# Patient Record
Sex: MALE | Race: WHITE | NOT HISPANIC OR LATINO | Employment: OTHER | URBAN - METROPOLITAN AREA
[De-identification: names, ages, dates, MRNs, and addresses within clinical notes are randomized per-mention and may not be internally consistent; named-entity substitution may affect disease eponyms.]

---

## 2018-10-24 ENCOUNTER — TELEPHONE (OUTPATIENT)
Dept: CARDIOLOGY | Facility: CLINIC | Age: 78
End: 2018-10-24

## 2018-10-24 NOTE — TELEPHONE ENCOUNTER
Daughter calling stating she was told to have her Dad here tomorrow at 11:30 to see you.  Is this correct    [phone]

## 2018-10-24 NOTE — TELEPHONE ENCOUNTER
Barbie please add this patient on for 10/25/18 at 2:00 with Dr. Stoner   I have spoke with the daughter TAVR consult  Ok per Dr. Stoner

## 2018-10-25 ENCOUNTER — OFFICE VISIT (OUTPATIENT)
Dept: CARDIOLOGY | Facility: CLINIC | Age: 78
End: 2018-10-25

## 2018-10-25 VITALS
HEIGHT: 70 IN | SYSTOLIC BLOOD PRESSURE: 102 MMHG | BODY MASS INDEX: 39.83 KG/M2 | HEART RATE: 48 BPM | WEIGHT: 278.2 LBS | DIASTOLIC BLOOD PRESSURE: 60 MMHG

## 2018-10-25 DIAGNOSIS — I35.0 NONRHEUMATIC AORTIC VALVE STENOSIS: Primary | ICD-10-CM

## 2018-10-25 DIAGNOSIS — K55.21 AVM (ARTERIOVENOUS MALFORMATION) OF SMALL BOWEL, ACQUIRED WITH HEMORRHAGE: ICD-10-CM

## 2018-10-25 DIAGNOSIS — I50.32 CHRONIC DIASTOLIC CONGESTIVE HEART FAILURE (HCC): ICD-10-CM

## 2018-10-25 DIAGNOSIS — E66.01 CLASS 2 SEVERE OBESITY DUE TO EXCESS CALORIES WITH SERIOUS COMORBIDITY AND BODY MASS INDEX (BMI) OF 39.0 TO 39.9 IN ADULT (HCC): ICD-10-CM

## 2018-10-25 DIAGNOSIS — R55 SYNCOPE, UNSPECIFIED SYNCOPE TYPE: ICD-10-CM

## 2018-10-25 DIAGNOSIS — R06.09 DYSPNEA ON EXERTION: ICD-10-CM

## 2018-10-25 DIAGNOSIS — Z79.4 TYPE 2 DIABETES MELLITUS WITH COMPLICATION, WITH LONG-TERM CURRENT USE OF INSULIN (HCC): ICD-10-CM

## 2018-10-25 DIAGNOSIS — I20.9 ANGINA PECTORIS (HCC): ICD-10-CM

## 2018-10-25 DIAGNOSIS — E11.8 TYPE 2 DIABETES MELLITUS WITH COMPLICATION, WITH LONG-TERM CURRENT USE OF INSULIN (HCC): ICD-10-CM

## 2018-10-25 DIAGNOSIS — Z95.1 S/P CABG (CORONARY ARTERY BYPASS GRAFT): ICD-10-CM

## 2018-10-25 DIAGNOSIS — I44.7 LEFT BUNDLE BRANCH BLOCK (LBBB): ICD-10-CM

## 2018-10-25 PROCEDURE — 99205 OFFICE O/P NEW HI 60 MIN: CPT | Performed by: INTERNAL MEDICINE

## 2018-10-25 PROCEDURE — 93000 ELECTROCARDIOGRAM COMPLETE: CPT | Performed by: INTERNAL MEDICINE

## 2018-10-25 RX ORDER — ALLOPURINOL 300 MG/1
300 TABLET ORAL NIGHTLY
COMMUNITY
End: 2020-06-16

## 2018-10-25 RX ORDER — LOPERAMIDE HYDROCHLORIDE 2 MG/1
2 CAPSULE ORAL AS NEEDED
COMMUNITY
End: 2019-01-13 | Stop reason: HOSPADM

## 2018-10-25 RX ORDER — FUROSEMIDE 40 MG/1
40 TABLET ORAL 2 TIMES DAILY
COMMUNITY
End: 2021-12-27 | Stop reason: HOSPADM

## 2018-10-25 RX ORDER — ISOSORBIDE DINITRATE 40 MG/1
40 TABLET ORAL 2 TIMES DAILY
COMMUNITY
End: 2020-12-02

## 2018-10-25 RX ORDER — FERROUS SULFATE 325(65) MG
325 TABLET ORAL
Status: ON HOLD | COMMUNITY
End: 2018-11-01 | Stop reason: HOSPADM

## 2018-10-25 NOTE — PROGRESS NOTES
Date of Office Visit: 10/25/2018  Encounter Provider: Dionicio Stoner MD  Place of Service: [facility] CARDIOLOGY  Patient Name: Ariel Elliott  :1940  [id number]    Chief Complaint   Patient presents with   • Cardiac Valve Problem   :     HPI: Ariel Elliott is a 78 y.o. 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 His daughter works in the PACU at [employer]. [facility]. He has had a prior bypass in  and we had last seen him 4 years ago. He felt like his heart was okay and he has not been back for followup. At that time in  he had a heart cath done. He has a LIMA to his LAD which is widely patent and looks good. He has a little bit of mild disease in his native LAD, not that much. He had a vein graft to a diagonal that was widely patent and looked good, a vein graft to an OM that had a critical lesion in it and had a 4.0 x 23 mm bare-metal stent implanted. His vein graft to the PDA looks good. All of his natives are otherwise occluded. He has a history of atrial flutter, he has a history of heart failure, but he has preserved LV function. He did have an ablation for his atrial flutter. Now the main thing that he has been having is he has terrible GI bleeding and AVMs and he gets almost weekly transfusions. Last week it was early in the morning, like 1:30. He went to the restroom, called out, “Oh my God!” His daughter went up to find him on the toilet, passed out, not responsive or breathing. She lifted his head up, his airway opened, he breathed, and he had been having some chest pain, and he says he has chest pain when his hemoglobin gets below 7. [facility] and [facility] had a [facility] of 6. They transfused him 3 units. They scoped him. They ablated some AVMs in the 1st part of his small intestine and 10 in his colon, but they also found that he has critical aortic stenosis. When  we had last seen him, he had an echo done and he had very mild AS, but he has not had any followup for it. His peak gradient across his aortic valve is over 70, his mean is over 40, valve area is 0.6. I did review the echo myself. He has not had any other episodes of syncope, no PND, no orthopnea. His weight is down about 40 pounds in the last 4 years. He does not really complain of a lot if his hemoglobin is adequate. He has diabetes. He has not had a stroke. He does not have significant lung disease.         Past Medical History:   Diagnosis Date   • Anemia    • Atrial flutter (CMS/HCC)    • Congestive heart failure (CMS/HCC)    • Coronary atherosclerosis    • Diabetes mellitus (CMS/HCC)    • GI (gastrointestinal bleed)    • Gout    • Hyperlipidemia    • Sleep apnea     Bi-Pap       Past Surgical History:   Procedure Laterality Date   • ABLATION OF DYSRHYTHMIC FOCUS     • CARDIAC CATHETERIZATION     • CORONARY ANGIOPLASTY     • CORONARY ARTERY BYPASS GRAFT     • HERNIA REPAIR     • HIP SURGERY Right        Social History     Social History   • Marital status:      Spouse name: N/A   • Number of children: N/A   • Years of education: N/A     Occupational History   • Not on file.     Social History Main Topics   • Smoking status: Former Smoker   • Smokeless tobacco: Not on file   • Alcohol use Defer      Comment: DENIED   • Drug use: Unknown   • Sexual activity: Not on file     Other Topics Concern   • Not on file     Social History Narrative   • No narrative on file       Family History   Problem Relation Age of Onset   • Diabetes Mother    • Cancer Mother        Review of Systems   Constitution: Negative for decreased appetite, fever, malaise/fatigue and weight loss.   HENT: Negative for nosebleeds.    Eyes: Negative for double vision.   Cardiovascular: Negative for chest pain, claudication, cyanosis, dyspnea on exertion, irregular heartbeat, leg swelling, near-syncope, orthopnea, palpitations, paroxysmal  "nocturnal dyspnea and syncope.   Respiratory: Negative for cough, hemoptysis and shortness of breath.    Hematologic/Lymphatic: Negative for bleeding problem.   Skin: Negative for rash.   Musculoskeletal: Negative for falls and myalgias.   Gastrointestinal: Negative for hematochezia, jaundice, melena, nausea and vomiting.   Genitourinary: Negative for hematuria.   Neurological: Negative for dizziness and seizures.   Psychiatric/Behavioral: Negative for altered mental status and memory loss.       Allergies   Allergen Reactions   • Statins          Current Outpatient Prescriptions:   •  allopurinol (ZYLOPRIM) 300 MG tablet, Take 300 mg by mouth Daily., Disp: , Rfl:   •  aspirin 81 MG tablet, Take  by mouth., Disp: , Rfl:   •  ferrous sulfate 325 (65 FE) MG tablet, Take 325 mg by mouth Daily With Breakfast., Disp: , Rfl:   •  furosemide (LASIX) 40 MG tablet, Take 40 mg by mouth 2 (Two) Times a Day., Disp: , Rfl:   •  gemfibrozil (LOPID) 600 MG tablet, Take  by mouth., Disp: , Rfl:   •  insulin aspart (novoLOG) 100 UNIT/ML injection, Inject  under the skin into the appropriate area as directed 3 (Three) Times a Day Before Meals., Disp: , Rfl:   •  insulin detemir (LEVEMIR) 100 UNIT/ML injection, Inject  under the skin into the appropriate area as directed Daily., Disp: , Rfl:   •  insulin glargine (LANTUS) 100 UNIT/ML injection, Inject  under the skin., Disp: , Rfl:   •  isosorbide dinitrate (ISORDIL) 40 MG tablet, Take 40 mg by mouth 3 (Three) Times a Day., Disp: , Rfl:   •  loperamide (IMODIUM) 2 MG capsule, Take 2 mg by mouth As Needed for Diarrhea., Disp: , Rfl:   •  octreotide (sandoSTATIN) 10 MG injection, Inject 10 mg into the appropriate muscle as directed by prescriber Every 28 (Twenty-Eight) Days., Disp: , Rfl:       Objective:     Vitals:    10/25/18 1412   BP: 102/60   Pulse: (!) 48   Weight: 126 kg (278 lb 3.2 oz)   Height: 177.8 cm (70\")     Body mass index is 39.92 kg/m².    Physical Exam "   Constitutional: He is oriented to person, place, and time. He appears well-developed and well-nourished.   HENT:   Head: Normocephalic.   Eyes: No scleral icterus.   Neck: No JVD present. No thyromegaly present.   Cardiovascular: Normal rate and regular rhythm.  Exam reveals no gallop and no friction rub.    Murmur heard.   Medium-pitched harsh crescendo-decrescendo mid to late systolic murmur is present with a grade of 3/6   Pulmonary/Chest: Effort normal and breath sounds normal. He has no wheezes. He has no rales.   Abdominal: Soft. There is no hepatosplenomegaly. There is no tenderness.   Musculoskeletal: Normal range of motion. He exhibits edema (+2 B).   Lymphadenopathy:     He has no cervical adenopathy.   Neurological: He is alert and oriented to person, place, and time.   Skin: Skin is warm and dry. No rash noted.   Psychiatric: He has a normal mood and affect.         ECG 12 Lead  Date/Time: 10/25/2018 4:20 PM  Performed by: CHRISTA COMBS  Authorized by: CHRISTA COMBS   Comparison: compared with previous ECG   Rhythm: sinus bradycardia  Conduction: complete LBBB and 1st degree  Clinical impression: abnormal ECG and pericarditis  Comments: He has a primary T wave abnormality this is different than prior ECGs             Assessment:       Diagnosis Plan   1. Nonrheumatic aortic valve stenosis  Case Request Cath Lab: Right and Left Heart Cath   2. S/P CABG (coronary artery bypass graft)  Case Request Cath Lab: Right and Left Heart Cath   3. Class 2 severe obesity due to excess calories with serious comorbidity and body mass index (BMI) of 39.0 to 39.9 in adult (CMS/[facility])  Case Request Cath Lab: Right and Left Heart Cath   4. Left bundle branch block (LBBB)  Case Request Cath Lab: Right and Left Heart Cath   5. Type 2 diabetes mellitus with complication, with long-term current use of insulin (CMS/HCC)  Case Request Cath Lab: Right and Left Heart Cath   6. Angina pectoris (CMS/HCC)  Case Request Cath Lab:  Right and Left Heart Cath   7. Dyspnea on exertion  Case Request Cath Lab: Right and Left Heart Cath   8. Syncope, unspecified syncope type  Case Request Cath Lab: Right and Left Heart Cath   9. AVM (arteriovenous malformation) of small bowel, acquired with hemorrhage (CMS/HCC)  Case Request Cath Lab: Right and Left Heart Cath          Plan:       This is a gentleman with severe degenerative aortic stenosis, symptoms of angina and syncope. He probably has at least class II heart failure also. Normal LV function, prior bypass, diabetes, overall general health not great, chronic GI bleeding. 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 I think we should move forward with evaluation and treatment of his aortic stenosis. We will have him see our surgeons, but I really cannot imagine doing another surgery on this anu. His STS score measures out at 6, but his overall health does not look great, and I think if we could fix him with a TAVR, it might be a better way to go. I am going to get him set up for a CT scan as well as a cardiac cath, and we will try and get him on the schedule in the next week or two for some kind of intervention on his aortic valve.     I spoke at length with the patient and his daughter about the risks of TAVR including death, stroke, need for a pacemaker (and his risk for the pacemaker is a bit higher than most because he has intrinsic left bundle branch block, first degree block), bleeding, vascular disease, and/or emergent surgery.  He agrees and understands.          As always, it has been a pleasure to participate in your patient's care.      Sincerely,       Dionicio Stoner MD

## 2018-10-26 ENCOUNTER — LAB (OUTPATIENT)
Dept: LAB | Facility: HOSPITAL | Age: 78
End: 2018-10-26

## 2018-10-26 ENCOUNTER — TRANSCRIBE ORDERS (OUTPATIENT)
Dept: CARDIOLOGY | Facility: CLINIC | Age: 78
End: 2018-10-26

## 2018-10-26 ENCOUNTER — HOSPITAL ENCOUNTER (OUTPATIENT)
Dept: CARDIOLOGY | Facility: HOSPITAL | Age: 78
Discharge: HOME | End: 2018-10-26
Attending: INTERNAL MEDICINE

## 2018-10-26 ENCOUNTER — HOSPITAL ENCOUNTER (OUTPATIENT)
Dept: CT IMAGING | Facility: HOSPITAL | Age: 78
Discharge: HOME | End: 2018-10-26
Attending: INTERNAL MEDICINE | Admitting: INTERNAL MEDICINE

## 2018-10-26 DIAGNOSIS — I50.32 CHRONIC DIASTOLIC CONGESTIVE HEART FAILURE (HCC): ICD-10-CM

## 2018-10-26 DIAGNOSIS — Z01.811 PRE-OP CHEST EXAM: ICD-10-CM

## 2018-10-26 DIAGNOSIS — I35.0 NONRHEUMATIC AORTIC (VALVE) STENOSIS: ICD-10-CM

## 2018-10-26 DIAGNOSIS — R09.89 CAROTID BRUIT, UNSPECIFIED LATERALITY: ICD-10-CM

## 2018-10-26 DIAGNOSIS — I35.0 NONRHEUMATIC AORTIC VALVE STENOSIS: ICD-10-CM

## 2018-10-26 DIAGNOSIS — Z13.6 SCREENING FOR CARDIOVASCULAR CONDITION: ICD-10-CM

## 2018-10-26 DIAGNOSIS — I35.0 NONRHEUMATIC AORTIC VALVE STENOSIS: Primary | ICD-10-CM

## 2018-10-26 DIAGNOSIS — Z01.811 PRE-OP CHEST EXAM: Primary | ICD-10-CM

## 2018-10-26 DIAGNOSIS — Z01.810 PREOP CARDIOVASCULAR EXAM: ICD-10-CM

## 2018-10-26 DIAGNOSIS — Z01.810 PREOP CARDIOVASCULAR EXAM: Primary | ICD-10-CM

## 2018-10-26 LAB
ANION GAP SERPL CALCULATED.3IONS-SCNC: 12.9 MMOL/L
BASOPHILS # BLD AUTO: 0.01 10*3/MM3 (ref 0–0.2)
BASOPHILS NFR BLD AUTO: 0.2 % (ref 0–1.5)
BUN BLD-MCNC: 40 MG/DL (ref 8–23)
BUN/CREAT SERPL: 29.4 (ref 7–25)
CALCIUM SPEC-SCNC: 8.5 MG/DL (ref 8.6–10.5)
CHLORIDE SERPL-SCNC: 108 MMOL/L (ref 98–107)
CO2 SERPL-SCNC: 18.1 MMOL/L (ref 22–29)
CREAT BLD-MCNC: 1.36 MG/DL (ref 0.76–1.27)
CREAT BLDA-MCNC: 1.4 MG/DL (ref 0.6–1.3)
DEPRECATED RDW RBC AUTO: 55.4 FL (ref 37–54)
EOSINOPHIL # BLD AUTO: 0.25 10*3/MM3 (ref 0–0.7)
EOSINOPHIL NFR BLD AUTO: 4.5 % (ref 0.3–6.2)
ERYTHROCYTE [DISTWIDTH] IN BLOOD BY AUTOMATED COUNT: 17.1 % (ref 11.5–14.5)
GFR SERPL CREATININE-BSD FRML MDRD: 51 ML/MIN/1.73
GLUCOSE BLD-MCNC: 155 MG/DL (ref 65–99)
HCT VFR BLD AUTO: 30.1 % (ref 40.4–52.2)
HGB BLD-MCNC: 8.9 G/DL (ref 13.7–17.6)
IMM GRANULOCYTES # BLD: 0 10*3/MM3 (ref 0–0.03)
IMM GRANULOCYTES NFR BLD: 0 % (ref 0–0.5)
LYMPHOCYTES # BLD AUTO: 0.88 10*3/MM3 (ref 0.9–4.8)
LYMPHOCYTES NFR BLD AUTO: 16 % (ref 19.6–45.3)
MCH RBC QN AUTO: 26.8 PG (ref 27–32.7)
MCHC RBC AUTO-ENTMCNC: 29.6 G/DL (ref 32.6–36.4)
MCV RBC AUTO: 90.7 FL (ref 79.8–96.2)
MONOCYTES # BLD AUTO: 0.41 10*3/MM3 (ref 0.2–1.2)
MONOCYTES NFR BLD AUTO: 7.4 % (ref 5–12)
NEUTROPHILS # BLD AUTO: 3.96 10*3/MM3 (ref 1.9–8.1)
NEUTROPHILS NFR BLD AUTO: 71.9 % (ref 42.7–76)
PLATELET # BLD AUTO: 114 10*3/MM3 (ref 140–500)
PMV BLD AUTO: 11.8 FL (ref 6–12)
POTASSIUM BLD-SCNC: 4.5 MMOL/L (ref 3.5–5.2)
RBC # BLD AUTO: 3.32 10*6/MM3 (ref 4.6–6)
SODIUM BLD-SCNC: 139 MMOL/L (ref 136–145)
WBC NRBC COR # BLD: 5.51 10*3/MM3 (ref 4.5–10.7)

## 2018-10-26 PROCEDURE — 85025 COMPLETE CBC W/AUTO DIFF WBC: CPT

## 2018-10-26 PROCEDURE — 82565 ASSAY OF CREATININE: CPT

## 2018-10-26 PROCEDURE — 0 IOPAMIDOL PER 1 ML: Performed by: INTERNAL MEDICINE

## 2018-10-26 PROCEDURE — 74174 CTA ABD&PLVS W/CONTRAST: CPT

## 2018-10-26 PROCEDURE — 80048 BASIC METABOLIC PNL TOTAL CA: CPT

## 2018-10-26 PROCEDURE — 93010 ELECTROCARDIOGRAM REPORT: CPT | Performed by: INTERNAL MEDICINE

## 2018-10-26 PROCEDURE — 93005 ELECTROCARDIOGRAM TRACING: CPT | Performed by: INTERNAL MEDICINE

## 2018-10-26 PROCEDURE — 71275 CT ANGIOGRAPHY CHEST: CPT

## 2018-10-26 PROCEDURE — 36415 COLL VENOUS BLD VENIPUNCTURE: CPT

## 2018-10-26 RX ADMIN — IOPAMIDOL 95 ML: 755 INJECTION, SOLUTION INTRAVENOUS at 11:27

## 2018-10-30 ENCOUNTER — HOSPITAL ENCOUNTER (OUTPATIENT)
Dept: CARDIOLOGY | Facility: HOSPITAL | Age: 78
Discharge: HOME | End: 2018-10-30
Attending: INTERNAL MEDICINE

## 2018-10-30 ENCOUNTER — HOSPITAL ENCOUNTER (OUTPATIENT)
Dept: RESPIRATORY THERAPY | Facility: HOSPITAL | Age: 78
Discharge: HOME | End: 2018-10-30
Attending: INTERNAL MEDICINE | Admitting: INTERNAL MEDICINE

## 2018-10-30 DIAGNOSIS — R09.89 CAROTID BRUIT, UNSPECIFIED LATERALITY: ICD-10-CM

## 2018-10-30 DIAGNOSIS — I35.0 NONRHEUMATIC AORTIC VALVE STENOSIS: ICD-10-CM

## 2018-10-30 DIAGNOSIS — I50.32 CHRONIC DIASTOLIC CONGESTIVE HEART FAILURE (HCC): ICD-10-CM

## 2018-10-30 LAB
BH CV XLRA MEAS LEFT CCA RATIO VEL: 51.9 CM/SEC
BH CV XLRA MEAS LEFT DIST CCA EDV: 10.2 CM/SEC
BH CV XLRA MEAS LEFT DIST CCA PSV: 51.9 CM/SEC
BH CV XLRA MEAS LEFT DIST ICA EDV: -18.8 CM/SEC
BH CV XLRA MEAS LEFT DIST ICA PSV: -78.6 CM/SEC
BH CV XLRA MEAS LEFT ICA RATIO VEL: 83.3 CM/SEC
BH CV XLRA MEAS LEFT ICA/CCA RATIO: 1.6
BH CV XLRA MEAS LEFT MID ICA EDV: -21.6 CM/SEC
BH CV XLRA MEAS LEFT MID ICA PSV: -61.8 CM/SEC
BH CV XLRA MEAS LEFT PROX CCA EDV: 9.4 CM/SEC
BH CV XLRA MEAS LEFT PROX CCA PSV: 73.9 CM/SEC
BH CV XLRA MEAS LEFT PROX ECA EDV: -4.7 CM/SEC
BH CV XLRA MEAS LEFT PROX ECA PSV: -59.3 CM/SEC
BH CV XLRA MEAS LEFT PROX ICA EDV: 19.3 CM/SEC
BH CV XLRA MEAS LEFT PROX ICA PSV: 83.3 CM/SEC
BH CV XLRA MEAS LEFT PROX SCLA PSV: 147 CM/SEC
BH CV XLRA MEAS LEFT VERTEBRAL A EDV: 14.9 CM/SEC
BH CV XLRA MEAS LEFT VERTEBRAL A PSV: 47.9 CM/SEC
BH CV XLRA MEAS RIGHT CCA RATIO VEL: 72.7 CM/SEC
BH CV XLRA MEAS RIGHT DIST CCA EDV: 11.7 CM/SEC
BH CV XLRA MEAS RIGHT DIST CCA PSV: 72.7 CM/SEC
BH CV XLRA MEAS RIGHT DIST ICA EDV: -17.7 CM/SEC
BH CV XLRA MEAS RIGHT DIST ICA PSV: -64.4 CM/SEC
BH CV XLRA MEAS RIGHT ICA RATIO VEL: -73.9 CM/SEC
BH CV XLRA MEAS RIGHT ICA/CCA RATIO: -1
BH CV XLRA MEAS RIGHT MID ICA EDV: -22 CM/SEC
BH CV XLRA MEAS RIGHT MID ICA PSV: -73.9 CM/SEC
BH CV XLRA MEAS RIGHT PROX CCA EDV: 10.6 CM/SEC
BH CV XLRA MEAS RIGHT PROX CCA PSV: 68 CM/SEC
BH CV XLRA MEAS RIGHT PROX ECA PSV: -89.1 CM/SEC
BH CV XLRA MEAS RIGHT PROX ICA EDV: -14.1 CM/SEC
BH CV XLRA MEAS RIGHT PROX ICA PSV: -67.6 CM/SEC
BH CV XLRA MEAS RIGHT PROX SCLA PSV: 167 CM/SEC
BH CV XLRA MEAS RIGHT VERTEBRAL A EDV: 7.5 CM/SEC
BH CV XLRA MEAS RIGHT VERTEBRAL A PSV: 27.9 CM/SEC
LEFT ARM BP: NORMAL MMHG
RIGHT ARM BP: NORMAL MMHG

## 2018-10-30 PROCEDURE — 94729 DIFFUSING CAPACITY: CPT

## 2018-10-30 PROCEDURE — 94726 PLETHYSMOGRAPHY LUNG VOLUMES: CPT

## 2018-10-30 PROCEDURE — 94010 BREATHING CAPACITY TEST: CPT

## 2018-10-30 PROCEDURE — 93880 EXTRACRANIAL BILAT STUDY: CPT

## 2018-11-01 ENCOUNTER — DOCUMENTATION (OUTPATIENT)
Dept: CARDIAC SURGERY | Facility: CLINIC | Age: 78
End: 2018-11-01

## 2018-11-01 ENCOUNTER — HOSPITAL ENCOUNTER (OUTPATIENT)
Facility: HOSPITAL | Age: 78
Setting detail: HOSPITAL OUTPATIENT SURGERY
Discharge: HOME | End: 2018-11-01
Attending: INTERNAL MEDICINE | Admitting: INTERNAL MEDICINE

## 2018-11-01 VITALS
RESPIRATION RATE: 18 BRPM | SYSTOLIC BLOOD PRESSURE: 157 MMHG | OXYGEN SATURATION: 98 % | TEMPERATURE: 97 F | BODY MASS INDEX: 37.71 KG/M2 | HEART RATE: 52 BPM | DIASTOLIC BLOOD PRESSURE: 84 MMHG | WEIGHT: 263.38 LBS | HEIGHT: 70 IN

## 2018-11-01 DIAGNOSIS — I44.7 LEFT BUNDLE BRANCH BLOCK (LBBB): ICD-10-CM

## 2018-11-01 DIAGNOSIS — R06.09 DYSPNEA ON EXERTION: ICD-10-CM

## 2018-11-01 DIAGNOSIS — I35.0 NONRHEUMATIC AORTIC VALVE STENOSIS: ICD-10-CM

## 2018-11-01 DIAGNOSIS — K55.21 AVM (ARTERIOVENOUS MALFORMATION) OF SMALL BOWEL, ACQUIRED WITH HEMORRHAGE: ICD-10-CM

## 2018-11-01 DIAGNOSIS — Z95.1 S/P CABG (CORONARY ARTERY BYPASS GRAFT): ICD-10-CM

## 2018-11-01 DIAGNOSIS — R55 SYNCOPE, UNSPECIFIED SYNCOPE TYPE: ICD-10-CM

## 2018-11-01 DIAGNOSIS — E66.01 CLASS 2 SEVERE OBESITY DUE TO EXCESS CALORIES WITH SERIOUS COMORBIDITY AND BODY MASS INDEX (BMI) OF 39.0 TO 39.9 IN ADULT (HCC): ICD-10-CM

## 2018-11-01 DIAGNOSIS — E11.8 TYPE 2 DIABETES MELLITUS WITH COMPLICATION, WITH LONG-TERM CURRENT USE OF INSULIN (HCC): ICD-10-CM

## 2018-11-01 DIAGNOSIS — I20.9 ANGINA PECTORIS (HCC): ICD-10-CM

## 2018-11-01 DIAGNOSIS — Z79.4 TYPE 2 DIABETES MELLITUS WITH COMPLICATION, WITH LONG-TERM CURRENT USE OF INSULIN (HCC): ICD-10-CM

## 2018-11-01 LAB
GLUCOSE BLDC GLUCOMTR-MCNC: 157 MG/DL (ref 70–130)
GLUCOSE BLDC GLUCOMTR-MCNC: 166 MG/DL (ref 70–130)

## 2018-11-01 PROCEDURE — 99204 OFFICE O/P NEW MOD 45 MIN: CPT | Performed by: THORACIC SURGERY (CARDIOTHORACIC VASCULAR SURGERY)

## 2018-11-01 PROCEDURE — 0 IOPAMIDOL PER 1 ML: Performed by: INTERNAL MEDICINE

## 2018-11-01 PROCEDURE — 25010000002 MIDAZOLAM PER 1 MG: Performed by: INTERNAL MEDICINE

## 2018-11-01 PROCEDURE — C1894 INTRO/SHEATH, NON-LASER: HCPCS | Performed by: INTERNAL MEDICINE

## 2018-11-01 PROCEDURE — 82962 GLUCOSE BLOOD TEST: CPT

## 2018-11-01 PROCEDURE — C1769 GUIDE WIRE: HCPCS | Performed by: INTERNAL MEDICINE

## 2018-11-01 PROCEDURE — 99153 MOD SED SAME PHYS/QHP EA: CPT | Performed by: INTERNAL MEDICINE

## 2018-11-01 PROCEDURE — 93457 R HRT ART/GRFT ANGIO: CPT | Performed by: INTERNAL MEDICINE

## 2018-11-01 PROCEDURE — C1887 CATHETER, GUIDING: HCPCS | Performed by: INTERNAL MEDICINE

## 2018-11-01 PROCEDURE — 25010000002 FENTANYL CITRATE (PF) 100 MCG/2ML SOLUTION: Performed by: INTERNAL MEDICINE

## 2018-11-01 PROCEDURE — 85018 HEMOGLOBIN: CPT

## 2018-11-01 PROCEDURE — 25010000002 HEPARIN (PORCINE) PER 1000 UNITS: Performed by: INTERNAL MEDICINE

## 2018-11-01 PROCEDURE — 99152 MOD SED SAME PHYS/QHP 5/>YRS: CPT | Performed by: INTERNAL MEDICINE

## 2018-11-01 PROCEDURE — 85014 HEMATOCRIT: CPT

## 2018-11-01 RX ORDER — MIDAZOLAM HYDROCHLORIDE 1 MG/ML
INJECTION INTRAMUSCULAR; INTRAVENOUS AS NEEDED
Status: DISCONTINUED | OUTPATIENT
Start: 2018-11-01 | End: 2018-11-01 | Stop reason: HOSPADM

## 2018-11-01 RX ORDER — SODIUM CHLORIDE 0.9 % (FLUSH) 0.9 %
3 SYRINGE (ML) INJECTION EVERY 12 HOURS SCHEDULED
Status: DISCONTINUED | OUTPATIENT
Start: 2018-11-01 | End: 2018-11-01 | Stop reason: HOSPADM

## 2018-11-01 RX ORDER — SODIUM CHLORIDE 9 MG/ML
75 INJECTION, SOLUTION INTRAVENOUS CONTINUOUS
Status: DISCONTINUED | OUTPATIENT
Start: 2018-11-01 | End: 2018-11-01 | Stop reason: HOSPADM

## 2018-11-01 RX ORDER — OMEPRAZOLE 40 MG/1
40 CAPSULE, DELAYED RELEASE ORAL 2 TIMES DAILY
COMMUNITY

## 2018-11-01 RX ORDER — SODIUM CHLORIDE 9 MG/ML
100 INJECTION, SOLUTION INTRAVENOUS CONTINUOUS
Status: DISCONTINUED | OUTPATIENT
Start: 2018-11-01 | End: 2018-11-01 | Stop reason: HOSPADM

## 2018-11-01 RX ORDER — SODIUM CHLORIDE 0.9 % (FLUSH) 0.9 %
3-10 SYRINGE (ML) INJECTION AS NEEDED
Status: DISCONTINUED | OUTPATIENT
Start: 2018-11-01 | End: 2018-11-01 | Stop reason: HOSPADM

## 2018-11-01 RX ORDER — LIDOCAINE HYDROCHLORIDE 20 MG/ML
INJECTION, SOLUTION INFILTRATION; PERINEURAL AS NEEDED
Status: DISCONTINUED | OUTPATIENT
Start: 2018-11-01 | End: 2018-11-01 | Stop reason: HOSPADM

## 2018-11-01 RX ORDER — FENTANYL CITRATE 50 UG/ML
INJECTION, SOLUTION INTRAMUSCULAR; INTRAVENOUS AS NEEDED
Status: DISCONTINUED | OUTPATIENT
Start: 2018-11-01 | End: 2018-11-01 | Stop reason: HOSPADM

## 2018-11-01 RX ORDER — LIDOCAINE HYDROCHLORIDE 10 MG/ML
0.1 INJECTION, SOLUTION EPIDURAL; INFILTRATION; INTRACAUDAL; PERINEURAL ONCE AS NEEDED
Status: DISCONTINUED | OUTPATIENT
Start: 2018-11-01 | End: 2018-11-01 | Stop reason: HOSPADM

## 2018-11-01 RX ADMIN — Medication 500 UNITS: at 16:05

## 2018-11-01 RX ADMIN — SODIUM CHLORIDE 75 ML/HR: 9 INJECTION, SOLUTION INTRAVENOUS at 10:57

## 2018-11-01 NOTE — CONSULTS
Consults     Progress Notes  Encounter Date: 10/25/2018  Dionicio Stoner MD   Cardiology   Procedure Orders:   1. ECG 12 Lead [[id number]] ordered by Dionicio Stoner MD at 10/25/18 1620   Post-procedure Diagnoses:   1. Nonrheumatic aortic valve stenosis [I35.0]   2. S/P CABG (coronary artery bypass graft) [Z95.1]   3. Class 2 severe obesity due to excess calories with serious comorbidity and body mass index (BMI) of 39.0 to 39.9 in adult (CMS/[facility]) [E66.01, Z68.39]   4. Left bundle branch block (LBBB) [I44.7]   5. Type 2 diabetes mellitus with complication, with long-term current use of insulin (CMS/[facility]) [E11.8, Z79.4]   6. Angina pectoris (CMS/[facility]) [I20.9]   7. Dyspnea on exertion [R06.09]   8. Syncope, unspecified syncope type [R55]   9. AVM (arteriovenous malformation) of small bowel, acquired with hemorrhage (CMS/[facility]) [K55.8]   Expand All Collapse All    []Manual[]Template  []Copied  Date of Office Visit: 10/25/2018  Encounter Provider: Dionicio Stoner MD  Place of Service: [facility] CARDIOLOGY  Patient Name: Ariel Elliott  :1940  [id number]         Chief Complaint   Patient presents with   • Cardiac Valve Problem   :      HPI: Ariel Elliott is a 78 y.o. 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 His daughter works in the PACU at [employer]. [facility]. He has had a prior bypass in  and we had last seen him 4 years ago. He felt like his heart was okay and he has not been back for followup. At that time in  he had a heart cath done. He has a LIMA to his LAD which is widely patent and looks good. He has a little bit of mild disease in his native LAD, not that much. He had a vein graft to a diagonal that was widely patent and looked good, a vein graft to an OM that had a critical lesion in it and had a 4.0 x 23 mm bare-metal stent implanted. His vein graft to the PDA looks good.  All of his natives are otherwise occluded. He has a history of atrial flutter, he has a history of heart failure, but he has preserved LV function. He did have an ablation for his atrial flutter. Now the main thing that he has been having is he has terrible GI bleeding and AVMs and he gets almost weekly transfusions. Last week it was early in the morning, like 1:30. He went to the restroom, called out, “Oh my God!” His daughter went up to find him on the toilet, passed out, not responsive or breathing. She lifted his head up, his airway opened, he breathed, and he had been having some chest pain, and he says he has chest pain when his hemoglobin gets below 7. [facility] and [facility] a [facility] of 6. They transfused him 3 units. They scoped him. They ablated some AVMs in the 1st part of his small intestine and 10 in his colon, but they also found that he has critical aortic stenosis. When we had last seen him, he had an echo done and he had very mild AS, but he has not had any followup for it. His peak gradient across his aortic valve is over 70, his mean is over 40, valve area is 0.6. I did review the echo myself. He has not had any other episodes of syncope, no PND, no orthopnea. His weight is down about 40 pounds in the last 4 years. He does not really complain of a lot if his hemoglobin is adequate. He has diabetes. He has not had a stroke. He does not have significant lung disease.         Medical History        Past Medical History:   Diagnosis Date   • Anemia     • Atrial flutter (CMS/HCC)     • Congestive heart failure (CMS/HCC)     • Coronary atherosclerosis     • Diabetes mellitus (CMS/HCC)     • GI (gastrointestinal bleed)     • Gout     • Hyperlipidemia     • Sleep apnea       Bi-Pap            Surgical History         Past Surgical History:   Procedure Laterality Date   • ABLATION OF DYSRHYTHMIC FOCUS       • CARDIAC CATHETERIZATION       • CORONARY ANGIOPLASTY       • CORONARY ARTERY BYPASS  GRAFT       • HERNIA REPAIR       • HIP SURGERY Right              Social History   Social History            Social History   • Marital status:        Spouse name: N/A   • Number of children: N/A   • Years of education: N/A          Occupational History   • Not on file.               Social History Main Topics     • Smoking status: Former Smoker    • Smokeless tobacco: Not on file    • Alcohol use Defer          Comment: DENIED     • Drug use: Unknown   • Sexual activity: Not on file            Other Topics Concern   • Not on file          Social History Narrative   • No narrative on file                  Family History   Problem Relation Age of Onset   • Diabetes Mother     • Cancer Mother           Review of Systems   Constitution: Negative for decreased appetite, fever, malaise/fatigue and weight loss.   HENT: Negative for nosebleeds.    Eyes: Negative for double vision.   Cardiovascular: Negative for chest pain, claudication, cyanosis, dyspnea on exertion, irregular heartbeat, leg swelling, near-syncope, orthopnea, palpitations, paroxysmal nocturnal dyspnea and syncope.   Respiratory: Negative for cough, hemoptysis and shortness of breath.    Hematologic/Lymphatic: Negative for bleeding problem.   Skin: Negative for rash.   Musculoskeletal: Negative for falls and myalgias.   Gastrointestinal: Negative for hematochezia, jaundice, melena, nausea and vomiting.   Genitourinary: Negative for hematuria.   Neurological: Negative for dizziness and seizures.   Psychiatric/Behavioral: Negative for altered mental status and memory loss.              Allergies   Allergen Reactions   • Statins              Current Outpatient Prescriptions:   •  allopurinol (ZYLOPRIM) 300 MG tablet, Take 300 mg by mouth Daily., Disp: , Rfl:   •  aspirin 81 MG tablet, Take  by mouth., Disp: , Rfl:   •  ferrous sulfate 325 (65 FE) MG tablet, Take 325 mg by mouth Daily With Breakfast., Disp: , Rfl:   •  furosemide (LASIX) 40 MG tablet,  "Take 40 mg by mouth 2 (Two) Times a Day., Disp: , Rfl:   •  gemfibrozil (LOPID) 600 MG tablet, Take  by mouth., Disp: , Rfl:   •  insulin aspart (novoLOG) 100 UNIT/ML injection, Inject  under the skin into the appropriate area as directed 3 (Three) Times a Day Before Meals., Disp: , Rfl:   •  insulin detemir (LEVEMIR) 100 UNIT/ML injection, Inject  under the skin into the appropriate area as directed Daily., Disp: , Rfl:   •  insulin glargine (LANTUS) 100 UNIT/ML injection, Inject  under the skin., Disp: , Rfl:   •  isosorbide dinitrate (ISORDIL) 40 MG tablet, Take 40 mg by mouth 3 (Three) Times a Day., Disp: , Rfl:   •  loperamide (IMODIUM) 2 MG capsule, Take 2 mg by mouth As Needed for Diarrhea., Disp: , Rfl:   •  octreotide (sandoSTATIN) 10 MG injection, Inject 10 mg into the appropriate muscle as directed by prescriber Every 28 (Twenty-Eight) Days., Disp: , Rfl:       Objective:      Vitals       Vitals:     10/25/18 1412   BP: 102/60   Pulse: (!) 48   Weight: 126 kg (278 lb 3.2 oz)   Height: 177.8 cm (70\")         Body mass index is 39.92 kg/m².     Physical Exam   Constitutional: He is oriented to person, place, and time. He appears well-developed and well-nourished.   HENT:   Head: Normocephalic.   Eyes: No scleral icterus.   Neck: No JVD present. No thyromegaly present.   Cardiovascular: Normal rate and regular rhythm.  Exam reveals no gallop and no friction rub.    Murmur heard.   Medium-pitched harsh crescendo-decrescendo mid to late systolic murmur is present with a grade of 3/6   Pulmonary/Chest: Effort normal and breath sounds normal. He has no wheezes. He has no rales.   Abdominal: Soft. There is no hepatosplenomegaly. There is no tenderness.   Musculoskeletal: Normal range of motion. He exhibits edema (+2 B).   Lymphadenopathy:     He has no cervical adenopathy.   Neurological: He is alert and oriented to person, place, and time.   Skin: Skin is warm and dry. No rash noted.   Psychiatric: He has a " normal mood and affect.           ECG 12 Lead  Date/Time: 10/25/2018 4:20 PM  Performed by: CHRISTA COMBS  Authorized by: CHRISTA COMBS   Comparison: compared with previous ECG   Rhythm: sinus bradycardia  Conduction: complete LBBB and 1st degree  Clinical impression: abnormal ECG and pericarditis  Comments: He has a primary T wave abnormality this is different than prior ECGs            Assessment:        Diagnosis Plan   1. Nonrheumatic aortic valve stenosis  Case Request Cath Lab: Right and Left Heart Cath   2. S/P CABG (coronary artery bypass graft)  Case Request Cath Lab: Right and Left Heart Cath   3. Class 2 severe obesity due to excess calories with serious comorbidity and body mass index (BMI) of 39.0 to 39.9 in adult (CMS/[facility])  Case Request Cath Lab: Right and Left Heart Cath   4. Left bundle branch block (LBBB)  Case Request Cath Lab: Right and Left Heart Cath   5. Type 2 diabetes mellitus with complication, with long-term current use of insulin (CMS/HCC)  Case Request Cath Lab: Right and Left Heart Cath   6. Angina pectoris (CMS/HCC)  Case Request Cath Lab: Right and Left Heart Cath   7. Dyspnea on exertion  Case Request Cath Lab: Right and Left Heart Cath   8. Syncope, unspecified syncope type  Case Request Cath Lab: Right and Left Heart Cath   9. AVM (arteriovenous malformation) of small bowel, acquired with hemorrhage (CMS/HCC)  Case Request Cath Lab: Right and Left Heart Cath            Plan:       This is a gentleman with severe degenerative aortic stenosis, symptoms of angina and syncope. He probably has at least class II heart failure also. Normal LV function, prior bypass, diabetes, overall general health not great, chronic GI bleeding. 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 I think we should move forward with evaluation and treatment  of his aortic stenosis. We will have him see our surgeons, but I really cannot imagine doing another surgery on this anu. His STS score measures out at 6, but his overall health does not look great, and I think if we could fix him with a TAVR, it might be a better way to go. I am going to get him set up for a CT scan as well as a cardiac cath, and we will try and get him on the schedule in the next week or two for some kind of intervention on his aortic valve.   I spoke at length with the patient and his daughter about the risks of TAVR including death, stroke, need for a pacemaker (and his risk for the pacemaker is a bit higher than most because he has intrinsic left bundle branch block, first degree block), bleeding, vascular disease, and/or emergent surgery.  He agrees and understands.          As always, it has been a pleasure to participate in your patient's care.        Sincerely,         Dionicio Stoner MD        TAVR workup H&P reviewed. The patient was examined and there are no changes to the H&P. I have explained the risks and benefits of the procedure to the patient.  The patient understands and agrees to proceed

## 2018-11-01 NOTE — INTERVAL H&P NOTE
Severe aortic stenosis under evaluation for TAVR, 1 under the left and right heart catheter injection of the vein grafts and ROX. H&P reviewed. The patient was examined and there are no changes to the H&P. I have explained the risks and benefits of the procedure to the patient.  The patient understands and agrees to proceed

## 2018-11-01 NOTE — CONSULTS
CARDIOTHORACIC SURGERY CONSULT     Patient Care Team:  Zeb Meadows MD as PCP - General (Internal Medicine)    Chief complaint: Shortness of breath and chest pain.        History of Present Illness    This is an old patient of mine who is being admitted today for evaluation for TAVR.  He had a CABG in 2003.  He is done well over the years but had a major GI bleed.  He has been followed for his aortic stenosis.  It is worsened aware he is now class III.  His ejection fraction is 58% on echocardiogram with a velocity of 529 cm/s.  The mean gradient is 66 mmHg the valve areas 0.66 cm² with moderate MR.  This was done at the [facility].  Dr. Stoner that a catheterization today.  He has a huge mammary open to the diagonal branch and then to LAD.  There is a disease vein graft to the circumflex that is small.  The right graft is open.    Considering everything I would recommend TAVR rather than surgical aortic valve replacement    Review of Systems   Constitutional: Positive for activity change and fatigue.   HENT: Negative.    Eyes: Negative.    Respiratory: Positive for chest tightness and shortness of breath.    Cardiovascular: Positive for chest pain, palpitations and leg swelling.   Gastrointestinal: Positive for blood in stool.   Endocrine: Negative for cold intolerance, heat intolerance, polydipsia and polyuria.   Genitourinary: Negative.  Negative for difficulty urinating.   Musculoskeletal: Negative.    Skin: Negative.         Past Medical History:   Diagnosis Date   • Anemia    • Arrhythmia    • Atrial flutter (CMS/HCC)    • Congestive heart failure (CMS/HCC)    • Coronary atherosclerosis    • Diabetes mellitus (CMS/HCC)    • GI (gastrointestinal bleed)    • Gout    • Hard to intubate    • Hyperlipidemia    • Sleep apnea     Bi-Pap     Past Surgical History:   Procedure Laterality Date   • ABLATION OF DYSRHYTHMIC FOCUS     • CARDIAC CATHETERIZATION     • CHOLECYSTECTOMY     • COLONOSCOPY     • CORONARY  "ANGIOPLASTY     • CORONARY ARTERY BYPASS GRAFT     • HAND SURGERY     • HEMORRHOIDECTOMY     • HERNIA REPAIR      VENTRAL HERNIA REPEATEDLY   • HIP SURGERY Right    • PORTACATH PLACEMENT      POWER PORT   • SINUS SURGERY       Family History   Problem Relation Age of Onset   • Diabetes Mother    • Cancer Mother      Social History   Substance Use Topics   • Smoking status: Former Smoker   • Smokeless tobacco: Never Used      Comment: QUIT 35YRS AGO   • Alcohol use No     No prescriptions prior to admission.       Allergies:  Statins    Objective      Vital Signs  Temp:  [97 °F (36.1 °C)] 97 °F (36.1 °C)  Heart Rate:  [51-57] 52  Resp:  [18-20] 18  BP: (133-157)/(56-84) 157/84    Flowsheet Rows      First Filed Value   Admission Height  177.8 cm (70\") Documented at 11/01/2018 1006   Admission Weight  119 kg (263 lb 6 oz) Documented at 11/01/2018 1015        177.8 cm (70\")    Physical Exam   Constitutional: He is oriented to person, place, and time. He appears well-developed and well-nourished. No distress. He is not intubated.   HENT:   Head: Normocephalic and atraumatic.   Right Ear: External ear normal.   Left Ear: External ear normal.   Eyes: Pupils are equal, round, and reactive to light. Conjunctivae and EOM are normal. Right eye exhibits no discharge. Left eye exhibits no discharge. No scleral icterus.   Neck: Normal range of motion. Neck supple. No JVD present. No tracheal deviation present. No thyromegaly present.   Cardiovascular: Normal rate and regular rhythm.   Occasional extrasystoles are present. PMI is displaced.    Murmur heard.   Crescendo decrescendo systolic murmur is present with a grade of 3/6   Pulses:       Carotid pulses are 2+ on the right side with bruit, and 2+ on the left side with bruit.       Radial pulses are 2+ on the right side, and 2+ on the left side.        Femoral pulses are 2+ on the right side, and 2+ on the left side.       Popliteal pulses are 2+ on the right side, and 2+ on " the left side.        Dorsalis pedis pulses are 2+ on the right side, and 2+ on the left side.        Posterior tibial pulses are 2+ on the right side, and 2+ on the left side.   Pulmonary/Chest: Effort normal and breath sounds normal. No accessory muscle usage or stridor. No apnea, no tachypnea and no bradypnea. He is not intubated. He has no wheezes. He has no rales. Chest wall is not dull to percussion. He exhibits no mass, no bony tenderness, no laceration and no crepitus.       Abdominal: He exhibits distension. He exhibits no mass. There is no tenderness. There is no rebound and no guarding. No hernia.   Musculoskeletal: Normal range of motion. He exhibits no edema, tenderness or deformity.   Lymphadenopathy:     He has no cervical adenopathy.   Neurological: He is alert and oriented to person, place, and time.   Skin: Skin is warm and dry. Capillary refill takes less than 2 seconds. No rash noted. He is not diaphoretic. No erythema. No pallor.   Psychiatric: He has a normal mood and affect. His behavior is normal. Judgment and thought content normal.   Nursing note and vitals reviewed.      Results Review:   Lab Results (last 24 hours)     Procedure Component Value Units Date/Time    POC Glucose Once [[id number]]  (Abnormal) Collected:  11/01/18 1455    Specimen:  Blood Updated:  11/01/18 1456     Glucose 157 (H) mg/dL     Narrative:       Meter: [id number] : [id number] Tonia BARNES RN    POC Glucose Once [[id number]]  (Abnormal) Collected:  11/01/18 1031    Specimen:  Blood Updated:  11/01/18 1040     Glucose 166 (H) mg/dL     Narrative:       Meter: [id number] : [id number] Ly Montague RN              Assessment/Plan       Nonrheumatic aortic valve stenosis    S/P CABG (coronary artery bypass graft)    Class 2 severe obesity due to excess calories with serious comorbidity and body mass index (BMI) of 39.0 to 39.9 in adult (CMS/[facility])    Left bundle branch block (LBBB)    Type 2 diabetes mellitus with  complication, with long-term current use of insulin (CMS/HCC)    Dyspnea on exertion    Angina pectoris (CMS/HCC)    Syncope    AVM (arteriovenous malformation) of small bowel, acquired with hemorrhage (CMS/HCC)          Plan:   (He has severe aortic stenosis and I would recommend TAVR rather than surgical aortic valve replacement.).       I discussed the patients findings and my recommendations with Dr. Stoner, patient, and family    I personally reviewed the Cardiac Cath and ECHO.    João Zaidi MD  11/01/18  7:25 PM

## 2018-11-01 NOTE — PROGRESS NOTES
Subjective   Ariel Elliott is a 78 y.o. male.     History of Present Illness     {Common H&P Review Areas:68627}    Review of Systems    Objective   Physical Exam      Assessment/Plan   {Assess/PlanSmartLinks:06981}

## 2018-11-01 NOTE — H&P (VIEW-ONLY)
Consults     Progress Notes  Encounter Date: 10/25/2018  Dionicio Stoner MD   Cardiology   Procedure Orders:   1. ECG 12 Lead [[id number]] ordered by Dionicio Stoner MD at 10/25/18 1620   Post-procedure Diagnoses:   1. Nonrheumatic aortic valve stenosis [I35.0]   2. S/P CABG (coronary artery bypass graft) [Z95.1]   3. Class 2 severe obesity due to excess calories with serious comorbidity and body mass index (BMI) of 39.0 to 39.9 in adult (CMS/[facility]) [E66.01, Z68.39]   4. Left bundle branch block (LBBB) [I44.7]   5. Type 2 diabetes mellitus with complication, with long-term current use of insulin (CMS/[facility]) [E11.8, Z79.4]   6. Angina pectoris (CMS/[facility]) [I20.9]   7. Dyspnea on exertion [R06.09]   8. Syncope, unspecified syncope type [R55]   9. AVM (arteriovenous malformation) of small bowel, acquired with hemorrhage (CMS/[facility]) [K55.8]   Expand All Collapse All    []Manual[]Template  []Copied  Date of Office Visit: 10/25/2018  Encounter Provider: Dionicio Stoner MD  Place of Service: [facility] CARDIOLOGY  Patient Name: Ariel Elliott  :1940  [id number]         Chief Complaint   Patient presents with   • Cardiac Valve Problem   :      HPI: Ariel Elliott is a 78 y.o. 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 His daughter works in the PACU at [employer]. [facility]. He has had a prior bypass in  and we had last seen him 4 years ago. He felt like his heart was okay and he has not been back for followup. At that time in  he had a heart cath done. He has a LIMA to his LAD which is widely patent and looks good. He has a little bit of mild disease in his native LAD, not that much. He had a vein graft to a diagonal that was widely patent and looked good, a vein graft to an OM that had a critical lesion in it and had a 4.0 x 23 mm bare-metal stent implanted. His vein graft to the PDA looks good.  All of his natives are otherwise occluded. He has a history of atrial flutter, he has a history of heart failure, but he has preserved LV function. He did have an ablation for his atrial flutter. Now the main thing that he has been having is he has terrible GI bleeding and AVMs and he gets almost weekly transfusions. Last week it was early in the morning, like 1:30. He went to the restroom, called out, “Oh my God!” His daughter went up to find him on the toilet, passed out, not responsive or breathing. She lifted his head up, his airway opened, he breathed, and he had been having some chest pain, and he says he has chest pain when his hemoglobin gets below 7. [facility] to [facility] and he had a hemoglobin of 6. They transfused him 3 units. They scoped him. They ablated some AVMs in the 1st part of his small intestine and 10 in his colon, but they also found that he has critical aortic stenosis. When we had last seen him, he had an echo done and he had very mild AS, but he has not had any followup for it. His peak gradient across his aortic valve is over 70, his mean is over 40, valve area is 0.6. I did review the echo myself. He has not had any other episodes of syncope, no PND, no orthopnea. His weight is down about 40 pounds in the last 4 years. He does not really complain of a lot if his hemoglobin is adequate. He has diabetes. He has not had a stroke. He does not have significant lung disease.         Medical History        Past Medical History:   Diagnosis Date   • Anemia     • Atrial flutter (CMS/HCC)     • Congestive heart failure (CMS/HCC)     • Coronary atherosclerosis     • Diabetes mellitus (CMS/HCC)     • GI (gastrointestinal bleed)     • Gout     • Hyperlipidemia     • Sleep apnea       Bi-Pap            Surgical History         Past Surgical History:   Procedure Laterality Date   • ABLATION OF DYSRHYTHMIC FOCUS       • CARDIAC CATHETERIZATION       • CORONARY ANGIOPLASTY       • CORONARY ARTERY BYPASS  GRAFT       • HERNIA REPAIR       • HIP SURGERY Right              Social History   Social History            Social History   • Marital status:        Spouse name: N/A   • Number of children: N/A   • Years of education: N/A          Occupational History   • Not on file.               Social History Main Topics     • Smoking status: Former Smoker    • Smokeless tobacco: Not on file    • Alcohol use Defer          Comment: DENIED     • Drug use: Unknown   • Sexual activity: Not on file            Other Topics Concern   • Not on file          Social History Narrative   • No narrative on file                  Family History   Problem Relation Age of Onset   • Diabetes Mother     • Cancer Mother           Review of Systems   Constitution: Negative for decreased appetite, fever, malaise/fatigue and weight loss.   HENT: Negative for nosebleeds.    Eyes: Negative for double vision.   Cardiovascular: Negative for chest pain, claudication, cyanosis, dyspnea on exertion, irregular heartbeat, leg swelling, near-syncope, orthopnea, palpitations, paroxysmal nocturnal dyspnea and syncope.   Respiratory: Negative for cough, hemoptysis and shortness of breath.    Hematologic/Lymphatic: Negative for bleeding problem.   Skin: Negative for rash.   Musculoskeletal: Negative for falls and myalgias.   Gastrointestinal: Negative for hematochezia, jaundice, melena, nausea and vomiting.   Genitourinary: Negative for hematuria.   Neurological: Negative for dizziness and seizures.   Psychiatric/Behavioral: Negative for altered mental status and memory loss.              Allergies   Allergen Reactions   • Statins              Current Outpatient Prescriptions:   •  allopurinol (ZYLOPRIM) 300 MG tablet, Take 300 mg by mouth Daily., Disp: , Rfl:   •  aspirin 81 MG tablet, Take  by mouth., Disp: , Rfl:   •  ferrous sulfate 325 (65 FE) MG tablet, Take 325 mg by mouth Daily With Breakfast., Disp: , Rfl:   •  furosemide (LASIX) 40 MG tablet,  "Take 40 mg by mouth 2 (Two) Times a Day., Disp: , Rfl:   •  gemfibrozil (LOPID) 600 MG tablet, Take  by mouth., Disp: , Rfl:   •  insulin aspart (novoLOG) 100 UNIT/ML injection, Inject  under the skin into the appropriate area as directed 3 (Three) Times a Day Before Meals., Disp: , Rfl:   •  insulin detemir (LEVEMIR) 100 UNIT/ML injection, Inject  under the skin into the appropriate area as directed Daily., Disp: , Rfl:   •  insulin glargine (LANTUS) 100 UNIT/ML injection, Inject  under the skin., Disp: , Rfl:   •  isosorbide dinitrate (ISORDIL) 40 MG tablet, Take 40 mg by mouth 3 (Three) Times a Day., Disp: , Rfl:   •  loperamide (IMODIUM) 2 MG capsule, Take 2 mg by mouth As Needed for Diarrhea., Disp: , Rfl:   •  octreotide (sandoSTATIN) 10 MG injection, Inject 10 mg into the appropriate muscle as directed by prescriber Every 28 (Twenty-Eight) Days., Disp: , Rfl:       Objective:      Vitals       Vitals:     10/25/18 1412   BP: 102/60   Pulse: (!) 48   Weight: 126 kg (278 lb 3.2 oz)   Height: 177.8 cm (70\")         Body mass index is 39.92 kg/m².     Physical Exam   Constitutional: He is oriented to person, place, and time. He appears well-developed and well-nourished.   HENT:   Head: Normocephalic.   Eyes: No scleral icterus.   Neck: No JVD present. No thyromegaly present.   Cardiovascular: Normal rate and regular rhythm.  Exam reveals no gallop and no friction rub.    Murmur heard.   Medium-pitched harsh crescendo-decrescendo mid to late systolic murmur is present with a grade of 3/6   Pulmonary/Chest: Effort normal and breath sounds normal. He has no wheezes. He has no rales.   Abdominal: Soft. There is no hepatosplenomegaly. There is no tenderness.   Musculoskeletal: Normal range of motion. He exhibits edema (+2 B).   Lymphadenopathy:     He has no cervical adenopathy.   Neurological: He is alert and oriented to person, place, and time.   Skin: Skin is warm and dry. No rash noted.   Psychiatric: He has a " normal mood and affect.           ECG 12 Lead  Date/Time: 10/25/2018 4:20 PM  Performed by: CHRISTA COMBS  Authorized by: CHRISTA COMBS   Comparison: compared with previous ECG   Rhythm: sinus bradycardia  Conduction: complete LBBB and 1st degree  Clinical impression: abnormal ECG and pericarditis  Comments: He has a primary T wave abnormality this is different than prior ECGs            Assessment:        Diagnosis Plan   1. Nonrheumatic aortic valve stenosis  Case Request Cath Lab: Right and Left Heart Cath   2. S/P CABG (coronary artery bypass graft)  Case Request Cath Lab: Right and Left Heart Cath   3. Class 2 severe obesity due to excess calories with serious comorbidity and body mass index (BMI) of 39.0 to 39.9 in adult (CMS/[facility])  Case Request Cath Lab: Right and Left Heart Cath   4. Left bundle branch block (LBBB)  Case Request Cath Lab: Right and Left Heart Cath   5. Type 2 diabetes mellitus with complication, with long-term current use of insulin (CMS/HCC)  Case Request Cath Lab: Right and Left Heart Cath   6. Angina pectoris (CMS/HCC)  Case Request Cath Lab: Right and Left Heart Cath   7. Dyspnea on exertion  Case Request Cath Lab: Right and Left Heart Cath   8. Syncope, unspecified syncope type  Case Request Cath Lab: Right and Left Heart Cath   9. AVM (arteriovenous malformation) of small bowel, acquired with hemorrhage (CMS/HCC)  Case Request Cath Lab: Right and Left Heart Cath            Plan:       This is a gentleman with severe degenerative aortic stenosis, symptoms of angina and syncope. He probably has at least class II heart failure also. Normal LV function, prior bypass, diabetes, overall general health not great, chronic GI bleeding. 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 I think we should move forward with evaluation and treatment  of his aortic stenosis. We will have him see our surgeons, but I really cannot imagine doing another surgery on this anu. His STS score measures out at 6, but his overall health does not look great, and I think if we could fix him with a TAVR, it might be a better way to go. I am going to get him set up for a CT scan as well as a cardiac cath, and we will try and get him on the schedule in the next week or two for some kind of intervention on his aortic valve.   I spoke at length with the patient and his daughter about the risks of TAVR including death, stroke, need for a pacemaker (and his risk for the pacemaker is a bit higher than most because he has intrinsic left bundle branch block, first degree block), bleeding, vascular disease, and/or emergent surgery.  He agrees and understands.          As always, it has been a pleasure to participate in your patient's care.        Sincerely,         Dionicio Stoner MD        TAVR workup H&P reviewed. The patient was examined and there are no changes to the H&P. I have explained the risks and benefits of the procedure to the patient.  The patient understands and agrees to proceed

## 2018-11-01 NOTE — PERIOPERATIVE NURSING NOTE
Post sheath bleeding precautions and movement restrictions reviewed with pt and family.  Verbalized understanding.

## 2018-11-01 NOTE — DISCHARGE INSTRUCTIONS
[facility]  [address] [place] [address]    Coronary Angiogram (Radial/Ulnar Approach) After Care    Refer to this sheet in the next few weeks. These instructions provide you with information on caring for yourself after your procedure. Your caregiver may also give you more specific instructions. Your treatment has been planned according to current medical practices, but problems sometimes occur. Call your caregiver if you have any problems or questions after your procedure.    Home Care Instructions:  · You may shower the day after the procedure. Remove the bandage (dressing) and gently wash the site with plain soap and water. Gently pat the site dry. You may apply a band aid daily for 2 days if desired.    · Do not apply powder or lotion to the site.  · Do not submerge the affected site in water for 3 to 5 days or until the site is completely healed.   · Do not lift, push or pull anything over 10 pounds for 2 days after your procedure.  · Inspect the site at least twice daily. You may notice some bruising at the site and it may be tender for 1 to 2 weeks.     · Increase your fluid intake for the next 2 days.    · Keep arm elevated for 24 hours. For the remainder of the day, keep your arm in “Pledge of Allegiance” position when up and about.     · You may drive 24 hours after the procedure unless otherwise instructed by your caregiver.  · Do not operate machinery or power tools for 24 hours.  · A responsible adult should be with you for the first 24 hours after you arrive home. Do not make any important legal decisions or sign legal papers for 24 hours.  Do not drink alcohol for 24 hours.    · Metformin or any medications containing Metformin should not be taken for 48 hours after your procedure.      Call Your Doctor if:   · You have unusual pain at the radial/ulnar (wrist) site.  · You have redness, warmth, swelling, or pain at the radial/ulnar (wrist) site.  · You have drainage (other  than a small amount of blood on the dressing).  · You have chills or a fever > 101.  · Your arm becomes pale or dark, cool, tingly, or numb.  · You have heavy bleeding from the site, hold pressure on the site for 20 minutes.  If the bleeding stops, apply a fresh bandage and call your cardiologist.  However, if you continue to have bleeding, call 911.

## 2018-11-02 ENCOUNTER — TELEPHONE (OUTPATIENT)
Dept: CARDIOLOGY | Facility: CLINIC | Age: 78
End: 2018-11-02

## 2018-11-02 LAB
HCT VFR BLDA CALC: 23 % (ref 38–51)
HCT VFR BLDA CALC: 24 % (ref 38–51)
HGB BLDA-MCNC: 7.8 G/DL (ref 12–17)
HGB BLDA-MCNC: 8.2 G/DL (ref 12–17)
SAO2 % BLDA: 60 % (ref 95–98)
SAO2 % BLDA: 97 % (ref 95–98)

## 2018-11-05 ENCOUNTER — CLINICAL SUPPORT NO REQUIREMENTS (OUTPATIENT)
Dept: CARDIOLOGY | Facility: CLINIC | Age: 78
End: 2018-11-05

## 2018-11-05 ENCOUNTER — APPOINTMENT (OUTPATIENT)
Dept: GENERAL RADIOLOGY | Facility: HOSPITAL | Age: 78
End: 2018-11-05

## 2018-11-05 ENCOUNTER — HOSPITAL ENCOUNTER (OUTPATIENT)
Facility: HOSPITAL | Age: 78
Setting detail: HOSPITAL OUTPATIENT SURGERY
Discharge: HOME | End: 2018-11-05
Attending: INTERNAL MEDICINE | Admitting: INTERNAL MEDICINE

## 2018-11-05 VITALS
TEMPERATURE: 97.5 F | HEART RATE: 60 BPM | OXYGEN SATURATION: 98 % | BODY MASS INDEX: 38.08 KG/M2 | SYSTOLIC BLOOD PRESSURE: 151 MMHG | DIASTOLIC BLOOD PRESSURE: 80 MMHG | RESPIRATION RATE: 20 BRPM | WEIGHT: 266 LBS | HEIGHT: 70 IN

## 2018-11-05 DIAGNOSIS — I44.7 LEFT BUNDLE BRANCH BLOCK (LBBB): ICD-10-CM

## 2018-11-05 DIAGNOSIS — R55 SYNCOPE, UNSPECIFIED SYNCOPE TYPE: ICD-10-CM

## 2018-11-05 DIAGNOSIS — R55 SYNCOPE, UNSPECIFIED SYNCOPE TYPE: Primary | ICD-10-CM

## 2018-11-05 LAB — GLUCOSE BLDC GLUCOMTR-MCNC: 132 MG/DL (ref 70–130)

## 2018-11-05 PROCEDURE — 33208 INSRT HEART PM ATRIAL & VENT: CPT | Performed by: INTERNAL MEDICINE

## 2018-11-05 PROCEDURE — C1894 INTRO/SHEATH, NON-LASER: HCPCS | Performed by: INTERNAL MEDICINE

## 2018-11-05 PROCEDURE — C1898 LEAD, PMKR, OTHER THAN TRANS: HCPCS | Performed by: INTERNAL MEDICINE

## 2018-11-05 PROCEDURE — 82962 GLUCOSE BLOOD TEST: CPT

## 2018-11-05 PROCEDURE — 25010000002 MIDAZOLAM PER 1 MG: Performed by: INTERNAL MEDICINE

## 2018-11-05 PROCEDURE — 99152 MOD SED SAME PHYS/QHP 5/>YRS: CPT | Performed by: INTERNAL MEDICINE

## 2018-11-05 PROCEDURE — 25010000002 VANCOMYCIN PER 500 MG: Performed by: INTERNAL MEDICINE

## 2018-11-05 PROCEDURE — 93005 ELECTROCARDIOGRAM TRACING: CPT | Performed by: NURSE PRACTITIONER

## 2018-11-05 PROCEDURE — 71045 X-RAY EXAM CHEST 1 VIEW: CPT

## 2018-11-05 PROCEDURE — 25010000002 FENTANYL CITRATE (PF) 100 MCG/2ML SOLUTION: Performed by: INTERNAL MEDICINE

## 2018-11-05 PROCEDURE — 99153 MOD SED SAME PHYS/QHP EA: CPT | Performed by: INTERNAL MEDICINE

## 2018-11-05 PROCEDURE — 0 IOPAMIDOL PER 1 ML: Performed by: INTERNAL MEDICINE

## 2018-11-05 PROCEDURE — C1785 PMKR, DUAL, RATE-RESP: HCPCS | Performed by: INTERNAL MEDICINE

## 2018-11-05 PROCEDURE — 93010 ELECTROCARDIOGRAM REPORT: CPT | Performed by: INTERNAL MEDICINE

## 2018-11-05 DEVICE — PACEMAKER
Type: IMPLANTABLE DEVICE | Status: FUNCTIONAL
Brand: ACCOLADE™ MRI DR

## 2018-11-05 DEVICE — ENDOCARDIAL STEROID-ELUTING MR CONDITIONAL STYLET DELIVERED PACE/SENSE LEAD
Type: IMPLANTABLE DEVICE | Status: FUNCTIONAL
Brand: INGEVITY™ MRI

## 2018-11-05 RX ORDER — LIDOCAINE HYDROCHLORIDE 10 MG/ML
INJECTION, SOLUTION INFILTRATION; PERINEURAL AS NEEDED
Status: DISCONTINUED | OUTPATIENT
Start: 2018-11-05 | End: 2018-11-05 | Stop reason: HOSPADM

## 2018-11-05 RX ORDER — HYDROMORPHONE HYDROCHLORIDE 1 MG/ML
0.5 INJECTION, SOLUTION INTRAMUSCULAR; INTRAVENOUS; SUBCUTANEOUS
Status: DISCONTINUED | OUTPATIENT
Start: 2018-11-05 | End: 2018-11-05 | Stop reason: HOSPADM

## 2018-11-05 RX ORDER — HYDROCODONE BITARTRATE AND ACETAMINOPHEN 5; 325 MG/1; MG/1
1 TABLET ORAL EVERY 4 HOURS PRN
Status: DISCONTINUED | OUTPATIENT
Start: 2018-11-05 | End: 2018-11-05 | Stop reason: HOSPADM

## 2018-11-05 RX ORDER — FENTANYL CITRATE 50 UG/ML
INJECTION, SOLUTION INTRAMUSCULAR; INTRAVENOUS AS NEEDED
Status: DISCONTINUED | OUTPATIENT
Start: 2018-11-05 | End: 2018-11-05 | Stop reason: HOSPADM

## 2018-11-05 RX ORDER — SODIUM CHLORIDE 0.9 % (FLUSH) 0.9 %
3-10 SYRINGE (ML) INJECTION AS NEEDED
Status: DISCONTINUED | OUTPATIENT
Start: 2018-11-05 | End: 2018-11-05 | Stop reason: HOSPADM

## 2018-11-05 RX ORDER — HYDROCODONE BITARTRATE AND ACETAMINOPHEN 10; 325 MG/1; MG/1
1 TABLET ORAL EVERY 4 HOURS PRN
Status: DISCONTINUED | OUTPATIENT
Start: 2018-11-05 | End: 2018-11-05 | Stop reason: HOSPADM

## 2018-11-05 RX ORDER — LIDOCAINE HYDROCHLORIDE 10 MG/ML
0.1 INJECTION, SOLUTION EPIDURAL; INFILTRATION; INTRACAUDAL; PERINEURAL ONCE AS NEEDED
Status: DISCONTINUED | OUTPATIENT
Start: 2018-11-05 | End: 2018-11-05 | Stop reason: HOSPADM

## 2018-11-05 RX ORDER — ONDANSETRON 2 MG/ML
4 INJECTION INTRAMUSCULAR; INTRAVENOUS EVERY 6 HOURS PRN
Status: DISCONTINUED | OUTPATIENT
Start: 2018-11-05 | End: 2018-11-05 | Stop reason: HOSPADM

## 2018-11-05 RX ORDER — SODIUM CHLORIDE 9 MG/ML
75 INJECTION, SOLUTION INTRAVENOUS CONTINUOUS
Status: DISCONTINUED | OUTPATIENT
Start: 2018-11-05 | End: 2018-11-05 | Stop reason: HOSPADM

## 2018-11-05 RX ORDER — SODIUM CHLORIDE 9 MG/ML
INJECTION, SOLUTION INTRAVENOUS CONTINUOUS PRN
Status: COMPLETED | OUTPATIENT
Start: 2018-11-05 | End: 2018-11-05

## 2018-11-05 RX ORDER — SODIUM CHLORIDE 0.9 % (FLUSH) 0.9 %
3 SYRINGE (ML) INJECTION EVERY 12 HOURS SCHEDULED
Status: DISCONTINUED | OUTPATIENT
Start: 2018-11-05 | End: 2018-11-05 | Stop reason: HOSPADM

## 2018-11-05 RX ORDER — ACETAMINOPHEN 160 MG/5ML
650 SOLUTION ORAL EVERY 4 HOURS PRN
Status: DISCONTINUED | OUTPATIENT
Start: 2018-11-05 | End: 2018-11-05 | Stop reason: HOSPADM

## 2018-11-05 RX ORDER — VANCOMYCIN HYDROCHLORIDE 1 G/200ML
INJECTION, SOLUTION INTRAVENOUS CONTINUOUS PRN
Status: COMPLETED | OUTPATIENT
Start: 2018-11-05 | End: 2018-11-05

## 2018-11-05 RX ORDER — NALOXONE HCL 0.4 MG/ML
0.4 VIAL (ML) INJECTION
Status: DISCONTINUED | OUTPATIENT
Start: 2018-11-05 | End: 2018-11-05 | Stop reason: HOSPADM

## 2018-11-05 RX ORDER — MIDAZOLAM HYDROCHLORIDE 1 MG/ML
INJECTION INTRAMUSCULAR; INTRAVENOUS AS NEEDED
Status: DISCONTINUED | OUTPATIENT
Start: 2018-11-05 | End: 2018-11-05 | Stop reason: HOSPADM

## 2018-11-05 RX ORDER — ACETAMINOPHEN 325 MG/1
650 TABLET ORAL EVERY 4 HOURS PRN
Status: DISCONTINUED | OUTPATIENT
Start: 2018-11-05 | End: 2018-11-05 | Stop reason: HOSPADM

## 2018-11-05 RX ADMIN — SODIUM CHLORIDE 75 ML/HR: 9 INJECTION, SOLUTION INTRAVENOUS at 09:27

## 2018-11-05 NOTE — INTERVAL H&P NOTE
H&P reviewed. The patient was examined and there are no changes to the H&P.       Patient has severe symptomatic sinus bradycardia.  In fact he's passed out once.  We'll proceed with a dual-chamber pacemaker.

## 2018-11-05 NOTE — H&P (VIEW-ONLY)
Consults     Progress Notes  Encounter Date: 10/25/2018  Dionicio Stoner MD   Cardiology   Procedure Orders:   1. ECG 12 Lead [[id number]] ordered by Dionicio Stoner MD at 10/25/18 1620   Post-procedure Diagnoses:   1. Nonrheumatic aortic valve stenosis [I35.0]   2. S/P CABG (coronary artery bypass graft) [Z95.1]   3. Class 2 severe obesity due to excess calories with serious comorbidity and body mass index (BMI) of 39.0 to 39.9 in adult (CMS/[facility]) [E66.01, Z68.39]   4. Left bundle branch block (LBBB) [I44.7]   5. Type 2 diabetes mellitus with complication, with long-term current use of insulin (CMS/[facility]) [E11.8, Z79.4]   6. Angina pectoris (CMS/[facility]) [I20.9]   7. Dyspnea on exertion [R06.09]   8. Syncope, unspecified syncope type [R55]   9. AVM (arteriovenous malformation) of small bowel, acquired with hemorrhage (CMS/[facility]) [K55.8]   Expand All Collapse All    []Manual[]Template  []Copied  Date of Office Visit: 10/25/2018  Encounter Provider: Dionicio Stoner MD  Place of Service: [facility]  Patient Name: Ariel Elliott  :1940  [id number]         Chief Complaint   Patient presents with   • Cardiac Valve Problem   :      HPI: Ariel Elliott is a 78 y.o. 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 His daughter works in the PACU at [employer]. [facility]. He has had a prior bypass in  and we had last seen him 4 years ago. He felt like his heart was okay and he has not been back for followup. At that time in  he had a heart cath done. He has a LIMA to his LAD which is widely patent and looks good. He has a little bit of mild disease in his native LAD, not that much. He had a vein graft to a diagonal that was widely patent and looked good, a vein graft to an OM that had a critical lesion in it and had a 4.0 x 23 mm bare-metal stent implanted. His vein graft to the PDA looks good.  All of his natives are otherwise occluded. He has a history of atrial flutter, he has a history of heart failure, but he has preserved LV function. He did have an ablation for his atrial flutter. Now the main thing that he has been having is he has terrible GI bleeding and AVMs and he gets almost weekly transfusions. Last week it was early in the morning, like 1:30. He went to the restroom, called out, “Oh my God!” His daughter went up to find him on the toilet, passed out, not responsive or breathing. She lifted his head up, his airway opened, he breathed, and he had been having some chest pain, and he says he has chest pain when his hemoglobin gets below 7. [facility] and he had a hemoglobin of 6. They transfused him 3 units. They scoped him. They ablated some AVMs in the 1st part of his small intestine and 10 in his colon, but they also found that he has critical aortic stenosis. When we had last seen him, he had an echo done and he had very mild AS, but he has not had any followup for it. His peak gradient across his aortic valve is over 70, his mean is over 40, valve area is 0.6. I did review the echo myself. He has not had any other episodes of syncope, no PND, no orthopnea. His weight is down about 40 pounds in the last 4 years. He does not really complain of a lot if his hemoglobin is adequate. He has diabetes. He has not had a stroke. He does not have significant lung disease.         Medical History        Past Medical History:   Diagnosis Date   • Anemia     • Atrial flutter (CMS/HCC)     • Congestive heart failure (CMS/HCC)     • Coronary atherosclerosis     • Diabetes mellitus (CMS/HCC)     • GI (gastrointestinal bleed)     • Gout     • Hyperlipidemia     • Sleep apnea       Bi-Pap            Surgical History         Past Surgical History:   Procedure Laterality Date   • ABLATION OF DYSRHYTHMIC FOCUS       • CARDIAC CATHETERIZATION       • CORONARY ANGIOPLASTY       • CORONARY ARTERY BYPASS  GRAFT       • HERNIA REPAIR       • HIP SURGERY Right              Social History   Social History            Social History   • Marital status:        Spouse name: N/A   • Number of children: N/A   • Years of education: N/A          Occupational History   • Not on file.               Social History Main Topics     • Smoking status: Former Smoker    • Smokeless tobacco: Not on file    • Alcohol use Defer          Comment: DENIED     • Drug use: Unknown   • Sexual activity: Not on file            Other Topics Concern   • Not on file          Social History Narrative   • No narrative on file                  Family History   Problem Relation Age of Onset   • Diabetes Mother     • Cancer Mother           Review of Systems   Constitution: Negative for decreased appetite, fever, malaise/fatigue and weight loss.   HENT: Negative for nosebleeds.    Eyes: Negative for double vision.   Cardiovascular: Negative for chest pain, claudication, cyanosis, dyspnea on exertion, irregular heartbeat, leg swelling, near-syncope, orthopnea, palpitations, paroxysmal nocturnal dyspnea and syncope.   Respiratory: Negative for cough, hemoptysis and shortness of breath.    Hematologic/Lymphatic: Negative for bleeding problem.   Skin: Negative for rash.   Musculoskeletal: Negative for falls and myalgias.   Gastrointestinal: Negative for hematochezia, jaundice, melena, nausea and vomiting.   Genitourinary: Negative for hematuria.   Neurological: Negative for dizziness and seizures.   Psychiatric/Behavioral: Negative for altered mental status and memory loss.              Allergies   Allergen Reactions   • Statins              Current Outpatient Prescriptions:   •  allopurinol (ZYLOPRIM) 300 MG tablet, Take 300 mg by mouth Daily., Disp: , Rfl:   •  aspirin 81 MG tablet, Take  by mouth., Disp: , Rfl:   •  ferrous sulfate 325 (65 FE) MG tablet, Take 325 mg by mouth Daily With Breakfast., Disp: , Rfl:   •  furosemide (LASIX) 40 MG tablet,  "Take 40 mg by mouth 2 (Two) Times a Day., Disp: , Rfl:   •  gemfibrozil (LOPID) 600 MG tablet, Take  by mouth., Disp: , Rfl:   •  insulin aspart (novoLOG) 100 UNIT/ML injection, Inject  under the skin into the appropriate area as directed 3 (Three) Times a Day Before Meals., Disp: , Rfl:   •  insulin detemir (LEVEMIR) 100 UNIT/ML injection, Inject  under the skin into the appropriate area as directed Daily., Disp: , Rfl:   •  insulin glargine (LANTUS) 100 UNIT/ML injection, Inject  under the skin., Disp: , Rfl:   •  isosorbide dinitrate (ISORDIL) 40 MG tablet, Take 40 mg by mouth 3 (Three) Times a Day., Disp: , Rfl:   •  loperamide (IMODIUM) 2 MG capsule, Take 2 mg by mouth As Needed for Diarrhea., Disp: , Rfl:   •  octreotide (sandoSTATIN) 10 MG injection, Inject 10 mg into the appropriate muscle as directed by prescriber Every 28 (Twenty-Eight) Days., Disp: , Rfl:       Objective:      Vitals       Vitals:     10/25/18 1412   BP: 102/60   Pulse: (!) 48   Weight: 126 kg (278 lb 3.2 oz)   Height: 177.8 cm (70\")         Body mass index is 39.92 kg/m².     Physical Exam   Constitutional: He is oriented to person, place, and time. He appears well-developed and well-nourished.   HENT:   Head: Normocephalic.   Eyes: No scleral icterus.   Neck: No JVD present. No thyromegaly present.   Cardiovascular: Normal rate and regular rhythm.  Exam reveals no gallop and no friction rub.    Murmur heard.   Medium-pitched harsh crescendo-decrescendo mid to late systolic murmur is present with a grade of 3/6   Pulmonary/Chest: Effort normal and breath sounds normal. He has no wheezes. He has no rales.   Abdominal: Soft. There is no hepatosplenomegaly. There is no tenderness.   Musculoskeletal: Normal range of motion. He exhibits edema (+2 B).   Lymphadenopathy:     He has no cervical adenopathy.   Neurological: He is alert and oriented to person, place, and time.   Skin: Skin is warm and dry. No rash noted.   Psychiatric: He has a " normal mood and affect.           ECG 12 Lead  Date/Time: 10/25/2018 4:20 PM  Performed by: CHRISTA COMBS  Authorized by: CHRISTA COMBS   Comparison: compared with previous ECG   Rhythm: sinus bradycardia  Conduction: complete LBBB and 1st degree  Clinical impression: abnormal ECG and pericarditis  Comments: He has a primary T wave abnormality this is different than prior ECGs            Assessment:        Diagnosis Plan   1. Nonrheumatic aortic valve stenosis  Case Request Cath Lab: Right and Left Heart Cath   2. S/P CABG (coronary artery bypass graft)  Case Request Cath Lab: Right and Left Heart Cath   3. Class 2 severe obesity due to excess calories with serious comorbidity and body mass index (BMI) of 39.0 to 39.9 in adult (CMS/[facility])  Case Request Cath Lab: Right and Left Heart Cath   4. Left bundle branch block (LBBB)  Case Request Cath Lab: Right and Left Heart Cath   5. Type 2 diabetes mellitus with complication, with long-term current use of insulin (CMS/HCC)  Case Request Cath Lab: Right and Left Heart Cath   6. Angina pectoris (CMS/HCC)  Case Request Cath Lab: Right and Left Heart Cath   7. Dyspnea on exertion  Case Request Cath Lab: Right and Left Heart Cath   8. Syncope, unspecified syncope type  Case Request Cath Lab: Right and Left Heart Cath   9. AVM (arteriovenous malformation) of small bowel, acquired with hemorrhage (CMS/HCC)  Case Request Cath Lab: Right and Left Heart Cath            Plan:       This is a gentleman with severe degenerative aortic stenosis, symptoms of angina and syncope. He probably has at least class II heart failure also. Normal LV function, prior bypass, diabetes, overall general health not great, chronic GI bleeding. 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 I think we should move forward with evaluation and treatment  of his aortic stenosis. We will have him see our surgeons, but I really cannot imagine doing another surgery on this anu. His STS score measures out at 6, but his overall health does not look great, and I think if we could fix him with a TAVR, it might be a better way to go. I am going to get him set up for a CT scan as well as a cardiac cath, and we will try and get him on the schedule in the next week or two for some kind of intervention on his aortic valve.   I spoke at length with the patient and his daughter about the risks of TAVR including death, stroke, need for a pacemaker (and his risk for the pacemaker is a bit higher than most because he has intrinsic left bundle branch block, first degree block), bleeding, vascular disease, and/or emergent surgery.  He agrees and understands.          As always, it has been a pleasure to participate in your patient's care.        Sincerely,         Dionicio Stoner MD        TAVR workup H&P reviewed. The patient was examined and there are no changes to the H&P. I have explained the risks and benefits of the procedure to the patient.  The patient understands and agrees to proceed

## 2018-11-07 ENCOUNTER — OFFICE VISIT (OUTPATIENT)
Dept: CARDIAC SURGERY | Facility: CLINIC | Age: 78
End: 2018-11-07

## 2018-11-07 ENCOUNTER — PREP FOR SURGERY (OUTPATIENT)
Dept: OTHER | Facility: HOSPITAL | Age: 78
End: 2018-11-07

## 2018-11-07 VITALS
BODY MASS INDEX: 38.08 KG/M2 | OXYGEN SATURATION: 100 % | HEIGHT: 70 IN | HEART RATE: 60 BPM | SYSTOLIC BLOOD PRESSURE: 131 MMHG | TEMPERATURE: 97.8 F | RESPIRATION RATE: 20 BRPM | DIASTOLIC BLOOD PRESSURE: 75 MMHG | WEIGHT: 266 LBS

## 2018-11-07 DIAGNOSIS — I35.0 NONRHEUMATIC AORTIC VALVE STENOSIS: Primary | ICD-10-CM

## 2018-11-07 DIAGNOSIS — R79.9 ABNORMAL FINDING OF BLOOD CHEMISTRY: ICD-10-CM

## 2018-11-07 DIAGNOSIS — R79.1 ABNORMAL COAGULATION PROFILE: ICD-10-CM

## 2018-11-07 DIAGNOSIS — I35.0 AORTIC VALVE STENOSIS, ETIOLOGY OF CARDIAC VALVE DISEASE UNSPECIFIED: Primary | ICD-10-CM

## 2018-11-07 DIAGNOSIS — I13.0 HYPERTENSIVE HEART AND CHRONIC KIDNEY DISEASE WITH HEART FAILURE AND STAGE 1 THROUGH STAGE 4 CHRONIC KIDNEY DISEASE, OR CHRONIC KIDNEY DISEASE (HCC): ICD-10-CM

## 2018-11-07 PROCEDURE — 99215 OFFICE O/P EST HI 40 MIN: CPT | Performed by: THORACIC SURGERY (CARDIOTHORACIC VASCULAR SURGERY)

## 2018-11-07 RX ORDER — CHLORHEXIDINE GLUCONATE 0.12 MG/ML
15 RINSE ORAL EVERY 12 HOURS
Status: CANCELLED | OUTPATIENT
Start: 2018-11-07 | End: 2018-11-08

## 2018-11-07 RX ORDER — CEFAZOLIN SODIUM IN 0.9 % NACL 3 G/100 ML
2 INTRAVENOUS SOLUTION, PIGGYBACK (ML) INTRAVENOUS ONCE
Status: CANCELLED | OUTPATIENT
Start: 2018-11-07 | End: 2018-11-07

## 2018-11-07 RX ORDER — CHLORHEXIDINE GLUCONATE 0.12 MG/ML
15 RINSE ORAL ONCE
Status: CANCELLED | OUTPATIENT
Start: 2018-11-13 | End: 2018-11-07

## 2018-11-08 PROBLEM — I35.0 AORTIC VALVE STENOSIS: Status: ACTIVE | Noted: 2018-11-08

## 2018-11-09 ENCOUNTER — ANESTHESIA EVENT (OUTPATIENT)
Dept: PERIOP | Facility: HOSPITAL | Age: 78
End: 2018-11-09

## 2018-11-09 ENCOUNTER — LAB (OUTPATIENT)
Dept: LAB | Facility: HOSPITAL | Age: 78
End: 2018-11-09

## 2018-11-09 DIAGNOSIS — I13.0 HYPERTENSIVE HEART AND CHRONIC KIDNEY DISEASE WITH HEART FAILURE AND STAGE 1 THROUGH STAGE 4 CHRONIC KIDNEY DISEASE, OR CHRONIC KIDNEY DISEASE (HCC): ICD-10-CM

## 2018-11-09 DIAGNOSIS — I35.0 AORTIC VALVE STENOSIS, ETIOLOGY OF CARDIAC VALVE DISEASE UNSPECIFIED: ICD-10-CM

## 2018-11-09 DIAGNOSIS — R79.1 ABNORMAL COAGULATION PROFILE: ICD-10-CM

## 2018-11-09 DIAGNOSIS — R79.9 ABNORMAL FINDING OF BLOOD CHEMISTRY: ICD-10-CM

## 2018-11-09 LAB
ABO GROUP BLD: NORMAL
ALBUMIN SERPL-MCNC: 3.6 G/DL (ref 3.5–5.2)
ALBUMIN/GLOB SERPL: 1.2 G/DL
ALP SERPL-CCNC: 143 U/L (ref 39–117)
ALT SERPL W P-5'-P-CCNC: 6 U/L (ref 1–41)
ANION GAP SERPL CALCULATED.3IONS-SCNC: 11 MMOL/L
APTT PPP: 48.5 SECONDS (ref 22.7–35.4)
AST SERPL-CCNC: 15 U/L (ref 1–40)
BACTERIA UR QL AUTO: NORMAL /HPF
BASOPHILS # BLD AUTO: 0.02 10*3/MM3 (ref 0–0.2)
BASOPHILS NFR BLD AUTO: 0.4 % (ref 0–1.5)
BILIRUB SERPL-MCNC: <0.2 MG/DL (ref 0.1–1.2)
BILIRUB UR QL STRIP: NEGATIVE
BLD GP AB SCN SERPL QL: NEGATIVE
BUN BLD-MCNC: 46 MG/DL (ref 8–23)
BUN/CREAT SERPL: 34.1 (ref 7–25)
CALCIUM SPEC-SCNC: 8.5 MG/DL (ref 8.6–10.5)
CHLORIDE SERPL-SCNC: 108 MMOL/L (ref 98–107)
CLARITY UR: CLEAR
CLOSE TME COLL+ADP + EPINEP PNL BLD: 97 % (ref 86–100)
CO2 SERPL-SCNC: 23 MMOL/L (ref 22–29)
COLOR UR: YELLOW
CREAT BLD-MCNC: 1.35 MG/DL (ref 0.76–1.27)
DEPRECATED RDW RBC AUTO: 57.7 FL (ref 37–54)
EOSINOPHIL # BLD AUTO: 0.23 10*3/MM3 (ref 0–0.7)
EOSINOPHIL NFR BLD AUTO: 5 % (ref 0.3–6.2)
ERYTHROCYTE [DISTWIDTH] IN BLOOD BY AUTOMATED COUNT: 17.2 % (ref 11.5–14.5)
GFR SERPL CREATININE-BSD FRML MDRD: 51 ML/MIN/1.73
GLOBULIN UR ELPH-MCNC: 3 GM/DL
GLUCOSE BLD-MCNC: 189 MG/DL (ref 65–99)
GLUCOSE UR STRIP-MCNC: NEGATIVE MG/DL
HBA1C MFR BLD: 5.76 % (ref 4.8–5.6)
HCT VFR BLD AUTO: 25.9 % (ref 40.4–52.2)
HGB BLD-MCNC: 7.8 G/DL (ref 13.7–17.6)
HGB UR QL STRIP.AUTO: NEGATIVE
HYALINE CASTS UR QL AUTO: NORMAL /LPF
IMM GRANULOCYTES # BLD: 0 10*3/MM3 (ref 0–0.03)
IMM GRANULOCYTES NFR BLD: 0 % (ref 0–0.5)
INR PPP: 1.13 (ref 0.9–1.1)
KETONES UR QL STRIP: NEGATIVE
LEUKOCYTE ESTERASE UR QL STRIP.AUTO: NEGATIVE
LYMPHOCYTES # BLD AUTO: 0.75 10*3/MM3 (ref 0.9–4.8)
LYMPHOCYTES NFR BLD AUTO: 16.3 % (ref 19.6–45.3)
MCH RBC QN AUTO: 27.9 PG (ref 27–32.7)
MCHC RBC AUTO-ENTMCNC: 30.1 G/DL (ref 32.6–36.4)
MCV RBC AUTO: 92.5 FL (ref 79.8–96.2)
MONOCYTES # BLD AUTO: 0.29 10*3/MM3 (ref 0.2–1.2)
MONOCYTES NFR BLD AUTO: 6.3 % (ref 5–12)
NEUTROPHILS # BLD AUTO: 3.31 10*3/MM3 (ref 1.9–8.1)
NEUTROPHILS NFR BLD AUTO: 72 % (ref 42.7–76)
NITRITE UR QL STRIP: NEGATIVE
NT-PROBNP SERPL-MCNC: 5440 PG/ML (ref 0–1800)
PH UR STRIP.AUTO: <=5 [PH] (ref 5–8)
PLATELET # BLD AUTO: 190 10*3/MM3 (ref 140–500)
PMV BLD AUTO: 10.7 FL (ref 6–12)
POTASSIUM BLD-SCNC: 5 MMOL/L (ref 3.5–5.2)
PROT SERPL-MCNC: 6.6 G/DL (ref 6–8.5)
PROT UR QL STRIP: ABNORMAL
PROTHROMBIN TIME: 14.3 SECONDS (ref 11.7–14.2)
RBC # BLD AUTO: 2.8 10*6/MM3 (ref 4.6–6)
RBC # UR: NORMAL /HPF
REF LAB TEST METHOD: NORMAL
RH BLD: POSITIVE
SODIUM BLD-SCNC: 142 MMOL/L (ref 136–145)
SP GR UR STRIP: 1.01 (ref 1–1.03)
SQUAMOUS #/AREA URNS HPF: NORMAL /HPF
T&S EXPIRATION DATE: NORMAL
UROBILINOGEN UR QL STRIP: ABNORMAL
WBC NRBC COR # BLD: 4.6 10*3/MM3 (ref 4.5–10.7)
WBC UR QL AUTO: NORMAL /HPF

## 2018-11-09 PROCEDURE — 83036 HEMOGLOBIN GLYCOSYLATED A1C: CPT

## 2018-11-09 PROCEDURE — 86901 BLOOD TYPING SEROLOGIC RH(D): CPT | Performed by: NURSE PRACTITIONER

## 2018-11-09 PROCEDURE — 85025 COMPLETE CBC W/AUTO DIFF WBC: CPT

## 2018-11-09 PROCEDURE — 86850 RBC ANTIBODY SCREEN: CPT | Performed by: NURSE PRACTITIONER

## 2018-11-09 PROCEDURE — 85610 PROTHROMBIN TIME: CPT

## 2018-11-09 PROCEDURE — 86900 BLOOD TYPING SEROLOGIC ABO: CPT | Performed by: NURSE PRACTITIONER

## 2018-11-09 PROCEDURE — 36415 COLL VENOUS BLD VENIPUNCTURE: CPT

## 2018-11-09 PROCEDURE — 81001 URINALYSIS AUTO W/SCOPE: CPT

## 2018-11-09 PROCEDURE — 85730 THROMBOPLASTIN TIME PARTIAL: CPT

## 2018-11-09 PROCEDURE — 85576 BLOOD PLATELET AGGREGATION: CPT

## 2018-11-09 PROCEDURE — 83880 ASSAY OF NATRIURETIC PEPTIDE: CPT

## 2018-11-09 PROCEDURE — 80053 COMPREHEN METABOLIC PANEL: CPT

## 2018-11-09 NOTE — PROGRESS NOTES
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 Zaidi. Over the past twelve months he has suffered worsening diastolic congestive heart failure symptoms; he has severe aortic valve stenosis and progressive CAD. He also has recurrent GI bleeds (requiring frequent transfusions) and numerous GI AVMs, with a working diagnosis of Heyde's Syndrome. Because of his multiple severe co-morbidities and overall poor health, he is being considered for TAVR rather than traditional AVR surgery.    Echocardiogram on 10/22/18 showed a mean aortic valve gradient of 66 mmHg, a Vmax of 5.29 m/s, and an ORLY of 0.81 cm2. Images also showed mild aortic valve regurgitation, moderate mitral regurgitation, trace tricuspid regurgitation, and LV EF over 50%.    Right and left heart catheterization on 11/1/18 showed a patent LOPEZ to LAD, a patent SV to PDA, and a patent SV to an OM; this last SV to OM has a 90% proximal lesion. The RVSBP was measured at 38 mmHg.    TAVR protocol CTA on 10/26/18 showed an aortic annular area of 4.91 cm2, an aortic annular perimeter of 80.5 mm, a RCA height of 15.8 mm, and a LCA height of 11.8 mm. The aorto-liofemoral systems appear accessible B/L.    Review of Systems   Constitutional: Positive for activity change, diaphoresis and fatigue. Negative for appetite change, chills, fever and unexpected weight change.   HENT: Negative for congestion, ear discharge, ear pain, facial swelling, hearing loss, mouth sores, nosebleeds, postnasal drip, rhinorrhea, sinus pressure, sneezing, sore throat, trouble swallowing and voice change.    Eyes: Negative for visual disturbance.   Respiratory: Positive for shortness of breath. Negative for apnea, cough, choking, chest tightness, wheezing and stridor.    Cardiovascular: Positive for  chest pain and leg swelling. Negative for palpitations.   Gastrointestinal: Negative for abdominal distention, abdominal pain, constipation, diarrhea, nausea, rectal pain and vomiting.   Endocrine: Negative for cold intolerance and heat intolerance.   Genitourinary: Negative for dysuria, flank pain and frequency.   Musculoskeletal: Negative for arthralgias and back pain.   Skin: Negative for color change, pallor, rash and wound.   Allergic/Immunologic: Negative for environmental allergies, food allergies and immunocompromised state.   Neurological: Positive for light-headedness. Negative for dizziness, tremors, seizures, syncope, facial asymmetry, speech difficulty, weakness, numbness and headaches.   Hematological: Negative for adenopathy. Does not bruise/bleed easily.   Psychiatric/Behavioral: Positive for sleep disturbance. Negative for agitation, behavioral problems, confusion, decreased concentration, dysphoric mood, hallucinations, self-injury and suicidal ideas. The patient is not nervous/anxious and is not hyperactive.         Past Medical History:   Diagnosis Date   • Anemia    • Arrhythmia    • Atrial flutter (CMS/HCC)    • Congestive heart failure (CMS/HCC)    • Coronary atherosclerosis    • Diabetes mellitus (CMS/HCC)    • GI (gastrointestinal bleed)    • Gout    • Hard to intubate    • Hyperlipidemia    • Sleep apnea     Bi-Pap     Past Surgical History:   Procedure Laterality Date   • ABLATION OF DYSRHYTHMIC FOCUS     • CARDIAC CATHETERIZATION     • CARDIAC CATHETERIZATION N/A 11/1/2018    Procedure: Right and Left Heart Cath;  Surgeon: Dionicio Stoner MD;  Location: [facility] CATH INVASIVE LOCATION;  Service: Cardiology   • CARDIAC CATHETERIZATION  11/1/2018    Procedure: Saphenous Vein Graft;  Surgeon: Dionicio Stoner MD;  Location:  [facility] CATH INVASIVE LOCATION;  Service: Cardiology   • CARDIAC ELECTROPHYSIOLOGY PROCEDURE Left 11/5/2018    Procedure: Pacemaker DC new   [place];  Surgeon: Anna  Mina Lara MD;  Location:  [facility] CATH INVASIVE LOCATION;  Service: Cardiology   • CHOLECYSTECTOMY     • COLONOSCOPY     • CORONARY ANGIOPLASTY     • CORONARY ARTERY BYPASS GRAFT     • HAND SURGERY     • HEMORRHOIDECTOMY     • HERNIA REPAIR      VENTRAL HERNIA REPEATEDLY   • HIP SURGERY Right    • PORTACATH PLACEMENT      POWER PORT   • SINUS SURGERY       Family History   Problem Relation Age of Onset   • Diabetes Mother    • Cancer Mother      Social History   Substance Use Topics   • Smoking status: Former Smoker   • Smokeless tobacco: Never Used      Comment: QUIT 35YRS AGO   • Alcohol use No       (Not in a hospital admission)    Current Outpatient Prescriptions:   •  allopurinol (ZYLOPRIM) 300 MG tablet, Take 300 mg by mouth Daily., Disp: , Rfl:   •  furosemide (LASIX) 40 MG tablet, Take 40 mg by mouth 2 (Two) Times a Day., Disp: , Rfl:   •  gemfibrozil (LOPID) 600 MG tablet, Take 600 mg by mouth 2 (Two) Times a Day Before Meals., Disp: , Rfl:   •  insulin aspart (novoLOG) 100 UNIT/ML injection, Inject  under the skin into the appropriate area as directed 3 (Three) Times a Day Before Meals. 15-20UNITS, Disp: , Rfl:   •  insulin detemir (LEVEMIR) 100 UNIT/ML injection, Inject 30 Units under the skin into the appropriate area as directed 2 (Two) Times a Day., Disp: , Rfl:   •  isosorbide dinitrate (ISORDIL) 40 MG tablet, Take 40 mg by mouth 2 (Two) Times a Day., Disp: , Rfl:   •  loperamide (IMODIUM) 2 MG capsule, Take 2 mg by mouth As Needed for Diarrhea., Disp: , Rfl:   •  octreotide (sandoSTATIN) 10 MG injection, Inject 10 mg into the appropriate muscle as directed by prescriber Every 28 (Twenty-Eight) Days., Disp: , Rfl:   •  omeprazole (priLOSEC) 40 MG capsule, Take 40 mg by mouth Daily., Disp: , Rfl:   •  Psyllium (METAMUCIL FIBER PO), Take 1 tablet by mouth Daily., Disp: , Rfl:     Allergies:  Statins    Objective      Vital Signs       Flowsheet Rows      First Filed Value   Admission Height  177.8 cm  "(70\") Documented at 11/07/2018 1313   Admission Weight  121 kg (266 lb) Documented at 11/07/2018 1313        177.8 cm (70\")    Physical Exam   Constitutional: He is oriented to person, place, and time. He appears well-developed and well-nourished.   HENT:   Head: Normocephalic and atraumatic.   Mouth/Throat: No oropharyngeal exudate.   Eyes: Pupils are equal, round, and reactive to light. Conjunctivae and EOM are normal. No scleral icterus.   Neck: Normal range of motion. Neck supple. No JVD present. No tracheal deviation present. No thyromegaly present.   Cardiovascular: Normal rate and regular rhythm.  PMI is not displaced.  Exam reveals no gallop and no friction rub.    Murmur heard.   Crescendo systolic murmur is present with a grade of 4/6   Pulses:       Radial pulses are 2+ on the right side, and 2+ on the left side.        Femoral pulses are 2+ on the right side, and 2+ on the left side.       Dorsalis pedis pulses are 1+ on the right side, and 1+ on the left side.   Pulmonary/Chest: Effort normal. No stridor. He has no wheezes. He has rales. He exhibits no tenderness.   Sternum healed and stable to palpation.   Abdominal: Soft. Bowel sounds are normal. He exhibits no distension. There is no tenderness.   Obese.   Musculoskeletal: Normal range of motion. He exhibits no edema, tenderness or deformity.   Lymphadenopathy:     He has no cervical adenopathy.   Neurological: He is alert and oriented to person, place, and time. He displays normal reflexes. No cranial nerve deficit or sensory deficit. He exhibits normal muscle tone. Coordination normal. GCS eye subscore is 4. GCS verbal subscore is 5. GCS motor subscore is 6.   Skin: Skin is warm and dry. Capillary refill takes 2 to 3 seconds. No rash noted. No erythema. No pallor.   Psychiatric: He has a normal mood and affect. His speech is normal and behavior is normal. Judgment and thought content normal. Cognition and memory are normal.   Vitals " reviewed.      Results Review:       Assessment/Plan:    This is a pleasant 78 year old man with severe aortic valve stenosis and chronic diastolic congestive heart failure, NYHA III. He also has multiple severe co-morbidities; I estimate his STS mortality risk for a redosternotomy/AVR/CABG at 10.37%, his M&M risk at 44.79%, his CVA risk at 2.89%, and his ARF risk at 18.69%. As such, he is a high operative risk. It would appropriate to offer him TAVR; his images suggest he would accessible by the transfemoral route.    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 Elliott himself is agreeable.       Elizabeth Meade MD  11/09/18  2:05 PM

## 2018-11-11 NOTE — NURSING NOTE
CARDIOVASCULAR - ADULT     Maintains optimal cardiac output and hemodynamic stability Progressing     Absence of cardiac dysrhythmias or at baseline rhythm Progressing        DISCHARGE PLANNING     Discharge to home or other facility with appropriate resources Progressing        DISCHARGE PLANNING - CARE MANAGEMENT     Discharge to post-acute care or home with appropriate resources Progressing        HEMATOLOGIC - ADULT     Maintains hematologic stability Progressing        INFECTION - ADULT     Absence or prevention of progression during hospitalization Progressing        Knowledge Deficit     Patient/family/caregiver demonstrates understanding of disease process, treatment plan, medications, and discharge instructions Progressing        METABOLIC, FLUID AND ELECTROLYTES - ADULT     Electrolytes maintained within normal limits Progressing        MUSCULOSKELETAL - ADULT     Maintain or return mobility to safest level of function Progressing        PAIN - ADULT     Verbalizes/displays adequate comfort level or baseline comfort level Progressing        Potential for Falls     Patient will remain free of falls Progressing        Prexisting or High Potential for Compromised Skin Integrity     Skin integrity is maintained or improved Progressing        RESPIRATORY - ADULT     Achieves optimal ventilation and oxygenation Progressing        SAFETY ADULT     Patient will remain free of falls Progressing Pacemaker rep to bedside to explain pacemaker

## 2018-11-12 ENCOUNTER — APPOINTMENT (OUTPATIENT)
Dept: PREADMISSION TESTING | Facility: HOSPITAL | Age: 78
End: 2018-11-12

## 2018-11-12 VITALS
RESPIRATION RATE: 16 BRPM | BODY MASS INDEX: 40.46 KG/M2 | SYSTOLIC BLOOD PRESSURE: 92 MMHG | HEIGHT: 70 IN | OXYGEN SATURATION: 100 % | WEIGHT: 282.6 LBS | DIASTOLIC BLOOD PRESSURE: 55 MMHG | HEART RATE: 60 BPM

## 2018-11-12 DIAGNOSIS — I35.0 AORTIC VALVE STENOSIS, ETIOLOGY OF CARDIAC VALVE DISEASE UNSPECIFIED: ICD-10-CM

## 2018-11-12 PROCEDURE — 63710000001 MUPIROCIN 2 % OINTMENT: Performed by: NURSE PRACTITIONER

## 2018-11-12 PROCEDURE — A9270 NON-COVERED ITEM OR SERVICE: HCPCS | Performed by: NURSE PRACTITIONER

## 2018-11-12 PROCEDURE — 63710000001 CHLORHEXIDINE 0.12 % SOLUTION: Performed by: NURSE PRACTITIONER

## 2018-11-12 RX ORDER — CHLORHEXIDINE GLUCONATE 0.12 MG/ML
15 RINSE ORAL EVERY 12 HOURS
Status: DISPENSED | OUTPATIENT
Start: 2018-11-12 | End: 2018-11-13

## 2018-11-12 NOTE — ANESTHESIA PREPROCEDURE EVALUATION
Anesthesia Evaluation     Patient summary reviewed and Nursing notes reviewed   history of anesthetic complications: difficult airway  NPO Solid Status: > 8 hours  NPO Liquid Status: > 8 hours           Airway   Mallampati: III  TM distance: <3 FB  Neck ROM: limited  Difficult intubation highly probable  Dental - normal exam     Pulmonary - normal exam    breath sounds clear to auscultation  (+) shortness of breath, sleep apnea on CPAP,   Cardiovascular     Rhythm: regular  Rate: normal    (+) pacemaker, valvular problems/murmurs, CAD, CABG, dysrhythmias (a-flutter ablated) Atrial Flutter, angina, CHF, STARR, murmur, hyperlipidemia,       Neuro/Psych  GI/Hepatic/Renal/Endo    (+) morbid obesity, GI bleeding, liver disease, diabetes mellitus type 2 using insulin,   (-)  obesity    Musculoskeletal (-) negative ROS    Abdominal   (+) obese,    Substance History - negative use     OB/GYN negative ob/gyn ROS         Other   (+) blood dyscrasia (von Willebrand disease)                   Anesthesia Plan    ASA 4     general   (GA vs MAC  A-line)  intravenous induction   Anesthetic plan, all risks, benefits, and alternatives have been provided, discussed and informed consent has been obtained with: patient and child.

## 2018-11-13 ENCOUNTER — ANESTHESIA (OUTPATIENT)
Dept: PERIOP | Facility: HOSPITAL | Age: 78
End: 2018-11-13

## 2018-11-13 ENCOUNTER — ANCILLARY PROCEDURE (OUTPATIENT)
Dept: PERIOP | Facility: HOSPITAL | Age: 78
End: 2018-11-13
Attending: ANESTHESIOLOGY

## 2018-11-13 ENCOUNTER — ANCILLARY PROCEDURE (OUTPATIENT)
Dept: PERIOP | Facility: HOSPITAL | Age: 78
End: 2018-11-13

## 2018-11-13 ENCOUNTER — HOSPITAL ENCOUNTER (INPATIENT)
Facility: HOSPITAL | Age: 78
LOS: 1 days | Discharge: HOME | End: 2018-11-14
Attending: THORACIC SURGERY (CARDIOTHORACIC VASCULAR SURGERY) | Admitting: THORACIC SURGERY (CARDIOTHORACIC VASCULAR SURGERY)

## 2018-11-13 ENCOUNTER — APPOINTMENT (OUTPATIENT)
Dept: GENERAL RADIOLOGY | Facility: HOSPITAL | Age: 78
End: 2018-11-13

## 2018-11-13 DIAGNOSIS — I50.32 CHRONIC DIASTOLIC CONGESTIVE HEART FAILURE (HCC): ICD-10-CM

## 2018-11-13 DIAGNOSIS — I35.0 NONRHEUMATIC AORTIC VALVE STENOSIS: Primary | ICD-10-CM

## 2018-11-13 DIAGNOSIS — I35.0 AORTIC VALVE STENOSIS, ETIOLOGY OF CARDIAC VALVE DISEASE UNSPECIFIED: ICD-10-CM

## 2018-11-13 PROBLEM — D68.00 VON WILLEBRAND DISEASE (HCC): Status: ACTIVE | Noted: 2018-11-13

## 2018-11-13 PROBLEM — N18.9 CKD (CHRONIC KIDNEY DISEASE): Status: ACTIVE | Noted: 2018-11-13

## 2018-11-13 PROBLEM — Z95.0 PACEMAKER: Status: ACTIVE | Noted: 2018-11-13

## 2018-11-13 PROBLEM — I25.10 CORONARY ATHEROSCLEROSIS: Status: ACTIVE | Noted: 2018-11-13

## 2018-11-13 PROBLEM — G47.30 SLEEP APNEA: Status: ACTIVE | Noted: 2018-11-13

## 2018-11-13 PROBLEM — E11.9 DIABETES MELLITUS (HCC): Status: ACTIVE | Noted: 2018-11-13

## 2018-11-13 PROBLEM — I48.92 ATRIAL FLUTTER: Status: ACTIVE | Noted: 2018-11-13

## 2018-11-13 LAB
ABO GROUP BLD: NORMAL
BASOPHILS # BLD AUTO: 0.03 10*3/MM3 (ref 0–0.2)
BASOPHILS NFR BLD AUTO: 0.7 % (ref 0–1.5)
BLD GP AB SCN SERPL QL: NEGATIVE
DEPRECATED RDW RBC AUTO: 56.1 FL (ref 37–54)
EOSINOPHIL # BLD AUTO: 0.24 10*3/MM3 (ref 0–0.7)
EOSINOPHIL NFR BLD AUTO: 5.2 % (ref 0.3–6.2)
ERYTHROCYTE [DISTWIDTH] IN BLOOD BY AUTOMATED COUNT: 17 % (ref 11.5–14.5)
GLUCOSE BLDC GLUCOMTR-MCNC: 128 MG/DL (ref 70–130)
GLUCOSE BLDC GLUCOMTR-MCNC: 245 MG/DL (ref 70–130)
HCT VFR BLD AUTO: 27.4 % (ref 40.4–52.2)
HGB BLD-MCNC: 8.4 G/DL (ref 13.7–17.6)
IMM GRANULOCYTES # BLD: 0 10*3/MM3 (ref 0–0.03)
IMM GRANULOCYTES NFR BLD: 0 % (ref 0–0.5)
LYMPHOCYTES # BLD AUTO: 0.68 10*3/MM3 (ref 0.9–4.8)
LYMPHOCYTES NFR BLD AUTO: 14.8 % (ref 19.6–45.3)
MCH RBC QN AUTO: 27.9 PG (ref 27–32.7)
MCHC RBC AUTO-ENTMCNC: 30.7 G/DL (ref 32.6–36.4)
MCV RBC AUTO: 91 FL (ref 79.8–96.2)
MONOCYTES # BLD AUTO: 0.44 10*3/MM3 (ref 0.2–1.2)
MONOCYTES NFR BLD AUTO: 9.5 % (ref 5–12)
NEUTROPHILS # BLD AUTO: 3.22 10*3/MM3 (ref 1.9–8.1)
NEUTROPHILS NFR BLD AUTO: 69.8 % (ref 42.7–76)
PLATELET # BLD AUTO: 196 10*3/MM3 (ref 140–500)
PMV BLD AUTO: 11.3 FL (ref 6–12)
RBC # BLD AUTO: 3.01 10*6/MM3 (ref 4.6–6)
RH BLD: POSITIVE
T&S EXPIRATION DATE: NORMAL
WBC NRBC COR # BLD: 4.61 10*3/MM3 (ref 4.5–10.7)

## 2018-11-13 PROCEDURE — C1769 GUIDE WIRE: HCPCS | Performed by: THORACIC SURGERY (CARDIOTHORACIC VASCULAR SURGERY)

## 2018-11-13 PROCEDURE — C1894 INTRO/SHEATH, NON-LASER: HCPCS | Performed by: THORACIC SURGERY (CARDIOTHORACIC VASCULAR SURGERY)

## 2018-11-13 PROCEDURE — 86901 BLOOD TYPING SEROLOGIC RH(D): CPT | Performed by: PHYSICIAN ASSISTANT

## 2018-11-13 PROCEDURE — 86900 BLOOD TYPING SEROLOGIC ABO: CPT

## 2018-11-13 PROCEDURE — 82962 GLUCOSE BLOOD TEST: CPT

## 2018-11-13 PROCEDURE — 93355 ECHO TRANSESOPHAGEAL (TEE): CPT

## 2018-11-13 PROCEDURE — 25010000002 PROPOFOL 10 MG/ML EMULSION: Performed by: ANESTHESIOLOGY

## 2018-11-13 PROCEDURE — 25010000002 PROTAMINE SULFATE PER 10 MG: Performed by: ANESTHESIOLOGY

## 2018-11-13 PROCEDURE — 63710000001 INSULIN LISPRO (HUMAN) PER 5 UNITS: Performed by: INTERNAL MEDICINE

## 2018-11-13 PROCEDURE — 93005 ELECTROCARDIOGRAM TRACING: CPT | Performed by: INTERNAL MEDICINE

## 2018-11-13 PROCEDURE — P9016 RBC LEUKOCYTES REDUCED: HCPCS

## 2018-11-13 PROCEDURE — 94799 UNLISTED PULMONARY SVC/PX: CPT

## 2018-11-13 PROCEDURE — C1760 CLOSURE DEV, VASC: HCPCS | Performed by: THORACIC SURGERY (CARDIOTHORACIC VASCULAR SURGERY)

## 2018-11-13 PROCEDURE — 0 IOPAMIDOL PER 1 ML: Performed by: THORACIC SURGERY (CARDIOTHORACIC VASCULAR SURGERY)

## 2018-11-13 PROCEDURE — 82803 BLOOD GASES ANY COMBINATION: CPT

## 2018-11-13 PROCEDURE — 25010000002 FENTANYL CITRATE (PF) 100 MCG/2ML SOLUTION: Performed by: ANESTHESIOLOGY

## 2018-11-13 PROCEDURE — 86923 COMPATIBILITY TEST ELECTRIC: CPT

## 2018-11-13 PROCEDURE — 25010000002 HEPARIN (PORCINE) PER 1000 UNITS: Performed by: ANESTHESIOLOGY

## 2018-11-13 PROCEDURE — 85014 HEMATOCRIT: CPT

## 2018-11-13 PROCEDURE — S0260 H&P FOR SURGERY: HCPCS | Performed by: INTERNAL MEDICINE

## 2018-11-13 PROCEDURE — 86900 BLOOD TYPING SEROLOGIC ABO: CPT | Performed by: PHYSICIAN ASSISTANT

## 2018-11-13 PROCEDURE — 93010 ELECTROCARDIOGRAM REPORT: CPT | Performed by: INTERNAL MEDICINE

## 2018-11-13 PROCEDURE — 33361 REPLACE AORTIC VALVE PERQ: CPT | Performed by: THORACIC SURGERY (CARDIOTHORACIC VASCULAR SURGERY)

## 2018-11-13 PROCEDURE — 71045 X-RAY EXAM CHEST 1 VIEW: CPT

## 2018-11-13 PROCEDURE — 82947 ASSAY GLUCOSE BLOOD QUANT: CPT

## 2018-11-13 PROCEDURE — 85018 HEMOGLOBIN: CPT

## 2018-11-13 PROCEDURE — 63710000001 INSULIN GLARGINE PER 5 UNITS: Performed by: INTERNAL MEDICINE

## 2018-11-13 PROCEDURE — 86850 RBC ANTIBODY SCREEN: CPT | Performed by: PHYSICIAN ASSISTANT

## 2018-11-13 PROCEDURE — 02RF38Z REPLACEMENT OF AORTIC VALVE WITH ZOOPLASTIC TISSUE, PERCUTANEOUS APPROACH: ICD-10-PCS | Performed by: THORACIC SURGERY (CARDIOTHORACIC VASCULAR SURGERY)

## 2018-11-13 PROCEDURE — 36430 TRANSFUSION BLD/BLD COMPNT: CPT

## 2018-11-13 PROCEDURE — 85025 COMPLETE CBC W/AUTO DIFF WBC: CPT | Performed by: THORACIC SURGERY (CARDIOTHORACIC VASCULAR SURGERY)

## 2018-11-13 PROCEDURE — 85347 COAGULATION TIME ACTIVATED: CPT

## 2018-11-13 PROCEDURE — 25010000002 ONDANSETRON PER 1 MG: Performed by: ANESTHESIOLOGY

## 2018-11-13 PROCEDURE — 25010000002 SUCCINYLCHOLINE PER 20 MG: Performed by: ANESTHESIOLOGY

## 2018-11-13 DEVICE — VLV HEART TRNSCATH SAPIEN3 26MM: Type: IMPLANTABLE DEVICE | Site: HEART | Status: FUNCTIONAL

## 2018-11-13 RX ORDER — HYDROCODONE BITARTRATE AND ACETAMINOPHEN 5; 325 MG/1; MG/1
1 TABLET ORAL ONCE
Status: COMPLETED | OUTPATIENT
Start: 2018-11-13 | End: 2018-11-13

## 2018-11-13 RX ORDER — FENTANYL CITRATE 50 UG/ML
50 INJECTION, SOLUTION INTRAMUSCULAR; INTRAVENOUS
Status: DISCONTINUED | OUTPATIENT
Start: 2018-11-13 | End: 2018-11-13 | Stop reason: HOSPADM

## 2018-11-13 RX ORDER — ROCURONIUM BROMIDE 10 MG/ML
INJECTION, SOLUTION INTRAVENOUS AS NEEDED
Status: DISCONTINUED | OUTPATIENT
Start: 2018-11-13 | End: 2018-11-13 | Stop reason: SURG

## 2018-11-13 RX ORDER — HEPARIN SODIUM 1000 [USP'U]/ML
INJECTION, SOLUTION INTRAVENOUS; SUBCUTANEOUS AS NEEDED
Status: DISCONTINUED | OUTPATIENT
Start: 2018-11-13 | End: 2018-11-13 | Stop reason: SURG

## 2018-11-13 RX ORDER — ONDANSETRON 2 MG/ML
INJECTION INTRAMUSCULAR; INTRAVENOUS AS NEEDED
Status: DISCONTINUED | OUTPATIENT
Start: 2018-11-13 | End: 2018-11-13 | Stop reason: SURG

## 2018-11-13 RX ORDER — NICOTINE POLACRILEX 4 MG
15 LOZENGE BUCCAL
Status: DISCONTINUED | OUTPATIENT
Start: 2018-11-13 | End: 2018-11-14 | Stop reason: HOSPADM

## 2018-11-13 RX ORDER — INSULIN GLARGINE 100 [IU]/ML
30 INJECTION, SOLUTION SUBCUTANEOUS NIGHTLY
Status: DISCONTINUED | OUTPATIENT
Start: 2018-11-13 | End: 2018-11-14

## 2018-11-13 RX ORDER — SODIUM CHLORIDE 9 MG/ML
INJECTION, SOLUTION INTRAVENOUS CONTINUOUS PRN
Status: DISCONTINUED | OUTPATIENT
Start: 2018-11-13 | End: 2018-11-13 | Stop reason: SURG

## 2018-11-13 RX ORDER — CEFAZOLIN SODIUM IN 0.9 % NACL 3 G/100 ML
3 INTRAVENOUS SOLUTION, PIGGYBACK (ML) INTRAVENOUS ONCE
Status: COMPLETED | OUTPATIENT
Start: 2018-11-13 | End: 2018-11-13

## 2018-11-13 RX ORDER — FAMOTIDINE 10 MG/ML
20 INJECTION, SOLUTION INTRAVENOUS ONCE
Status: COMPLETED | OUTPATIENT
Start: 2018-11-13 | End: 2018-11-13

## 2018-11-13 RX ORDER — PROPOFOL 10 MG/ML
VIAL (ML) INTRAVENOUS AS NEEDED
Status: DISCONTINUED | OUTPATIENT
Start: 2018-11-13 | End: 2018-11-13 | Stop reason: SURG

## 2018-11-13 RX ORDER — LIDOCAINE HYDROCHLORIDE 10 MG/ML
0.5 INJECTION, SOLUTION EPIDURAL; INFILTRATION; INTRACAUDAL; PERINEURAL ONCE AS NEEDED
Status: DISCONTINUED | OUTPATIENT
Start: 2018-11-13 | End: 2018-11-13 | Stop reason: HOSPADM

## 2018-11-13 RX ORDER — FUROSEMIDE 40 MG/1
40 TABLET ORAL
Status: DISCONTINUED | OUTPATIENT
Start: 2018-11-13 | End: 2018-11-14 | Stop reason: HOSPADM

## 2018-11-13 RX ORDER — CHLORHEXIDINE GLUCONATE 0.12 MG/ML
15 RINSE ORAL ONCE
Status: DISCONTINUED | OUTPATIENT
Start: 2018-11-13 | End: 2018-11-13 | Stop reason: HOSPADM

## 2018-11-13 RX ORDER — NOREPINEPHRINE BITARTRATE 1 MG/ML
INJECTION, SOLUTION INTRAVENOUS CONTINUOUS PRN
Status: DISCONTINUED | OUTPATIENT
Start: 2018-11-13 | End: 2018-11-13 | Stop reason: SURG

## 2018-11-13 RX ORDER — PROPOFOL 10 MG/ML
VIAL (ML) INTRAVENOUS CONTINUOUS PRN
Status: DISCONTINUED | OUTPATIENT
Start: 2018-11-13 | End: 2018-11-13 | Stop reason: SURG

## 2018-11-13 RX ORDER — FENTANYL CITRATE 50 UG/ML
INJECTION, SOLUTION INTRAMUSCULAR; INTRAVENOUS AS NEEDED
Status: DISCONTINUED | OUTPATIENT
Start: 2018-11-13 | End: 2018-11-13 | Stop reason: SURG

## 2018-11-13 RX ORDER — LIDOCAINE HYDROCHLORIDE 20 MG/ML
INJECTION, SOLUTION INFILTRATION; PERINEURAL AS NEEDED
Status: DISCONTINUED | OUTPATIENT
Start: 2018-11-13 | End: 2018-11-13 | Stop reason: SURG

## 2018-11-13 RX ORDER — ACETAMINOPHEN 325 MG/1
650 TABLET ORAL EVERY 4 HOURS PRN
Status: DISCONTINUED | OUTPATIENT
Start: 2018-11-13 | End: 2018-11-14 | Stop reason: HOSPADM

## 2018-11-13 RX ORDER — SUCCINYLCHOLINE CHLORIDE 20 MG/ML
INJECTION INTRAMUSCULAR; INTRAVENOUS AS NEEDED
Status: DISCONTINUED | OUTPATIENT
Start: 2018-11-13 | End: 2018-11-13 | Stop reason: SURG

## 2018-11-13 RX ORDER — LOPERAMIDE HYDROCHLORIDE 2 MG/1
2 CAPSULE ORAL AS NEEDED
Status: DISCONTINUED | OUTPATIENT
Start: 2018-11-13 | End: 2018-11-14 | Stop reason: HOSPADM

## 2018-11-13 RX ORDER — ISOSORBIDE DINITRATE 20 MG/1
40 TABLET ORAL 2 TIMES DAILY
Status: DISCONTINUED | OUTPATIENT
Start: 2018-11-13 | End: 2018-11-14 | Stop reason: HOSPADM

## 2018-11-13 RX ORDER — PSYLLIUM SEED (WITH DEXTROSE)
2 POWDER (GRAM) ORAL DAILY
Status: DISCONTINUED | OUTPATIENT
Start: 2018-11-13 | End: 2018-11-14 | Stop reason: HOSPADM

## 2018-11-13 RX ORDER — MIDAZOLAM HYDROCHLORIDE 1 MG/ML
2 INJECTION INTRAMUSCULAR; INTRAVENOUS
Status: DISCONTINUED | OUTPATIENT
Start: 2018-11-13 | End: 2018-11-13 | Stop reason: HOSPADM

## 2018-11-13 RX ORDER — DEXTROSE MONOHYDRATE 25 G/50ML
25 INJECTION, SOLUTION INTRAVENOUS
Status: DISCONTINUED | OUTPATIENT
Start: 2018-11-13 | End: 2018-11-14 | Stop reason: HOSPADM

## 2018-11-13 RX ORDER — MIDAZOLAM HYDROCHLORIDE 1 MG/ML
1 INJECTION INTRAMUSCULAR; INTRAVENOUS
Status: DISCONTINUED | OUTPATIENT
Start: 2018-11-13 | End: 2018-11-13 | Stop reason: HOSPADM

## 2018-11-13 RX ORDER — PROTAMINE SULFATE 10 MG/ML
INJECTION, SOLUTION INTRAVENOUS AS NEEDED
Status: DISCONTINUED | OUTPATIENT
Start: 2018-11-13 | End: 2018-11-13 | Stop reason: SURG

## 2018-11-13 RX ORDER — SODIUM CHLORIDE 9 MG/ML
100 INJECTION, SOLUTION INTRAVENOUS CONTINUOUS
Status: DISCONTINUED | OUTPATIENT
Start: 2018-11-13 | End: 2018-11-14 | Stop reason: HOSPADM

## 2018-11-13 RX ORDER — SODIUM CHLORIDE, SODIUM LACTATE, POTASSIUM CHLORIDE, CALCIUM CHLORIDE 600; 310; 30; 20 MG/100ML; MG/100ML; MG/100ML; MG/100ML
9 INJECTION, SOLUTION INTRAVENOUS CONTINUOUS
Status: DISCONTINUED | OUTPATIENT
Start: 2018-11-13 | End: 2018-11-14 | Stop reason: HOSPADM

## 2018-11-13 RX ORDER — SODIUM CHLORIDE 0.9 % (FLUSH) 0.9 %
1-10 SYRINGE (ML) INJECTION AS NEEDED
Status: DISCONTINUED | OUTPATIENT
Start: 2018-11-13 | End: 2018-11-13 | Stop reason: HOSPADM

## 2018-11-13 RX ORDER — ALLOPURINOL 300 MG/1
300 TABLET ORAL NIGHTLY
Status: DISCONTINUED | OUTPATIENT
Start: 2018-11-13 | End: 2018-11-14 | Stop reason: HOSPADM

## 2018-11-13 RX ORDER — SODIUM CHLORIDE 9 MG/ML
9 INJECTION, SOLUTION INTRAVENOUS CONTINUOUS
Status: DISCONTINUED | OUTPATIENT
Start: 2018-11-13 | End: 2018-11-14 | Stop reason: HOSPADM

## 2018-11-13 RX ORDER — PANTOPRAZOLE SODIUM 40 MG/1
40 TABLET, DELAYED RELEASE ORAL EVERY MORNING
Status: DISCONTINUED | OUTPATIENT
Start: 2018-11-13 | End: 2018-11-14 | Stop reason: HOSPADM

## 2018-11-13 RX ADMIN — FENTANYL CITRATE 25 MCG: 50 INJECTION INTRAMUSCULAR; INTRAVENOUS at 06:50

## 2018-11-13 RX ADMIN — HYDROCODONE BITARTRATE AND ACETAMINOPHEN 1 TABLET: 5; 325 TABLET ORAL at 21:39

## 2018-11-13 RX ADMIN — SODIUM CHLORIDE: 9 INJECTION, SOLUTION INTRAVENOUS at 07:31

## 2018-11-13 RX ADMIN — ALLOPURINOL 300 MG: 300 TABLET ORAL at 23:20

## 2018-11-13 RX ADMIN — FAMOTIDINE 20 MG: 10 INJECTION, SOLUTION INTRAVENOUS at 05:55

## 2018-11-13 RX ADMIN — PROPOFOL 50 MG: 10 INJECTION, EMULSION INTRAVENOUS at 07:06

## 2018-11-13 RX ADMIN — SODIUM CHLORIDE 9 ML/HR: 9 INJECTION, SOLUTION INTRAVENOUS at 05:57

## 2018-11-13 RX ADMIN — HEPARIN SODIUM 5000 UNITS: 1000 INJECTION, SOLUTION INTRAVENOUS; SUBCUTANEOUS at 08:49

## 2018-11-13 RX ADMIN — CEFAZOLIN 3 G: 1 INJECTION, POWDER, FOR SOLUTION INTRAMUSCULAR; INTRAVENOUS; PARENTERAL at 07:53

## 2018-11-13 RX ADMIN — PROTAMINE SULFATE 100 MG: 10 INJECTION, SOLUTION INTRAVENOUS at 09:18

## 2018-11-13 RX ADMIN — SUCCINYLCHOLINE CHLORIDE 180 MG: 20 INJECTION, SOLUTION INTRAMUSCULAR; INTRAVENOUS; PARENTERAL at 07:09

## 2018-11-13 RX ADMIN — LIDOCAINE HYDROCHLORIDE 100 MG: 20 INJECTION, SOLUTION INFILTRATION; PERINEURAL at 07:06

## 2018-11-13 RX ADMIN — FUROSEMIDE 40 MG: 40 TABLET ORAL at 17:32

## 2018-11-13 RX ADMIN — PROPOFOL 25 MCG/KG/MIN: 10 INJECTION, EMULSION INTRAVENOUS at 06:54

## 2018-11-13 RX ADMIN — INSULIN GLARGINE 30 UNITS: 100 INJECTION, SOLUTION SUBCUTANEOUS at 23:21

## 2018-11-13 RX ADMIN — SODIUM CHLORIDE 100 ML/HR: 9 INJECTION, SOLUTION INTRAVENOUS at 11:25

## 2018-11-13 RX ADMIN — INSULIN LISPRO 5 UNITS: 100 INJECTION, SOLUTION INTRAVENOUS; SUBCUTANEOUS at 23:21

## 2018-11-13 RX ADMIN — NOREPINEPHRINE BITARTRATE 0.02 MCG/KG/MIN: 1 INJECTION, SOLUTION, CONCENTRATE INTRAVENOUS at 08:06

## 2018-11-13 RX ADMIN — HEPARIN SODIUM 15000 UNITS: 1000 INJECTION, SOLUTION INTRAVENOUS; SUBCUTANEOUS at 08:41

## 2018-11-13 RX ADMIN — SUGAMMADEX 400 MG: 100 INJECTION, SOLUTION INTRAVENOUS at 09:38

## 2018-11-13 RX ADMIN — ISOSORBIDE DINITRATE 40 MG: 20 TABLET ORAL at 23:20

## 2018-11-13 RX ADMIN — SODIUM CHLORIDE 2 MCG/KG/HR: 900 INJECTION, SOLUTION INTRAVENOUS at 06:54

## 2018-11-13 RX ADMIN — ONDANSETRON 4 MG: 2 INJECTION INTRAMUSCULAR; INTRAVENOUS at 08:28

## 2018-11-13 RX ADMIN — ROCURONIUM BROMIDE 30 MG: 10 INJECTION INTRAVENOUS at 07:20

## 2018-11-13 RX ADMIN — ROCURONIUM BROMIDE 20 MG: 10 INJECTION INTRAVENOUS at 08:14

## 2018-11-13 NOTE — ANESTHESIA PROCEDURE NOTES
Central Line      Patient location during procedure: OR  Start time: 11/13/2018 7:24 AM  Stop Time:11/13/2018 7:31 AM  Indications: vascular access  Staff  Anesthesiologist: David Mixon MD  Preanesthetic Checklist  Completed: patient identified and risks and benefits discussed  Central Line Prep  Sterile Tech:cap, gloves, gown, mask and sterile barriers  Prep: chloraprep  Patient monitoring: blood pressure monitoring, continuous pulse oximetry and EKG  Central Line Procedure  Laterality:right  Location:internal jugular  Catheter Type:Cordis  Catheter Size:6 Fr  Guidance:ultrasound guided  Assessment  Post procedure:biopatch applied, line sutured, occlusive dressing applied and secured with tape  Assessement:blood return through all ports, free fluid flow and chest x-ray ordered  Complications:no  Patient Tolerance:patient tolerated the procedure well with no apparent complications  Additional Notes  Ultrasound Interpretation:  Using ultrasound guidance the potential vascular sites for insertion of the catheter were visualized to determine the patency of the vessel to be used for vascular access.  After selecting the appropriate site for insertion, the needle was visualized under ultrasound being inserted into the vessel, followed by ultrasound confirmation of wire and catheter placement.  There were no abnormalities seen on ultrasound; an image was taken/ and the patient tolerated the procedure with no complications.

## 2018-11-13 NOTE — OP NOTE
Date of procedure: 11/13/2018.    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    Postoperative diagnosis: Same.    Procedure: Percutaneous access transfemoral TAVR using 26 mm Sapien3 prosthesis, supravalvular aortogram, insertion of temporary transvenous pacemaker.    Co-surgeon: Dionicio Stoner MD.    Co-surgeon: Elizabeth Meade MD.    Anesthesia: GETA.    Findings: Uneventful deployment of transcatheter heart valve; post deployment dilatation performed to reduce paravalvular leak.  Following postdilatation, transected echocardiogram showed a mean aortic valve gradient of 10 mmHg, an aortic valve area of 2.2 cm², and trace aortic valve insufficiency.  Total fluoroscopy time 18.8 minutes using on 40 mL of IV contrast.    Estimated blood loss: 30 L.    Specimen: None.    History of present illness: This very pleasant 78-year-old  gentleman who is been suffering from severe aortic valve stenosis and chronic diastolic distal heart failure.  He is well-developed or service, having undergone a coronary bypass in 2003 by Dr. Zaidi.  He was felt to be high risk for redo sternotomy and aortic valve replacement, with an STS predicted mortality of over 10%.  He was therefore evaluated for possible TAVR; images suggested that he was accessible by the transfemoral route.  He was counseled and consented for the procedure.    Details: Patient was identified in the prep holding area.  He was taken to the operating room.  Following easy induction, he received a single-lumen endotracheal tube.  He received the right IJ catheter.  He received a left radial arterial line.  He is positioned, and then prepped and draped.  Access was gained to the right radial artery;  this was used to insert a picture catheter into the right coronary sinus.  A supravalvular aortogram was shot in the intended deployment position, confirming the adequacy of this angle.  Access was gained to the left femoral vein.  This was used to insert a transvenous pacemaker into the right trigger apex; pacing thresholds were noted to be adequate.  The patient's permanent pacemaker was kept at about rate VVI 30 bpm.  Access was gained to the right femoral artery.  This received to Perclose sutures.  This then received a 40 [place] citizen E sheath over sequential dilators.  The patient was heparinized.  ACT was confirmed.  A 0.035 straight wire was guided around the aortic arch and through the aortic valve into the left internal cavity; this was exchanged, using a pigtail catheter, for superstiff guidewire.    A 26 mm prosthesis was crimped and prepped.  This was passed onto the field and orientation was confirmed.  This was loaded onto the superstiff guidewire.  The prosthesis was introduced beyond the E sheath into the descending thoracic aorta.  The deployment balloon was withdrawn into the prosthesis.  Using the active flexion delivery system, the procedures was passed around the aortic arch and into the aortic valve annulus.  A supravalvular aortogram confirmed adequate positioning of the prosthesis.  Rapid ventricular pacing was employed with good capture and good reduction of the pulse pressure.  Another supravalvular aortogram was performed showing good intended deployment position; the deployment balloon was inflated rapidly kept inflated for possibly 5 seconds, after which it was rapidly deflated and withdrawn into the ascending aorta.  Rapid ventricular pacing was discontinued with good capture of the patient's temperature transvenous pacemaker.  The prosthesis was examined by transesophageal echocardiogram, showing a small paravalvular leak.  The deployment balloon was therefore passed back into the  prosthesis.  Rapid trigger pacing was employed again and the deployment balloon, now with an extra milliliter of saline, was expanded again.  This balloon was then rapidly deflated.  The patient once again was noted to recover into a paced rhythm.  Transesophageal echocardiogram showed reduction of the paravalvular leak to a very trace 1.  This is felt to be excellent result.  There was no evidence of annular trauma.  The patient's permanent pacemaker was reactivated.      The temporary transvenous pacemaker was removed.  The E sheath was withdrawn to the level of the right common femoral artery.  An aortogram was performed showing the absence of any injury to the aortoiliofemoral systems.  The E sheath was removed and the Perclose sutures were tied.  There was still some bleeding at the site; a third Perclose suture was passed into the artery and deployed; this was then tied down with a good hemostatic result.  The pigtail was removed from the right radial artery.  The patient received a dose of protamine.  He remained hemostatic and hemodynamically stable.  He was revived and extubated; he was transferred to the CVR.

## 2018-11-13 NOTE — ANESTHESIA POSTPROCEDURE EVALUATION
"Patient: Ariel Elliott    Procedure Summary     Date:  11/13/18 Room / Location:  [facility] OR 19 INV / [facility] HYBRID OR 18/19    Anesthesia Start:  0639 Anesthesia Stop:  1000    Procedure:  INTRAOPERATIVE LANG, PERCUTANEOUS TRANSFEMORAL TRANSCATHETER AORTIC VALVE REPLACEMENT PERCUTANEOUS APPROACH (N/A Chest) Diagnosis:       Aortic valve stenosis, etiology of cardiac valve disease unspecified      (Aortic valve stenosis, etiology of cardiac valve disease unspecified [I35.0])    Surgeon:  Elizabeth Meade MD Provider:  David Mixon MD    Anesthesia Type:  general ASA Status:  4          Anesthesia Type: general  Last vitals  BP   158/77 (11/13/18 1451)   Temp   34.6 °C (94.2 °F) (11/13/18 1258)   Pulse   68 (11/13/18 1451)   Resp   18 (11/13/18 1451)     SpO2   98 % (11/13/18 1451)     Post Anesthesia Care and Evaluation    Patient location during evaluation: bedside  Patient participation: complete - patient participated  Level of consciousness: awake  Pain management: adequate  Airway patency: patent  Anesthetic complications: No anesthetic complications    Cardiovascular status: acceptable  Respiratory status: acceptable  Hydration status: acceptable    Comments: /77 (BP Location: Right arm, Patient Position: Sitting)   Pulse 68   Temp 34.6 °C (94.2 °F) (Oral)   Resp 18   Ht 177.8 cm (70\")   Wt 128 kg (281 lb 6.4 oz)   SpO2 98%   BMI 40.38 kg/m²         "

## 2018-11-13 NOTE — ANESTHESIA PROCEDURE NOTES
ANESTHESIA INTUBATION  Airway not difficult    General Information and Staff    Anesthesiologist: David Mixon MD    Indications and Patient Condition    Preoxygenated: yes  Mask difficulty assessment: 2 - vent by mask + OA or adjuvant +/- NMBA    Final Airway Details  Final airway type: endotracheal airway      Successful airway: ETT  Cuffed: yes   Successful intubation technique: direct laryngoscopy and video laryngoscopy  Endotracheal tube insertion site: oral  Blade: Hansel  Blade size: 3  ETT size (mm): 8.0  Cormack-Lehane Classification: grade I - full view of glottis  Placement verified by: chest auscultation and capnometry   Inital cuff pressure (cm H2O): 24  Measured from: teeth  ETT to teeth (cm): 22    Additional Comments  Teeth checked after intubation, no damage noted.

## 2018-11-13 NOTE — CONSULTS
"Met with patient and his family, discussed benefits of cardiac rehab. Provided phase II information packet, which includes; general information about cardiac rehab, [facility] handout and [facility] letter article entitled “Cardiac Rehab is often the Best Medicine for Recovery\", stresses the importance of cardiac rehab after a heart event.   I provided the contact information for cardiac rehab here at [facility] and encouraged them to call when discharged.   Explained if receiving home health would not be able to attend cardiac rehab until finished with home health. Verbalized understanding. Stated attended a program following his heart surgery about 15 years ago. He expressed some interest in attending.       "

## 2018-11-13 NOTE — PLAN OF CARE
Problem: Patient Care Overview  Goal: Plan of Care Review   11/13/18 9450   Coping/Psychosocial   Plan of Care Reviewed With patient   Plan of Care Review   Progress improving   OTHER   Outcome Summary pt transfered from CVR had TAVR today. rt groin site had slight bruising and ooz, no hematoma noted lt groin site / rt radial site cdi. pt ambulated to the bathroom 2x no c/o pain will cont to monitor

## 2018-11-13 NOTE — H&P
TAVR PLAN OF CARE    The patient is an 78-year old man with severe degenerative aortic stenosis.  he has class 3 CHF symptoms.  he has an STS score of 14%, indicating high risk for cardiac surgery.  Comorbidities include CHF, PPM, CAD, GIB, obesity, DM and COPD.  The patient was was discussed in the multi-disciplinary TAVR conference.  We plan to insert a 26-mm  S3 valve via a femoral approach.  Temporary pacing will be performed from the femoral approach.  The patient will be a candidate for bailout surgery in the event of unsuccessful TAVR.

## 2018-11-13 NOTE — ANESTHESIA PROCEDURE NOTES
Procedure Performed: Emergent/Open-Heart Anesthesia LANG     Start Time:        End Time:      Preanesthesia Checklist:  Procedure being performed at surgeon's request.    General Procedure Information  Diagnostic Indications for Echo:  assessment of surgical repair, defect repair evaluation and hemodynamic monitoring  Physician Requesting Echo: Elizabeth Meade MD  CPT Code:  11322, mitral regurgitation, aortic stenosis, aortic regurgitation, diastolic dysfunction, TAVR guidance  Location performed:  OR  Intubated  Heart visualized  Probe Insertion:  Easy  Probe Type:  Multiplane  Modalities:  Color flow mapping, continuous wave Doppler and pulse wave Doppler    Echocardiographic and Doppler Measurements    Ventricles    Right Ventricle:  Cavity size normal.  Hypertrophy not present.  Global function normal.    Left Ventricle:  Cavity size dilated.  Hypertrophy present.  Global Function normal.  Ejection Fraction 55%.      Ventricular Regional Function:  1- Basal Anteroseptal:  normal  2- Basal Anterior:  normal  3- Basal Anterolateral:  normal  4- Basal Inferolateral:  normal  5- Basal Inferior:  normal  6- Basal Inferoseptal:  normal  7- Mid Anteroseptal:  normal  8- Mid Anterior:  normal  9- Mid Anterolateral:  normal  10- Mid Inferolateral:  normal  11- Mid Inferior:  normal  12- Mid Inferoseptal:  normal  13- Apical Anterior:  normal  14- Apical Lateral:  normal  15- Apical Inferior:  normal  16- Apical Septal:  normal  17- [place]:  normal      Valves    Aortic Valve:  Annulus calcified.  Stenosis severe.  Regurgitation trace.  Leaflets calcified.  Leaflet motions restricted.      Mitral Valve:  Annulus calcified.  Stenosis not present.  Regurgitation trace.  Leaflets thickened.      Tricuspid Valve:  Annulus normal.  Stenosis not present.  Regurgitation trace.  Leaflets normal.  Leaflet motions normal.          Aorta    Ascending Aorta:  Size normal.  Plaque thickness less than 3 mm.  Mobile plaque not  present.    Aortic Arch:  Size normal.  Plaque thickness less than 3 mm.  Mobile plaque not present.    Descending Aorta:  Size normal.  Plaque thickness less than 3 mm.  Mobile plaque not present.          Atria    Right Atrium:  Size normal.    Left Atrium:  Size dilated.  Left atrial appendage normal.      Septa    Atrial Septum:  Intra-atrial septal morphology normal.      Ventricular Septum:  Intra-ventricular septum morphology normal.      Diastolic Function Measurements:  Diastolic Dysfunction Grade= I  E= ms  A= ms  E/A Ratio=   DT= ms  S/D=  IVRT=    Other Findings  Pulmonary Arteries:  normal      Anesthesia Information  Performed Personally      Echocardiogram Comments:       Intraop LANG used to ensure MV apparatus not ensnared with wires and to evaluate for post TAVR paravalvular leaks, effusions, aortic damage.    Post TAVR  No effusion.  Aorta  WNL  Moderate paravalvular leak reduced to mild with post deployment dilation.  Mean gradient 10 mmHg  MR now trace, no TR  EF 55%

## 2018-11-13 NOTE — ANESTHESIA PROCEDURE NOTES
ANESTHESIA ARTERIAL LINE      Patient reassessed immediately prior to procedure    Patient location during procedure: OR  Start time: 11/13/2018 6:56 AM  Stop Time:11/13/2018 6:58 AM       Line placed for hemodynamic monitoring.  Performed By   Anesthesiologist: David Mixon MD  Preanesthetic Checklist  Completed: patient identified and risks and benefits discussed  Arterial Line Prep   Sterile Tech: gloves  Prep: ChloraPrep  Patient monitoring: blood pressure monitoring, continuous pulse oximetry and EKG  Arterial Line Procedure   Location:  radial artery  Catheter size: 20 G   Guidance: ultrasound guided  PROCEDURE NOTE/ULTRASOUND INTERPRETATION.  Using ultrasound guidance the potential vascular sites for insertion of the catheter were visualized to determine the patency of the vessel to be used for vascular access.  After selecting the appropriate site for insertion, the needle was visualized under ultrasound being inserted into the radial artery, followed by ultrasound confirmation of wire and catheter placement. There were no abnormalities seen on ultrasound; an image was taken; and the patient tolerated the procedure with no complications.   Successful placement: yes          Post Assessment   Dressing Type: occlusive dressing applied and secured with tape.   Complications no  Patient Tolerance: patient tolerated the procedure well with no apparent complications  Additional Notes  Using ultrasound guidance the potential vascular sites for insertion of the catheter were visualized to determine the patency of the vessel to be used for vascular access.  After selecting the appropriate site for insertion, the needle was visualized under ultrasound being inserted into the radial artery, followed by ultrasound confirmation of wire and catheter placement.  There were no abnormalities seen on ultrasound; an image was taken/ and the patient tolerated the procedure with no complications.

## 2018-11-14 ENCOUNTER — APPOINTMENT (OUTPATIENT)
Dept: CARDIOLOGY | Facility: HOSPITAL | Age: 78
End: 2018-11-14
Attending: INTERNAL MEDICINE

## 2018-11-14 VITALS
TEMPERATURE: 98.2 F | WEIGHT: 280.38 LBS | HEIGHT: 70 IN | SYSTOLIC BLOOD PRESSURE: 165 MMHG | HEART RATE: 60 BPM | BODY MASS INDEX: 40.14 KG/M2 | RESPIRATION RATE: 18 BRPM | OXYGEN SATURATION: 99 % | DIASTOLIC BLOOD PRESSURE: 68 MMHG

## 2018-11-14 LAB
ACT BLD: 142 SECONDS (ref 82–152)
ACT BLD: 147 SECONDS (ref 82–152)
ACT BLD: 246 SECONDS (ref 82–152)
ACT BLD: 279 SECONDS (ref 82–152)
ANION GAP SERPL CALCULATED.3IONS-SCNC: 14.7 MMOL/L
BASE EXCESS BLDA CALC-SCNC: -1 MMOL/L (ref -5–5)
BASE EXCESS BLDA CALC-SCNC: -3 MMOL/L (ref -5–5)
BH CV ECHO MEAS - AO MAX PG (FULL): 20.5 MMHG
BH CV ECHO MEAS - AO MAX PG: 28.3 MMHG
BH CV ECHO MEAS - AO MEAN PG (FULL): 10 MMHG
BH CV ECHO MEAS - AO MEAN PG: 15 MMHG
BH CV ECHO MEAS - AO ROOT AREA (BSA CORRECTED): 1.1
BH CV ECHO MEAS - AO ROOT AREA: 5.3 CM^2
BH CV ECHO MEAS - AO ROOT DIAM: 2.6 CM
BH CV ECHO MEAS - AO V2 MAX: 266 CM/SEC
BH CV ECHO MEAS - AO V2 MEAN: 175 CM/SEC
BH CV ECHO MEAS - AO V2 VTI: 56.4 CM
BH CV ECHO MEAS - AVA(I,A): 2.3 CM^2
BH CV ECHO MEAS - AVA(I,D): 2.3 CM^2
BH CV ECHO MEAS - AVA(V,A): 2 CM^2
BH CV ECHO MEAS - AVA(V,D): 2 CM^2
BH CV ECHO MEAS - BSA(HAYCOCK): 2.6 M^2
BH CV ECHO MEAS - BSA: 2.4 M^2
BH CV ECHO MEAS - BZI_BMI: 40.2 KILOGRAMS/M^2
BH CV ECHO MEAS - BZI_METRIC_HEIGHT: 177.8 CM
BH CV ECHO MEAS - BZI_METRIC_WEIGHT: 127 KG
BH CV ECHO MEAS - EDV(CUBED): 97.3 ML
BH CV ECHO MEAS - EDV(MOD-SP2): 92 ML
BH CV ECHO MEAS - EDV(MOD-SP4): 155 ML
BH CV ECHO MEAS - EDV(TEICH): 97.3 ML
BH CV ECHO MEAS - EF(CUBED): 59.6 %
BH CV ECHO MEAS - EF(MOD-BP): 56 %
BH CV ECHO MEAS - EF(MOD-SP2): 45.7 %
BH CV ECHO MEAS - EF(MOD-SP4): 59.4 %
BH CV ECHO MEAS - EF(TEICH): 51.3 %
BH CV ECHO MEAS - ESV(CUBED): 39.3 ML
BH CV ECHO MEAS - ESV(MOD-SP2): 50 ML
BH CV ECHO MEAS - ESV(MOD-SP4): 63 ML
BH CV ECHO MEAS - ESV(TEICH): 47.4 ML
BH CV ECHO MEAS - FS: 26.1 %
BH CV ECHO MEAS - IVS/LVPW: 1
BH CV ECHO MEAS - IVSD: 1.5 CM
BH CV ECHO MEAS - LAT PEAK E' VEL: 7 CM/SEC
BH CV ECHO MEAS - LV DIASTOLIC VOL/BSA (35-75): 64.4 ML/M^2
BH CV ECHO MEAS - LV MASS(C)D: 284.8 GRAMS
BH CV ECHO MEAS - LV MASS(C)DI: 118.3 GRAMS/M^2
BH CV ECHO MEAS - LV MAX PG: 7.8 MMHG
BH CV ECHO MEAS - LV MEAN PG: 5 MMHG
BH CV ECHO MEAS - LV SYSTOLIC VOL/BSA (12-30): 26.2 ML/M^2
BH CV ECHO MEAS - LV V1 MAX: 140 CM/SEC
BH CV ECHO MEAS - LV V1 MEAN: 103 CM/SEC
BH CV ECHO MEAS - LV V1 VTI: 33.5 CM
BH CV ECHO MEAS - LVIDD: 4.6 CM
BH CV ECHO MEAS - LVIDS: 3.4 CM
BH CV ECHO MEAS - LVLD AP2: 8.6 CM
BH CV ECHO MEAS - LVLD AP4: 9.8 CM
BH CV ECHO MEAS - LVLS AP2: 8.3 CM
BH CV ECHO MEAS - LVLS AP4: 8.4 CM
BH CV ECHO MEAS - LVOT AREA (M): 3.8 CM^2
BH CV ECHO MEAS - LVOT AREA: 3.8 CM^2
BH CV ECHO MEAS - LVOT DIAM: 2.2 CM
BH CV ECHO MEAS - LVPWD: 1.5 CM
BH CV ECHO MEAS - MED PEAK E' VEL: 6 CM/SEC
BH CV ECHO MEAS - MV A DUR: 0.18 SEC
BH CV ECHO MEAS - MV A MAX VEL: 113 CM/SEC
BH CV ECHO MEAS - MV DEC SLOPE: 326 CM/SEC^2
BH CV ECHO MEAS - MV DEC TIME: 0.34 SEC
BH CV ECHO MEAS - MV E MAX VEL: 110 CM/SEC
BH CV ECHO MEAS - MV E/A: 0.97
BH CV ECHO MEAS - MV MAX PG: 5.5 MMHG
BH CV ECHO MEAS - MV MEAN PG: 2 MMHG
BH CV ECHO MEAS - MV P1/2T MAX VEL: 122 CM/SEC
BH CV ECHO MEAS - MV P1/2T: 109.6 MSEC
BH CV ECHO MEAS - MV V2 MAX: 117 CM/SEC
BH CV ECHO MEAS - MV V2 MEAN: 69.6 CM/SEC
BH CV ECHO MEAS - MV V2 VTI: 49.5 CM
BH CV ECHO MEAS - MVA P1/2T LCG: 1.8 CM^2
BH CV ECHO MEAS - MVA(P1/2T): 2 CM^2
BH CV ECHO MEAS - MVA(VTI): 2.6 CM^2
BH CV ECHO MEAS - PA ACC TIME: 0.14 SEC
BH CV ECHO MEAS - PA MAX PG (FULL): 2.5 MMHG
BH CV ECHO MEAS - PA MAX PG: 6.8 MMHG
BH CV ECHO MEAS - PA PR(ACCEL): 15.6 MMHG
BH CV ECHO MEAS - PA V2 MAX: 130 CM/SEC
BH CV ECHO MEAS - PULM A REVS DUR: 0.12 SEC
BH CV ECHO MEAS - PULM A REVS VEL: 20 CM/SEC
BH CV ECHO MEAS - PULM DIAS VEL: 49.5 CM/SEC
BH CV ECHO MEAS - PULM S/D: 1.6
BH CV ECHO MEAS - PULM SYS VEL: 77.5 CM/SEC
BH CV ECHO MEAS - RAP SYSTOLE: 8 MMHG
BH CV ECHO MEAS - RV MAX PG: 4.2 MMHG
BH CV ECHO MEAS - RV MEAN PG: 2 MMHG
BH CV ECHO MEAS - RV V1 MAX: 103 CM/SEC
BH CV ECHO MEAS - RV V1 MEAN: 64.1 CM/SEC
BH CV ECHO MEAS - RV V1 VTI: 22.3 CM
BH CV ECHO MEAS - SI(AO): 124.3 ML/M^2
BH CV ECHO MEAS - SI(CUBED): 24.1 ML/M^2
BH CV ECHO MEAS - SI(LVOT): 52.9 ML/M^2
BH CV ECHO MEAS - SI(MOD-SP2): 17.4 ML/M^2
BH CV ECHO MEAS - SI(MOD-SP4): 38.2 ML/M^2
BH CV ECHO MEAS - SI(TEICH): 20.7 ML/M^2
BH CV ECHO MEAS - SV(AO): 299.4 ML
BH CV ECHO MEAS - SV(CUBED): 58 ML
BH CV ECHO MEAS - SV(LVOT): 127.3 ML
BH CV ECHO MEAS - SV(MOD-SP2): 42 ML
BH CV ECHO MEAS - SV(MOD-SP4): 92 ML
BH CV ECHO MEAS - SV(TEICH): 49.9 ML
BH CV ECHO MEAS - TAPSE (>1.6): 1.6 CM2
BH CV ECHO MEASUREMENTS AVERAGE E/E' RATIO: 16.92
BH CV VAS BP RIGHT ARM: NORMAL MMHG
BH CV XLRA - RV BASE: 3.9 CM
BH CV XLRA - TDI S': 12 CM/SEC
BUN BLD-MCNC: 34 MG/DL (ref 8–23)
BUN/CREAT SERPL: 24.6 (ref 7–25)
CALCIUM SPEC-SCNC: 8.3 MG/DL (ref 8.6–10.5)
CHLORIDE SERPL-SCNC: 105 MMOL/L (ref 98–107)
CO2 BLDA-SCNC: 23 MMOL/L (ref 24–29)
CO2 BLDA-SCNC: 26 MMOL/L (ref 24–29)
CO2 SERPL-SCNC: 20.3 MMOL/L (ref 22–29)
CREAT BLD-MCNC: 1.38 MG/DL (ref 0.76–1.27)
DEPRECATED RDW RBC AUTO: 55.5 FL (ref 37–54)
DEPRECATED RDW RBC AUTO: 56.2 FL (ref 37–54)
ERYTHROCYTE [DISTWIDTH] IN BLOOD BY AUTOMATED COUNT: 16.9 % (ref 11.5–14.5)
ERYTHROCYTE [DISTWIDTH] IN BLOOD BY AUTOMATED COUNT: 16.9 % (ref 11.5–14.5)
GFR SERPL CREATININE-BSD FRML MDRD: 50 ML/MIN/1.73
GLUCOSE BLD-MCNC: 178 MG/DL (ref 65–99)
GLUCOSE BLDC GLUCOMTR-MCNC: 150 MG/DL (ref 70–130)
GLUCOSE BLDC GLUCOMTR-MCNC: 160 MG/DL (ref 70–130)
GLUCOSE BLDC GLUCOMTR-MCNC: 195 MG/DL (ref 70–130)
GLUCOSE BLDC GLUCOMTR-MCNC: 201 MG/DL (ref 70–130)
GLUCOSE BLDC GLUCOMTR-MCNC: 204 MG/DL (ref 70–130)
HCO3 BLDA-SCNC: 22.3 MMOL/L (ref 22–26)
HCO3 BLDA-SCNC: 24.6 MMOL/L (ref 22–26)
HCT VFR BLD AUTO: 25.2 % (ref 40.4–52.2)
HCT VFR BLD AUTO: 26.4 % (ref 40.4–52.2)
HCT VFR BLDA CALC: 19 % (ref 38–51)
HCT VFR BLDA CALC: 22 % (ref 38–51)
HGB BLD-MCNC: 7.9 G/DL (ref 13.7–17.6)
HGB BLD-MCNC: 8.3 G/DL (ref 13.7–17.6)
HGB BLDA-MCNC: 6.5 G/DL (ref 12–17)
HGB BLDA-MCNC: 7.5 G/DL (ref 12–17)
LEFT ATRIUM VOLUME INDEX: 37 ML/M2
MAXIMAL PREDICTED HEART RATE: 142 BPM
MCH RBC QN AUTO: 28 PG (ref 27–32.7)
MCH RBC QN AUTO: 28.7 PG (ref 27–32.7)
MCHC RBC AUTO-ENTMCNC: 31.3 G/DL (ref 32.6–36.4)
MCHC RBC AUTO-ENTMCNC: 31.4 G/DL (ref 32.6–36.4)
MCV RBC AUTO: 89.4 FL (ref 79.8–96.2)
MCV RBC AUTO: 91.3 FL (ref 79.8–96.2)
PCO2 BLDA: 39.5 MM HG (ref 35–45)
PCO2 BLDA: 44.8 MM HG (ref 35–45)
PH BLDA: 7.35 PH UNITS (ref 7.35–7.6)
PH BLDA: 7.36 PH UNITS (ref 7.35–7.6)
PLATELET # BLD AUTO: 143 10*3/MM3 (ref 140–500)
PLATELET # BLD AUTO: 150 10*3/MM3 (ref 140–500)
PMV BLD AUTO: 10.6 FL (ref 6–12)
PMV BLD AUTO: 11.4 FL (ref 6–12)
PO2 BLDA: 320 MMHG (ref 80–105)
PO2 BLDA: 410 MMHG (ref 80–105)
POTASSIUM BLD-SCNC: 4.2 MMOL/L (ref 3.5–5.2)
POTASSIUM BLDA-SCNC: 4 MMOL/L (ref 3.5–4.9)
POTASSIUM BLDA-SCNC: 4.3 MMOL/L (ref 3.5–4.9)
RBC # BLD AUTO: 2.82 10*6/MM3 (ref 4.6–6)
RBC # BLD AUTO: 2.89 10*6/MM3 (ref 4.6–6)
SAO2 % BLDA: 100 % (ref 95–98)
SAO2 % BLDA: 100 % (ref 95–98)
SODIUM BLD-SCNC: 140 MMOL/L (ref 136–145)
STRESS TARGET HR: 121 BPM
WBC NRBC COR # BLD: 5.71 10*3/MM3 (ref 4.5–10.7)
WBC NRBC COR # BLD: 5.98 10*3/MM3 (ref 4.5–10.7)

## 2018-11-14 PROCEDURE — 99238 HOSP IP/OBS DSCHRG MGMT 30/<: CPT | Performed by: INTERNAL MEDICINE

## 2018-11-14 PROCEDURE — P9016 RBC LEUKOCYTES REDUCED: HCPCS

## 2018-11-14 PROCEDURE — 93010 ELECTROCARDIOGRAM REPORT: CPT | Performed by: INTERNAL MEDICINE

## 2018-11-14 PROCEDURE — 82962 GLUCOSE BLOOD TEST: CPT

## 2018-11-14 PROCEDURE — 25010000002 PERFLUTREN (DEFINITY) 8.476 MG IN SODIUM CHLORIDE 0.9 % 10 ML INJECTION: Performed by: THORACIC SURGERY (CARDIOTHORACIC VASCULAR SURGERY)

## 2018-11-14 PROCEDURE — 93306 TTE W/DOPPLER COMPLETE: CPT | Performed by: INTERNAL MEDICINE

## 2018-11-14 PROCEDURE — 85027 COMPLETE CBC AUTOMATED: CPT | Performed by: INTERNAL MEDICINE

## 2018-11-14 PROCEDURE — 93306 TTE W/DOPPLER COMPLETE: CPT

## 2018-11-14 PROCEDURE — 99233 SBSQ HOSP IP/OBS HIGH 50: CPT | Performed by: THORACIC SURGERY (CARDIOTHORACIC VASCULAR SURGERY)

## 2018-11-14 PROCEDURE — 86900 BLOOD TYPING SEROLOGIC ABO: CPT

## 2018-11-14 PROCEDURE — 80048 BASIC METABOLIC PNL TOTAL CA: CPT | Performed by: INTERNAL MEDICINE

## 2018-11-14 PROCEDURE — 36430 TRANSFUSION BLD/BLD COMPNT: CPT

## 2018-11-14 PROCEDURE — 63710000001 INSULIN LISPRO (HUMAN) PER 5 UNITS: Performed by: INTERNAL MEDICINE

## 2018-11-14 PROCEDURE — 93005 ELECTROCARDIOGRAM TRACING: CPT | Performed by: INTERNAL MEDICINE

## 2018-11-14 RX ORDER — INSULIN GLARGINE 100 [IU]/ML
30 INJECTION, SOLUTION SUBCUTANEOUS EVERY 12 HOURS SCHEDULED
Status: DISCONTINUED | OUTPATIENT
Start: 2018-11-14 | End: 2018-11-14 | Stop reason: HOSPADM

## 2018-11-14 RX ORDER — SODIUM CHLORIDE 0.9 % (FLUSH) 0.9 %
10 SYRINGE (ML) INJECTION EVERY 12 HOURS SCHEDULED
Status: DISCONTINUED | OUTPATIENT
Start: 2018-11-14 | End: 2018-11-14 | Stop reason: HOSPADM

## 2018-11-14 RX ORDER — ASPIRIN 81 MG/1
81 TABLET ORAL DAILY
Start: 2018-11-15 | End: 2019-02-22

## 2018-11-14 RX ORDER — INSULIN GLARGINE 100 [IU]/ML
30 INJECTION, SOLUTION SUBCUTANEOUS EVERY 12 HOURS SCHEDULED
Status: DISCONTINUED | OUTPATIENT
Start: 2018-11-14 | End: 2018-11-14

## 2018-11-14 RX ORDER — SODIUM CHLORIDE 0.9 % (FLUSH) 0.9 %
20 SYRINGE (ML) INJECTION AS NEEDED
Status: DISCONTINUED | OUTPATIENT
Start: 2018-11-14 | End: 2018-11-14 | Stop reason: HOSPADM

## 2018-11-14 RX ORDER — SODIUM CHLORIDE 0.9 % (FLUSH) 0.9 %
10 SYRINGE (ML) INJECTION AS NEEDED
Status: DISCONTINUED | OUTPATIENT
Start: 2018-11-14 | End: 2018-11-14 | Stop reason: HOSPADM

## 2018-11-14 RX ORDER — ASPIRIN 81 MG/1
81 TABLET ORAL DAILY
Status: DISCONTINUED | OUTPATIENT
Start: 2018-11-14 | End: 2018-11-14 | Stop reason: HOSPADM

## 2018-11-14 RX ADMIN — INSULIN LISPRO 5 UNITS: 100 INJECTION, SOLUTION INTRAVENOUS; SUBCUTANEOUS at 07:05

## 2018-11-14 RX ADMIN — Medication 2 WAFER: at 11:17

## 2018-11-14 RX ADMIN — Medication 500 UNITS: at 18:26

## 2018-11-14 RX ADMIN — PANTOPRAZOLE SODIUM 40 MG: 40 TABLET, DELAYED RELEASE ORAL at 07:05

## 2018-11-14 RX ADMIN — FUROSEMIDE 40 MG: 40 TABLET ORAL at 11:17

## 2018-11-14 RX ADMIN — ISOSORBIDE DINITRATE 40 MG: 20 TABLET ORAL at 11:17

## 2018-11-14 RX ADMIN — PERFLUTREN 2.5 ML: 6.52 INJECTION, SUSPENSION INTRAVENOUS at 09:45

## 2018-11-14 RX ADMIN — INSULIN LISPRO 3 UNITS: 100 INJECTION, SOLUTION INTRAVENOUS; SUBCUTANEOUS at 18:04

## 2018-11-14 RX ADMIN — ASPIRIN 81 MG: 81 TABLET, DELAYED RELEASE ORAL at 11:17

## 2018-11-14 RX ADMIN — INSULIN LISPRO 5 UNITS: 100 INJECTION, SOLUTION INTRAVENOUS; SUBCUTANEOUS at 14:06

## 2018-11-14 RX ADMIN — Medication 10 ML: at 14:54

## 2018-11-14 NOTE — PROGRESS NOTES
" LOS: 1 day   Patient Care Team:  Zeb Meadows MD as PCP - General (Internal Medicine)    Chief Complaint: TAVR    Subjective:  Symptoms:  No shortness of breath.          Vital Signs  Temp:  [97.6 °F (36.4 °C)-99.3 °F (37.4 °C)] 98.7 °F (37.1 °C)  Heart Rate:  [60-68] 60  Resp:  [17-20] 18  BP: (130-177)/(54-81) 134/60  Body mass index is 40.23 kg/m².    Intake/Output Summary (Last 24 hours) at 11/14/2018 1417  Last data filed at 11/14/2018 1306  Gross per 24 hour   Intake 255 ml   Output 700 ml   Net -445 ml     I/O this shift:  In: 255 [P.O.:240; Blood:15]  Out: -           11/13/18  0533 11/14/18  0500   Weight: 128 kg (281 lb 6.4 oz) 127 kg (280 lb 6 oz)         Objective:  General Appearance:  In no acute distress.    Vital signs: (most recent): Blood pressure 158/77, pulse 60, temperature 98.2 °F (36.8 °C), temperature source Oral, resp. rate 18, height 177.8 cm (70\"), weight 127 kg (280 lb 6 oz), SpO2 99 %.  Vital signs are normal.    Output: Producing urine and producing stool.    HEENT: Normal HEENT exam.    Lungs:  Normal effort and normal respiratory rate.    Heart: Normal rate.  Regular rhythm.  S1 normal and S2 normal.    Abdomen: Abdomen is soft.  Bowel sounds are normal.   There is no abdominal tenderness.     Extremities: Normal range of motion.    Pulses: Distal pulses are intact.    Neurological: Patient is alert and oriented to person, place and time.  Normal strength.    Pupils:  Pupils are equal, round, and reactive to light.    Skin:  Warm and dry.              Results Review:        WBC WBC   Date Value Ref Range Status   11/14/2018 5.71 4.50 - 10.70 10*3/mm3 Final   11/14/2018 5.98 4.50 - 10.70 10*3/mm3 Final   11/13/2018 4.61 4.50 - 10.70 10*3/mm3 Final      HGB Hemoglobin   Date Value Ref Range Status   11/14/2018 7.9 (L) 13.7 - 17.6 g/dL Final   11/14/2018 8.3 (L) 13.7 - 17.6 g/dL Final   11/13/2018 7.5 (L) 12.0 - 17.0 g/dL Final   11/13/2018 8.4 (L) 13.7 - 17.6 g/dL Final      HCT " Hematocrit   Date Value Ref Range Status   11/14/2018 25.2 (L) 40.4 - 52.2 % Final   11/14/2018 26.4 (L) 40.4 - 52.2 % Final   11/13/2018 22 (L) 38 - 51 % Final   11/13/2018 27.4 (L) 40.4 - 52.2 % Final      Platelets Platelets   Date Value Ref Range Status   11/14/2018 143 140 - 500 10*3/mm3 Final   11/14/2018 150 140 - 500 10*3/mm3 Final   11/13/2018 196 140 - 500 10*3/mm3 Final        PT/INR:  No results found for: PROTIME/No results found for: INR    Sodium Sodium   Date Value Ref Range Status   11/14/2018 140 136 - 145 mmol/L Final      Potassium Potassium   Date Value Ref Range Status   11/14/2018 4.2 3.5 - 5.2 mmol/L Final      Chloride Chloride   Date Value Ref Range Status   11/14/2018 105 98 - 107 mmol/L Final      Bicarbonate CO2   Date Value Ref Range Status   11/14/2018 20.3 (L) 22.0 - 29.0 mmol/L Final      BUN BUN   Date Value Ref Range Status   11/14/2018 34 (H) 8 - 23 mg/dL Final      Creatinine Creatinine   Date Value Ref Range Status   11/14/2018 1.38 (H) 0.76 - 1.27 mg/dL Final      Calcium Calcium   Date Value Ref Range Status   11/14/2018 8.3 (L) 8.6 - 10.5 mg/dL Final      Magnesium No results found for: MG         allopurinol 300 mg Oral Nightly   aspirin 81 mg Oral Daily   furosemide 40 mg Oral BID   insulin glargine 30 Units Subcutaneous Q12H   insulin lispro 0-14 Units Subcutaneous 4x Daily With Meals & Nightly   isosorbide dinitrate 40 mg Oral BID   METAMUCIL 2 wafer Oral Daily   pantoprazole 40 mg Oral QAM   sodium chloride 10 mL Intravenous Q12H       lactated ringers 9 mL/hr    sodium chloride 9 mL/hr Last Rate: Stopped (11/13/18 4787)   sodium chloride 100 mL/hr Last Rate: Stopped (11/13/18 2189)           Patient Active Problem List   Diagnosis Code   • Nonrheumatic aortic valve stenosis I35.0   • S/P CABG (coronary artery bypass graft) Z95.1   • Class 2 severe obesity due to excess calories with serious comorbidity and body mass index (BMI) of 39.0 to 39.9 in adult (CMS/[facility]) E66.01,  Z68.39   • Left bundle branch block (LBBB) I44.7   • Type 2 diabetes mellitus with complication, with long-term current use of insulin (CMS/[facility]) E11.8, Z79.4   • Dyspnea on exertion R06.09   • Angina pectoris (CMS/[facility]) I20.9   • Syncope R55   • AVM (arteriovenous malformation) of small bowel, acquired with hemorrhage (CMS/[facility]) K55.8   • Aortic valve stenosis I35.0   • Atrial flutter (CMS/[facility]) I48.92   • Diabetes mellitus (CMS/[facility]) E11.9   • Von Willebrand disease (CMS/[facility]) D68.0   • Sleep apnea G47.30   • Pacemaker Z95.0   • Coronary atherosclerosis I25.10   • CKD (chronic kidney disease) N18.9       Assessment & Plan    -Aortic stenosis- s/p TAVR POD#1 - Deshaun/Herbie   -CKD  -Diabetes type 2  -AVM small bowel  -CAD s/p CABG  -Chronic anemia-hx GIB, black tarry stools today hgb 7.9- per cards additional 2 units today      Overnight had a low grade fever.  Will watch one more day.        Florencia Cabrera, APRN  11/14/18  2:17 PM

## 2018-11-14 NOTE — PROGRESS NOTES
"Discharge Planning Assessment  [facility]     Patient Name: Ariel Elliott  MRN: [id number]  Today's Date: 11/14/2018    Admit Date: 11/13/2018    Discharge Needs Assessment     Row Name 11/14/18 164       Living Environment    Lives With  child(billy), adult    Name(s) of Who Lives With Patient  Lexi Hurstmichelle    Current Living Arrangements  home/apartment/condo    Primary Care Provided by  self    Provides Primary Care For  no one    Family Caregiver if Needed  child(billy), adult    Family Caregiver Names  Lexi Hurst    Quality of Family Relationships  helpful;involved;supportive    Able to Return to Prior Arrangements  yes       Resource/Environmental Concerns    Resource/Environmental Concerns  none       Transition Planning    Patient/Family Anticipates Transition to  home with family    Patient/Family Anticipated Services at Transition  none    Transportation Anticipated  family or friend will provide       Discharge Needs Assessment    Concerns to be Addressed  no discharge needs identified    Equipment Currently Used at Home  bipap/cpap;walker, rolling;cane, straight Pt has a shower chair \"if needed.\"    Anticipated Changes Related to Illness  none    Equipment Needed After Discharge  none    Offered/Gave Vendor List  no        Discharge Plan     Row Name 11/14/18 5903       Plan    Plan  Home with support of daughter.      Plan Comments  Spoke with Pt at bedside.  CCP introduced self and role.  Pt confirmed information on face sheet.  Pt stated he is IADL'S, retired & drives.  Pt lives with his adult daughter, Lexi Hurst ([phone]).  Pt reports he has used home health in the past.  Pt has been to [facility] for subacute rehab in the past.  Pt has a BIPAP however, Pt cannot recall the DME company name.  [facility]  [facility] [place] [facility] medications.  Pt denies issues with affording his medications.  " Pt plans to return home at discharge and denies needs at this time.  CCP will continue to follow…NO RIVERA/CCP        Destination      No service coordination in this encounter.      Durable Medical Equipment      No service coordination in this encounter.      Dialysis/Infusion      No service coordination in this encounter.      Home Medical Care      No service coordination in this encounter.      Community Resources      No service coordination in this encounter.          Demographic Summary     Row Name 11/14/18 1643       General Information    Admission Type  inpatient    Arrived From  home    Required Notices Provided  Important Message from Medicare    Referral Source  admission list;physician    Reason for Consult  discharge planning    Preferred Language  English     Used During This Interaction  no        Functional Status     Row Name 11/14/18 1644       Functional Status    Usual Activity Tolerance  moderate    Current Activity Tolerance  moderate       Functional Status, IADL    Medications  independent    Meal Preparation  assistive person    Housekeeping  assistive person    Laundry  assistive person    Shopping  assistive person       Mental Status    General Appearance WDL  WDL       Mental Status Summary    Recent Changes in Mental Status/Cognitive Functioning  no changes       Employment/    Employment Status  retired        Psychosocial    No documentation.       Abuse/Neglect     Row Name 11/14/18 1640       Personal Safety    Feels Unsafe at Home or Work/School  no    Feels Threatened by Someone  no    Does Anyone Try to Keep You From Having Contact with Others or Doing Things Outside Your Home?  no    Physical Signs of Abuse Present  no        Legal    No documentation.       Substance Abuse    No documentation.       Patient Forms    No documentation.           Nova Gilbert RN

## 2018-11-14 NOTE — PROGRESS NOTES
"Ariel Elliott  1940 78 y.o.  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  Had a black tarry stools morning      Objective   Vital Signs  Temp:  [93.3 °F (34.1 °C)-99.3 °F (37.4 °C)] 99.3 °F (37.4 °C)  Heart Rate:  [60-75] 64  Resp:  [12-18] 18  BP: ()/(47-79) 137/64  Arterial Line BP: ()/(41-67) 115/55    Intake/Output Summary (Last 24 hours) at 11/14/2018 0841  Last data filed at 11/14/2018 0805  Gross per 24 hour   Intake 1761.67 ml   Output 700 ml   Net 1061.67 ml     Flowsheet Rows      First Filed Value   Admission Height  177.8 cm (70\") Documented at 11/13/2018 0533   Admission Weight  128 kg (281 lb 6.4 oz) Documented at 11/13/2018 0533          Physical Exam:   General Appearance:    Alert,oriented, in no acute distress   Lungs:     Clear to auscultation,BS are equal    Heart:    Normal S1 and S2, RRR with 2/6 systolic ejection murmur, gallop or rub   HEENT:    Sclerae are clear, no JVD or adenopathy   Abdomen:     Normal bowel sounds, soft non-tender, non-distended, no HSM   Extremities:   Moves all extremities well, no edema, no cyanosis, no             Redness, no rash     Medication Review:        allopurinol 300 mg Oral Nightly   aspirin 81 mg Oral Daily   furosemide 40 mg Oral BID   insulin glargine 30 Units Subcutaneous Nightly   insulin lispro 0-14 Units Subcutaneous 4x Daily With Meals & Nightly   isosorbide dinitrate 40 mg Oral BID   METAMUCIL 2 wafer Oral Daily   pantoprazole 40 mg Oral QAM       lactated ringers 9 mL/hr    sodium chloride 9 mL/hr Last Rate: Stopped (11/13/18 0941)   sodium chloride 100 mL/hr Last Rate: Stopped (11/13/18 1335)         I reviewed the patient's new clinical results.  I personally viewed " and interpreted the patient's EKG/Telemetry data    Assessment/Plan  Active Hospital Problems    Diagnosis Date Noted   • **Aortic valve stenosis [I35.0] 11/08/2018   • Atrial flutter (CMS/[facility]) [I48.92] 11/13/2018   • Diabetes mellitus (CMS/[facility]) [E11.9] 11/13/2018   • Von Willebrand disease (CMS/[facility]) [D68.0] 11/13/2018   • Sleep apnea [G47.30] 11/13/2018   • Pacemaker [Z95.0] 11/13/2018   • Coronary atherosclerosis [I25.10] 11/13/2018   • CKD (chronic kidney disease) [N18.9] 11/13/2018   • Dyspnea on exertion [R06.09] 10/25/2018   • Class 2 severe obesity due to excess calories with serious comorbidity and body mass index (BMI) of 39.0 to 39.9 in adult (CMS/[facility]) [E66.01, Z68.39] 10/25/2018   • S/P CABG (coronary artery bypass graft) [Z95.1] 10/25/2018      Resolved Hospital Problems   No resolved problems to display.       He seems to be doing well after his TAVR although he had a GI bleed this morning and hemoglobin is 8.3. I am going to give him 2 units of blood. We are going to have him get up and move around. He has a low-grade temperature. I think we will probably end up watching him 1 more day.        Dionicio Stoner MD  11/14/18  8:41 AM

## 2018-11-14 NOTE — PROGRESS NOTES
"Adult Nutrition  Assessment/PES    Patient Name:  Ariel Elliott  YOB: 1940  MRN: [id number]  Admit Date:  11/13/2018    Assessment Date:  11/14/2018    Comments:  Assessed due to skin risk/pressure injury identified - there is an area of black eschar on R middle toe. Appetite/intake good, Aic 5.7. Glu in upper 100, low 200's - if becomes higher, consider adding diabetic restriction on diet. Will follow.     Reason for Assessment     Row Name 11/14/18 0920          Reason for Assessment    Reason For Assessment  identified at risk by screening criteria     Diagnosis  cardiac disease;renal disease;diabetes diagnosis/complications;pulmonary disease     Identified At Risk by Screening Criteria  large or nonhealing wound, burn or pressure injury         Nutrition/Diet History     Row Name 11/14/18 0921          Nutrition/Diet History    Typical Food/Fluid Intake  POD 1 TAVR. GI bleed noted this morn, Hgb down - transfusing         Anthropometrics     Row Name 11/14/18 0922 11/14/18 0500       Anthropometrics    Height  177.8 cm (70\")  --    Weight  --  127 kg (280 lb 6 oz)       Admit Weight    Admit Weight  128 kg (282 lb) standing  --       Ideal Body Weight (IBW)    Ideal Body Weight (IBW) (kg)  76.48  --       Body Mass Index (BMI)    BMI Assessment  BMI 40 or greater: obesity grade III  --        Labs/Tests/Procedures/Meds     Row Name 11/14/18 0922          Labs/Procedures/Meds    Lab Results Reviewed  reviewed     Lab Results Comments  Glu 178-201, BUN/Cr, H/H        Diagnostic Tests/Procedures    Diagnostic Test/Procedure Reviewed  reviewed     Diagnostic Test/Procedures Comments  TAVR 11/13        Medications    Pertinent Medications Reviewed  reviewed     Pertinent Medications Comments  insulin, ppi, diuretic         Physical Findings     Row Name 11/14/18 0922          Physical Findings    Overall Physical Appearance  obese     Skin  other (see comments);pressure injury R middle toe black " "eschar noted; groin incision         Estimated/Assessed Needs     Row Name 11/14/18 0924 11/14/18 0922       Calculation Measurements    Weight Used For Calculations  127 kg (279 lb 15.8 oz)  --    Height  --  177.8 cm (70\")       Estimated/Assessed Needs    Additional Documentation  Protein Requirements (Group);Fluid Requirements (Group);KCAL/KG (Group)  --       KCAL/KG    14 Kcal/Kg (kcal)  1778  --    15 Kcal/Kg (kcal)  1905  --    18 Kcal/Kg (kcal)  2286  --    20 Kcal/Kg (kcal)  2540  --    25 Kcal/Kg (kcal)  3175  --    30 Kcal/Kg (kcal)  3810  --    35 Kcal/Kg (kcal)  4445  --    40 Kcal/Kg (kcal)  5080  --    45 Kcal/Kg (kcal)  5715  --    50 Kcal/Kg (kcal)  6350  --    kcal/kg (Specify)  -- 18-20 ashley  --       Ward-St. Jeor Equation    RMR (Ward-St. Jeor Equation)  1996.25  --       Protein Requirements    Est Protein Requirement Amount (gms/kg)  0.8 gm protein  --    Estimated Protein Requirements (gms/day)  101.6  --       Fluid Requirements     2540 cc     Nutrition Prescription Ordered     Row Name 11/14/18 0925          Nutrition Prescription PO    Common Modifiers  Cardiac         Evaluation of Received Nutrient/Fluid Intake     Row Name 11/14/18 0925 11/14/18 0924       Calculation Measurements    Weight Used For Calculations  --  127 kg (279 lb 15.8 oz)       PO Evaluation    Number of Meals  1  --    % PO Intake  100%  --           Problem/Interventions:  Problem 1     Row Name 11/14/18 0925          Nutrition Diagnoses Problem 1    Problem 1  Nutrition Appropriate for Condition at this Time     Etiology (related to)  MNT for Treatment/Condition     Signs/Symptoms (evidenced by)  PO Intake     Percent (%) intake recorded  100 %         Problem 2     Row Name 11/14/18 0926          Nutrition Diagnoses Problem 2    Problem 2  Overweight/Obesity     Signs/Symptoms (evidenced by)  BMI     BMI  Greater than 40               Intervention Goal     Row Name 11/14/18 0926          Intervention Goal "    General  Maintain nutrition     PO  Maintain intake     Weight  Appropriate weight loss         Nutrition Intervention     Row Name 11/14/18 0927          Nutrition Intervention    RD/Tech Action  Interview for preference;Encourage intake;Follow Tx progress;Care plan reviewd           Education/Evaluation     Row Name 11/14/18 0927          Education    Education  Will Instruct as appropriate        Monitor/Evaluation    Monitor  Per protocol           Electronically signed by:  Florencia Haines RD  11/14/18 9:28 AM

## 2018-11-14 NOTE — DISCHARGE SUMMARY
Ariel Elliott  [id number]    Date of Admit: 11/13/2018  Date of Discharge:  11/14/2018    Discharge Diagnosis:  Active Hospital Problems    Diagnosis Date Noted   • **Aortic valve stenosis [I35.0] 11/08/2018   • Atrial flutter (CMS/[facility]) [I48.92] 11/13/2018   • Diabetes mellitus (CMS/[facility]) [E11.9] 11/13/2018   • Von Willebrand disease (CMS/[facility]) [D68.0] 11/13/2018   • Sleep apnea [G47.30] 11/13/2018   • Pacemaker [Z95.0] 11/13/2018   • Coronary atherosclerosis [I25.10] 11/13/2018   • CKD (chronic kidney disease) [N18.9] 11/13/2018   • Dyspnea on exertion [R06.09] 10/25/2018   • Class 2 severe obesity due to excess calories with serious comorbidity and body mass index (BMI) of 39.0 to 39.9 in adult (CMS/[facility]) [E66.01, Z68.39] 10/25/2018   • S/P CABG (coronary artery bypass graft) [Z95.1] 10/25/2018      Resolved Hospital Problems   No resolved problems to display.           Hospital Course: Is a gentleman with a prior bypass has developed severe degenerative aortic stenosis with class III heart failure symptoms and class III angina he also has chronic GI bleeding COPD and he had a have a pacemaker implanted recently.  He was electively admitted where he underwent transfemoral percutaneous aortic valve replacement with a #26's yeny S3 aortic valve.  He did well with that he did have melanotic stool the night after the procedure.  His hemoglobin was 8.3 we did transfuse him 2 units of blood.  He's had a lot of discomfort in his back which she feels is positional from his hospital bed because of that we feel he is safe now to go home he has not had any further GI bleeding and the GI bleeding is been a chronic problem.  We are going to have him see Kalli Sheriff in one week and he'll see me in a month    Procedures Performed  Procedure(s):  INTRAOPERATIVE LANG, PERCUTANEOUS TRANSFEMORAL TRANSCATHETER AORTIC VALVE REPLACEMENT PERCUTANEOUS APPROACH       Consults     Date and Time Order Name Status Description     11/13/2018 0952 Inpatient Internal Medicine Consult Completed           Discharge Medications     Your medication list      START taking these medications      Instructions Last Dose Given Next Dose Due   aspirin 81 MG EC tablet      Take 1 tablet by mouth Daily.          CONTINUE taking these medications      Instructions Last Dose Given Next Dose Due   allopurinol 300 MG tablet  Commonly known as:  ZYLOPRIM      Take 300 mg by mouth Every Night.       furosemide 40 MG tablet  Commonly known as:  LASIX      Take 40 mg by mouth 2 (Two) Times a Day.       gemfibrozil 600 MG tablet  Commonly known as:  LOPID      Take 600 mg by mouth 2 (Two) Times a Day Before Meals.       insulin aspart 100 UNIT/ML injection  Commonly known as:  novoLOG      Inject  under the skin into the appropriate area as directed 3 (Three) Times a Day Before Meals. 15-20UNITS       insulin detemir 100 UNIT/ML injection  Commonly known as:  LEVEMIR      Inject 30 Units under the skin into the appropriate area as directed 2 (Two) Times a Day. WITH BLOOD SUGAR GREATER THAN 130       isosorbide dinitrate 40 MG tablet  Commonly known as:  ISORDIL      Take 40 mg by mouth 2 (Two) Times a Day.       loperamide 2 MG capsule  Commonly known as:  IMODIUM      Take 2 mg by mouth As Needed for Diarrhea.       METAMUCIL FIBER PO      Take 1 tablet by mouth Daily.       octreotide 10 MG injection  Commonly known as:  sandoSTATIN      Inject 10 mg into the appropriate muscle as directed by prescriber Every 28 (Twenty-Eight) Days.       omeprazole 40 MG capsule  Commonly known as:  priLOSEC      Take 40 mg by mouth 2 (Two) Times a Day.             Where to Get Your Medications      Information about where to get these medications is not yet available    Ask your nurse or doctor about these medications  · aspirin 81 MG EC tablet         Discharge Diet:     Activity at Discharge:     Discharge disposition: home    Condition on Discharge: stable    Follow-up  Appointments  Future Appointments   Date Time Provider Department Center   12/21/2018 12:00 PM [facility]  [facility]   12/21/2018  1:00 PM Dionicio Stoner MD [facility] None     Additional Instructions for the Follow-ups that You Need to Schedule     Discharge Follow-up with Specified Provider: See Kalli Sheriff next Wednesday see me in 1 month   As directed      To:  See Kalli Sheriff next Wednesday see me in 1 month               Test Results Pending at Discharge       Dionicio Stoner MD  11/14/18  6:39 PM

## 2018-11-14 NOTE — PROGRESS NOTES
Chart reviewed - following peripherally as plans noted for DC soon though getting transfused today     Following for DM - resume home basal dosing at BID as he is tolerating PO    -resume home regimen at DC    -changed diet to NCS    D/w RN

## 2018-11-14 NOTE — CONSULTS
CONSULT NOTE    INTERNAL MEDICINE   [facility]       Patient Identification:  Name: Arile Elliott  Age: 78 y.o.  Sex: male  :  1940  MRN: [id number]             Date of Consultation:  2018        Primary Care Physician: Zeb Meadows MD                               Requesting Physician: Dr. Johnson  Reason for Consultation: Management of diabetes    Chief Complaint:  Brought in today for elective TVR.    History of Present Illness:   Patient is a 78-year-old male with history of severe aortic stenosis and has underlying history of congestive heart failure permanent pacemaker placement coronary artery disease obesity GI bleed COPD and diabetes mellitus was brought in earlier today for elective TAVR as he was considered a high risk for additional valve replacement surgery.  Patient was seen postoperatively for the management of diabetes as per primary team.  Patient currently feels tired but denies any new symptoms.  he has not eaten anything since last night.  Patient has had coronary artery bypass grafting performed in .    Past Medical History:  Past Medical History:   Diagnosis Date   • Anemia    • Anemia     2 UNITS OF BLOOD SAT 11/10. [facility]   • Aortic stenosis    • Arrhythmia    • Atrial flutter (CMS/HCC)    • Congestive heart failure (CMS/HCC)    • Coronary atherosclerosis    • Diabetes mellitus (CMS/HCC)     TYPE 2   • GI (gastrointestinal bleed)    • Gout    • H/O seasonal allergies    • Hard to intubate     SEVERAL TIMES IN PAST. NO ISSUES RECENT HISTORY   • Herpes     HANDS IN THE PAST   • Heyd syndrome (CMS/HCC)    • History of diverticulitis    • Hyperlipidemia    • Pacemaker     LEFT.   • Post-nasal drip    • Sleep apnea     Bi-Pap   • Von Willebrand disease (CMS/HCC)      Past Surgical History:  Past Surgical History:   Procedure Laterality Date   • ABLATION OF DYSRHYTHMIC FOCUS     • CARDIAC CATHETERIZATION     • CATARACT  EXTRACTION WITH INTRAOCULAR LENS IMPLANT      LEFT AND RIGHT   • CHOLECYSTECTOMY     • COLONOSCOPY     • CORONARY ANGIOPLASTY      WITH PTCA   • CORONARY ARTERY BYPASS GRAFT      5 VESSEL   • HAND SURGERY      TRIGGER FINGER RELEASE, TENDON RELEASE   • HEMORRHOIDECTOMY     • HERNIA REPAIR      VENTRAL HERNIA REPEATEDLY   • PACEMAKER IMPLANTATION  11/05/2018   • PORTACATH PLACEMENT Right     POWER PORT   • SINUS SURGERY     • TOTAL HIP ARTHROPLASTY Right       Home Meds:  Medications Prior to Admission   Medication Sig Dispense Refill Last Dose   • allopurinol (ZYLOPRIM) 300 MG tablet Take 300 mg by mouth Every Night.   11/12/2018 at 2200   • furosemide (LASIX) 40 MG tablet Take 40 mg by mouth 2 (Two) Times a Day.   11/12/2018 at 1200   • gemfibrozil (LOPID) 600 MG tablet Take 600 mg by mouth 2 (Two) Times a Day Before Meals.   11/12/2018 at 2200   • insulin aspart (novoLOG) 100 UNIT/ML injection Inject  under the skin into the appropriate area as directed 3 (Three) Times a Day Before Meals. 15-20UNITS   11/12/2018 at 2000   • insulin detemir (LEVEMIR) 100 UNIT/ML injection Inject 30 Units under the skin into the appropriate area as directed 2 (Two) Times a Day. WITH BLOOD SUGAR GREATER THAN 130   11/12/2018 at 2200   • isosorbide dinitrate (ISORDIL) 40 MG tablet Take 40 mg by mouth 2 (Two) Times a Day.   11/12/2018 at 2200   • loperamide (IMODIUM) 2 MG capsule Take 2 mg by mouth As Needed for Diarrhea.   Past Week at Unknown time   • octreotide (sandoSTATIN) 10 MG injection Inject 10 mg into the appropriate muscle as directed by prescriber Every 28 (Twenty-Eight) Days.   11/1/2018   • omeprazole (priLOSEC) 40 MG capsule Take 40 mg by mouth 2 (Two) Times a Day.   11/12/2018 at 2200   • Psyllium (METAMUCIL FIBER PO) Take 1 tablet by mouth Daily.   11/12/2018 at 1200     Current Meds:     Current Facility-Administered Medications:   •  acetaminophen (TYLENOL) tablet 650 mg, 650 mg, Oral, Q4H PRN, Niurka, Cathie, APRN  •  " allopurinol (ZYLOPRIM) tablet 300 mg, 300 mg, Oral, Nightly, Dionicio Stoner MD  •  furosemide (LASIX) tablet 40 mg, 40 mg, Oral, BID, Dionicio Stoner MD, 40 mg at 11/13/18 1732  •  isosorbide dinitrate (ISORDIL) tablet 40 mg, 40 mg, Oral, BID, Dionicio Stoner MD  •  lactated ringers infusion, 9 mL/hr, Intravenous, Continuous, Karyn Vanegas MD  •  loperamide (IMODIUM) capsule 2 mg, 2 mg, Oral, PRN, Dionicio Stoner MD  •  METAMUCIL wafer 2 wafer, 2 wafer, Oral, Daily, Dionicio Stoner MD  •  pantoprazole (PROTONIX) EC tablet 40 mg, 40 mg, Oral, QAM, Dionicio Stoner MD  •  sodium chloride 0.9 % infusion, 9 mL/hr, Intravenous, Continuous, Elizabeth Meade MD, Stopped at 11/13/18 0941  •  sodium chloride 0.9 % infusion, 100 mL/hr, Intravenous, Continuous, Dionicio Stoner MD, Stopped at 11/13/18 1335  Allergies:  Allergies   Allergen Reactions   • Statins Myalgia     Social History:   Social History     Tobacco Use   • Smoking status: Former Smoker   • Smokeless tobacco: Never Used   • Tobacco comment: QUIT 35YRS AGO   Substance Use Topics   • Alcohol use: No      Family History:  Family History   Problem Relation Age of Onset   • Diabetes Mother    • Cancer Mother           Review of Systems  See history of present illness and past medical history.    Constitutional: Remarkable for no fever or chills  Cardio vascular: Remarkable for shortness of breath and dyspnea on exertion    Respiratory: Remarkable for sleep apnea uses CPAP  GI: Remarkable for no nausea vomiting  : Remarkable for no burning in urination frequency urgency  Musculoskeletal skeletal: Remarkable for no specific joint aches and pain  Neurological: Remarkable for no loss of consciousness continence  Endocrine remarkable for diabetes mellitus    Vitals:   /71 (BP Location: Left arm, Patient Position: Sitting)   Pulse 60   Temp 98.5 °F (36.9 °C) (Oral)   Resp 17   Ht 177.8 cm (70\")   Wt 128 kg (281 lb 6.4 oz)   SpO2 100%   BMI " 40.38 kg/m²   I/O:     Intake/Output Summary (Last 24 hours) at 11/13/2018 2212  Last data filed at 11/13/2018 1638  Gross per 24 hour   Intake 1521.67 ml   Output 200 ml   Net 1321.67 ml     Exam:  General Appearance:    Alert, chronically ill-appearing obese male who is sitting in the recliner and cooperative, no distress, appears stated age   Head:    Normocephalic, without obvious abnormality, atraumatic   Eyes:    PERRL, conjunctiva/corneas clear, EOM's intact, both eyes   Ears:    Normal external ear canals, both ears   Nose:   Nares normal, septum midline, mucosa normal, no drainage    or sinus tenderness   Throat:   Lips, tongue, gums normal; oral mucosa pink and moist   Neck:   Supple, symmetrical, trachea midline, no adenopathy;     thyroid:  no enlargement/tenderness/nodules; no carotid    bruit or JVD   Back:     Symmetric, no curvature, ROM normal, no CVA tenderness   Lungs:     Clear to auscultation bilaterally, respirations unlabored   Chest Wall:    No tenderness or deformity    Heart:    Regular rate and rhythm, S1 and S2 normal, no murmur, rub   or gallop   Abdomen:     Soft, morbidly obese, non-tender, bowel sounds active all four quadrants,     no masses, no hepatomegaly, no splenomegaly   Extremities:   Bilateral lower extremity edema    Pulses:   Pulses palpable in all extremities; symmetric all extremities   Skin:   Skin color normal, Skin is warm and dry,  no rashes or palpable lesions   Neurologic:   CNII-XII intact, motor strength grossly intact, sensation grossly intact to light touch, no focal deficits noted       Data Review:      I reviewed the patient's new clinical results.  Results from last 7 days   Lab Units  11/13/18   0545  11/09/18   1143   WBC 10*3/mm3  4.61  4.60   HEMOGLOBIN g/dL  8.4*  7.8*   PLATELETS 10*3/mm3  196  190     Results from last 7 days   Lab Units  11/09/18   1143   SODIUM mmol/L  142   POTASSIUM mmol/L  5.0   CHLORIDE mmol/L  108*   CO2 mmol/L  23.0   BUN  mg/dL  46*   CREATININE mg/dL  1.35*   CALCIUM mg/dL  8.5*   GLUCOSE mg/dL  189*       Assessment:  Active Hospital Problems    Diagnosis Date Noted   • **Aortic valve stenosis [I35.0] 11/08/2018     Added automatically from request for surgery [id number]     • Atrial flutter (CMS/[facility]) [I48.92] 11/13/2018   • Diabetes mellitus (CMS/[facility]) [E11.9] 11/13/2018     TYPE 2     • Von Willebrand disease (CMS/[facility]) [D68.0] 11/13/2018   • Sleep apnea [G47.30] 11/13/2018     Bi-Pap     • Pacemaker [Z95.0] 11/13/2018     LEFT.     • Coronary atherosclerosis [I25.10] 11/13/2018   • Dyspnea on exertion [R06.09] 10/25/2018   • Class 2 severe obesity due to excess calories with serious comorbidity and body mass index (BMI) of 39.0 to 39.9 in adult (CMS/[facility]) [E66.01, Z68.39] 10/25/2018   • S/P CABG (coronary artery bypass graft) [Z95.1] 10/25/2018   ckd    Medical decision making:  Diabetes mellitus-plan provided with Accu-Cheks and sliding scale coverage and long-acting insulin once his oral intake is established.  Check hemoglobin A1c and further management as his condition evolves.  Severe aortic stenosis with congestive heart failure-status post TaVR-management per cardiology service.  Anemia with history of von Willebrand disease and need for anticoagulation therapy-monitor his hemoglobin and hematocrit and further management under the direction of cardiology service.  COPD Obesity obstructive sleep apnea-continue never lies of treatment on as-needed basis and  continuous pulse ox and allow him to have CPAP/BiPAP device as he uses at home.  Chronic kidney disease-monitor his renal function given his recent procedure and avoid nephrotoxic agents.    Kady Keller MD   11/13/2018  10:12 PM  Much of this encounter note is an electronic transcription/translation of spoken language to printed text. The electronic translation of spoken language may permit erroneous, or at times, nonsensical words or phrases to be inadvertently transcribed;  Although I have reviewed the note for such errors, some may still exist

## 2018-11-15 ENCOUNTER — READMISSION MANAGEMENT (OUTPATIENT)
Dept: CALL CENTER | Facility: HOSPITAL | Age: 78
End: 2018-11-15

## 2018-11-15 LAB
ABO + RH BLD: NORMAL
BH BB BLOOD EXPIRATION DATE: NORMAL
BH BB BLOOD TYPE BARCODE: 5100
BH BB DISPENSE STATUS: NORMAL
BH BB PRODUCT CODE: NORMAL
BH BB UNIT NUMBER: NORMAL
UNIT  ABO: NORMAL
UNIT  RH: NORMAL

## 2018-11-15 NOTE — PROGRESS NOTES
Case Management Discharge Note    Final Note: home; no needs identified. Clari Diego CSW     Destination      No service has been selected for the patient.      Durable Medical Equipment      No service has been selected for the patient.      Dialysis/Infusion      No service has been selected for the patient.      Home Medical Care      No service has been selected for the patient.      Community Resources      No service has been selected for the patient.        Other: (private auto )    Final Discharge Disposition Code: 01 - home or self-care

## 2018-11-15 NOTE — PLAN OF CARE
Problem: Patient Care Overview  Goal: Plan of Care Review  Outcome: Ongoing (interventions implemented as appropriate)   11/14/18 1826   Coping/Psychosocial   Plan of Care Reviewed With patient   Plan of Care Review   Progress improving   OTHER   Outcome Summary Pt given 2 units PRBCs. RT groin site soft, tender, bruised old drainage. LT groin site CDI/soft. Pt reports feeling better, with more energy. Will continue to monitor. D/C this evening.

## 2018-11-16 ENCOUNTER — CLINICAL SUPPORT NO REQUIREMENTS (OUTPATIENT)
Dept: CARDIOLOGY | Facility: CLINIC | Age: 78
End: 2018-11-16

## 2018-11-16 DIAGNOSIS — I49.5 SICK SINUS SYNDROME (HCC): Primary | ICD-10-CM

## 2018-11-16 NOTE — OUTREACH NOTE
Prep Survey      Responses   Facility patient discharged from?  [place]   Is patient eligible?  Yes   Discharge diagnosis  transfemoral percutaneous aortic valve replacement Von Willebrand disease Atrial flutter Pacemaker S/P CABG 10/25/18   Does the patient have one of the following disease processes/diagnoses(primary or secondary)?  General Surgery   Does the patient have Home health ordered?  No   Is there a DME ordered?  No   Prep survey completed?  Yes          Barbie Aguilar RN

## 2018-11-18 ENCOUNTER — READMISSION MANAGEMENT (OUTPATIENT)
Dept: CALL CENTER | Facility: HOSPITAL | Age: 78
End: 2018-11-18

## 2018-11-18 NOTE — OUTREACH NOTE
General Surgery Week 1 Survey      Responses   Facility patient discharged from?  [place]   Does the patient have one of the following disease processes/diagnoses(primary or secondary)?  General Surgery   Is there a successful TCM telephone encounter documented?  No   Week 1 attempt successful?  No   Unsuccessful attempts  Attempt 1          Rosa M Ansari RN

## 2018-11-19 ENCOUNTER — READMISSION MANAGEMENT (OUTPATIENT)
Dept: CALL CENTER | Facility: HOSPITAL | Age: 78
End: 2018-11-19

## 2018-11-19 NOTE — OUTREACH NOTE
General Surgery Week 1 Survey      Responses   Facility patient discharged from?  [place]   Does the patient have one of the following disease processes/diagnoses(primary or secondary)?  General Surgery   Is there a successful TCM telephone encounter documented?  No   Week 1 attempt successful?  No   Unsuccessful attempts  Attempt 2          Yvrose Hernandez RN

## 2018-11-24 ENCOUNTER — READMISSION MANAGEMENT (OUTPATIENT)
Dept: CALL CENTER | Facility: HOSPITAL | Age: 78
End: 2018-11-24

## 2018-11-24 NOTE — OUTREACH NOTE
General Surgery Week 2 Survey      Responses   Facility patient discharged from?  [place]   Does the patient have one of the following disease processes/diagnoses(primary or secondary)?  General Surgery   Week 2 attempt successful?  No   Unsuccessful attempts  Attempt 1          Danielle Gonzalez RN

## 2018-11-28 ENCOUNTER — READMISSION MANAGEMENT (OUTPATIENT)
Dept: CALL CENTER | Facility: HOSPITAL | Age: 78
End: 2018-11-28

## 2018-11-28 NOTE — OUTREACH NOTE
General Surgery Week 2 Survey      Responses   Facility patient discharged from?  [place]   Does the patient have one of the following disease processes/diagnoses(primary or secondary)?  General Surgery   Week 2 attempt successful?  Yes   Call start time  1151   Revoke  Decline to participate [Daughter states works for [employer], states Dad is ok, declined to participate. ]   Call end time  1151          Barbie Vick RN

## 2018-12-21 ENCOUNTER — OFFICE VISIT (OUTPATIENT)
Dept: CARDIOLOGY | Facility: CLINIC | Age: 78
End: 2018-12-21

## 2018-12-21 ENCOUNTER — HOSPITAL ENCOUNTER (OUTPATIENT)
Dept: CARDIOLOGY | Facility: HOSPITAL | Age: 78
Discharge: HOME | End: 2018-12-21
Attending: INTERNAL MEDICINE | Admitting: INTERNAL MEDICINE

## 2018-12-21 ENCOUNTER — CLINICAL SUPPORT NO REQUIREMENTS (OUTPATIENT)
Dept: CARDIOLOGY | Facility: CLINIC | Age: 78
End: 2018-12-21

## 2018-12-21 VITALS
DIASTOLIC BLOOD PRESSURE: 70 MMHG | BODY MASS INDEX: 49.61 KG/M2 | SYSTOLIC BLOOD PRESSURE: 156 MMHG | HEART RATE: 63 BPM | WEIGHT: 280 LBS | HEIGHT: 63 IN

## 2018-12-21 VITALS
HEIGHT: 63 IN | SYSTOLIC BLOOD PRESSURE: 144 MMHG | HEART RATE: 60 BPM | BODY MASS INDEX: 48.02 KG/M2 | DIASTOLIC BLOOD PRESSURE: 64 MMHG | WEIGHT: 271 LBS

## 2018-12-21 DIAGNOSIS — I35.0 NONRHEUMATIC AORTIC VALVE STENOSIS: ICD-10-CM

## 2018-12-21 DIAGNOSIS — I50.32 CHRONIC DIASTOLIC CONGESTIVE HEART FAILURE (HCC): ICD-10-CM

## 2018-12-21 DIAGNOSIS — I44.1 SECOND DEGREE AV BLOCK: Primary | ICD-10-CM

## 2018-12-21 DIAGNOSIS — Z95.1 S/P CABG (CORONARY ARTERY BYPASS GRAFT): Primary | ICD-10-CM

## 2018-12-21 DIAGNOSIS — N18.9 CHRONIC KIDNEY DISEASE, UNSPECIFIED CKD STAGE: ICD-10-CM

## 2018-12-21 DIAGNOSIS — Z95.0 PACEMAKER: ICD-10-CM

## 2018-12-21 DIAGNOSIS — E66.01 CLASS 2 SEVERE OBESITY DUE TO EXCESS CALORIES WITH SERIOUS COMORBIDITY AND BODY MASS INDEX (BMI) OF 39.0 TO 39.9 IN ADULT (HCC): ICD-10-CM

## 2018-12-21 DIAGNOSIS — K55.21 AVM (ARTERIOVENOUS MALFORMATION) OF SMALL BOWEL, ACQUIRED WITH HEMORRHAGE: ICD-10-CM

## 2018-12-21 DIAGNOSIS — Z95.2 S/P TAVR (TRANSCATHETER AORTIC VALVE REPLACEMENT): ICD-10-CM

## 2018-12-21 LAB
AORTIC DIMENSIONLESS INDEX: 0.6 (DI)
BH CV ECHO AV AORTIC VALVE AT ACCEL TIME CALCULATED: NORMAL MSEC
BH CV ECHO MEAS - AO ACC TIME: 0.08 SEC
BH CV ECHO MEAS - AO MAX PG (FULL): 21.8 MMHG
BH CV ECHO MEAS - AO MAX PG: 26.7 MMHG
BH CV ECHO MEAS - AO MEAN PG (FULL): 10 MMHG
BH CV ECHO MEAS - AO MEAN PG: 13.5 MMHG
BH CV ECHO MEAS - AO ROOT AREA (BSA CORRECTED): 1.5
BH CV ECHO MEAS - AO ROOT AREA: 9 CM^2
BH CV ECHO MEAS - AO ROOT DIAM: 3.4 CM
BH CV ECHO MEAS - AO V2 MAX: 258.6 CM/SEC
BH CV ECHO MEAS - AO V2 MEAN: 176.4 CM/SEC
BH CV ECHO MEAS - AO V2 VTI: 48.3 CM
BH CV ECHO MEAS - AT: 81 SEC
BH CV ECHO MEAS - AVA(I,A): 1.9 CM^2
BH CV ECHO MEAS - AVA(I,D): 1.9 CM^2
BH CV ECHO MEAS - AVA(V,A): 1.4 CM^2
BH CV ECHO MEAS - AVA(V,D): 1.4 CM^2
BH CV ECHO MEAS - BSA(HAYCOCK): 2.5 M^2
BH CV ECHO MEAS - BSA: 2.2 M^2
BH CV ECHO MEAS - BZI_BMI: 49.6 KILOGRAMS/M^2
BH CV ECHO MEAS - BZI_METRIC_HEIGHT: 160 CM
BH CV ECHO MEAS - BZI_METRIC_WEIGHT: 127 KG
BH CV ECHO MEAS - EDV(MOD-SP2): 168 ML
BH CV ECHO MEAS - EDV(MOD-SP4): 152 ML
BH CV ECHO MEAS - EDV(TEICH): 103 ML
BH CV ECHO MEAS - EF(CUBED): 69.9 %
BH CV ECHO MEAS - EF(MOD-BP): 54 %
BH CV ECHO MEAS - EF(MOD-SP2): 53.6 %
BH CV ECHO MEAS - EF(MOD-SP4): 53.3 %
BH CV ECHO MEAS - EF(TEICH): 61.5 %
BH CV ECHO MEAS - ESV(MOD-SP2): 78 ML
BH CV ECHO MEAS - ESV(MOD-SP4): 71 ML
BH CV ECHO MEAS - ESV(TEICH): 39.7 ML
BH CV ECHO MEAS - FS: 33 %
BH CV ECHO MEAS - IVS/LVPW: 1
BH CV ECHO MEAS - IVSD: 1.3 CM
BH CV ECHO MEAS - LAT PEAK E' VEL: 8 CM/SEC
BH CV ECHO MEAS - LV DIASTOLIC VOL/BSA (35-75): 68.1 ML/M^2
BH CV ECHO MEAS - LV MASS(C)D: 242 GRAMS
BH CV ECHO MEAS - LV MASS(C)DI: 108.5 GRAMS/M^2
BH CV ECHO MEAS - LV MAX PG: 5 MMHG
BH CV ECHO MEAS - LV MEAN PG: 3.5 MMHG
BH CV ECHO MEAS - LV SYSTOLIC VOL/BSA (12-30): 31.8 ML/M^2
BH CV ECHO MEAS - LV V1 MAX: 111.6 CM/SEC
BH CV ECHO MEAS - LV V1 MEAN: 90.8 CM/SEC
BH CV ECHO MEAS - LV V1 VTI: 27.8 CM
BH CV ECHO MEAS - LVIDD: 4.7 CM
BH CV ECHO MEAS - LVIDS: 3.2 CM
BH CV ECHO MEAS - LVLD AP2: 8.8 CM
BH CV ECHO MEAS - LVLD AP4: 8.6 CM
BH CV ECHO MEAS - LVLS AP2: 7.7 CM
BH CV ECHO MEAS - LVLS AP4: 8 CM
BH CV ECHO MEAS - LVOT AREA (M): 3.1 CM^2
BH CV ECHO MEAS - LVOT AREA: 3.3 CM^2
BH CV ECHO MEAS - LVOT DIAM: 2 CM
BH CV ECHO MEAS - LVPWD: 1.3 CM
BH CV ECHO MEAS - MED PEAK E' VEL: 6 CM/SEC
BH CV ECHO MEAS - MR MAX PG: 62.4 MMHG
BH CV ECHO MEAS - MR MAX VEL: 395 CM/SEC
BH CV ECHO MEAS - MV A DUR: 0.15 SEC
BH CV ECHO MEAS - MV A MAX VEL: 120.9 CM/SEC
BH CV ECHO MEAS - MV DEC SLOPE: 312.8 CM/SEC^2
BH CV ECHO MEAS - MV DEC TIME: 0.22 SEC
BH CV ECHO MEAS - MV E MAX VEL: 123 CM/SEC
BH CV ECHO MEAS - MV E/A: 1
BH CV ECHO MEAS - MV MAX PG: 7.2 MMHG
BH CV ECHO MEAS - MV MEAN PG: 3.5 MMHG
BH CV ECHO MEAS - MV P1/2T MAX VEL: 119.4 CM/SEC
BH CV ECHO MEAS - MV P1/2T: 111.9 MSEC
BH CV ECHO MEAS - MV V2 MAX: 134.4 CM/SEC
BH CV ECHO MEAS - MV V2 MEAN: 90.1 CM/SEC
BH CV ECHO MEAS - MV V2 VTI: 51.7 CM
BH CV ECHO MEAS - MVA P1/2T LCG: 1.8 CM^2
BH CV ECHO MEAS - MVA(P1/2T): 2 CM^2
BH CV ECHO MEAS - MVA(VTI): 1.8 CM^2
BH CV ECHO MEAS - PA ACC TIME: 0.07 SEC
BH CV ECHO MEAS - PA MAX PG (FULL): 1.9 MMHG
BH CV ECHO MEAS - PA MAX PG: 5.2 MMHG
BH CV ECHO MEAS - PA PR(ACCEL): 47.3 MMHG
BH CV ECHO MEAS - PA V2 MAX: 114 CM/SEC
BH CV ECHO MEAS - PULM A REVS DUR: 0.14 SEC
BH CV ECHO MEAS - PULM A REVS VEL: 24.9 CM/SEC
BH CV ECHO MEAS - PULM DIAS VEL: 33.9 CM/SEC
BH CV ECHO MEAS - PULM S/D: 1.6
BH CV ECHO MEAS - PULM SYS VEL: 52.6 CM/SEC
BH CV ECHO MEAS - PVA(V,A): 3.4 CM^2
BH CV ECHO MEAS - PVA(V,D): 3.4 CM^2
BH CV ECHO MEAS - QP/QS: 0.78
BH CV ECHO MEAS - RAP SYSTOLE: 3 MMHG
BH CV ECHO MEAS - RV MAX PG: 3.3 MMHG
BH CV ECHO MEAS - RV MEAN PG: 1.6 MMHG
BH CV ECHO MEAS - RV V1 MAX: 91.2 CM/SEC
BH CV ECHO MEAS - RV V1 MEAN: 59.2 CM/SEC
BH CV ECHO MEAS - RV V1 VTI: 16.5 CM
BH CV ECHO MEAS - RVOT AREA: 4.3 CM^2
BH CV ECHO MEAS - RVOT DIAM: 2.3 CM
BH CV ECHO MEAS - SI(AO): 194.6 ML/M^2
BH CV ECHO MEAS - SI(CUBED): 32.8 ML/M^2
BH CV ECHO MEAS - SI(LVOT): 40.6 ML/M^2
BH CV ECHO MEAS - SI(MOD-SP2): 40.3 ML/M^2
BH CV ECHO MEAS - SI(MOD-SP4): 36.3 ML/M^2
BH CV ECHO MEAS - SI(TEICH): 28.4 ML/M^2
BH CV ECHO MEAS - SUP REN AO DIAM: 2.4 CM
BH CV ECHO MEAS - SV(AO): 434.2 ML
BH CV ECHO MEAS - SV(CUBED): 73.2 ML
BH CV ECHO MEAS - SV(LVOT): 90.6 ML
BH CV ECHO MEAS - SV(MOD-SP2): 90 ML
BH CV ECHO MEAS - SV(MOD-SP4): 81 ML
BH CV ECHO MEAS - SV(RVOT): 70.7 ML
BH CV ECHO MEAS - SV(TEICH): 63.3 ML
BH CV ECHO MEAS - TAPSE (>1.6): 2.3 CM2
BH CV ECHO MEASUREMENTS AVERAGE E/E' RATIO: 17.57
BH CV XLRA - RV BASE: 2.8 CM
BH CV XLRA - TDI S': 14 CM/SEC
LEFT ATRIUM VOLUME INDEX: 40 ML/M2
LV EF 2D ECHO EST: 54 %
MAXIMAL PREDICTED HEART RATE: 142 BPM
SINUS: 2.6 CM
STJ: 3.1 CM
STRESS TARGET HR: 121 BPM

## 2018-12-21 PROCEDURE — 93280 PM DEVICE PROGR EVAL DUAL: CPT | Performed by: INTERNAL MEDICINE

## 2018-12-21 PROCEDURE — 93306 TTE W/DOPPLER COMPLETE: CPT | Performed by: INTERNAL MEDICINE

## 2018-12-21 PROCEDURE — 99214 OFFICE O/P EST MOD 30 MIN: CPT | Performed by: INTERNAL MEDICINE

## 2018-12-21 PROCEDURE — 93000 ELECTROCARDIOGRAM COMPLETE: CPT | Performed by: INTERNAL MEDICINE

## 2018-12-21 PROCEDURE — 93306 TTE W/DOPPLER COMPLETE: CPT

## 2018-12-21 RX ORDER — POLYETHYLENE GLYCOL 3350 17 G/17G
17 POWDER, FOR SOLUTION ORAL DAILY
COMMUNITY

## 2018-12-21 RX ORDER — MELATONIN
1000 DAILY
COMMUNITY

## 2018-12-21 NOTE — PROGRESS NOTES
Date of Office Visit: 2018  Encounter Provider: Dionicio Stoner MD  Place of Service: [facility] CARDIOLOGY  Patient Name: Ariel Elliott  :1940  [id number]    Chief Complaint   Patient presents with   • Follow-up   :     HPI: Ariel Elliott is a 78 y.o. 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 His daughter works in the PACU at [employer]. [facility]. He has had a prior bypass in  and we had last seen him 4 years ago. He felt like his heart was okay and he has not been back for followup. At that time in  he had a heart cath done. He has a LIMA to his LAD which is widely patent and looks good. He has a little bit of mild disease in his native LAD, not that much. He had a vein graft to a diagonal that was widely patent and looked good, a vein graft to an OM that had a critical lesion in it and had a 4.0 x 23 mm bare-metal stent implanted. His vein graft to the PDA looks good. All of his natives are otherwise occluded. He has a history of atrial flutter, he has a history of heart failure, but he has preserved LV function. He did have an ablation for his atrial flutter. Now the main thing that he has been having is he has terrible GI bleeding and AVMs and he gets almost weekly transfusions. Last week it was early in the morning, like 1:30. He went to the restroom, called out, “Oh my God!” His daughter went up to find him on the toilet, passed out, not responsive or breathing. She lifted his head up, his airway opened, he breathed, and he had been having some chest pain, and he says he has chest pain when his hemoglobin gets below 7. [facility] and he had a hemoglobin of 6. They transfused him 3 units. They scoped him. They ablated some AVMs in the 1st part of his small intestine and 10 in his colon, but they also found that he has critical aortic stenosis. When we had last  seen him, he had an echo done and he had very mild AS, but he has not had any followup for it. His peak gradient across his aortic valve is over 70, his mean is over 40, valve area is 0.6. I did review the echo myself. He has not had any other episodes of syncope, no PND, no orthopnea. His weight is down about 40 pounds in the last 4 years. He does not really complain of a lot if his hemoglobin is adequate. He has diabetes. He has not had a stroke. He does not have significant lung disease.       He is here following his TAVR and pacemaker placement. He had a pacemake first, we put a #26 TAVR in him and he feels great. No angina. No shortness of breath. He cannot believe how great he feels. He is not having any bleeding. We felt he probably had an acquired von Willebrand from the AS and it appears that he probably does.           Past Medical History:   Diagnosis Date   • Anemia    • Anemia     2 UNITS OF BLOOD SAT 11/10. [facility]   • Aortic stenosis    • Arrhythmia    • Atrial flutter (CMS/HCC)    • Congestive heart failure (CMS/HCC)    • Coronary atherosclerosis    • Diabetes mellitus (CMS/HCC)     TYPE 2   • GI (gastrointestinal bleed)    • Gout    • H/O seasonal allergies    • Hard to intubate     SEVERAL TIMES IN PAST. NO ISSUES RECENT HISTORY   • Herpes     HANDS IN THE PAST   • Heyd syndrome (CMS/HCC)    • History of diverticulitis    • Hyperlipidemia    • Pacemaker     LEFT.   • Post-nasal drip    • Sleep apnea     Bi-Pap   • Von Willebrand disease (CMS/HCC)        Past Surgical History:   Procedure Laterality Date   • ABLATION OF DYSRHYTHMIC FOCUS     • AORTIC VALVE REPAIR/REPLACEMENT N/A 11/13/2018    Procedure: INTRAOPERATIVE LANG, PERCUTANEOUS TRANSFEMORAL TRANSCATHETER AORTIC VALVE REPLACEMENT PERCUTANEOUS APPROACH;  Surgeon: Elizabeth Meade MD;  Location: [facility] OR 18/19;  Service: Cardiothoracic   • CARDIAC CATHETERIZATION     • CARDIAC CATHETERIZATION N/A  11/1/2018    Procedure: Right and Left Heart Cath;  Surgeon: Dionicio Stoner MD;  Location:  [facility]ATH [facility]VASIVE [facility];  Service: Cardiology   • CARDIAC CATHETERIZATION  11/1/2018    Procedure: Saphenous Vein Graft;  Surgeon: Dionicio Stoner MD;  Location:  [facility];  Service: Cardiology   • CARDIAC ELECTROPHYSIOLOGY PROCEDURE Left 11/5/2018    Procedure: Pacemaker DC new   [place];  Surgeon: Mina Castillo MD;  Location:  [facility];  Service: Cardiology   • CATARACT EXTRACTION WITH INTRAOCULAR LENS IMPLANT      LEFT AND RIGHT   • CHOLECYSTECTOMY     • COLONOSCOPY     • CORONARY ANGIOPLASTY      WITH PTCA   • CORONARY ARTERY BYPASS GRAFT      5 VESSEL   • HAND SURGERY      TRIGGER FINGER RELEASE, TENDON RELEASE   • HEMORRHOIDECTOMY     • HERNIA REPAIR      VENTRAL HERNIA REPEATEDLY   • PACEMAKER IMPLANTATION  11/05/2018   • PORTACATH PLACEMENT Right     POWER PORT   • SINUS SURGERY     • TOTAL HIP ARTHROPLASTY Right        Social History     Socioeconomic History   • Marital status:      Spouse name: Not on file   • Number of children: Not on file   • Years of education: Not on file   • Highest education level: Not on file   Social Needs   • Financial resource strain: Not on file   • Food insecurity - worry: Not on file   • Food insecurity - inability: Not on file   • Transportation needs - medical: Not on file   • Transportation needs - non-medical: Not on file   Occupational History   • Not on file   Tobacco Use   • Smoking status: Former Smoker   • Smokeless tobacco: Never Used   • Tobacco comment: QUIT 35YRS AGO   Substance and Sexual Activity   • Alcohol use: No   • Drug use: No   • Sexual activity: Defer   Other Topics Concern   • Not on file   Social History Narrative   • Not on file       Family History   Problem Relation Age of Onset   • Diabetes Mother    • Cancer Mother        Review of Systems   Constitution: Negative for decreased appetite, fever,  malaise/fatigue and weight loss.   HENT: Negative for nosebleeds.    Eyes: Negative for double vision.   Cardiovascular: Negative for chest pain, claudication, cyanosis, dyspnea on exertion, irregular heartbeat, leg swelling, near-syncope, orthopnea, palpitations, paroxysmal nocturnal dyspnea and syncope.   Respiratory: Negative for cough, hemoptysis and shortness of breath.    Hematologic/Lymphatic: Negative for bleeding problem.   Skin: Negative for rash.   Musculoskeletal: Negative for falls and myalgias.   Gastrointestinal: Negative for hematochezia, jaundice, melena, nausea and vomiting.   Genitourinary: Negative for hematuria.   Neurological: Negative for dizziness and seizures.   Psychiatric/Behavioral: Negative for altered mental status and memory loss.       Allergies   Allergen Reactions   • Statins Myalgia         Current Outpatient Medications:   •  allopurinol (ZYLOPRIM) 300 MG tablet, Take 300 mg by mouth Every Night., Disp: , Rfl:   •  aspirin 81 MG EC tablet, Take 1 tablet by mouth Daily., Disp: , Rfl:   •  cholecalciferol (VITAMIN D3) 1000 units tablet, Take 1,000 Units by mouth Daily., Disp: , Rfl:   •  furosemide (LASIX) 40 MG tablet, Take 40 mg by mouth 2 (Two) Times a Day., Disp: , Rfl:   •  gemfibrozil (LOPID) 600 MG tablet, Take 600 mg by mouth 2 (Two) Times a Day Before Meals., Disp: , Rfl:   •  insulin aspart (novoLOG) 100 UNIT/ML injection, Inject  under the skin into the appropriate area as directed 3 (Three) Times a Day Before Meals. 15-20UNITS, Disp: , Rfl:   •  insulin detemir (LEVEMIR) 100 UNIT/ML injection, Inject 30 Units under the skin into the appropriate area as directed 2 (Two) Times a Day. WITH BLOOD SUGAR GREATER THAN 130, Disp: , Rfl:   •  isosorbide dinitrate (ISORDIL) 40 MG tablet, Take 40 mg by mouth 2 (Two) Times a Day., Disp: , Rfl:   •  loperamide (IMODIUM) 2 MG capsule, Take 2 mg by mouth As Needed for Diarrhea., Disp: , Rfl:   •  octreotide (sandoSTATIN) 10 MG  "injection, Inject 10 mg into the appropriate muscle as directed by prescriber Every 28 (Twenty-Eight) Days., Disp: , Rfl:   •  omeprazole (priLOSEC) 40 MG capsule, Take 40 mg by mouth 2 (Two) Times a Day., Disp: , Rfl:   •  polyethylene glycol (MIRALAX) packet, Take 17 g by mouth Daily., Disp: , Rfl:   •  Psyllium (METAMUCIL FIBER PO), Take 1 tablet by mouth Daily., Disp: , Rfl:       Objective:     Vitals:    12/21/18 1247   BP: 144/64   BP Location: Left arm   Patient Position: Sitting   Pulse: 60   Weight: 123 kg (271 lb)   Height: 160 cm (63\")     Body mass index is 48.01 kg/m².    Physical Exam   Constitutional: He is oriented to person, place, and time. He appears well-developed and well-nourished.   HENT:   Head: Normocephalic.   Eyes: No scleral icterus.   Neck: No JVD present. No thyromegaly present.   Cardiovascular: Normal rate and regular rhythm. Exam reveals no gallop and no friction rub.   Murmur heard.   Low-pitched harsh crescendo-decrescendo early systolic murmur is present with a grade of 1/6.  Pulmonary/Chest: Effort normal and breath sounds normal. He has no wheezes. He has no rales.   Abdominal: Soft. There is no hepatosplenomegaly. There is no tenderness.   Musculoskeletal: Normal range of motion. He exhibits no edema (+2 B).   Lymphadenopathy:     He has no cervical adenopathy.   Neurological: He is alert and oriented to person, place, and time.   Skin: Skin is warm and dry. No rash noted.   Psychiatric: He has a normal mood and affect.         ECG 12 Lead  Date/Time: 12/21/2018 1:22 PM  Performed by: Dionicio Stoner MD  Authorized by: Dionicio Stoner MD   Comparison: compared with previous ECG   Similar to previous ECG  Rhythm: paced  Clinical impression: abnormal ECG             Assessment:       Diagnosis Plan   1. S/P CABG (coronary artery bypass graft)     2. Chronic kidney disease, unspecified CKD stage     3. Class 2 severe obesity due to excess calories with serious comorbidity and " body mass index (BMI) of 39.0 to 39.9 in adult (CMS/[facility])     4. Pacemaker     5. Nonrheumatic aortic valve stenosis     6. AVM (arteriovenous malformation) of small bowel, acquired with hemorrhage (CMS/[facility])     7. S/P TAVR (transcatheter aortic valve replacement)            Plan:       He is doing great. This is the best outcome we could have hoped for, no angina, no shortness of breath and not GI bleeding. He does have a lesion and a vein graft to an OM but as long as he maintains and is asymptomatic we are not going to do anything about it. His valvae looks great on his echocardiogram, he had his pacer interrogated today and it is looking great. I am going to have him come back and see us in 6 months, sooner if he has trouble.             As always, it has been a pleasure to participate in your patient's care.      Sincerely,       Dionicio Stoner MD

## 2018-12-23 ENCOUNTER — CLINICAL SUPPORT NO REQUIREMENTS (OUTPATIENT)
Dept: CARDIOLOGY | Facility: CLINIC | Age: 78
End: 2018-12-23

## 2018-12-23 DIAGNOSIS — I44.1 SECOND DEGREE AV BLOCK: Primary | ICD-10-CM

## 2019-01-07 ENCOUNTER — APPOINTMENT (OUTPATIENT)
Dept: GENERAL RADIOLOGY | Facility: HOSPITAL | Age: 79
End: 2019-01-07

## 2019-01-07 ENCOUNTER — HOSPITAL ENCOUNTER (INPATIENT)
Facility: HOSPITAL | Age: 79
LOS: 6 days | Discharge: NURSING, REHAB OR LONG-TERM CARE | End: 2019-01-13
Attending: EMERGENCY MEDICINE | Admitting: ORTHOPAEDIC SURGERY

## 2019-01-07 ENCOUNTER — APPOINTMENT (OUTPATIENT)
Dept: CT IMAGING | Facility: HOSPITAL | Age: 79
End: 2019-01-07

## 2019-01-07 DIAGNOSIS — D64.9 SYMPTOMATIC ANEMIA: ICD-10-CM

## 2019-01-07 DIAGNOSIS — R26.2 DIFFICULTY WALKING: ICD-10-CM

## 2019-01-07 DIAGNOSIS — S72.451A CLOSED DISPLACED SUPRACONDYLAR FRACTURE OF DISTAL END OF RIGHT FEMUR WITHOUT INTRACONDYLAR EXTENSION, INITIAL ENCOUNTER (HCC): Primary | ICD-10-CM

## 2019-01-07 DIAGNOSIS — S09.90XA MINOR HEAD INJURY WITHOUT LOSS OF CONSCIOUSNESS, INITIAL ENCOUNTER: ICD-10-CM

## 2019-01-07 LAB
ABO GROUP BLD: NORMAL
ANION GAP SERPL CALCULATED.3IONS-SCNC: 14.3 MMOL/L
BASOPHILS # BLD AUTO: 0.01 10*3/MM3 (ref 0–0.2)
BASOPHILS NFR BLD AUTO: 0.2 % (ref 0–1.5)
BLD GP AB SCN SERPL QL: POSITIVE
BUN BLD-MCNC: 58 MG/DL (ref 8–23)
BUN/CREAT SERPL: 40.8 (ref 7–25)
CALCIUM SPEC-SCNC: 8.6 MG/DL (ref 8.6–10.5)
CHLORIDE SERPL-SCNC: 105 MMOL/L (ref 98–107)
CO2 SERPL-SCNC: 22.7 MMOL/L (ref 22–29)
CREAT BLD-MCNC: 1.42 MG/DL (ref 0.76–1.27)
DEPRECATED RDW RBC AUTO: 56.2 FL (ref 37–54)
EOSINOPHIL # BLD AUTO: 0.16 10*3/MM3 (ref 0–0.7)
EOSINOPHIL NFR BLD AUTO: 2.9 % (ref 0.3–6.2)
ERYTHROCYTE [DISTWIDTH] IN BLOOD BY AUTOMATED COUNT: 17.1 % (ref 11.5–14.5)
GFR SERPL CREATININE-BSD FRML MDRD: 48 ML/MIN/1.73
GLUCOSE BLD-MCNC: 178 MG/DL (ref 65–99)
GLUCOSE BLDC GLUCOMTR-MCNC: 185 MG/DL (ref 70–130)
HCT VFR BLD AUTO: 21.2 % (ref 40.4–52.2)
HGB BLD-MCNC: 6.7 G/DL (ref 13.7–17.6)
IMM GRANULOCYTES # BLD AUTO: 0.02 10*3/MM3 (ref 0–0.03)
IMM GRANULOCYTES NFR BLD AUTO: 0.4 % (ref 0–0.5)
LYMPHOCYTES # BLD AUTO: 0.45 10*3/MM3 (ref 0.9–4.8)
LYMPHOCYTES NFR BLD AUTO: 8.1 % (ref 19.6–45.3)
MCH RBC QN AUTO: 28.8 PG (ref 27–32.7)
MCHC RBC AUTO-ENTMCNC: 31.6 G/DL (ref 32.6–36.4)
MCV RBC AUTO: 91 FL (ref 79.8–96.2)
MONOCYTES # BLD AUTO: 0.42 10*3/MM3 (ref 0.2–1.2)
MONOCYTES NFR BLD AUTO: 7.6 % (ref 5–12)
NEUTROPHILS # BLD AUTO: 4.51 10*3/MM3 (ref 1.9–8.1)
NEUTROPHILS NFR BLD AUTO: 81.2 % (ref 42.7–76)
NONSPECIFIC GEL REACTION: NORMAL
PLATELET # BLD AUTO: 139 10*3/MM3 (ref 140–500)
PMV BLD AUTO: 12 FL (ref 6–12)
POTASSIUM BLD-SCNC: 4.4 MMOL/L (ref 3.5–5.2)
RBC # BLD AUTO: 2.33 10*6/MM3 (ref 4.6–6)
RH BLD: POSITIVE
SODIUM BLD-SCNC: 142 MMOL/L (ref 136–145)
T&S EXPIRATION DATE: NORMAL
WBC NRBC COR # BLD: 5.55 10*3/MM3 (ref 4.5–10.7)

## 2019-01-07 PROCEDURE — 86901 BLOOD TYPING SEROLOGIC RH(D): CPT | Performed by: EMERGENCY MEDICINE

## 2019-01-07 PROCEDURE — 25010000002 FENTANYL CITRATE (PF) 100 MCG/2ML SOLUTION: Performed by: EMERGENCY MEDICINE

## 2019-01-07 PROCEDURE — 73552 X-RAY EXAM OF FEMUR 2/>: CPT

## 2019-01-07 PROCEDURE — 99285 EMERGENCY DEPT VISIT HI MDM: CPT

## 2019-01-07 PROCEDURE — 73562 X-RAY EXAM OF KNEE 3: CPT

## 2019-01-07 PROCEDURE — 86922 COMPATIBILITY TEST ANTIGLOB: CPT

## 2019-01-07 PROCEDURE — 86870 RBC ANTIBODY IDENTIFICATION: CPT | Performed by: EMERGENCY MEDICINE

## 2019-01-07 PROCEDURE — 25010000002 ONDANSETRON PER 1 MG: Performed by: EMERGENCY MEDICINE

## 2019-01-07 PROCEDURE — 86900 BLOOD TYPING SEROLOGIC ABO: CPT | Performed by: EMERGENCY MEDICINE

## 2019-01-07 PROCEDURE — 86901 BLOOD TYPING SEROLOGIC RH(D): CPT

## 2019-01-07 PROCEDURE — 85025 COMPLETE CBC W/AUTO DIFF WBC: CPT | Performed by: EMERGENCY MEDICINE

## 2019-01-07 PROCEDURE — 86850 RBC ANTIBODY SCREEN: CPT | Performed by: EMERGENCY MEDICINE

## 2019-01-07 PROCEDURE — 86920 COMPATIBILITY TEST SPIN: CPT

## 2019-01-07 PROCEDURE — 25010000002 HYDROMORPHONE PER 4 MG: Performed by: EMERGENCY MEDICINE

## 2019-01-07 PROCEDURE — 70450 CT HEAD/BRAIN W/O DYE: CPT

## 2019-01-07 PROCEDURE — 82962 GLUCOSE BLOOD TEST: CPT

## 2019-01-07 PROCEDURE — 80048 BASIC METABOLIC PNL TOTAL CA: CPT | Performed by: EMERGENCY MEDICINE

## 2019-01-07 PROCEDURE — 86900 BLOOD TYPING SEROLOGIC ABO: CPT

## 2019-01-07 PROCEDURE — 86921 COMPATIBILITY TEST INCUBATE: CPT

## 2019-01-07 RX ORDER — FENTANYL CITRATE 50 UG/ML
50 INJECTION, SOLUTION INTRAMUSCULAR; INTRAVENOUS ONCE
Status: COMPLETED | OUTPATIENT
Start: 2019-01-07 | End: 2019-01-08

## 2019-01-07 RX ORDER — HYDROMORPHONE HYDROCHLORIDE 1 MG/ML
0.5 INJECTION, SOLUTION INTRAMUSCULAR; INTRAVENOUS; SUBCUTANEOUS ONCE
Status: COMPLETED | OUTPATIENT
Start: 2019-01-07 | End: 2019-01-07

## 2019-01-07 RX ORDER — SODIUM CHLORIDE 0.9 % (FLUSH) 0.9 %
10 SYRINGE (ML) INJECTION AS NEEDED
Status: DISCONTINUED | OUTPATIENT
Start: 2019-01-07 | End: 2019-01-13 | Stop reason: HOSPADM

## 2019-01-07 RX ORDER — SODIUM CHLORIDE 9 MG/ML
20 INJECTION, SOLUTION INTRAVENOUS CONTINUOUS
Status: DISCONTINUED | OUTPATIENT
Start: 2019-01-07 | End: 2019-01-10

## 2019-01-07 RX ORDER — ONDANSETRON 2 MG/ML
4 INJECTION INTRAMUSCULAR; INTRAVENOUS ONCE
Status: COMPLETED | OUTPATIENT
Start: 2019-01-07 | End: 2019-01-07

## 2019-01-07 RX ORDER — FENTANYL CITRATE 50 UG/ML
100 INJECTION, SOLUTION INTRAMUSCULAR; INTRAVENOUS ONCE
Status: COMPLETED | OUTPATIENT
Start: 2019-01-07 | End: 2019-01-07

## 2019-01-07 RX ORDER — FENTANYL CITRATE 50 UG/ML
50 INJECTION, SOLUTION INTRAMUSCULAR; INTRAVENOUS ONCE
Status: COMPLETED | OUTPATIENT
Start: 2019-01-07 | End: 2019-01-07

## 2019-01-07 RX ADMIN — HYDROMORPHONE HYDROCHLORIDE 0.5 MG: 1 INJECTION, SOLUTION INTRAMUSCULAR; INTRAVENOUS; SUBCUTANEOUS at 19:46

## 2019-01-07 RX ADMIN — FENTANYL CITRATE 50 MCG: 50 INJECTION, SOLUTION INTRAMUSCULAR; INTRAVENOUS at 23:14

## 2019-01-07 RX ADMIN — ONDANSETRON 4 MG: 2 INJECTION INTRAMUSCULAR; INTRAVENOUS at 19:47

## 2019-01-07 RX ADMIN — SODIUM CHLORIDE 20 ML/HR: 9 INJECTION, SOLUTION INTRAVENOUS at 20:28

## 2019-01-07 RX ADMIN — HYDROMORPHONE HYDROCHLORIDE 0.5 MG: 1 INJECTION, SOLUTION INTRAMUSCULAR; INTRAVENOUS; SUBCUTANEOUS at 20:27

## 2019-01-07 RX ADMIN — FENTANYL CITRATE 100 MCG: 50 INJECTION, SOLUTION INTRAMUSCULAR; INTRAVENOUS at 21:26

## 2019-01-08 ENCOUNTER — ANESTHESIA (OUTPATIENT)
Dept: PERIOP | Facility: HOSPITAL | Age: 79
End: 2019-01-08

## 2019-01-08 ENCOUNTER — APPOINTMENT (OUTPATIENT)
Dept: GENERAL RADIOLOGY | Facility: HOSPITAL | Age: 79
End: 2019-01-08
Attending: ORTHOPAEDIC SURGERY

## 2019-01-08 ENCOUNTER — ANESTHESIA EVENT (OUTPATIENT)
Dept: PERIOP | Facility: HOSPITAL | Age: 79
End: 2019-01-08

## 2019-01-08 LAB
ANION GAP SERPL CALCULATED.3IONS-SCNC: 13.7 MMOL/L
BUN BLD-MCNC: 54 MG/DL (ref 8–23)
BUN/CREAT SERPL: 41.2 (ref 7–25)
CALCIUM SPEC-SCNC: 8.5 MG/DL (ref 8.6–10.5)
CHLORIDE SERPL-SCNC: 105 MMOL/L (ref 98–107)
CO2 SERPL-SCNC: 22.3 MMOL/L (ref 22–29)
CREAT BLD-MCNC: 1.31 MG/DL (ref 0.76–1.27)
DEPRECATED RDW RBC AUTO: 55 FL (ref 37–54)
ERYTHROCYTE [DISTWIDTH] IN BLOOD BY AUTOMATED COUNT: 16.8 % (ref 11.5–14.5)
GFR SERPL CREATININE-BSD FRML MDRD: 53 ML/MIN/1.73
GLUCOSE BLD-MCNC: 176 MG/DL (ref 65–99)
GLUCOSE BLDC GLUCOMTR-MCNC: 188 MG/DL (ref 70–130)
GLUCOSE BLDC GLUCOMTR-MCNC: 194 MG/DL (ref 70–130)
GLUCOSE BLDC GLUCOMTR-MCNC: 211 MG/DL (ref 70–130)
GLUCOSE BLDC GLUCOMTR-MCNC: 222 MG/DL (ref 70–130)
GLUCOSE BLDC GLUCOMTR-MCNC: 235 MG/DL (ref 70–130)
GLUCOSE BLDC GLUCOMTR-MCNC: 240 MG/DL (ref 70–130)
HCT VFR BLD AUTO: 23.9 % (ref 40.4–52.2)
HCT VFR BLD AUTO: 24.6 % (ref 40.4–52.2)
HGB BLD-MCNC: 7.7 G/DL (ref 13.7–17.6)
HGB BLD-MCNC: 7.9 G/DL (ref 13.7–17.6)
MAGNESIUM SERPL-MCNC: 2.4 MG/DL (ref 1.6–2.4)
MCH RBC QN AUTO: 29.2 PG (ref 27–32.7)
MCHC RBC AUTO-ENTMCNC: 32.2 G/DL (ref 32.6–36.4)
MCV RBC AUTO: 90.5 FL (ref 79.8–96.2)
PHOSPHATE SERPL-MCNC: 5.1 MG/DL (ref 2.5–4.5)
PLATELET # BLD AUTO: 157 10*3/MM3 (ref 140–500)
PMV BLD AUTO: 11.1 FL (ref 6–12)
POTASSIUM BLD-SCNC: 4.3 MMOL/L (ref 3.5–5.2)
RBC # BLD AUTO: 2.64 10*6/MM3 (ref 4.6–6)
SODIUM BLD-SCNC: 141 MMOL/L (ref 136–145)
WBC NRBC COR # BLD: 6.8 10*3/MM3 (ref 4.5–10.7)

## 2019-01-08 PROCEDURE — C1776 JOINT DEVICE (IMPLANTABLE): HCPCS | Performed by: ORTHOPAEDIC SURGERY

## 2019-01-08 PROCEDURE — 25010000002 HYDROMORPHONE PER 4 MG: Performed by: HOSPITALIST

## 2019-01-08 PROCEDURE — 25010000002 PROPOFOL 10 MG/ML EMULSION: Performed by: NURSE ANESTHETIST, CERTIFIED REGISTERED

## 2019-01-08 PROCEDURE — 25010000002 MIDAZOLAM PER 1 MG: Performed by: ANESTHESIOLOGY

## 2019-01-08 PROCEDURE — 85014 HEMATOCRIT: CPT | Performed by: ANESTHESIOLOGY

## 2019-01-08 PROCEDURE — 94799 UNLISTED PULMONARY SVC/PX: CPT

## 2019-01-08 PROCEDURE — 80048 BASIC METABOLIC PNL TOTAL CA: CPT | Performed by: HOSPITALIST

## 2019-01-08 PROCEDURE — 85018 HEMOGLOBIN: CPT | Performed by: ANESTHESIOLOGY

## 2019-01-08 PROCEDURE — 0SRV0J9 REPLACEMENT OF RIGHT KNEE JOINT, TIBIAL SURFACE WITH SYNTHETIC SUBSTITUTE, CEMENTED, OPEN APPROACH: ICD-10-PCS | Performed by: ORTHOPAEDIC SURGERY

## 2019-01-08 PROCEDURE — 82962 GLUCOSE BLOOD TEST: CPT

## 2019-01-08 PROCEDURE — 25010000002 FENTANYL CITRATE (PF) 100 MCG/2ML SOLUTION: Performed by: ANESTHESIOLOGY

## 2019-01-08 PROCEDURE — 63710000001 INSULIN GLARGINE PER 5 UNITS: Performed by: HOSPITALIST

## 2019-01-08 PROCEDURE — 25010000002 ONDANSETRON PER 1 MG: Performed by: HOSPITALIST

## 2019-01-08 PROCEDURE — 63710000001 INSULIN LISPRO (HUMAN) PER 5 UNITS: Performed by: HOSPITALIST

## 2019-01-08 PROCEDURE — 25010000003 CEFAZOLIN IN DEXTROSE 2-4 GM/100ML-% SOLUTION: Performed by: ORTHOPAEDIC SURGERY

## 2019-01-08 PROCEDURE — P9016 RBC LEUKOCYTES REDUCED: HCPCS

## 2019-01-08 PROCEDURE — 84100 ASSAY OF PHOSPHORUS: CPT | Performed by: HOSPITALIST

## 2019-01-08 PROCEDURE — 25010000002 ROPIVACAINE PER 1 MG: Performed by: ANESTHESIOLOGY

## 2019-01-08 PROCEDURE — 99222 1ST HOSP IP/OBS MODERATE 55: CPT | Performed by: INTERNAL MEDICINE

## 2019-01-08 PROCEDURE — 93010 ELECTROCARDIOGRAM REPORT: CPT | Performed by: INTERNAL MEDICINE

## 2019-01-08 PROCEDURE — 83735 ASSAY OF MAGNESIUM: CPT | Performed by: HOSPITALIST

## 2019-01-08 PROCEDURE — 25010000002 HYDRALAZINE PER 20 MG

## 2019-01-08 PROCEDURE — 0SRT0J9 REPLACEMENT OF RIGHT KNEE JOINT, FEMORAL SURFACE WITH SYNTHETIC SUBSTITUTE, CEMENTED, OPEN APPROACH: ICD-10-PCS | Performed by: ORTHOPAEDIC SURGERY

## 2019-01-08 PROCEDURE — 25010000002 HYDROMORPHONE 1 MG/ML SOLUTION: Performed by: INTERNAL MEDICINE

## 2019-01-08 PROCEDURE — 25010000002 FENTANYL CITRATE (PF) 100 MCG/2ML SOLUTION: Performed by: NURSE ANESTHETIST, CERTIFIED REGISTERED

## 2019-01-08 PROCEDURE — 25010000002 ONDANSETRON PER 1 MG: Performed by: NURSE ANESTHETIST, CERTIFIED REGISTERED

## 2019-01-08 PROCEDURE — 73560 X-RAY EXAM OF KNEE 1 OR 2: CPT

## 2019-01-08 PROCEDURE — C1713 ANCHOR/SCREW BN/BN,TIS/BN: HCPCS | Performed by: ORTHOPAEDIC SURGERY

## 2019-01-08 PROCEDURE — 93005 ELECTROCARDIOGRAM TRACING: CPT | Performed by: INTERNAL MEDICINE

## 2019-01-08 PROCEDURE — 25010000002 FENTANYL CITRATE (PF) 100 MCG/2ML SOLUTION: Performed by: EMERGENCY MEDICINE

## 2019-01-08 PROCEDURE — 25010000002 MEPIVACAINE PF 2 % SOLUTION 20 ML VIAL: Performed by: ANESTHESIOLOGY

## 2019-01-08 PROCEDURE — 85027 COMPLETE CBC AUTOMATED: CPT | Performed by: ORTHOPAEDIC SURGERY

## 2019-01-08 PROCEDURE — 25010000003 BUPIVACAINE LIPOSOME 1.3 % SUSPENSION 20 ML VIAL: Performed by: ORTHOPAEDIC SURGERY

## 2019-01-08 PROCEDURE — 25010000002 PHENYLEPHRINE PER 1 ML: Performed by: NURSE ANESTHETIST, CERTIFIED REGISTERED

## 2019-01-08 PROCEDURE — 36430 TRANSFUSION BLD/BLD COMPNT: CPT

## 2019-01-08 PROCEDURE — 25010000002 PROMETHAZINE PER 50 MG: Performed by: NURSE ANESTHETIST, CERTIFIED REGISTERED

## 2019-01-08 PROCEDURE — C9290 INJ, BUPIVACAINE LIPOSOME: HCPCS | Performed by: ORTHOPAEDIC SURGERY

## 2019-01-08 PROCEDURE — 86900 BLOOD TYPING SEROLOGIC ABO: CPT

## 2019-01-08 DEVICE — M.B.T. REVISION METAPHYSEAL SLEEVE POROUS 37MM: Type: IMPLANTABLE DEVICE | Site: KNEE | Status: FUNCTIONAL

## 2019-01-08 DEVICE — LPS TIBIAL INSERT HINGE UNIVERSAL XSMALL 21MM
Type: IMPLANTABLE DEVICE | Site: KNEE | Status: FUNCTIONAL
Brand: LPS

## 2019-01-08 DEVICE — LPS DISTAL FEMORAL COMPONENT X-SMALL RIGHT
Type: IMPLANTABLE DEVICE | Site: KNEE | Status: FUNCTIONAL
Brand: LPS

## 2019-01-08 DEVICE — CMT BONE SIMPLEX/P FULL DOSE 10/PK: Type: IMPLANTABLE DEVICE | Site: KNEE | Status: FUNCTIONAL

## 2019-01-08 DEVICE — LPS STEM - STRAIGHT 15MM X 125MM CEMENTED
Type: IMPLANTABLE DEVICE | Site: KNEE | Status: FUNCTIONAL
Brand: LPS

## 2019-01-08 DEVICE — TIBIAL TRAY ROTATING PLATFORM M.B.T. REVISION SIZE 3 CEMENTED: Type: IMPLANTABLE DEVICE | Site: KNEE | Status: FUNCTIONAL

## 2019-01-08 DEVICE — UNIVERSAL STEM FLUTED 75MM X 14MM: Type: IMPLANTABLE DEVICE | Site: KNEE | Status: FUNCTIONAL

## 2019-01-08 RX ORDER — ONDANSETRON 2 MG/ML
4 INJECTION INTRAMUSCULAR; INTRAVENOUS ONCE AS NEEDED
Status: COMPLETED | OUTPATIENT
Start: 2019-01-08 | End: 2019-01-08

## 2019-01-08 RX ORDER — NICOTINE POLACRILEX 4 MG
15 LOZENGE BUCCAL
Status: DISCONTINUED | OUTPATIENT
Start: 2019-01-08 | End: 2019-01-13 | Stop reason: HOSPADM

## 2019-01-08 RX ORDER — POLYETHYLENE GLYCOL 3350 17 G/17G
17 POWDER, FOR SOLUTION ORAL DAILY
Status: DISCONTINUED | OUTPATIENT
Start: 2019-01-08 | End: 2019-01-13 | Stop reason: HOSPADM

## 2019-01-08 RX ORDER — TRANEXAMIC ACID 100 MG/ML
INJECTION, SOLUTION INTRAVENOUS AS NEEDED
Status: DISCONTINUED | OUTPATIENT
Start: 2019-01-08 | End: 2019-01-08 | Stop reason: SURG

## 2019-01-08 RX ORDER — SODIUM CHLORIDE 0.9 % (FLUSH) 0.9 %
20 SYRINGE (ML) INJECTION AS NEEDED
Status: DISCONTINUED | OUTPATIENT
Start: 2019-01-08 | End: 2019-01-13 | Stop reason: HOSPADM

## 2019-01-08 RX ORDER — BISACODYL 10 MG
10 SUPPOSITORY, RECTAL RECTAL DAILY PRN
Status: DISCONTINUED | OUTPATIENT
Start: 2019-01-08 | End: 2019-01-13 | Stop reason: HOSPADM

## 2019-01-08 RX ORDER — OXYCODONE AND ACETAMINOPHEN 7.5; 325 MG/1; MG/1
1 TABLET ORAL ONCE AS NEEDED
Status: DISCONTINUED | OUTPATIENT
Start: 2019-01-08 | End: 2019-01-08 | Stop reason: HOSPADM

## 2019-01-08 RX ORDER — HYDROCODONE BITARTRATE AND ACETAMINOPHEN 7.5; 325 MG/1; MG/1
1 TABLET ORAL ONCE AS NEEDED
Status: DISCONTINUED | OUTPATIENT
Start: 2019-01-08 | End: 2019-01-08 | Stop reason: HOSPADM

## 2019-01-08 RX ORDER — ONDANSETRON 4 MG/1
4 TABLET, ORALLY DISINTEGRATING ORAL EVERY 6 HOURS PRN
Status: DISCONTINUED | OUTPATIENT
Start: 2019-01-08 | End: 2019-01-13 | Stop reason: HOSPADM

## 2019-01-08 RX ORDER — SODIUM CHLORIDE 0.9 % (FLUSH) 0.9 %
10 SYRINGE (ML) INJECTION AS NEEDED
Status: DISCONTINUED | OUTPATIENT
Start: 2019-01-08 | End: 2019-01-13 | Stop reason: HOSPADM

## 2019-01-08 RX ORDER — LABETALOL HYDROCHLORIDE 5 MG/ML
5 INJECTION, SOLUTION INTRAVENOUS
Status: DISCONTINUED | OUTPATIENT
Start: 2019-01-08 | End: 2019-01-08 | Stop reason: HOSPADM

## 2019-01-08 RX ORDER — FAMOTIDINE 10 MG/ML
20 INJECTION, SOLUTION INTRAVENOUS ONCE
Status: COMPLETED | OUTPATIENT
Start: 2019-01-08 | End: 2019-01-08

## 2019-01-08 RX ORDER — WHEAT DEXTRIN 3 G/4 G
1 POWDER IN PACKET (EA) ORAL DAILY
Status: DISCONTINUED | OUTPATIENT
Start: 2019-01-08 | End: 2019-01-13 | Stop reason: HOSPADM

## 2019-01-08 RX ORDER — SODIUM CHLORIDE 0.9 % (FLUSH) 0.9 %
10 SYRINGE (ML) INJECTION EVERY 12 HOURS SCHEDULED
Status: DISCONTINUED | OUTPATIENT
Start: 2019-01-08 | End: 2019-01-13 | Stop reason: HOSPADM

## 2019-01-08 RX ORDER — EPHEDRINE SULFATE 50 MG/ML
5 INJECTION, SOLUTION INTRAVENOUS ONCE AS NEEDED
Status: DISCONTINUED | OUTPATIENT
Start: 2019-01-08 | End: 2019-01-08 | Stop reason: HOSPADM

## 2019-01-08 RX ORDER — MIDAZOLAM HYDROCHLORIDE 1 MG/ML
1 INJECTION INTRAMUSCULAR; INTRAVENOUS
Status: DISCONTINUED | OUTPATIENT
Start: 2019-01-08 | End: 2019-01-08 | Stop reason: HOSPADM

## 2019-01-08 RX ORDER — SODIUM CHLORIDE 0.9 % (FLUSH) 0.9 %
1-10 SYRINGE (ML) INJECTION AS NEEDED
Status: DISCONTINUED | OUTPATIENT
Start: 2019-01-08 | End: 2019-01-08 | Stop reason: HOSPADM

## 2019-01-08 RX ORDER — ACETAMINOPHEN 325 MG/1
650 TABLET ORAL EVERY 4 HOURS PRN
Status: DISCONTINUED | OUTPATIENT
Start: 2019-01-08 | End: 2019-01-13 | Stop reason: HOSPADM

## 2019-01-08 RX ORDER — ONDANSETRON 4 MG/1
4 TABLET, FILM COATED ORAL EVERY 6 HOURS PRN
Status: DISCONTINUED | OUTPATIENT
Start: 2019-01-08 | End: 2019-01-13 | Stop reason: HOSPADM

## 2019-01-08 RX ORDER — SODIUM CHLORIDE 0.9 % (FLUSH) 0.9 %
3-10 SYRINGE (ML) INJECTION AS NEEDED
Status: DISCONTINUED | OUTPATIENT
Start: 2019-01-08 | End: 2019-01-13 | Stop reason: HOSPADM

## 2019-01-08 RX ORDER — ONDANSETRON 2 MG/ML
4 INJECTION INTRAMUSCULAR; INTRAVENOUS EVERY 6 HOURS PRN
Status: DISCONTINUED | OUTPATIENT
Start: 2019-01-08 | End: 2019-01-13 | Stop reason: HOSPADM

## 2019-01-08 RX ORDER — ALLOPURINOL 300 MG/1
300 TABLET ORAL NIGHTLY
Status: DISCONTINUED | OUTPATIENT
Start: 2019-01-08 | End: 2019-01-13 | Stop reason: HOSPADM

## 2019-01-08 RX ORDER — HYDROCODONE BITARTRATE AND ACETAMINOPHEN 10; 325 MG/1; MG/1
1 TABLET ORAL EVERY 6 HOURS PRN
Status: DISCONTINUED | OUTPATIENT
Start: 2019-01-08 | End: 2019-01-13 | Stop reason: HOSPADM

## 2019-01-08 RX ORDER — BISACODYL 5 MG/1
5 TABLET, DELAYED RELEASE ORAL DAILY PRN
Status: DISCONTINUED | OUTPATIENT
Start: 2019-01-08 | End: 2019-01-13 | Stop reason: HOSPADM

## 2019-01-08 RX ORDER — DIPHENHYDRAMINE HYDROCHLORIDE 50 MG/ML
12.5 INJECTION INTRAMUSCULAR; INTRAVENOUS
Status: DISCONTINUED | OUTPATIENT
Start: 2019-01-08 | End: 2019-01-08 | Stop reason: HOSPADM

## 2019-01-08 RX ORDER — OXYCODONE HCL 10 MG/1
20 TABLET, FILM COATED, EXTENDED RELEASE ORAL ONCE
Status: COMPLETED | OUTPATIENT
Start: 2019-01-08 | End: 2019-01-08

## 2019-01-08 RX ORDER — ASPIRIN 81 MG/1
81 TABLET ORAL DAILY
Status: DISCONTINUED | OUTPATIENT
Start: 2019-01-08 | End: 2019-01-13 | Stop reason: HOSPADM

## 2019-01-08 RX ORDER — HYDROMORPHONE HYDROCHLORIDE 1 MG/ML
0.5 INJECTION, SOLUTION INTRAMUSCULAR; INTRAVENOUS; SUBCUTANEOUS
Status: DISCONTINUED | OUTPATIENT
Start: 2019-01-08 | End: 2019-01-08 | Stop reason: HOSPADM

## 2019-01-08 RX ORDER — LIDOCAINE HYDROCHLORIDE 20 MG/ML
INJECTION, SOLUTION INFILTRATION; PERINEURAL AS NEEDED
Status: DISCONTINUED | OUTPATIENT
Start: 2019-01-08 | End: 2019-01-08 | Stop reason: SURG

## 2019-01-08 RX ORDER — HYDRALAZINE HYDROCHLORIDE 20 MG/ML
INJECTION INTRAMUSCULAR; INTRAVENOUS
Status: COMPLETED
Start: 2019-01-08 | End: 2019-01-08

## 2019-01-08 RX ORDER — PROPOFOL 10 MG/ML
VIAL (ML) INTRAVENOUS AS NEEDED
Status: DISCONTINUED | OUTPATIENT
Start: 2019-01-08 | End: 2019-01-08 | Stop reason: SURG

## 2019-01-08 RX ORDER — FUROSEMIDE 40 MG/1
40 TABLET ORAL DAILY
Status: DISCONTINUED | OUTPATIENT
Start: 2019-01-08 | End: 2019-01-13 | Stop reason: HOSPADM

## 2019-01-08 RX ORDER — CEFAZOLIN SODIUM 2 G/100ML
2 INJECTION, SOLUTION INTRAVENOUS ONCE
Status: COMPLETED | OUTPATIENT
Start: 2019-01-08 | End: 2019-01-08

## 2019-01-08 RX ORDER — PROMETHAZINE HYDROCHLORIDE 25 MG/1
25 TABLET ORAL ONCE AS NEEDED
Status: COMPLETED | OUTPATIENT
Start: 2019-01-08 | End: 2019-01-08

## 2019-01-08 RX ORDER — SODIUM CHLORIDE 9 MG/ML
100 INJECTION, SOLUTION INTRAVENOUS CONTINUOUS
Status: DISCONTINUED | OUTPATIENT
Start: 2019-01-08 | End: 2019-01-10

## 2019-01-08 RX ORDER — CEFAZOLIN SODIUM 2 G/100ML
2 INJECTION, SOLUTION INTRAVENOUS EVERY 8 HOURS
Status: COMPLETED | OUTPATIENT
Start: 2019-01-08 | End: 2019-01-09

## 2019-01-08 RX ORDER — NALOXONE HCL 0.4 MG/ML
0.2 VIAL (ML) INJECTION AS NEEDED
Status: DISCONTINUED | OUTPATIENT
Start: 2019-01-08 | End: 2019-01-08 | Stop reason: HOSPADM

## 2019-01-08 RX ORDER — SODIUM CHLORIDE 0.9 % (FLUSH) 0.9 %
3 SYRINGE (ML) INJECTION EVERY 12 HOURS SCHEDULED
Status: DISCONTINUED | OUTPATIENT
Start: 2019-01-08 | End: 2019-01-13 | Stop reason: HOSPADM

## 2019-01-08 RX ORDER — FENTANYL CITRATE 50 UG/ML
INJECTION, SOLUTION INTRAMUSCULAR; INTRAVENOUS AS NEEDED
Status: DISCONTINUED | OUTPATIENT
Start: 2019-01-08 | End: 2019-01-08 | Stop reason: SURG

## 2019-01-08 RX ORDER — ASPIRIN 81 MG/1
81 TABLET ORAL DAILY
Status: DISCONTINUED | OUTPATIENT
Start: 2019-01-09 | End: 2019-01-08 | Stop reason: SDUPTHER

## 2019-01-08 RX ORDER — FENTANYL CITRATE 50 UG/ML
50 INJECTION, SOLUTION INTRAMUSCULAR; INTRAVENOUS
Status: DISCONTINUED | OUTPATIENT
Start: 2019-01-08 | End: 2019-01-08 | Stop reason: HOSPADM

## 2019-01-08 RX ORDER — MIDAZOLAM HYDROCHLORIDE 1 MG/ML
2 INJECTION INTRAMUSCULAR; INTRAVENOUS
Status: DISCONTINUED | OUTPATIENT
Start: 2019-01-08 | End: 2019-01-08 | Stop reason: HOSPADM

## 2019-01-08 RX ORDER — DIPHENHYDRAMINE HCL 25 MG
25 CAPSULE ORAL
Status: DISCONTINUED | OUTPATIENT
Start: 2019-01-08 | End: 2019-01-08 | Stop reason: HOSPADM

## 2019-01-08 RX ORDER — HYDRALAZINE HYDROCHLORIDE 20 MG/ML
5 INJECTION INTRAMUSCULAR; INTRAVENOUS
Status: DISCONTINUED | OUTPATIENT
Start: 2019-01-08 | End: 2019-01-08 | Stop reason: HOSPADM

## 2019-01-08 RX ORDER — MELATONIN
1000 DAILY
Status: DISCONTINUED | OUTPATIENT
Start: 2019-01-08 | End: 2019-01-13 | Stop reason: HOSPADM

## 2019-01-08 RX ORDER — LIDOCAINE HYDROCHLORIDE 10 MG/ML
0.5 INJECTION, SOLUTION EPIDURAL; INFILTRATION; INTRACAUDAL; PERINEURAL ONCE AS NEEDED
Status: DISCONTINUED | OUTPATIENT
Start: 2019-01-08 | End: 2019-01-08 | Stop reason: HOSPADM

## 2019-01-08 RX ORDER — PROMETHAZINE HYDROCHLORIDE 25 MG/ML
12.5 INJECTION, SOLUTION INTRAMUSCULAR; INTRAVENOUS ONCE AS NEEDED
Status: COMPLETED | OUTPATIENT
Start: 2019-01-08 | End: 2019-01-08

## 2019-01-08 RX ORDER — PANTOPRAZOLE SODIUM 40 MG/1
40 TABLET, DELAYED RELEASE ORAL EVERY MORNING
Status: DISCONTINUED | OUTPATIENT
Start: 2019-01-08 | End: 2019-01-13 | Stop reason: HOSPADM

## 2019-01-08 RX ORDER — INSULIN GLARGINE 100 [IU]/ML
15 INJECTION, SOLUTION SUBCUTANEOUS EVERY 12 HOURS SCHEDULED
Status: DISCONTINUED | OUTPATIENT
Start: 2019-01-08 | End: 2019-01-13 | Stop reason: HOSPADM

## 2019-01-08 RX ORDER — DEXTROSE MONOHYDRATE 25 G/50ML
25 INJECTION, SOLUTION INTRAVENOUS
Status: DISCONTINUED | OUTPATIENT
Start: 2019-01-08 | End: 2019-01-13 | Stop reason: HOSPADM

## 2019-01-08 RX ORDER — ROPIVACAINE HYDROCHLORIDE 5 MG/ML
INJECTION, SOLUTION EPIDURAL; INFILTRATION; PERINEURAL
Status: COMPLETED | OUTPATIENT
Start: 2019-01-08 | End: 2019-01-08

## 2019-01-08 RX ORDER — ACETAMINOPHEN 325 MG/1
650 TABLET ORAL ONCE AS NEEDED
Status: DISCONTINUED | OUTPATIENT
Start: 2019-01-08 | End: 2019-01-08 | Stop reason: HOSPADM

## 2019-01-08 RX ORDER — MAGNESIUM HYDROXIDE 1200 MG/15ML
LIQUID ORAL AS NEEDED
Status: DISCONTINUED | OUTPATIENT
Start: 2019-01-08 | End: 2019-01-08 | Stop reason: HOSPADM

## 2019-01-08 RX ORDER — EPHEDRINE SULFATE 50 MG/ML
INJECTION, SOLUTION INTRAVENOUS AS NEEDED
Status: DISCONTINUED | OUTPATIENT
Start: 2019-01-08 | End: 2019-01-08 | Stop reason: SURG

## 2019-01-08 RX ORDER — FLUMAZENIL 0.1 MG/ML
0.2 INJECTION INTRAVENOUS AS NEEDED
Status: DISCONTINUED | OUTPATIENT
Start: 2019-01-08 | End: 2019-01-08 | Stop reason: HOSPADM

## 2019-01-08 RX ORDER — ROCURONIUM BROMIDE 10 MG/ML
INJECTION, SOLUTION INTRAVENOUS AS NEEDED
Status: DISCONTINUED | OUTPATIENT
Start: 2019-01-08 | End: 2019-01-08 | Stop reason: SURG

## 2019-01-08 RX ORDER — ISOSORBIDE DINITRATE 20 MG/1
40 TABLET ORAL 2 TIMES DAILY
Status: DISCONTINUED | OUTPATIENT
Start: 2019-01-08 | End: 2019-01-13 | Stop reason: HOSPADM

## 2019-01-08 RX ORDER — ACETAMINOPHEN 500 MG
1000 TABLET ORAL ONCE
Status: COMPLETED | OUTPATIENT
Start: 2019-01-08 | End: 2019-01-08

## 2019-01-08 RX ORDER — HYDROMORPHONE HYDROCHLORIDE 1 MG/ML
0.5 INJECTION, SOLUTION INTRAMUSCULAR; INTRAVENOUS; SUBCUTANEOUS
Status: DISCONTINUED | OUTPATIENT
Start: 2019-01-08 | End: 2019-01-08

## 2019-01-08 RX ORDER — SODIUM CHLORIDE, SODIUM LACTATE, POTASSIUM CHLORIDE, CALCIUM CHLORIDE 600; 310; 30; 20 MG/100ML; MG/100ML; MG/100ML; MG/100ML
9 INJECTION, SOLUTION INTRAVENOUS CONTINUOUS
Status: DISCONTINUED | OUTPATIENT
Start: 2019-01-08 | End: 2019-01-10

## 2019-01-08 RX ORDER — PROMETHAZINE HYDROCHLORIDE 25 MG/1
25 SUPPOSITORY RECTAL ONCE AS NEEDED
Status: COMPLETED | OUTPATIENT
Start: 2019-01-08 | End: 2019-01-08

## 2019-01-08 RX ADMIN — ALLOPURINOL 300 MG: 300 TABLET ORAL at 21:52

## 2019-01-08 RX ADMIN — PROMETHAZINE HYDROCHLORIDE 12.5 MG: 25 INJECTION INTRAMUSCULAR; INTRAVENOUS at 16:34

## 2019-01-08 RX ADMIN — PHENYLEPHRINE HYDROCHLORIDE 200 MCG: 10 INJECTION INTRAVENOUS at 13:33

## 2019-01-08 RX ADMIN — FENTANYL CITRATE 50 MCG: 50 INJECTION, SOLUTION INTRAMUSCULAR; INTRAVENOUS at 00:11

## 2019-01-08 RX ADMIN — ISOSORBIDE DINITRATE 40 MG: 20 TABLET ORAL at 08:30

## 2019-01-08 RX ADMIN — SODIUM CHLORIDE, POTASSIUM CHLORIDE, SODIUM LACTATE AND CALCIUM CHLORIDE 9 ML/HR: 600; 310; 30; 20 INJECTION, SOLUTION INTRAVENOUS at 12:49

## 2019-01-08 RX ADMIN — HYDROMORPHONE HYDROCHLORIDE 1 MG: 1 INJECTION, SOLUTION INTRAMUSCULAR; INTRAVENOUS; SUBCUTANEOUS at 18:15

## 2019-01-08 RX ADMIN — SODIUM CHLORIDE, PRESERVATIVE FREE 10 ML: 5 INJECTION INTRAVENOUS at 02:27

## 2019-01-08 RX ADMIN — FENTANYL CITRATE 25 MCG: 50 INJECTION INTRAMUSCULAR; INTRAVENOUS at 13:57

## 2019-01-08 RX ADMIN — ACETAMINOPHEN 1000 MG: 500 TABLET, FILM COATED ORAL at 11:57

## 2019-01-08 RX ADMIN — TRANEXAMIC ACID 1000 MG: 100 INJECTION, SOLUTION INTRAVENOUS at 13:30

## 2019-01-08 RX ADMIN — INSULIN GLARGINE 15 UNITS: 100 INJECTION, SOLUTION SUBCUTANEOUS at 22:08

## 2019-01-08 RX ADMIN — PHENYLEPHRINE HYDROCHLORIDE 200 MCG: 10 INJECTION INTRAVENOUS at 13:21

## 2019-01-08 RX ADMIN — PROPOFOL 100 MG: 10 INJECTION, EMULSION INTRAVENOUS at 13:09

## 2019-01-08 RX ADMIN — MIDAZOLAM HYDROCHLORIDE 1 MG: 2 INJECTION, SOLUTION INTRAMUSCULAR; INTRAVENOUS at 12:28

## 2019-01-08 RX ADMIN — MEPIVACAINE HYDROCHLORIDE 10 ML: 20 INJECTION, SOLUTION EPIDURAL; INFILTRATION at 13:15

## 2019-01-08 RX ADMIN — SODIUM CHLORIDE, PRESERVATIVE FREE 10 ML: 5 INJECTION INTRAVENOUS at 08:58

## 2019-01-08 RX ADMIN — ROPIVACAINE HYDROCHLORIDE 20 ML: 5 INJECTION, SOLUTION EPIDURAL; INFILTRATION; PERINEURAL at 13:15

## 2019-01-08 RX ADMIN — ONDANSETRON 4 MG: 2 INJECTION INTRAMUSCULAR; INTRAVENOUS at 16:30

## 2019-01-08 RX ADMIN — INSULIN GLARGINE 15 UNITS: 100 INJECTION, SOLUTION SUBCUTANEOUS at 08:31

## 2019-01-08 RX ADMIN — ISOSORBIDE DINITRATE 40 MG: 20 TABLET ORAL at 21:52

## 2019-01-08 RX ADMIN — FENTANYL CITRATE 25 MCG: 50 INJECTION INTRAMUSCULAR; INTRAVENOUS at 13:42

## 2019-01-08 RX ADMIN — HYDROMORPHONE HYDROCHLORIDE 1 MG: 1 INJECTION, SOLUTION INTRAMUSCULAR; INTRAVENOUS; SUBCUTANEOUS at 20:08

## 2019-01-08 RX ADMIN — FENTANYL CITRATE 50 MCG: 50 INJECTION, SOLUTION INTRAMUSCULAR; INTRAVENOUS at 12:28

## 2019-01-08 RX ADMIN — ROCURONIUM BROMIDE 50 MG: 10 INJECTION INTRAVENOUS at 13:09

## 2019-01-08 RX ADMIN — HYDROCODONE BITARTRATE AND ACETAMINOPHEN 1 TABLET: 10; 325 TABLET ORAL at 23:08

## 2019-01-08 RX ADMIN — VITAMIN D, TAB 1000IU (100/BT) 1000 UNITS: 25 TAB at 08:30

## 2019-01-08 RX ADMIN — FENTANYL CITRATE 25 MCG: 50 INJECTION INTRAMUSCULAR; INTRAVENOUS at 13:06

## 2019-01-08 RX ADMIN — SODIUM CHLORIDE, PRESERVATIVE FREE 10 ML: 5 INJECTION INTRAVENOUS at 23:02

## 2019-01-08 RX ADMIN — HYDROMORPHONE HYDROCHLORIDE 0.5 MG: 1 INJECTION, SOLUTION INTRAMUSCULAR; INTRAVENOUS; SUBCUTANEOUS at 02:27

## 2019-01-08 RX ADMIN — MUPIROCIN 1 APPLICATION: 20 OINTMENT TOPICAL at 11:58

## 2019-01-08 RX ADMIN — HYDROMORPHONE HYDROCHLORIDE 0.5 MG: 1 INJECTION, SOLUTION INTRAMUSCULAR; INTRAVENOUS; SUBCUTANEOUS at 08:24

## 2019-01-08 RX ADMIN — PHENYLEPHRINE HYDROCHLORIDE 200 MCG: 10 INJECTION INTRAVENOUS at 13:27

## 2019-01-08 RX ADMIN — PHENYLEPHRINE HYDROCHLORIDE 200 MCG: 10 INJECTION INTRAVENOUS at 13:17

## 2019-01-08 RX ADMIN — HYDROMORPHONE HYDROCHLORIDE 1 MG: 1 INJECTION, SOLUTION INTRAMUSCULAR; INTRAVENOUS; SUBCUTANEOUS at 22:00

## 2019-01-08 RX ADMIN — ONDANSETRON HYDROCHLORIDE 4 MG: 2 SOLUTION INTRAMUSCULAR; INTRAVENOUS at 15:13

## 2019-01-08 RX ADMIN — OXYCODONE HYDROCHLORIDE 20 MG: 10 TABLET, FILM COATED, EXTENDED RELEASE ORAL at 11:57

## 2019-01-08 RX ADMIN — SUGAMMADEX 200 MG: 100 INJECTION, SOLUTION INTRAVENOUS at 15:10

## 2019-01-08 RX ADMIN — SODIUM CHLORIDE, PRESERVATIVE FREE 3 ML: 5 INJECTION INTRAVENOUS at 22:09

## 2019-01-08 RX ADMIN — SODIUM CHLORIDE, PRESERVATIVE FREE 3 ML: 5 INJECTION INTRAVENOUS at 22:10

## 2019-01-08 RX ADMIN — INSULIN LISPRO 2 UNITS: 100 INJECTION, SOLUTION INTRAVENOUS; SUBCUTANEOUS at 08:30

## 2019-01-08 RX ADMIN — HYDRALAZINE HYDROCHLORIDE 5 MG: 20 INJECTION INTRAMUSCULAR; INTRAVENOUS at 16:09

## 2019-01-08 RX ADMIN — SODIUM CHLORIDE 100 ML/HR: 9 INJECTION, SOLUTION INTRAVENOUS at 18:19

## 2019-01-08 RX ADMIN — FENTANYL CITRATE 25 MCG: 50 INJECTION INTRAMUSCULAR; INTRAVENOUS at 14:46

## 2019-01-08 RX ADMIN — HYDROMORPHONE HYDROCHLORIDE 0.5 MG: 1 INJECTION, SOLUTION INTRAMUSCULAR; INTRAVENOUS; SUBCUTANEOUS at 05:32

## 2019-01-08 RX ADMIN — CEFAZOLIN SODIUM 2 G: 2 INJECTION, SOLUTION INTRAVENOUS at 13:22

## 2019-01-08 RX ADMIN — INSULIN LISPRO 4 UNITS: 100 INJECTION, SOLUTION INTRAVENOUS; SUBCUTANEOUS at 21:52

## 2019-01-08 RX ADMIN — SODIUM CHLORIDE, PRESERVATIVE FREE 3 ML: 5 INJECTION INTRAVENOUS at 01:18

## 2019-01-08 RX ADMIN — FAMOTIDINE 20 MG: 10 INJECTION, SOLUTION INTRAVENOUS at 12:34

## 2019-01-08 RX ADMIN — EPHEDRINE SULFATE 15 MG: 50 INJECTION INTRAMUSCULAR; INTRAVENOUS; SUBCUTANEOUS at 14:08

## 2019-01-08 RX ADMIN — SODIUM CHLORIDE, POTASSIUM CHLORIDE, SODIUM LACTATE AND CALCIUM CHLORIDE: 600; 310; 30; 20 INJECTION, SOLUTION INTRAVENOUS at 13:03

## 2019-01-08 RX ADMIN — HYDROMORPHONE HYDROCHLORIDE 1 MG: 1 INJECTION, SOLUTION INTRAMUSCULAR; INTRAVENOUS; SUBCUTANEOUS at 09:43

## 2019-01-08 RX ADMIN — ROPIVACAINE HYDROCHLORIDE 30 ML: 5 INJECTION, SOLUTION EPIDURAL; INFILTRATION; PERINEURAL at 12:50

## 2019-01-08 RX ADMIN — CEFAZOLIN SODIUM 2 G: 2 INJECTION, SOLUTION INTRAVENOUS at 21:52

## 2019-01-08 RX ADMIN — LIDOCAINE HYDROCHLORIDE 100 MG: 20 INJECTION, SOLUTION INFILTRATION; PERINEURAL at 13:09

## 2019-01-08 RX ADMIN — PANTOPRAZOLE SODIUM 40 MG: 40 TABLET, DELAYED RELEASE ORAL at 08:30

## 2019-01-08 RX ADMIN — PHENYLEPHRINE HYDROCHLORIDE 200 MCG: 10 INJECTION INTRAVENOUS at 13:24

## 2019-01-08 RX ADMIN — EPHEDRINE SULFATE 10 MG: 50 INJECTION INTRAMUSCULAR; INTRAVENOUS; SUBCUTANEOUS at 13:33

## 2019-01-08 RX ADMIN — FUROSEMIDE 40 MG: 40 TABLET ORAL at 08:30

## 2019-01-08 NOTE — ANESTHESIA PROCEDURE NOTES
ANESTHESIA INTUBATION  Urgency: elective      General Information and Staff    Patient location during procedure: OR  Anesthesiologist: Riki Nieves MD  CRNA: Serena Ko CRNA    Indications and Patient Condition  Indications for airway management: airway protection    Preoxygenated: yes  Mask difficulty assessment: 2 - vent by mask + OA or adjuvant +/- NMBA    Final Airway Details  Final airway type: endotracheal airway      Successful airway: ETT  Cuffed: yes   Successful intubation technique: video laryngoscopy  Facilitating devices/methods: intubating stylet  Endotracheal tube insertion site: oral  Blade: CMAC  Blade size: D  ETT size (mm): 7.5  Cormack-Lehane Classification: grade IIa - partial view of glottis  Placement verified by: chest auscultation and capnometry   Measured from: lips  ETT to lips (cm): 23  Number of attempts at approach: 1    Additional Comments  CMAC D blade utilized for first attempt.    Preoxygenated with 100% FiO2. Smooth IV induction. Atraumatic insertion. No change to dentition or soft tissue.

## 2019-01-08 NOTE — H&P
Patient Name:  Ariel Elliott  YOB: 1940  MRN:  [id number]  Admit Date:  1/7/2019  Patient Care Team:  Zeb Meadows MD as PCP - General (Internal Medicine)      Chief Complaint   Patient presents with   • Fall     pt fell from recliner and c/o right knee swelling and pain.      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  He presented after having a mechanical fall injuring his right leg.  He reports he tripped over a carpet and there is no loss of consciousness.  Since he had his TAVR he has not had any chest pain and has felt pretty good.  When he came to the emergency room x-rays revealed a comminuted, impacted, displaced distal femoral metaphyseal fracture.  Orthopedic surgery was consulted and we were asked for mission.  Currently his pain is not controlled with 0.5 mg of Dilaudid every 2 hours.  She had been taking a baby aspirin but stopped taking it 3 days ago.    History of Present Illness    Past Medical History:   Diagnosis Date   • Anemia    • Anemia     2 UNITS OF BLOOD SAT 11/10. [facility]   • Aortic stenosis    • Arrhythmia    • Atrial flutter (CMS/HCC)    • Congestive heart failure (CMS/HCC)    • Coronary atherosclerosis    • Diabetes mellitus (CMS/HCC)     TYPE 2   • GI (gastrointestinal bleed)    • Gout    • H/O seasonal allergies    • Hard to intubate     SEVERAL TIMES IN PAST. NO ISSUES RECENT HISTORY   • Herpes     HANDS IN THE PAST   • Heyd syndrome (CMS/HCC)    • History of diverticulitis    • Hyperlipidemia    • Pacemaker     LEFT.   • Post-nasal drip    • Sleep apnea      Bi-Pap   • Von Willebrand disease (CMS/HCC)      Past Surgical History:   Procedure Laterality Date   • ABLATION OF DYSRHYTHMIC FOCUS     • AORTIC VALVE REPAIR/REPLACEMENT N/A 11/13/2018    Procedure: INTRAOPERATIVE LANG, PERCUTANEOUS TRANSFEMORAL TRANSCATHETER AORTIC VALVE REPLACEMENT PERCUTANEOUS APPROACH;  Surgeon: Elizabeth Meade MD;  Location: [facility] HYBRID OR 18/19;  Service: Cardiothoracic   • CARDIAC CATHETERIZATION     • CARDIAC CATHETERIZATION N/A 11/1/2018    Procedure: Right and Left Heart Cath;  Surgeon: Dionicio Stoner MD;  Location: [facility] CATH INVASIVE LOCATION;  Service: Cardiology   • CARDIAC CATHETERIZATION  11/1/2018    Procedure: Saphenous Vein Graft;  Surgeon: Dionicio Stoner MD;  [facility] C[facility];  Service: Cardiology   • CARDIAC ELECTROPHYSIOLOGY PROCEDURE Left 11/5/2018    Procedure: Pacemaker DC new   [place];  Surgeon: Mina Castillo MD;  Location: [facility] CATH INVASIVE LOCATION;  Service: Cardiology   • CATARACT EXTRACTION WITH INTRAOCULAR LENS IMPLANT      LEFT AND RIGHT   • CHOLECYSTECTOMY     • COLONOSCOPY     • CORONARY ANGIOPLASTY      WITH PTCA   • CORONARY ARTERY BYPASS GRAFT      5 VESSEL   • HAND SURGERY      TRIGGER FINGER RELEASE, TENDON RELEASE   • HEMORRHOIDECTOMY     • HERNIA REPAIR      VENTRAL HERNIA REPEATEDLY   • PACEMAKER IMPLANTATION  11/05/2018   • PORTACATH PLACEMENT Right     POWER PORT   • SINUS SURGERY     • TOTAL HIP ARTHROPLASTY Right      Family History   Problem Relation Age of Onset   • Diabetes Mother    • Cancer Mother      Social History     Tobacco Use   • Smoking status: Former Smoker   • Smokeless tobacco: Never Used   • Tobacco comment: QUIT 35YRS AGO   Substance Use Topics   • Alcohol use: No   • Drug use: No     Medications Prior to Admission   Medication Sig Dispense Refill Last Dose   • allopurinol (ZYLOPRIM) 300 MG tablet Take 300 mg by mouth Every Night.   Taking   • aspirin 81 MG EC tablet Take 1 tablet by  mouth Daily.   Taking   • cholecalciferol (VITAMIN D3) 1000 units tablet Take 1,000 Units by mouth Daily.   Taking   • furosemide (LASIX) 40 MG tablet Take 40 mg by mouth 2 (Two) Times a Day.   Taking   • gemfibrozil (LOPID) 600 MG tablet Take 600 mg by mouth 2 (Two) Times a Day Before Meals.   Taking   • insulin aspart (novoLOG) 100 UNIT/ML injection Inject  under the skin into the appropriate area as directed 3 (Three) Times a Day Before Meals. 15-20UNITS   Taking   • insulin detemir (LEVEMIR) 100 UNIT/ML injection Inject 30 Units under the skin into the appropriate area as directed 2 (Two) Times a Day. WITH BLOOD SUGAR GREATER THAN 130   Taking   • isosorbide dinitrate (ISORDIL) 40 MG tablet Take 40 mg by mouth 2 (Two) Times a Day.   Taking   • loperamide (IMODIUM) 2 MG capsule Take 2 mg by mouth As Needed for Diarrhea.   Taking   • octreotide (sandoSTATIN) 10 MG injection Inject 10 mg into the appropriate muscle as directed by prescriber Every 28 (Twenty-Eight) Days.   Taking   • omeprazole (priLOSEC) 40 MG capsule Take 40 mg by mouth 2 (Two) Times a Day.   Taking   • polyethylene glycol (MIRALAX) packet Take 17 g by mouth Daily.   Taking   • Psyllium (METAMUCIL FIBER PO) Take 1 tablet by mouth Daily.   Taking     Allergies:    Allergies   Allergen Reactions   • Statins Myalgia       Review of Systems   Constitutional: Negative for activity change, appetite change and unexpected weight change.   HENT: Negative for nosebleeds, sore throat and trouble swallowing.    Eyes: Negative for pain and visual disturbance.   Respiratory: Negative for cough, chest tightness and shortness of breath.    Cardiovascular: Positive for leg swelling. Negative for chest pain and palpitations.   Gastrointestinal: Positive for blood in stool. Negative for abdominal pain, constipation, diarrhea, nausea and vomiting.   Endocrine:        Positive for Diabetes but neg for thyroid disease   Genitourinary: Positive for difficulty urinating.  Negative for hematuria.   Musculoskeletal: Positive for arthralgias and gait problem. Negative for back pain, neck pain and neck stiffness.   Skin: Negative for rash and wound.   Neurological: Negative for dizziness, syncope, weakness, light-headedness and headaches.   Hematological: Negative for adenopathy. Bruises/bleeds easily.   Psychiatric/Behavioral: Negative for agitation, behavioral problems and confusion.        Objective    Vital Signs  Temp:  [97.3 °F (36.3 °C)-98.9 °F (37.2 °C)] 98.2 °F (36.8 °C)  Heart Rate:  [59-72] 71  Resp:  [16-18] 18  BP: (103-168)/(47-90) 142/63  SpO2:  [92 %-100 %] 95 %  on  Flow (L/min):  [2] 2;   Device (Oxygen Therapy): room air  Body mass index is 38.81 kg/m².    Physical Exam   Constitutional: He is oriented to person, place, and time. He appears well-developed and well-nourished.   In pain     HENT:   Head: Normocephalic and atraumatic.   Mouth/Throat: Oropharynx is clear and moist. No oropharyngeal exudate.   Eyes: Conjunctivae and EOM are normal. Pupils are equal, round, and reactive to light. No scleral icterus.   Neck: Normal range of motion. Neck supple. No JVD present. No thyromegaly present.   Cardiovascular: Normal rate, regular rhythm and normal heart sounds.   No murmur heard.  Pulmonary/Chest: Effort normal and breath sounds normal. No stridor. No respiratory distress. He exhibits no tenderness (left PPM).   Abdominal: Soft. Bowel sounds are normal. He exhibits no distension. There is no tenderness. There is no rebound and no guarding.   Musculoskeletal: He exhibits deformity (Right leg externally rotated and shorter than the left). He exhibits no edema (1+) or tenderness.   Lymphadenopathy:     He has no cervical adenopathy.   Neurological: He is alert and oriented to person, place, and time. No cranial nerve deficit.   Skin: Skin is warm and dry. He is not diaphoretic.   Psychiatric: He has a normal mood and affect. His behavior is normal.   Vitals  reviewed.      Results Review:  I reviewed the patient's new clinical results.  I reviewed the patient's new imaging results and agree with the interpretation.  I reviewed the patient's other test results and agree with the interpretation  I personally viewed and interpreted the patient's EKG/Telemetry data  Discussed with Dr. Ray and Dr. Stoner      Lab Results (last 24 hours)     Procedure Component Value Units Date/Time    CBC & Differential [[id number]] Collected:  01/07/19 1947    Specimen:  Blood Updated:  01/07/19 2039    Narrative:       The following orders were created for panel order CBC & Differential.  Procedure                               Abnormality         Status                     ---------                               -----------         ------                     CBC Auto Differential[[id number]]        Abnormal            Final result                 Please view results for these tests on the individual orders.    Basic Metabolic Panel [[id number]]  (Abnormal) Collected:  01/07/19 1947    Specimen:  Blood Updated:  01/07/19 2051     Glucose 178 mg/dL      BUN 58 mg/dL      Creatinine 1.42 mg/dL      Sodium 142 mmol/L      Potassium 4.4 mmol/L      Chloride 105 mmol/L      CO2 22.7 mmol/L      Calcium 8.6 mg/dL      eGFR Non African Amer 48 mL/min/1.73      BUN/Creatinine Ratio 40.8     Anion Gap 14.3 mmol/L     Narrative:       The MDRD GFR formula is only valid for adults with stable renal function between ages 18 and 70.    CBC Auto Differential [[id number]]  (Abnormal) Collected:  01/07/19 1947    Specimen:  Blood Updated:  01/07/19 2039     WBC 5.55 10*3/mm3      RBC 2.33 10*6/mm3      Hemoglobin 6.7 g/dL      Hematocrit 21.2 %      MCV 91.0 fL      MCH 28.8 pg      MCHC 31.6 g/dL      RDW 17.1 %      RDW-SD 56.2 fl      MPV 12.0 fL      Platelets 139 10*3/mm3      Neutrophil % 81.2 %      Lymphocyte % 8.1 %      Monocyte % 7.6 %      Eosinophil % 2.9 %      Basophil % 0.2 %       Immature Grans % 0.4 %      Neutrophils, Absolute 4.51 10*3/mm3      Lymphocytes, Absolute 0.45 10*3/mm3      Monocytes, Absolute 0.42 10*3/mm3      Eosinophils, Absolute 0.16 10*3/mm3      Basophils, Absolute 0.01 10*3/mm3      Immature Grans, Absolute 0.02 10*3/mm3     POC Glucose Once [[id number]]  (Abnormal) Collected:  01/07/19 1956    Specimen:  Blood Updated:  01/07/19 1958     Glucose 185 mg/dL     POC Glucose Once [[id number]]  (Abnormal) Collected:  01/08/19 0041    Specimen:  Blood Updated:  01/08/19 0042     Glucose 211 mg/dL     POC Glucose Once [[id number]]  (Abnormal) Collected:  01/08/19 0545    Specimen:  Blood Updated:  01/08/19 0547     Glucose 194 mg/dL     Basic Metabolic Panel [[id number]]  (Abnormal) Collected:  01/08/19 0616    Specimen:  Blood Updated:  01/08/19 0724     Glucose 176 mg/dL      BUN 54 mg/dL      Creatinine 1.31 mg/dL      Sodium 141 mmol/L      Potassium 4.3 mmol/L      Chloride 105 mmol/L      CO2 22.3 mmol/L      Calcium 8.5 mg/dL      eGFR Non African Amer 53 mL/min/1.73      BUN/Creatinine Ratio 41.2     Anion Gap 13.7 mmol/L     Narrative:       The MDRD GFR formula is only valid for adults with stable renal function between ages 18 and 70.    Magnesium [[id number]]  (Normal) Collected:  01/08/19 0616    Specimen:  Blood Updated:  01/08/19 0724     Magnesium 2.4 mg/dL     Phosphorus [[id number]]  (Abnormal) Collected:  01/08/19 0616    Specimen:  Blood Updated:  01/08/19 0724     Phosphorus 5.1 mg/dL           Imaging Results (last 24 hours)     Procedure Component Value Units Date/Time    XR Knee 3 View Right [[id number]] Collected:  01/07/19 2148     Updated:  01/08/19 0805    Narrative:       RIGHT FEMUR, RIGHT KNEE     HISTORY: 78-year-old with fall from a chair. Right hip replacement 3  years ago.     FINDINGS:    RIGHT HIP: AP, FROG LATERAL: There has been right total hip  arthroplasty. Evaluation is limited by the obliquity of the exam. There  is heterotopic ossification  lateral to the right hip. No fracture or  acute osseous abnormality is demonstrated at the right hip.     RIGHT KNEE: AP, CROSSTABLE LATERAL: There is a comminuted distal femoral  metaphyseal fracture that may exhibit intercondylar extension. Fracture  is associated with impaction and 14 mm posterior displacement. There is  surrounding soft tissue swelling. Atherosclerotic calcifications are  present.       Impression:       1. Comminuted, impacted, displaced distal femoral metaphyseal fracture  may exhibit intercondylar extension and this could be further evaluated  with CT.  2. Right total hip arthroplasty without evidence for right hip fracture.     This report was finalized on 1/8/2019 8:02 AM by Dr. Jake Begum M.D.       XR Femur 2 View Right [[id number]] Collected:  01/07/19 2148     Updated:  01/08/19 0805    Narrative:       RIGHT FEMUR, RIGHT KNEE     HISTORY: 78-year-old with fall from a chair. Right hip replacement 3  years ago.     FINDINGS:    RIGHT HIP: AP, FROG LATERAL: There has been right total hip  arthroplasty. Evaluation is limited by the obliquity of the exam. There  is heterotopic ossification lateral to the right hip. No fracture or  acute osseous abnormality is demonstrated at the right hip.     RIGHT KNEE: AP, CROSSTABLE LATERAL: There is a comminuted distal femoral  metaphyseal fracture that may exhibit intercondylar extension. Fracture  is associated with impaction and 14 mm posterior displacement. There is  surrounding soft tissue swelling. Atherosclerotic calcifications are  present.       Impression:       1. Comminuted, impacted, displaced distal femoral metaphyseal fracture  may exhibit intercondylar extension and this could be further evaluated  with CT.  2. Right total hip arthroplasty without evidence for right hip fracture.     This report was finalized on 1/8/2019 8:02 AM by Dr. Jake Begum M.D.       CT Head Without Contrast [[id number]] Collected:  01/07/19 2112      Updated:  01/07/19 2119    Narrative:       CT OF THE HEAD WITHOUT CONTRAST     HISTORY: Right scalp hematoma after a fall     COMPARISON: None available.     TECHNIQUE: Axial CT imaging was obtained from the vertex of the skull to  the skull base. No IV contrast is administered.     FINDINGS:  No acute intracranial hemorrhage is seen. There is diffuse cerebral  atrophy, with compensatory ventricular dilatation, in keeping with the  age of 78. There is periventricular and deep white matter  microangiopathic change. There is some mucosal thickening identified  within the maxillary sinuses. There are also postsurgical changes  involving the maxillary sinuses. There is some mild mucosal thickening  seen within the left mastoid air cells. No calvarial fracture is seen.  There is soft tissue swelling overlying the right parietal bone, with a  small hematoma noted. This measures about 1.9 x 0.9 cm.       Impression:          1. No acute intracranial hemorrhage.  2. Right parietal soft tissue hematoma.     Radiation dose reduction techniques were utilized, including automated  exposure control and exposure modulation based on body size.     This report was finalized on 1/7/2019 9:16 PM by Dr. Irina Cole M.D.             No orders to display     Assessment/Plan   Active Hospital Problems    Diagnosis Date Noted   • **Closed displaced supracondylar fracture without intracondylar extension of lower end of right femur (CMS/[facility]) [S72.451A] 01/07/2019   • S/P TAVR (transcatheter aortic valve replacement) [Z95.2] 12/21/2018   • Von Willebrand disease (CMS/[facility]) [D68.0] 11/13/2018   • CKD (chronic kidney disease) [N18.9] 11/13/2018   • Nonrheumatic aortic valve stenosis [I35.0] 10/25/2018   • S/P CABG (coronary artery bypass graft) [Z95.1] 10/25/2018   • Type 2 diabetes mellitus with complication, with long-term current use of insulin (CMS/[facility]) [E11.8, Z79.4] 10/25/2018   • AVM (arteriovenous malformation) of small bowel,  acquired with hemorrhage (CMS/[facility]) [K55.8] 10/25/2018      Resolved Hospital Problems   No resolved problems to display.       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  He has been admitted to our service for a distal right femur fracture.    · This gentleman is quite complicated.  I've discussed with orthopedic surgery and cardiology.  Plan is to operate as soon as possible given the severity of this fracture.  Safe for surgery from a cardiac perspective without any further workup prior to operation.  He does have a critical vein graft to OM so we will need to keep an eye out for any postoperative cardiac issues.  He has done quite well after his TAVR 2 months ago  · Another issue is his chronic GI bleeding from AVMs needing multiple blood transfusions.  Currently he is getting 3 units of blood.  We'll need to ambulate him as soon as possible after surgery as we cannot give him pharmacologic DVT prophylaxis due to his bleeding  · Type 2 diabetes: Continue lower dose of Levemir 15 units twice a day continue sliding scale insulin  · Chronic kidney disease stage III: Creatinine around 1.3 at baseline  · Increase pain management to 1 mg Dilaudid every 2 hours  · Further care and management based on his condition and as his course unfolds.      I discussed the patients findings and my recommendations with patient and consulting provider.      Jerson Higginbotham MD  [facility]  01/08/19  9:25 AM

## 2019-01-08 NOTE — ED NOTES
Nursing report ED to floor  Ariel Elliott  78 y.o.  male    HPI (triage note):   Chief Complaint   Patient presents with   • Fall     pt fell from recliner and c/o right knee swelling and pain.        Admitting doctor:   Tre Becerril MD    Admitting diagnosis:   The primary encounter diagnosis was Closed displaced supracondylar fracture of distal end of right femur without intracondylar extension, initial encounter (CMS/[facility]). Diagnoses of Symptomatic anemia and Minor head injury without loss of consciousness, initial encounter were also pertinent to this visit.    Code status:   Current Code Status     Date Active Code Status Order ID Comments User Context       Prior          Allergies:   Statins    Weight:       01/07/19  1800   Weight: 119 kg (263 lb)       Most recent vitals:   Vitals:    01/07/19 2028 01/07/19 2137 01/07/19 2148 01/07/19 2219   BP: 141/69  149/76 145/71   BP Location:    Right arm   Patient Position:    Lying   Pulse: 59  59 60   Resp: 17      Temp:       SpO2: 100% 96% 93% 98%   Weight:       Height:           Active LDAs/IV Access:   Lines, Drains & Airways    Active LDAs     Name:   Placement date:   Placement time:   Site:   Days:    Single Lumen Implantable Port 11/14/18 Right Chest   11/14/18    1225    Chest   54                Labs (abnormal labs have a star):   Labs Reviewed   BASIC METABOLIC PANEL - Abnormal; Notable for the following components:       Result Value    Glucose 178 (*)     BUN 58 (*)     Creatinine 1.42 (*)     eGFR Non African Amer 48 (*)     BUN/Creatinine Ratio 40.8 (*)     All other components within normal limits    Narrative:     The MDRD GFR formula is only valid for adults with stable renal function between ages 18 and 70.   CBC WITH AUTO DIFFERENTIAL - Abnormal; Notable for the following components:    RBC 2.33 (*)     Hemoglobin 6.7 (*)     Hematocrit 21.2 (*)     MCHC 31.6 (*)     RDW 17.1 (*)     RDW-SD 56.2 (*)     Platelets 139 (*)      Neutrophil % 81.2 (*)     Lymphocyte % 8.1 (*)     Lymphocytes, Absolute 0.45 (*)     All other components within normal limits   POCT GLUCOSE FINGERSTICK - Abnormal; Notable for the following components:    Glucose 185 (*)     All other components within normal limits   TYPE AND SCREEN   PREPARE RBC   CBC AND DIFFERENTIAL    Narrative:     The following orders were created for panel order CBC & Differential.  Procedure                               Abnormality         Status                     ---------                               -----------         ------                     CBC Auto Differential[[id number]]        Abnormal            Final result                 Please view results for these tests on the individual orders.       EKG:   No orders to display       Meds given in ED:   Medications   sodium chloride 0.9 % flush 10 mL (not administered)   Sodium chloride 0.9 % infusion (20 mL/hr Intravenous New Bag 1/7/19 2028)   HYDROmorphone (DILAUDID) injection 0.5 mg (0.5 mg Intravenous Given 1/7/19 1946)   ondansetron (ZOFRAN) injection 4 mg (4 mg Intravenous Given 1/7/19 1947)   HYDROmorphone (DILAUDID) injection 0.5 mg (0.5 mg Intravenous Given 1/7/19 2027)   fentaNYL citrate (PF) (SUBLIMAZE) injection 100 mcg (100 mcg Intravenous Given 1/7/19 2126)       Imaging results:  Xr Femur 2 View Right    Result Date: 1/7/2019  1. Comminuted, impacted, displaced distal femoral metaphyseal fracture that may exhibit intercondylar extension and this could be further evaluated with CT. 2. Right total hip arthroplasty without evidence for right hip fracture.        Xr Knee 3 View Right    Result Date: 1/7/2019  1. Comminuted, impacted, displaced distal femoral metaphyseal fracture that may exhibit intercondylar extension and this could be further evaluated with CT. 2. Right total hip arthroplasty without evidence for right hip fracture.        Ct Head Without Contrast    Result Date: 1/7/2019   1. No acute intracranial  hemorrhage. 2. Right parietal soft tissue hematoma.  Radiation dose reduction techniques were utilized, including automated exposure control and exposure modulation based on body size.  This report was finalized on 1/7/2019 9:16 PM by Dr. Irina Cole M.D.        Ambulatory status:   -bed rest    Social issues:   Social History     Socioeconomic History   • Marital status:      Spouse name: Not on file   • Number of children: Not on file   • Years of education: Not on file   • Highest education level: Not on file   Social Needs   • Financial resource strain: Not on file   • Food insecurity - worry: Not on file   • Food insecurity - inability: Not on file   • Transportation needs - medical: Not on file   • Transportation needs - non-medical: Not on file   Occupational History   • Not on file   Tobacco Use   • Smoking status: Former Smoker   • Smokeless tobacco: Never Used   • Tobacco comment: QUIT 35YRS AGO   Substance and Sexual Activity   • Alcohol use: No   • Drug use: No   • Sexual activity: Defer   Other Topics Concern   • Not on file   Social History Narrative   • Not on file          Sarita Richardson RN  01/07/19 0263

## 2019-01-08 NOTE — PROGRESS NOTES
Never paged regarding order for bipap placed by ER physician.  Spoke with Dr. Finney personally and he ordered to replace patient home unit, patient did not bring with him.  Patient is being admitted to floor, would pass along to therapist getting area that RN upstairs would need to get a pulmonary consult to get order for patient to use our bipap with settings.   MD understood.

## 2019-01-08 NOTE — ANESTHESIA PROCEDURE NOTES
Peripheral Block    Pre-sedation assessment completed: 1/8/2019 12:25 PM    Patient reassessed immediately prior to procedure    Patient location during procedure: holding area  Start time: 1/8/2019 12:25 PM  Stop time: 1/8/2019 12:38 PM  Reason for block: at surgeon's request and post-op pain management  Performed by  Anesthesiologist: Riki Nieves MD  Preanesthetic Checklist  Completed: patient identified, site marked, surgical consent, pre-op evaluation, timeout performed, IV checked, risks and benefits discussed and monitors and equipment checked  Prep:  Pt Position: supine  Sterile barriers:cap, gloves and mask  Prep: ChloraPrep  Patient monitoring: blood pressure monitoring, continuous pulse oximetry and EKG  Procedure  Sedation:yes  Performed under: local infiltration  Guidance:ultrasound guided  ULTRASOUND INTERPRETATION.  Using ultrasound guidance a 22 G (22G needle for placement of 3cc 2%lido to site prior to start of procedure.) gauge needle was placed in close proximity to the femoral nerve, at which point, under ultrasound guidance anesthetic was injected in the area of the nerve and spread of the anesthesia was seen on ultrasound in close proximity thereto.  There were no abnormalities seen on ultrasound; a digital image was taken; and the patient tolerated the procedure with no complications. Images:still images obtained    Laterality:right  Block Type:femoral  Injection Technique:single-shot  Needle Type:echogenic  Needle Gauge:22 G    Medications Used: ropivacaine (NAROPIN) 0.5 % injection, 30 mL  Post Assessment  Injection Assessment: negative aspiration for heme, no paresthesia on injection and incremental injection  Patient Tolerance:comfortable throughout block  Complications:no

## 2019-01-08 NOTE — PLAN OF CARE
Problem: Fall Risk (Adult)  Goal: Identify Related Risk Factors and Signs and Symptoms  Outcome: Outcome(s) achieved Date Met: 01/08/19    Goal: Absence of Fall  Outcome: Ongoing (interventions implemented as appropriate)      Problem: Patient Care Overview  Goal: Plan of Care Review  Outcome: Ongoing (interventions implemented as appropriate)   01/08/19 0803   Coping/Psychosocial   Plan of Care Reviewed With patient   Plan of Care Review   Progress no change   OTHER   Outcome Summary VSS on RA. IV dilaudid given for knee pain. 2nd unit of PRBC currently infusing. Possible surgery with Dr. Ray today. Will cont to monitor.      Goal: Individualization and Mutuality  Outcome: Ongoing (interventions implemented as appropriate)    Goal: Discharge Needs Assessment  Outcome: Ongoing (interventions implemented as appropriate)    Goal: Interprofessional Rounds/Family Conf  Outcome: Ongoing (interventions implemented as appropriate)      Problem: Skin Injury Risk (Adult)  Goal: Identify Related Risk Factors and Signs and Symptoms  Outcome: Outcome(s) achieved Date Met: 01/08/19    Goal: Skin Health and Integrity  Outcome: Ongoing (interventions implemented as appropriate)      Problem: Pain, Acute (Adult)  Goal: Identify Related Risk Factors and Signs and Symptoms  Outcome: Outcome(s) achieved Date Met: 01/08/19    Goal: Acceptable Pain Control/Comfort Level  Outcome: Ongoing (interventions implemented as appropriate)      Problem: Anemia (Adult)  Goal: Identify Related Risk Factors and Signs and Symptoms  Outcome: Outcome(s) achieved Date Met: 01/08/19    Goal: Symptom Improvement  Outcome: Ongoing (interventions implemented as appropriate)

## 2019-01-08 NOTE — CONSULTS
Orthopedic Consult      Patient: Ariel Elliott  YOB: 1940     Date of Admission: 1/7/2019  6:31 PM    Medical Record Number: [id number]    Attending Physician: Job Finney MD    Consulting Physician: Margot Ray MD    Reason for Consult: Right femur supracondylar fracture with intercondylar extension, with severe comminution    History of Present Illness: 78 y.o. male admitted to [facility] with Closed displaced supracondylar fracture of distal end of right femur without intracondylar extension, initial encounter (CMS/[facility]) [S72.451A].     The patient was evaluated in the emergency room and was diagnosed with a   fracture. 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   Secondary to the age and multiple medical co morbidities the patient was admitted to the hospitalist.   Patient is known to me from his right FREDRICK.   Dr Brown was on call for the emergency room, patients family requested for me,  I was consulted for further evaluation and treatment.   The patient was in the usual state of health and tripped over a rug in his library resulting in sudden onset right leg pain and inability to ambulate.   Denies any history of loss of consciousness, headache, vomiting, or seizures.   Denies any other injuries.   The patient is accompanied by  family members  to this hospital visit.     Patient is a home ambulator. Patient uses walker at home as an assistive device.   The patient  lives at home with relatively sedentary due to his medical comorbidities, but is somewhat independant in activities of daily living.  The patient denies history of dementia.    Patient denies any history of: DVT/PE, MRSA, COPD, Dementia or A-Fib.   The patient has history of : CHF, CAD, Diabetes mellitus, Chronic GI bleed, CKD, Von Willebrand disease  The patient is not on anticoagulants, and is unable to take any due to his chronic GI bleed and VWD.        Past medical  history, Past surgical history, family history, Social history, current medications, home medications Have been reviewed by me.    Past Medical History:   Diagnosis Date   • Anemia    • Anemia     2 UNITS OF BLOOD SAT 11/10. [facility]   • Aortic stenosis    • Arrhythmia    • Atrial flutter (CMS/HCC)    • Congestive heart failure (CMS/HCC)    • Coronary atherosclerosis    • Diabetes mellitus (CMS/HCC)     TYPE 2   • GI (gastrointestinal bleed)    • Gout    • H/O seasonal allergies    • Hard to intubate     SEVERAL TIMES IN PAST. NO ISSUES RECENT HISTORY   • Herpes     HANDS IN THE PAST   • Heyd syndrome (CMS/HCC)    • History of diverticulitis    • Hyperlipidemia    • Pacemaker     LEFT.   • Post-nasal drip    • Sleep apnea     Bi-Pap   • Von Willebrand disease (CMS/HCC)      Past Surgical History:   Procedure Laterality Date   • ABLATION OF DYSRHYTHMIC FOCUS     • AORTIC VALVE REPAIR/REPLACEMENT N/A 11/13/2018    Procedure: INTRAOPERATIVE LANG, PERCUTANEOUS TRANSFEMORAL TRANSCATHETER AORTIC VALVE REPLACEMENT PERCUTANEOUS APPROACH;  Surgeon: Elizabeth Meade MD;  Location: [facility] OR 18/19;  Service: Cardiothoracic   • CARDIAC CATHETERIZATION     • CARDIAC CATHETERIZATION N/A 11/1/2018    Procedure: Right and Left Heart Cath;  Surgeon: Dionicio Stoner MD;  Location: [facility] CATH INVASIVE LOCATION;  Service: Cardiology   • CARDIAC CATHETERIZATION  11/1/2018    Procedure: Saphenous Vein Graft;  Surgeon: Dionicio Stoner MD;  Location: [facility] CATH INVASIVE LOCATION;  Service: Cardiology   • CARDIAC ELECTROPHYSIOLOGY PROCEDURE Left 11/5/2018    Procedure: Pacemaker DC new   [place];  Surgeon: Mina Castillo MD;  Location: [facility] CATH INVASIVE LOCATION;  Service: Cardiology   • CATARACT EXTRACTION WITH INTRAOCULAR LENS IMPLANT      LEFT AND RIGHT   • CHOLECYSTECTOMY     • COLONOSCOPY     • CORONARY ANGIOPLASTY      WITH PTCA   • CORONARY ARTERY BYPASS GRAFT      5 VESSEL   •  "HAND SURGERY      TRIGGER FINGER RELEASE, TENDON RELEASE   • HEMORRHOIDECTOMY     • HERNIA REPAIR      VENTRAL HERNIA REPEATEDLY   • PACEMAKER IMPLANTATION  11/05/2018   • PORTACATH PLACEMENT Right     POWER PORT   • SINUS SURGERY     • TOTAL HIP ARTHROPLASTY Right      Social History     Occupational History   • Not on file   Tobacco Use   • Smoking status: Former Smoker   • Smokeless tobacco: Never Used   • Tobacco comment: QUIT 35YRS AGO   Substance and Sexual Activity   • Alcohol use: No   • Drug use: No   • Sexual activity: Defer    Social History     Social History Narrative   • Not on file     Family History   Problem Relation Age of Onset   • Diabetes Mother    • Cancer Mother           Allergies   Allergen Reactions   • Statins Myalgia        Home Medications:    (Not in a hospital admission)    Current Medications:  Scheduled Meds:   Continuous Infusions:  Sodium chloride 20 mL/hr Last Rate: 20 mL/hr (01/07/19 2028)     PRN Meds:.•  [COMPLETED] Insert peripheral IV **AND** sodium chloride    Review of Systems:   A 12 point system review was reviewed with the patient and from the chart  and is negative except as mentioned in history of present illness.      Physical Exam: 78 y.o. male                    Vitals:    01/07/19 1800 01/07/19 1948 01/07/19 2028   BP: 103/47 131/64 141/69   Pulse: 63 60 59   Resp: 18  17   Temp: 97.3 °F (36.3 °C)     SpO2: 97% 99% 100%   Weight: 119 kg (263 lb)     Height: 177.8 cm (70\")          Gait: Not evaluated.     Mental/HEENT/cardio/skin: The patient's general appearance was well-nourished, well-hydrated, no acute distress.  Orientation was alert and oriented ×3.  The patient's mood was normal.   Pulmonary exam shows normal late exchange, no labored breathing, or shortness of breath.    The  skin exam showed normal temperature and color in the area of examination.    Extremities: Right leg positive deformity, positive shortening, swelling, abnormal mobility, attempted " movements right leg painful and restricted. Good ankle and toe movements.   Gross sensation intact over toes.     Pulses:  Pulses palpable and equal bilaterally    Diagnostic Tests:    Results from last 7 days   Lab Units  01/07/19 1947   WBC 10*3/mm3  5.55   HEMOGLOBIN g/dL  6.7*   HEMATOCRIT %  21.2*   PLATELETS 10*3/mm3  139*     Results from last 7 days   Lab Units  01/07/19 1947   SODIUM mmol/L  142   POTASSIUM mmol/L  4.4   CHLORIDE mmol/L  105   CO2 mmol/L  22.7   BUN mg/dL  58*   CREATININE mg/dL  1.42*   GLUCOSE mg/dL  178*   CALCIUM mg/dL  8.6         Lab Results   Component Value Date    URICACID 12.1 (H) 03/10/2014     No results found for: CRYSTAL  Microbiology Results (last 10 days)     ** No results found for the last 240 hours. **        Ct Head Without Contrast    Result Date: 1/7/2019   1. No acute intracranial hemorrhage. 2. Right parietal soft tissue hematoma.  Radiation dose reduction techniques were utilized, including automated exposure control and exposure modulation based on body size.  This report was finalized on 1/7/2019 9:16 PM by Dr. Irina Cole M.D.        The labs, X-ray results for preoperative evaluation have been reviewed by me.    Assessment: Right femur supracondylar fracture with intercondylar extension, with severe comminution      Patient Active Problem List   Diagnosis   • Nonrheumatic aortic valve stenosis   • S/P CABG (coronary artery bypass graft)   • Class 2 severe obesity due to excess calories with serious comorbidity and body mass index (BMI) of 39.0 to 39.9 in adult (CMS/[facility])   • Left bundle branch block (LBBB)   • Type 2 diabetes mellitus with complication, with long-term current use of insulin (CMS/[facility])   • Dyspnea on exertion   • Angina pectoris (CMS/[facility])   • Syncope   • AVM (arteriovenous malformation) of small bowel, acquired with hemorrhage (CMS/[facility])   • Aortic valve stenosis   • Atrial flutter (CMS/[facility])   • Diabetes mellitus (CMS/[facility])   • Von  Willebrand disease (CMS/[facility])   • Sleep apnea   • Pacemaker   • Coronary atherosclerosis   • CKD (chronic kidney disease)   • S/P TAVR (transcatheter aortic valve replacement)   • Closed displaced supracondylar fracture without intracondylar extension of lower end of right femur (CMS/HCC)       Plan:    Hgb was 6.7. He is currently receiving his 3rd unit of pRBC. Will check stat CBC now to evaluate for adequate bump in Hgb.   Options and alternatives were discussed in detail with the patient and   family.   The patient is indicated for a distal femur replacement.     The patient voiced understanding of the risks, benefits, and alternative forms of treatment that were discussed including but not limited to  Infection, DVT, pulmonary embolism, fat embolism, malunion, nonunion, Leg length discrepancy, medical risks including stroke, heart attack have been discussed. Despite the risks involved the patient and   family consent to proceed with distal femur replacement.     The patient is scheduled for the above surgery tentatively for Jan 7, 2019.    The patient's admitting service has seen the patient and the patient has been  cleared to the operating room.   His cardiologist is Dr Stoner and is OK with proceeding to the OR    Will plan for mechanical DVT prophylaxis post op due to underlying medical comorbidities.       Date: 1/7/2019    Margot Ray MD     CC: Zeb Meadows MD; MD Reg Gilbert Joseph David, MD

## 2019-01-08 NOTE — OP NOTE
ORTHOPAEDIC OPERATIVE NOTE    Patient: Ariel Elliott       YOB: 1940    Medical Record Number: [id number]    Attending Physician: Jerson Higginbotham MD    Primary Care Physician: Zeb Meadows MD    Date of Service: 1/8/2019    Surgeon: Margot Ray MD        DATE OF PROCEDURE: 1/8/2019    PREOPERATIVE DIAGNOSIS: Comminuted supracondylar RIGHT femur fracture with intercondylar extension and displacement.  Severe osteopenia    POSTOPERATIVE DIAGNOSIS: Comminuted supracondylar RIGHT femur fracture with intercondylar extension and displacement.  Severe osteopenia    PROCEDURE PERFORMED: RIGHT DISTAL FEMUR REPLACEMENT    TIBIA  MBT revision tray size 3 -  tibial tray - rotating platform,   MBT Tibia metaphyseal sleeve- 37 mm,  Universal stem  Pressfit fluted 75 mm × 14 mm for tibia,   FEMUR  Distal femur replacement Xsmall  Cemented stem  125 mm × 15 mm for the femur,      Sigma tibial insert- rotating platform stabilized- size 3- 21 mm thickness .     Implant Name Type Inv. Item Serial No.  Lot No. LRB No. Used   CMT BONE SIMPLEX/P FULL DOSE 10/PK - [id number] Implant CMT BONE SIMPLEX/P FULL DOSE 10/PK  [employer] [id number] Right 3   CMT BONE SIMPLEX/P FULL DOSE 10/PK - [id number] Implant CMT BONE SIMPLEX/P FULL DOSE 10/PK  [employer] [id number] Right 1   STEM FEM FLUT UNIV [id number] - [id number] Implant STEM FEM FLUT UNIV [id number]  DEPUY [id number] Right 1   TRY TIB REV MBT CMT SZ3 - [id number] Implant TRY TIB REV MBT CMT SZ3  DEPUY [id number] Right 1   SLV TRY TIB MBT REV POR 37MM - [id number] Implant SLV TRY TIB MBT REV POR 37MM  DEPUY [id number] Right 1   COMP FEM LPS DIST XSM RT - [id number] Implant COMP FEM LPS DIST XSM RT  DEPUY [id number] Right 1   STEM STRAIGHT 15MM X 125 MM CEMENTED Implant    [id number] Right 1   INSRT TIB BEAR HNGD LPS UNIV 21MM XS - [id number] Implant INSRT TIB BEAR HNGD LPS UNIV 21MM XS  DEPUY [id number] Right 1       SURGEON:  Margot Ray MD     ASSISTANT: Ariel Machado MD, Fellow    JOSÉ MIGUEL Alvarez     The services of a skilled  first assist were necessary for performing the procedure safely and expeditiously.  The first assist was present for the entire duration of the case and helped with positioning, retraction and closure of the incision.      ANESTHESIA: General anaesthesia with a regional block  and intraoperative periarticular  Exparel injection.    ESTIMATED BLOOD LOSS: 200 mL    SPECIMENS: Nil      COMPLICATIONS: Nil.     DRAINS: A 10 Macedonian Hemovac drain.     INDICATIONS: The patient is a 78 y.o. male  who Is known to me from his  Hip Replacement.  He has been losing his mobility and has been having chronic anemia due to GI bleed.  Patient presented to [facility] emergency room with a history of fall.  Evaluation confirmed presence of a comminuted supracondylar femur fracture with displacement with intercondylar extension.  Again, there was severe comminution and displacement.  Options were discussed including open reduction internal fixation and distal femur replacement.  Secondary to his decreased mobility.  He was a good candidate for a distal femoral replacement.    Treatment options and alternatives were discussed in detail with the patient who is indicated for a revision total knee arthroplasty.     Likely risks and benefits of the procedure, including but not limited to infection, DVT, pulmonary embolism, future loosening of the implants, possibility of injury to tendons, ligaments, nerves or vessels and periprosthetic fractures, loss of extensor mechanism, stiffness, future above-the-knee amputation have been discussed in detail. Despite the risks involved, the patient elected to proceed and informed consent was obtained and the patient was scheduled for surgery. The patient was seen in the preoperative holding area and the operative site was marked.       DESCRIPTION OF PROCEDURE:   The patient  was transferred to [facility] operating room.     Preoperative antibiotics in the form of  Vancomycin and  Kefzol  intravenously was infused prior to the incision and prior to the tourniquet placement according to the SCIP protocol.     A surgical time out was done with the team and the correct patient, surgical side and site were identified.     After achieving adequate general anesthesia, a well-padded tourniquet was placed over the proximal aspect of the operative thigh. The operative leg was prepped and draped in the usual sterile fashion. Tourniquet was elevated to a pressure of 250 mm Hg.     Trannexamic acid was given intravenously prior to incision.      A skin incision was made vertically oriented centering over the patella anteriorly for a midline incision. Skin and subcutaneous tissue were incised, full thickness flaps were raised and a medial parapatellar approach was developed. There was a no effusion. The patella was subluxed and the knee was inspected.    The distal fibular fracture site was exposed and the fracture was inspected.  Again, there was severe intra-articular extension with metaphyseal comminution and collapse.  It was planned to proceed with distal femur replacement.  The distal femur fragments were excised.  A flat cut was made on the distal femur. The approximate height of the removed fragments was measured and it was found to be about 90 mm.  The distal femur segment was 80 mm.  The medullary canal of the femur was sized and the eye was able to place a 15 stem without any difficulty.  A trial femur was assembled.    Attention was then directed to the proximal tibia.  Intramedullary reamer was utilized progressively.  A  stem was utilized with a conical proximal reamer.  Progressive broaching was done to achieve adequate metaphyseal stability.  A 10 mm cut on the high side  was done with an oscillating saw along the proximal end of the tibia.  The proximal tibial cut  was found to be satisfactory.     Proximal tibia was sized and a trial reduction was performed. Reduction was found to be satisfactory with range of motion from 0° to 120° with the patella tracking well.   There was severe osteopenia.  I decided not to resurface the patella.  The patella had some cartilage.    Having been satisfied with the trials, the bony surfaces irrigated with saline.  I have elected to proceed with press-fit component for the tibia with the metaphyseal sleeve. Exparel was infiltrated into the posterior capsule medially and laterally and into periarticular tissue and subcutaneously. Followed by this, the tibial component was seated into position followed by the femoral component, followed by seating of the 21 mm thick trial liner.  The metaphyseal sleeve was first placed to obtain the orientation of broaching followed by temporarily fixing the tibial tray to the sleeve in situ to maintain tibial rotation.  Followed by this the tibial component and was removed and impacted on the back table.  The femoral component was assembled based upon the trial implant for orientation and the sleeve and femur were impacted on the back table.  I used Simplex cement for obtaining some support of the component to the bone .  The cement was allowed to set excess cement was removed.  The trial liner was replaced with a 21 mm rotating platform posteriorly stabilized liner.     Again, range of motion was checked and the range was satisfactory from 0° to 120° with excellent stability throughout the range of motion. Good medial and lateral tissue tension, and soft tissue balancing. The patella was tracking well. Having been satisfied with this, the joint was thoroughly irrigated with saline. Soft tissue hemostasis was secured. A 10-Eth[place]ian Hemovac drain was placed.      The sponge and needle count was found to be correct.  Arthrotomy was closed with Ethibond sutures followed by closure of the incision in layers with  Vicryl sutures and staples. Sterile dressings were placed and the patient was transferred to the recovery room in stable condition. The patient was given a knee immobilizer. The patient tolerated the procedure well and is being admitted for postoperative antibiotics according to the SCIP protocol for 2 more doses /  until I see culture result.     DVT Prophylaxis -  Mechanical - TEDS and venous foot plexi pulses and early mobilization. Avoid chemical anticoagulation due to GI bleed.     The patient will be mobilized in am with physical therapy. WBAT and no restrictions for ROM.     I discussed the satisfactory performance of the procedure with the patient's family and discussed with them The postoperative management.      Margot Ray M.D.    1/8/2019    CC: Zeb Meadows MD; MD Anahy Gilbert David Ryan, MD

## 2019-01-08 NOTE — CONSULTS
Date of Hospital Visit: 19  Encounter Provider: Dionicio Stoner MD  Place of Service: [facility] CARDIOLOGY  Patient Name: Ariel Elliott  :1940  [id number]  Referral Provider: Tre Becerril MD    Chief complaint: right knee pain    Consulted for: cardiac surgical clearance    History of Present Illness:Mr. Elliott is a 79 y/o with a history of chronic GIB due to Von Willebrand disease, CAD s/p CABG x5, TAVR 18, LBBB, diabetes, syncope, atrial flutter post ablation, BS PPM 18 and CKD. He presented to the ED yesterday with c/o right knee pain after a mechanical fall. VItals were stable on arrival. Labs showed a bun/creat of 58/1.42, hgb 6.7, plts 139. CT head showed a right parietal soft tissue hematoma. Femur XR confirmed a fracture. Orthopedics have seen and surgery is planned for today. We have been asked to see for cardiac clearance. PPM was interrogated on 18 and showed no abnormal episodes with AV pacing at 60bpm. Most recent echo showed an EF of 54%, grade II diastolic dysfunction and AV peak and mean gradient wnl. I last saw him in office on 18 and at that time he was feeling great.    I reviewed the above history of present illness and agree with it.  He has been doing really well since his TAVR.  He tripped and fell on the rug and that's how he shattered his leg.  He has not had as much GI bleeding since we did this.  He has not had a heart failure symptoms he's not had unstable angina he has freshly implanted pacemaker so his rhythm is been stable      Cardiac Testing:  PPM Interrogation 18    Echo 18  · Left ventricular wall thickness is consistent with mild concentric hypertrophy.  · Estimated EF = 54%.  · Left ventricular systolic function is normal.  · Left ventricular diastolic dysfunction (grade II) consistent with pseudonormalization.  · Left atrial cavity size is mildly dilated.  · There is a 26 mm TAVR  valve present. The aortic valve peak and mean gradients are within defined limits.  · There is no aortic regurgitation.   Cardiac Catheterization 18  FINDINGS:  RIGHT HEART CATHETERIZATION:  Pressures:  Right Atrial:                     9  Right Ventricle   :            38/8  Pulmonary Artery:           38/17 mean 24  PCWP:                            21  Cardiac output                5.85  Cardiac index                 2.49     LEFT HEART CATHETERIZATION:  LEFT VENTRICULOGRAPHY: We did not cross the aortic valve     CORONARY ANGIOGRAPHY:  Left main: 50% distal  Left anterior descendin% proximal  Ramus intermedius:Not present  Circumflex 100% proximal  RCA: Is a dominant vessel.  100% occluded distally diffusely diseased in the proximal and midportion 80%     LIMA to the LAD the LIMA is widely patent and looks good there is some 40% distal disease  SVG to the OM1 difficult to engage graft it's a large patulous diffusely degenerated there is a 90% lesion in the proximal portion of it  SVG to the diagonal this graft is widely patent and normal  SVG to the PDA this bypass is normal retrofills some to the WILLIAM the PDA is normal       Past Medical History:   Diagnosis Date   • Anemia    • Anemia     2 UNITS OF BLOOD SAT 11/10. [facility] FOR SURGERY   • Aortic stenosis    • Arrhythmia    • Atrial flutter (CMS/HCC)    • Congestive heart failure (CMS/HCC)    • Coronary atherosclerosis    • Diabetes mellitus (CMS/HCC)     TYPE 2   • GI (gastrointestinal bleed)    • Gout    • H/O seasonal allergies    • Hard to intubate     SEVERAL TIMES IN PAST. NO ISSUES RECENT HISTORY   • Herpes     HANDS IN THE PAST   • Heyd syndrome (CMS/HCC)    • History of diverticulitis    • Hyperlipidemia    • Pacemaker     LEFT.   • Post-nasal drip    • Sleep apnea     Bi-Pap   • Von Willebrand disease (CMS/HCC)        Past Surgical History:   Procedure Laterality Date   • ABLATION OF DYSRHYTHMIC FOCUS     • AORTIC VALVE  REPAIR/REPLACEMENT N/A 11/13/2018    Procedure: INTRAOPERATIVE LANG, PERCUTANEOUS TRANSFEMORAL TRANSCATHETER AORTIC VALVE REPLACEMENT PERCUTANEOUS APPROACH;  Surgeon: Elizabeth Meade MD;  Location: [facility] OR 18/19;  Service: Cardiothoracic   • CARDIAC CATHETERIZATION     • CARDIAC CATHETERIZATION N/A 11/1/2018    Procedure: Right and Left Heart Cath;  Surgeon: Dionicio Stoner MD;  Location: [facility] CATH INVASIVE LOCATION;  Service: Cardiology   • CARDIAC CATHETERIZATION  11/1/2018    Procedure: Saphenous Vein Graft;  Surgeon: Dionicio Stoner MD;  Location: [facility] CATH INVASIVE LOCATION;  Service: Cardiology   • CARDIAC ELECTROPHYSIOLOGY PROCEDURE Left 11/5/2018    Procedure: Pacemaker DC new   [place];  Surgeon: Mina Castillo MD;  Location: [facility] CATH INVASIVE LOCATION;  Service: Cardiology   • CATARACT EXTRACTION WITH INTRAOCULAR LENS IMPLANT      LEFT AND RIGHT   • CHOLECYSTECTOMY     • COLONOSCOPY     • CORONARY ANGIOPLASTY      WITH PTCA   • CORONARY ARTERY BYPASS GRAFT      5 VESSEL   • HAND SURGERY      TRIGGER FINGER RELEASE, TENDON RELEASE   • HEMORRHOIDECTOMY     • HERNIA REPAIR      VENTRAL HERNIA REPEATEDLY   • PACEMAKER IMPLANTATION  11/05/2018   • PORTACATH PLACEMENT Right     POWER PORT   • SINUS SURGERY     • TOTAL HIP ARTHROPLASTY Right        Medications Prior to Admission   Medication Sig Dispense Refill Last Dose   • allopurinol (ZYLOPRIM) 300 MG tablet Take 300 mg by mouth Every Night.   Taking   • aspirin 81 MG EC tablet Take 1 tablet by mouth Daily.   Taking   • cholecalciferol (VITAMIN D3) 1000 units tablet Take 1,000 Units by mouth Daily.   Taking   • furosemide (LASIX) 40 MG tablet Take 40 mg by mouth 2 (Two) Times a Day.   Taking   • gemfibrozil (LOPID) 600 MG tablet Take 600 mg by mouth 2 (Two) Times a Day Before Meals.   Taking   • insulin aspart (novoLOG) 100 UNIT/ML injection Inject  under the skin into the appropriate area as directed 3 (Three) Times a Day Before  Meals. 15-20UNITS   Taking   • insulin detemir (LEVEMIR) 100 UNIT/ML injection Inject 30 Units under the skin into the appropriate area as directed 2 (Two) Times a Day. WITH BLOOD SUGAR GREATER THAN 130   Taking   • isosorbide dinitrate (ISORDIL) 40 MG tablet Take 40 mg by mouth 2 (Two) Times a Day.   Taking   • loperamide (IMODIUM) 2 MG capsule Take 2 mg by mouth As Needed for Diarrhea.   Taking   • octreotide (sandoSTATIN) 10 MG injection Inject 10 mg into the appropriate muscle as directed by prescriber Every 28 (Twenty-Eight) Days.   Taking   • omeprazole (priLOSEC) 40 MG capsule Take 40 mg by mouth 2 (Two) Times a Day.   Taking   • polyethylene glycol (MIRALAX) packet Take 17 g by mouth Daily.   Taking   • Psyllium (METAMUCIL FIBER PO) Take 1 tablet by mouth Daily.   Taking       Current Meds  Scheduled Meds:    [MAR Hold] allopurinol 300 mg Oral Nightly   [MAR Hold] aspirin 81 mg Oral Daily   [MAR Hold] cholecalciferol 1,000 Units Oral Daily   [MAR Hold] furosemide 40 mg Oral Daily   [MAR Hold] insulin glargine 15 Units Subcutaneous Q12H   [MAR Hold] insulin lispro 0-9 Units Subcutaneous 4x Daily With Meals & Nightly   [MAR Hold] insulin lispro 5 Units Subcutaneous TID With Meals   [MAR Hold] isosorbide dinitrate 40 mg Oral BID   [MAR Hold] pantoprazole 40 mg Oral QAM   [MAR Hold] polyethylene glycol 17 g Oral Daily   [MAR Hold] sodium chloride 10 mL Intravenous Q12H   [MAR Hold] sodium chloride 3 mL Intravenous Q12H   [MAR Hold] wheat dextrin 1 each Oral Daily     Continuous Infusions:    lactated ringers 9 mL/hr Last Rate: 9 mL/hr (01/08/19 1249)   Sodium chloride 20 mL/hr Last Rate: 20 mL/hr (01/07/19 2028)   Sodium chloride 100 mL/hr Last Rate: Stopped (01/08/19 0221)     PRN Meds:.•  acetaminophen **OR** acetaminophen  •  [MAR Hold] acetaminophen  •  [MAR Hold] bisacodyl  •  [MAR Hold] bisacodyl  •  [MAR Hold] dextrose  •  [MAR Hold] dextrose  •  diphenhydrAMINE  •  diphenhydrAMINE  •  ePHEDrine  •   fentanyl  •  flumazenil  •  [MAR Hold] glucagon (human recombinant)  •  [MAR Hold] heparin flush (porcine)  •  hydrALAZINE  •  HYDROcodone-acetaminophen  •  HYDROmorphone  •  [MAR Hold] HYDROmorphone  •  labetalol  •  naloxone  •  [MAR Hold] ondansetron **OR** [MAR Hold] ondansetron ODT **OR** [MAR Hold] ondansetron  •  ondansetron  •  oxyCODONE-acetaminophen  •  promethazine **OR** promethazine **OR** promethazine **OR** promethazine  •  [COMPLETED] Insert peripheral IV **AND** [MAR Hold] sodium chloride  •  Access VAD **AND** [MAR Hold] sodium chloride  •  [MAR Hold] sodium chloride  •  [MAR Hold] sodium chloride  •  [MAR Hold] sodium chloride    Allergies as of 01/07/2019 - Reviewed 01/07/2019   Allergen Reaction Noted   • Statins Myalgia 03/16/2016       Social History     Socioeconomic History   • Marital status:      Spouse name: Not on file   • Number of children: Not on file   • Years of education: Not on file   • Highest education level: Not on file   Social Needs   • Financial resource strain: Not on file   • Food insecurity - worry: Not on file   • Food insecurity - inability: Not on file   • Transportation needs - medical: Not on file   • Transportation needs - non-medical: Not on file   Occupational History   • Not on file   Tobacco Use   • Smoking status: Former Smoker   • Smokeless tobacco: Never Used   • Tobacco comment: QUIT 35YRS AGO   Substance and Sexual Activity   • Alcohol use: No   • Drug use: No   • Sexual activity: Defer   Other Topics Concern   • Not on file   Social History Narrative   • Not on file       Family History   Problem Relation Age of Onset   • Diabetes Mother    • Cancer Mother        REVIEW OF SYSTEMS:   ROS was performed and is negative except as outlined in HPI     REVIEW OF SYSTEMS:   CONSTITUTIONAL: No weight loss, fever, chills, weakness or fatigue.   HEENT: Eyes: No visual loss, blurred vision, double vision or yellow sclerae. Ears, Nose, Throat: No hearing loss,  "sneezing, congestion, runny nose or sore throat.   SKIN: No rash or itching.     RESPIRATORY: No shortness of breath, hemoptysis, cough or sputum.   GASTROINTESTINAL: No anorexia, nausea, vomiting or diarrhea. No abdominal pain, bright red blood per rectum or melena.  GENITOURINARY: No burning on urination, hematuria or increased frequency.  NEUROLOGICAL: No headache, dizziness, syncope, paralysis, ataxia, numbness or tingling in the extremities. No change in bowel or bladder control.   MUSCULOSKELETAL: No muscle, back pain, joint pain or stiffness.   HEMATOLOGIC: No anemia, bleeding or bruising.   LYMPHATICS: No enlarged nodes. No history of splenectomy.   PSYCHIATRIC: No history of depression, anxiety, hallucinations.   ENDOCRINOLOGIC: No reports of sweating, cold or heat intolerance. No polyuria or polydipsia.        Objective:   Temp:  [97.3 °F (36.3 °C)-98.9 °F (37.2 °C)] 97.9 °F (36.6 °C)  Heart Rate:  [59-99] 99  Resp:  [12-20] 12  BP: (103-192)/(43-95) 192/95  Body mass index is 38.81 kg/m².  Flowsheet Rows      First Filed Value   Admission Height  177.8 cm (70\") Documented at 01/07/2019 1800   Admission Weight  119 kg (263 lb) Documented at 01/07/2019 1800        Vitals:    01/08/19 1609   BP: (!) 192/95   Pulse: 99   Resp:    Temp:    SpO2:        Head:    Normocephalic, without obvious abnormality, atraumatic   Eyes:            Lids and lashes normal, conjunctivae and sclerae normal, no   icterus, no pallor   Ears:    Ears appear intact with no abnormalities noted   Throat:   No oral lesions, dentition good   Neck:   No adenopathy, supple, trachea midline, no thyromegaly, no   carotid bruit, no JVD   Lungs:     Breath sounds are equal and clear to auscultation    Heart:    Normal S1 and S2, RRR, 1/6 systolic ejection M/G/R   Abdomen:     Normal bowel sounds, no masses, no organomegaly, soft        non-tender, non-distended, no guarding   Extremities:   Moves all extremities well, no edema, no cyanosis, " no redness   Pulses:   Pulses palpable and equal bilaterally.    Skin:  Psychiatric:   No bleeding, diffuse bruising or rash    Awake, alert and oriented x 3, normal mood and affect                 I personally viewed and interpreted the patient's EKG/Telemetry data    Assessment:  Active Hospital Problems    Diagnosis Date Noted   • **Closed displaced supracondylar fracture without intracondylar extension of lower end of right femur (CMS/[facility]) [S72.451A] 01/07/2019   • S/P TAVR (transcatheter aortic valve replacement) [Z95.2] 12/21/2018   • Von Willebrand disease (CMS/[facility]) [D68.0] 11/13/2018   • CKD (chronic kidney disease) [N18.9] 11/13/2018   • Nonrheumatic aortic valve stenosis [I35.0] 10/25/2018   • S/P CABG (coronary artery bypass graft) [Z95.1] 10/25/2018   • Type 2 diabetes mellitus with complication, with long-term current use of insulin (CMS/[facility]) [E11.8, Z79.4] 10/25/2018   • AVM (arteriovenous malformation) of small bowel, acquired with hemorrhage (CMS/[facility]) [K55.8] 10/25/2018      Resolved Hospital Problems   No resolved problems to display.       Plan:This is a bad turn of events for this gentleman.  However, thankfully, his TAVR is already in place and doing great and his rhythm is secured and I think he is at an acceptable risk for his surgery.  We will of course follow along with him.  He still is at risk for heart failure.               Dionicio Stoner MD  01/08/19  4:22 PM.

## 2019-01-08 NOTE — ANESTHESIA POSTPROCEDURE EVALUATION
Patient: Ariel Elliott    Procedure Summary     Date:  01/08/19 Room / Location:  [facility] OR [address] [place] [address] MAIN OR    Anesthesia Start:  1303 Anesthesia Stop:  1601    Procedure:  RIGHT DISTAL FEMUR REPLACEMENT (Right Knee) Diagnosis:       Closed displaced supracondylar fracture of distal end of right femur without intracondylar extension, initial encounter (CMS/[facility])      (Closed displaced supracondylar fracture of distal end of right femur without intracondylar extension, initial encounter (CMS/[facility]) [S72.451A])    Surgeon:  Margot Ray MD Provider:  Riki Nieves MD    Anesthesia Type:  general ASA Status:  4          Anesthesia Type: general  Last vitals  BP   154/73 (01/08/19 1729)   Temp   37 °C (98.6 °F) (01/08/19 1556)   Pulse   79 (01/08/19 1729)   Resp   20 (01/08/19 1729)     SpO2   97 % (01/08/19 1729)     Post Anesthesia Care and Evaluation    Patient location during evaluation: bedside  Patient participation: complete - patient participated  Level of consciousness: awake and alert  Pain management: adequate  Airway patency: patent  Anesthetic complications: No anesthetic complications  PONV Status: controlled  Cardiovascular status: acceptable  Respiratory status: acceptable  Hydration status: acceptable

## 2019-01-08 NOTE — ANESTHESIA PREPROCEDURE EVALUATION
Anesthesia Evaluation     Patient summary reviewed and Nursing notes reviewed   history of anesthetic complications: difficult airway  NPO Solid Status: > 8 hours  NPO Liquid Status: > 2 hours           Airway   Mallampati: III  TM distance: >3 FB  Neck ROM: full  Difficult intubation highly probable and Large neck circumference  Dental - normal exam     Pulmonary - normal exam   (+) a smoker Former, shortness of breath, sleep apnea on CPAP,   (-) COPD, asthma, lung cancer  Cardiovascular - normal exam  Exercise tolerance: poor (<4 METS)    ECG reviewed  Rhythm: regular  Rate: normal    (+) pacemaker pacemaker interrogated unknown, valvular problems/murmurs AS, CAD, CABG >6 Months, dysrhythmias Atrial Flutter, angina with exertion, CHF, hyperlipidemia,     ROS comment: S/p TAVR 12/2018, 5v CABG approximately 15yrs ago, s/p cardiac ablation with Mandrola approximately 8-10yrs ago.    ECHO 12/2018:  · Left ventricular wall thickness is consistent with mild concentric hypertrophy.  · Estimated EF = 54%.  · Left ventricular systolic function is normal.  · Left ventricular diastolic dysfunction (grade II) consistent with pseudonormalization.  · Left atrial cavity size is mildly dilated.  · There is a 26 mm TAVR valve present. The aortic valve peak and mean gradients are within defined limits.  · There is no aortic regurgitation.    Neuro/Psych  (+) syncope,     (-) seizures, TIA, CVA  GI/Hepatic/Renal/Endo    (+) morbid obesity, GERD well controlled, GI bleeding, liver disease, diabetes mellitus type 2 well controlled using insulin,   (-) no renal disease, hypothyroidism    Musculoskeletal (-) negative ROS    Abdominal  - normal exam   Substance History - negative use     OB/GYN          Other   (+) blood dyscrasia       Other Comment: Von Willebrand disease, anemia s/p 3u prbc's today.                Anesthesia Plan    ASA 4     general   (GA w/ blocks for POPC. T&C for 2u PRBC's)  intravenous induction   Anesthetic  plan, all risks, benefits, and alternatives have been provided, discussed and informed consent has been obtained with: patient and child.    Plan discussed with CRNA and attending.

## 2019-01-08 NOTE — ED NOTES
Pt educated on needing blood transfusion by MD, pt verbalized understanding. Informed blood consent signed by pt at this time.     Sarita Richardson, RN  01/07/19 2208       Sarita Richardson RN  01/07/19 3912

## 2019-01-08 NOTE — ANESTHESIA PROCEDURE NOTES
Peripheral Block    Pre-sedation assessment completed: 1/8/2019 1:15 PM    Patient reassessed immediately prior to procedure    Patient location during procedure: OR  Start time: 1/8/2019 1:15 PM  Stop time: 1/8/2019 1:20 PM  Reason for block: at surgeon's request and post-op pain management  Performed by  Anesthesiologist: Lyle Kim MD  Preanesthetic Checklist  Completed: patient identified, site marked, surgical consent, pre-op evaluation, timeout performed, IV checked, risks and benefits discussed and monitors and equipment checked  Prep:  Sterile barriers:cap, gloves, gown, mask and sterile barriers  Prep: ChloraPrep  Patient monitoring: blood pressure monitoring, continuous pulse oximetry and EKG  Procedure  Sedation:yes    Guidance:ultrasound guided  ULTRASOUND INTERPRETATION.  Using ultrasound guidance a 21 G gauge needle was placed in close proximity to the sciatic nerve, at which point, under ultrasound guidance anesthetic was injected in the area of the nerve and spread of the anesthesia was seen on ultrasound in close proximity thereto.  There were no abnormalities seen on ultrasound; a digital image was taken; and the patient tolerated the procedure with no complications. Images:still images obtained    Laterality:right  Block Type:sciatic  Injection Technique:single-shot  Needle Type:echogenic  Needle Gauge:21 G    Medications Used: mepivacaine PF (CARBOCAINE) 2 % injection, 10 mL  ropivacaine (NAROPIN) 0.5 % injection, 20 mL  Post Assessment  Injection Assessment: negative aspiration for heme, no paresthesia on injection and incremental injection  Patient Tolerance:comfortable throughout block  Complications:no

## 2019-01-08 NOTE — ED PROVIDER NOTES
EMERGENCY DEPARTMENT ENCOUNTER    Room Number:  21/21  Date seen:  1/7/2019  Time seen: 7:07 PM  PCP: Zeb Meadows MD  Historian: patient      HPI:  Chief Complaint: right knee pain  Context: Ariel Elliott is a 78 y.o. male who presents to the ED via EMS c/o right knee pain that occurred after a mechanical fall PTA. Pt states his right knee is also swollen. Pt states he fell forward and when trying to catch himself he hit his head on the bookshelf. Pt denies LOC, HA, lightheadedness, syncopal episode, and N/V. Pt states he took his last ASA (81mg) 3 days ago. Pt states he has a chronic GI bleed and is receiving blood transfusions. Family at bedside states that she is unsure if pt ambulated after the incident because she was not there at that the time.             Pain Location: right knee  Quality: pain  Intensity/Severity: moderate  Duration: PTA  Onset quality: gradual  Timing: constant  Progression: unchanged  Treatment before arrival: EMS care and treatment  Associated Symptoms: head injury, right knee swelling    PAST MEDICAL HISTORY  Active Ambulatory Problems     Diagnosis Date Noted   • Nonrheumatic aortic valve stenosis 10/25/2018   • S/P CABG (coronary artery bypass graft) 10/25/2018   • Class 2 severe obesity due to excess calories with serious comorbidity and body mass index (BMI) of 39.0 to 39.9 in adult (CMS/[facility]) 10/25/2018   • Left bundle branch block (LBBB) 10/25/2018   • Type 2 diabetes mellitus with complication, with long-term current use of insulin (CMS/HCC) 10/25/2018   • Dyspnea on exertion 10/25/2018   • Angina pectoris (CMS/[facility]) 10/25/2018   • Syncope 10/25/2018   • AVM (arteriovenous malformation) of small bowel, acquired with hemorrhage (CMS/[facility]) 10/25/2018   • Aortic valve stenosis 11/08/2018   • Atrial flutter (CMS/[facility]) 11/13/2018   • Diabetes mellitus (CMS/[facility]) 11/13/2018   • Von Willebrand disease (CMS/[facility]) 11/13/2018   • Sleep apnea 11/13/2018   • Pacemaker 11/13/2018   •  Coronary atherosclerosis 11/13/2018   • CKD (chronic kidney disease) 11/13/2018   • S/P TAVR (transcatheter aortic valve replacement) 12/21/2018     Resolved Ambulatory Problems     Diagnosis Date Noted   • No Resolved Ambulatory Problems     Past Medical History:   Diagnosis Date   • Anemia    • Anemia    • Aortic stenosis    • Arrhythmia    • Atrial flutter (CMS/HCC)    • Congestive heart failure (CMS/HCC)    • Coronary atherosclerosis    • Diabetes mellitus (CMS/HCC)    • GI (gastrointestinal bleed)    • Gout    • H/O seasonal allergies    • Hard to intubate    • Herpes    • Heyd syndrome (CMS/[facility])    • History of diverticulitis    • Hyperlipidemia    • Pacemaker    • Post-nasal drip    • Sleep apnea    • Von Willebrand disease (CMS/HCC)          PAST SURGICAL HISTORY  Past Surgical History:   Procedure Laterality Date   • ABLATION OF DYSRHYTHMIC FOCUS     • AORTIC VALVE REPAIR/REPLACEMENT N/A 11/13/2018    Procedure: INTRAOPERATIVE LANG, PERCUTANEOUS TRANSFEMORAL TRANSCATHETER AORTIC VALVE REPLACEMENT PERCUTANEOUS APPROACH;  Surgeon: Elizabeth Meade MD;  Location: [facility] OR 18/19;  Service: Cardiothoracic   • CARDIAC CATHETERIZATION     • CARDIAC CATHETERIZATION N/A 11/1/2018    Procedure: Right and Left Heart Cath;  Surgeon: Dionicio Stoner MD;  Location: [facility] CATH INVASIVE LOCATION;  Service: Cardiology   • CARDIAC CATHETERIZATION  11/1/2018    Procedure: Saphenous Vein Graft;  Surgeon: Dionicio Stoner MD;  Location: [facility] CATH INVASIVE LOCATION;  Service: Cardiology   • CARDIAC ELECTROPHYSIOLOGY PROCEDURE Left 11/5/2018    Procedure: Pacemaker DC new   [place];  Surgeon: Mina Castillo MD;  Location: [facility] CATH INVASIVE LOCATION;  Service: Cardiology   • CATARACT EXTRACTION WITH INTRAOCULAR LENS IMPLANT      LEFT AND RIGHT   • CHOLECYSTECTOMY     • COLONOSCOPY     • CORONARY ANGIOPLASTY      WITH PTCA   • CORONARY ARTERY BYPASS GRAFT      5 VESSEL   • HAND SURGERY      TRIGGER FINGER  RELEASE, TENDON RELEASE   • HEMORRHOIDECTOMY     • HERNIA REPAIR      VENTRAL HERNIA REPEATEDLY   • PACEMAKER IMPLANTATION  11/05/2018   • PORTACATH PLACEMENT Right     POWER PORT   • SINUS SURGERY     • TOTAL HIP ARTHROPLASTY Right          FAMILY HISTORY  Family History   Problem Relation Age of Onset   • Diabetes Mother    • Cancer Mother          SOCIAL HISTORY  Social History     Socioeconomic History   • Marital status:      Spouse name: Not on file   • Number of children: Not on file   • Years of education: Not on file   • Highest education level: Not on file   Social Needs   • Financial resource strain: Not on file   • Food insecurity - worry: Not on file   • Food insecurity - inability: Not on file   • Transportation needs - medical: Not on file   • Transportation needs - non-medical: Not on file   Occupational History   • Not on file   Tobacco Use   • Smoking status: Former Smoker   • Smokeless tobacco: Never Used   • Tobacco comment: QUIT 35YRS AGO   Substance and Sexual Activity   • Alcohol use: No   • Drug use: No   • Sexual activity: Defer   Other Topics Concern   • Not on file   Social History Narrative   • Not on file         ALLERGIES  Statins        REVIEW OF SYSTEMS  Review of Systems   Constitutional: Negative for diaphoresis and fever.   HENT: Negative for congestion.         (+) head injury   Eyes: Negative for visual disturbance.   Respiratory: Negative for shortness of breath.    Cardiovascular: Negative for palpitations.   Gastrointestinal: Negative for blood in stool, nausea and vomiting.   Endocrine: Negative for polyuria.   Genitourinary: Negative for flank pain.   Musculoskeletal: Positive for joint swelling (right knee).        (+) right knee pain   Skin: Negative for wound.   Neurological: Negative for seizures, syncope, light-headedness and headaches.        (-) LOC   Hematological: Negative for adenopathy.   Psychiatric/Behavioral: Negative for sleep disturbance.             PHYSICAL EXAM  ED Triage Vitals [01/07/19 1800]   Temp Heart Rate Resp BP SpO2   97.3 °F (36.3 °C) 63 18 103/47 97 %      Temp src Heart Rate Source Patient Position BP Location FiO2 (%)   -- -- -- -- --         GENERAL: no acute distress  HENT: nares patent, 4cm right parierital scalp hematoma, no laceration  EYES: no scleral icterus  CV: regular rhythm, normal rate  RESPIRATORY: normal effort  ABDOMEN: soft and non-tender  MUSCULOSKELETAL: no deformity, no midline/C-spine tenderness, palpable right DP pulse, no palpable PT pulse on the right, no right patella tenderness, no crepitance, point tenderness over the patella tendon and moderate point tenderness lateral aspect of right knee, soft tissue swelling and moderate effusion of the lateral right knee, no erythema, no warmth   NEURO: alert, moves all extremities, follows commands  SKIN: warm, dry    Vital signs and nursing notes reviewed.          LAB RESULTS  Recent Results (from the past 24 hour(s))   Basic Metabolic Panel    Collection Time: 01/07/19  7:47 PM   Result Value Ref Range    Glucose 178 (H) 65 - 99 mg/dL    BUN 58 (H) 8 - 23 mg/dL    Creatinine 1.42 (H) 0.76 - 1.27 mg/dL    Sodium 142 136 - 145 mmol/L    Potassium 4.4 3.5 - 5.2 mmol/L    Chloride 105 98 - 107 mmol/L    CO2 22.7 22.0 - 29.0 mmol/L    Calcium 8.6 8.6 - 10.5 mg/dL    eGFR Non African Amer 48 (L) >60 mL/min/1.73    BUN/Creatinine Ratio 40.8 (H) 7.0 - 25.0    Anion Gap 14.3 mmol/L   CBC Auto Differential    Collection Time: 01/07/19  7:47 PM   Result Value Ref Range    WBC 5.55 4.50 - 10.70 10*3/mm3    RBC 2.33 (L) 4.60 - 6.00 10*6/mm3    Hemoglobin 6.7 (C) 13.7 - 17.6 g/dL    Hematocrit 21.2 (L) 40.4 - 52.2 %    MCV 91.0 79.8 - 96.2 fL    MCH 28.8 27.0 - 32.7 pg    MCHC 31.6 (L) 32.6 - 36.4 g/dL    RDW 17.1 (H) 11.5 - 14.5 %    RDW-SD 56.2 (H) 37.0 - 54.0 fl    MPV 12.0 6.0 - 12.0 fL    Platelets 139 (L) 140 - 500 10*3/mm3    Neutrophil % 81.2 (H) 42.7 - 76.0 %    Lymphocyte %  8.1 (L) 19.6 - 45.3 %    Monocyte % 7.6 5.0 - 12.0 %    Eosinophil % 2.9 0.3 - 6.2 %    Basophil % 0.2 0.0 - 1.5 %    Immature Grans % 0.4 0.0 - 0.5 %    Neutrophils, Absolute 4.51 1.90 - 8.10 10*3/mm3    Lymphocytes, Absolute 0.45 (L) 0.90 - 4.80 10*3/mm3    Monocytes, Absolute 0.42 0.20 - 1.20 10*3/mm3    Eosinophils, Absolute 0.16 0.00 - 0.70 10*3/mm3    Basophils, Absolute 0.01 0.00 - 0.20 10*3/mm3    Immature Grans, Absolute 0.02 0.00 - 0.03 10*3/mm3   POC Glucose Once    Collection Time: 01/07/19  7:56 PM   Result Value Ref Range    Glucose 185 (H) 70 - 130 mg/dL       Ordered the above labs and reviewed the results.        RADIOLOGY  Ct Head Without Contrast    Result Date: 1/7/2019  Narrative: CT OF THE HEAD WITHOUT CONTRAST  HISTORY: Right scalp hematoma after a fall  COMPARISON: None available.  TECHNIQUE: Axial CT imaging was obtained from the vertex of the skull to the skull base. No IV contrast is administered.  FINDINGS: No acute intracranial hemorrhage is seen. There is diffuse cerebral atrophy, with compensatory ventricular dilatation, in keeping with the age of 78. There is periventricular and deep white matter microangiopathic change. There is some mucosal thickening identified within the maxillary sinuses. There are also postsurgical changes involving the maxillary sinuses. There is some mild mucosal thickening seen within the left mastoid air cells. No calvarial fracture is seen. There is soft tissue swelling overlying the right parietal bone, with a small hematoma noted. This measures about 1.9 x 0.9 cm.      Impression:  1. No acute intracranial hemorrhage. 2. Right parietal soft tissue hematoma.  Radiation dose reduction techniques were utilized, including automated exposure control and exposure modulation based on body size.  This report was finalized on 1/7/2019 9:16 PM by Dr. Irina Cole M.D.      XR Right Femur: Comminuted and displaced right distal femoral fracture    Ordered the  above noted radiological studies. Reviewed and interpreted by me in PACS.      PROCEDURES  Procedures              MEDICATIONS GIVEN IN ER  Medications   sodium chloride 0.9 % flush 10 mL (not administered)   Sodium chloride 0.9 % infusion (20 mL/hr Intravenous New Bag 1/7/19 2028)   HYDROmorphone (DILAUDID) injection 0.5 mg (0.5 mg Intravenous Given 1/7/19 1946)   ondansetron (ZOFRAN) injection 4 mg (4 mg Intravenous Given 1/7/19 1947)   HYDROmorphone (DILAUDID) injection 0.5 mg (0.5 mg Intravenous Given 1/7/19 2027)   fentaNYL citrate (PF) (SUBLIMAZE) injection 100 mcg (100 mcg Intravenous Given 1/7/19 2126)                   PROGRESS AND CONSULTS     1919-Ordered lab work, XR right knee, CT head, and XR right femur for further evaluation. Ordered Dilaudid for pain and Zofran for nausea.     1956-Ordered POC glucose for further evaluation. Ordered IVF.     1959-Rechecked pt. Family at bedside states pt just received pain medication. Discussed the plan to review imaging studies. Pt understands and agrees with the plan, all questions answered.    2017-Rechecked pt. Pt states pain was not relieved with pain medication. Notified pt of review imaging studies before further examining pt. Discussed the plan to order further pain medication. Pt understands and agrees with the plan, all questions answered.    2019-Ordered dilaudid for pain.     2059-Rechecked pt. Pt is resting comfortably. Notified pt of XR right femur which shows a distal femur fracture and hemoglobin-6.7. Family is requesting  (ortho) who did his prior hip replacement. Family states they have his contact information and are going to contact him at this time. Discussed the plan to order prior pain medication. Pt understands and agrees with the plan, all questions answered.    2104-Placed call to ortho and A for consult. Ordered sublimaze for pain. Ordered type and screen at this time.     2105-Doppler detected right PT pulse at this time.      2114-Unable to contact  (ortho) at this time. Pt and family state to contact on call ortho at this time.     2120-Discussed pt case with  (ortho) who states recommends a knee immobilizer at this time. He states they will follow pt and see in consult per medicine admission.     2121-Discussed pt case with  ([facility]) who will admit pt. He request 3 units blood transfusion at this time.      2124-Ordered prepare and transfuse panel for anemia. Ordered knee immobilizer for right femoral fracture.     2136-Discussed pt case with  (ortho) who states he will plan for surgery tomorrow.     2153-Rechecked pt. Pt is resting comfortably. Notified pt of my consult with  (ortho) who states that he is going to plan for surgery tomorrow. Knee immobilizer placed at this time. Pt and family understands and agrees with the plan, all questions answered.    2209-Pt is NVI following splint placement. Doppler used for confirmation of PT pulse.    2253-Rechecked pt. Pt is resting comfortably and states pain was improved with sublimaze but pain is still present. Family states that pt uses C-PAP at night for sleep apnea . Discussed the plan to order further pain medication. Pt understands and agrees with the plan, all questions answered.    2256-Ordered Sublimaze for pain.      2351-Family states pt is still in pain. Ordered sublimaze at this time.     MEDICAL DECISION MAKING        MDM  Number of Diagnoses or Management Options  Closed displaced supracondylar fracture of distal end of right femur without intracondylar extension, initial encounter (CMS/[facility]):   Minor head injury without loss of consciousness, initial encounter:   Symptomatic anemia:      Amount and/or Complexity of Data Reviewed  Clinical lab tests: reviewed and ordered (Hemoglobin-6.7  Glucose-178  Creatinine-1.42)  Tests in the radiology section of CPT®: reviewed and ordered (XR Right Femur: Comminuted and displaced  right distal femoral fracture  CT head: negative)  Decide to obtain previous medical records or to obtain history from someone other than the patient: yes  Obtain history from someone other than the patient: yes (family)  Discuss the patient with other providers: yes ( ([facility])   (Ortho)   (ortho))  Independent visualization of images, tracings, or specimens: yes               DIAGNOSIS  Final diagnoses:   Closed displaced supracondylar fracture of distal end of right femur without intracondylar extension, initial encounter (CMS/[facility])   Symptomatic anemia   Minor head injury without loss of consciousness, initial encounter         DISPOSITION  ADMISSION    Discussed treatment plan and reason for admission with pt/family and admitting physician.  Pt/family voiced understanding of the plan for admission for further testing/treatment as needed.           Latest Documented Vital Signs:  As of 9:32 PM  BP- 141/69 HR- 59 Temp- 97.3 °F (36.3 °C) O2 sat- 100%        --  Documentation assistance provided by jorge Wallace for Dr. ALONSO Finney MD.  Information recorded by the ajithibyani was done at my direction and has been verified and validated by me.            Fanta Wallace  01/07/19 5528       Job Finney MD  01/08/19 4547

## 2019-01-08 NOTE — BRIEF OP NOTE
TOTAL KNEE ARTHROPLASTY  Progress Note    Ariel Elliott  1/7/2019 - 1/8/2019    Pre-op Diagnosis:   Closed displaced supracondylar fracture of distal end of right femur without intracondylar extension, initial encounter (CMS/[facility]) [S72.451A]       Post-Op Diagnosis Codes:     * Closed displaced supracondylar fracture of distal end of right femur without intracondylar extension, initial encounter (CMS/[facility]) [S72.451A]    Procedure/CPT® Codes:      Procedure(s):  RIGHT DISTAL FEMUR REPLACEMENT    Surgeon(s):  Margot Ray MD Chakour, Kenneth Saad, MD    Anesthesia: General with Block    Staff:   Circulator: Keshia Melara RN; Manasa Argueta RN  Scrub Person: Yifan Crowder; Bonifacio Villa  Vendor Representative: Landry Talavera  Assistant: Nathan Branch    Estimated Blood Loss: 200 mL    Urine Voided: * No values recorded between 1/8/2019  1:03 PM and 1/8/2019  3:16 PM *    Specimens:                None      Drains:   Closed/Suction Drain Right Knee Accordion 10 Fr. (Active)       Findings: SEE DICT     Complications: GILMAR Ray MD     Date: 1/8/2019  Time: 3:16 PM

## 2019-01-09 LAB
ABO + RH BLD: NORMAL
ANION GAP SERPL CALCULATED.3IONS-SCNC: 14 MMOL/L
BASOPHILS # BLD AUTO: 0.01 10*3/MM3 (ref 0–0.2)
BASOPHILS NFR BLD AUTO: 0.1 % (ref 0–1.5)
BH BB BLOOD EXPIRATION DATE: NORMAL
BH BB BLOOD TYPE BARCODE: 5100
BH BB DISPENSE STATUS: NORMAL
BH BB PRODUCT CODE: NORMAL
BH BB UNIT NUMBER: NORMAL
BUN BLD-MCNC: 48 MG/DL (ref 8–23)
BUN/CREAT SERPL: 30 (ref 7–25)
CALCIUM SPEC-SCNC: 8.1 MG/DL (ref 8.6–10.5)
CHLORIDE SERPL-SCNC: 106 MMOL/L (ref 98–107)
CO2 SERPL-SCNC: 21 MMOL/L (ref 22–29)
CREAT BLD-MCNC: 1.6 MG/DL (ref 0.76–1.27)
CROSSMATCH INTERPRETATION: NORMAL
DEPRECATED RDW RBC AUTO: 59.9 FL (ref 37–54)
EOSINOPHIL # BLD AUTO: 0.03 10*3/MM3 (ref 0–0.7)
EOSINOPHIL NFR BLD AUTO: 0.3 % (ref 0.3–6.2)
ERYTHROCYTE [DISTWIDTH] IN BLOOD BY AUTOMATED COUNT: 17.6 % (ref 11.5–14.5)
GFR SERPL CREATININE-BSD FRML MDRD: 42 ML/MIN/1.73
GLUCOSE BLD-MCNC: 216 MG/DL (ref 65–99)
GLUCOSE BLDC GLUCOMTR-MCNC: 129 MG/DL (ref 70–130)
GLUCOSE BLDC GLUCOMTR-MCNC: 140 MG/DL (ref 70–130)
GLUCOSE BLDC GLUCOMTR-MCNC: 142 MG/DL (ref 70–130)
GLUCOSE BLDC GLUCOMTR-MCNC: 185 MG/DL (ref 70–130)
GLUCOSE BLDC GLUCOMTR-MCNC: 250 MG/DL (ref 70–130)
HCT VFR BLD AUTO: 21.2 % (ref 40.4–52.2)
HCT VFR BLD AUTO: 23.7 % (ref 40.4–52.2)
HGB BLD-MCNC: 6.6 G/DL (ref 13.7–17.6)
HGB BLD-MCNC: 7.2 G/DL (ref 13.7–17.6)
IMM GRANULOCYTES # BLD AUTO: 0.02 10*3/MM3 (ref 0–0.03)
IMM GRANULOCYTES NFR BLD AUTO: 0.2 % (ref 0–0.5)
LYMPHOCYTES # BLD AUTO: 0.28 10*3/MM3 (ref 0.9–4.8)
LYMPHOCYTES NFR BLD AUTO: 2.7 % (ref 19.6–45.3)
MCH RBC QN AUTO: 28.5 PG (ref 27–32.7)
MCHC RBC AUTO-ENTMCNC: 30.4 G/DL (ref 32.6–36.4)
MCV RBC AUTO: 93.7 FL (ref 79.8–96.2)
MONOCYTES # BLD AUTO: 1.09 10*3/MM3 (ref 0.2–1.2)
MONOCYTES NFR BLD AUTO: 10.6 % (ref 5–12)
NEUTROPHILS # BLD AUTO: 8.85 10*3/MM3 (ref 1.9–8.1)
NEUTROPHILS NFR BLD AUTO: 86.3 % (ref 42.7–76)
PLATELET # BLD AUTO: 133 10*3/MM3 (ref 140–500)
PMV BLD AUTO: 11.1 FL (ref 6–12)
POTASSIUM BLD-SCNC: 4.1 MMOL/L (ref 3.5–5.2)
RBC # BLD AUTO: 2.53 10*6/MM3 (ref 4.6–6)
SODIUM BLD-SCNC: 141 MMOL/L (ref 136–145)
UNIT  ABO: NORMAL
UNIT  RH: NORMAL
WBC NRBC COR # BLD: 10.26 10*3/MM3 (ref 4.5–10.7)

## 2019-01-09 PROCEDURE — 97162 PT EVAL MOD COMPLEX 30 MIN: CPT

## 2019-01-09 PROCEDURE — 97110 THERAPEUTIC EXERCISES: CPT

## 2019-01-09 PROCEDURE — 25010000003 CEFAZOLIN IN DEXTROSE 2-4 GM/100ML-% SOLUTION: Performed by: ORTHOPAEDIC SURGERY

## 2019-01-09 PROCEDURE — 85014 HEMATOCRIT: CPT | Performed by: INTERNAL MEDICINE

## 2019-01-09 PROCEDURE — P9016 RBC LEUKOCYTES REDUCED: HCPCS

## 2019-01-09 PROCEDURE — 80048 BASIC METABOLIC PNL TOTAL CA: CPT | Performed by: INTERNAL MEDICINE

## 2019-01-09 PROCEDURE — 63710000001 INSULIN GLARGINE PER 5 UNITS: Performed by: HOSPITALIST

## 2019-01-09 PROCEDURE — 36430 TRANSFUSION BLD/BLD COMPNT: CPT

## 2019-01-09 PROCEDURE — 63710000001 INSULIN LISPRO (HUMAN) PER 5 UNITS: Performed by: HOSPITALIST

## 2019-01-09 PROCEDURE — 85018 HEMOGLOBIN: CPT | Performed by: INTERNAL MEDICINE

## 2019-01-09 PROCEDURE — 85025 COMPLETE CBC W/AUTO DIFF WBC: CPT | Performed by: INTERNAL MEDICINE

## 2019-01-09 PROCEDURE — 99232 SBSQ HOSP IP/OBS MODERATE 35: CPT | Performed by: INTERNAL MEDICINE

## 2019-01-09 PROCEDURE — 86900 BLOOD TYPING SEROLOGIC ABO: CPT

## 2019-01-09 PROCEDURE — 25010000002 HYDROMORPHONE 1 MG/ML SOLUTION: Performed by: INTERNAL MEDICINE

## 2019-01-09 PROCEDURE — 82962 GLUCOSE BLOOD TEST: CPT

## 2019-01-09 RX ORDER — TAMSULOSIN HYDROCHLORIDE 0.4 MG/1
0.4 CAPSULE ORAL DAILY
Status: DISCONTINUED | OUTPATIENT
Start: 2019-01-09 | End: 2019-01-13 | Stop reason: HOSPADM

## 2019-01-09 RX ADMIN — HYDROCODONE BITARTRATE AND ACETAMINOPHEN 1 TABLET: 10; 325 TABLET ORAL at 12:07

## 2019-01-09 RX ADMIN — SODIUM CHLORIDE 100 ML/HR: 9 INJECTION, SOLUTION INTRAVENOUS at 01:55

## 2019-01-09 RX ADMIN — INSULIN LISPRO 5 UNITS: 100 INJECTION, SOLUTION INTRAVENOUS; SUBCUTANEOUS at 09:13

## 2019-01-09 RX ADMIN — ONDANSETRON 4 MG: 4 TABLET, ORALLY DISINTEGRATING ORAL at 03:21

## 2019-01-09 RX ADMIN — INSULIN LISPRO 5 UNITS: 100 INJECTION, SOLUTION INTRAVENOUS; SUBCUTANEOUS at 17:31

## 2019-01-09 RX ADMIN — FUROSEMIDE 40 MG: 40 TABLET ORAL at 09:08

## 2019-01-09 RX ADMIN — INSULIN LISPRO 5 UNITS: 100 INJECTION, SOLUTION INTRAVENOUS; SUBCUTANEOUS at 12:06

## 2019-01-09 RX ADMIN — SODIUM CHLORIDE, PRESERVATIVE FREE 10 ML: 5 INJECTION INTRAVENOUS at 09:19

## 2019-01-09 RX ADMIN — HYDROCODONE BITARTRATE AND ACETAMINOPHEN 1 TABLET: 10; 325 TABLET ORAL at 05:31

## 2019-01-09 RX ADMIN — INSULIN LISPRO 2 UNITS: 100 INJECTION, SOLUTION INTRAVENOUS; SUBCUTANEOUS at 12:07

## 2019-01-09 RX ADMIN — ALLOPURINOL 300 MG: 300 TABLET ORAL at 21:25

## 2019-01-09 RX ADMIN — HYDROMORPHONE HYDROCHLORIDE 1 MG: 1 INJECTION, SOLUTION INTRAMUSCULAR; INTRAVENOUS; SUBCUTANEOUS at 00:47

## 2019-01-09 RX ADMIN — VITAMIN D, TAB 1000IU (100/BT) 1000 UNITS: 25 TAB at 09:09

## 2019-01-09 RX ADMIN — ISOSORBIDE DINITRATE 40 MG: 20 TABLET ORAL at 21:25

## 2019-01-09 RX ADMIN — HYDROCODONE BITARTRATE AND ACETAMINOPHEN 1 TABLET: 10; 325 TABLET ORAL at 22:34

## 2019-01-09 RX ADMIN — ACETAMINOPHEN 650 MG: 325 TABLET, FILM COATED ORAL at 04:24

## 2019-01-09 RX ADMIN — SODIUM CHLORIDE, PRESERVATIVE FREE 3 ML: 5 INJECTION INTRAVENOUS at 09:19

## 2019-01-09 RX ADMIN — PANTOPRAZOLE SODIUM 40 MG: 40 TABLET, DELAYED RELEASE ORAL at 06:23

## 2019-01-09 RX ADMIN — HYDROMORPHONE HYDROCHLORIDE 1 MG: 1 INJECTION, SOLUTION INTRAMUSCULAR; INTRAVENOUS; SUBCUTANEOUS at 17:42

## 2019-01-09 RX ADMIN — CEFAZOLIN SODIUM 2 G: 2 INJECTION, SOLUTION INTRAVENOUS at 05:31

## 2019-01-09 RX ADMIN — TAMSULOSIN HYDROCHLORIDE 0.4 MG: 0.4 CAPSULE ORAL at 11:36

## 2019-01-09 RX ADMIN — HYDROMORPHONE HYDROCHLORIDE 1 MG: 1 INJECTION, SOLUTION INTRAMUSCULAR; INTRAVENOUS; SUBCUTANEOUS at 14:28

## 2019-01-09 RX ADMIN — SODIUM CHLORIDE 100 ML/HR: 9 INJECTION, SOLUTION INTRAVENOUS at 11:43

## 2019-01-09 RX ADMIN — ISOSORBIDE DINITRATE 40 MG: 20 TABLET ORAL at 09:09

## 2019-01-09 RX ADMIN — ASPIRIN 81 MG: 81 TABLET, DELAYED RELEASE ORAL at 09:09

## 2019-01-09 RX ADMIN — SODIUM CHLORIDE, PRESERVATIVE FREE 10 ML: 5 INJECTION INTRAVENOUS at 21:00

## 2019-01-09 RX ADMIN — HYDROMORPHONE HYDROCHLORIDE 1 MG: 1 INJECTION, SOLUTION INTRAMUSCULAR; INTRAVENOUS; SUBCUTANEOUS at 09:30

## 2019-01-09 RX ADMIN — HYDROMORPHONE HYDROCHLORIDE 1 MG: 1 INJECTION, SOLUTION INTRAMUSCULAR; INTRAVENOUS; SUBCUTANEOUS at 06:23

## 2019-01-09 RX ADMIN — INSULIN GLARGINE 15 UNITS: 100 INJECTION, SOLUTION SUBCUTANEOUS at 09:13

## 2019-01-09 RX ADMIN — INSULIN LISPRO 6 UNITS: 100 INJECTION, SOLUTION INTRAVENOUS; SUBCUTANEOUS at 09:17

## 2019-01-09 NOTE — NURSING NOTE
Pt was to have H&H drawn at noon and was tx to me at 1500; H&H was to be drawn from port so I did; Hgb critical 6.6; placed call out to Dr. Schuler

## 2019-01-09 NOTE — PROGRESS NOTES
Discharge Planning Assessment  [facility]     Patient Name: Ariel Elliott  MRN: [id number]  Today's Date: 1/9/2019    Admit Date: 1/7/2019    Discharge Needs Assessment     Row Name 01/09/19 1212       Living Environment    Lives With  child(billy), adult    Current Living Arrangements  home/apartment/condo    Provides Primary Care For  no one, unable/limited ability to care for self    Family Caregiver if Needed  child(billy), adult daughter Lexi Hurst [phone]       Resource/Environmental Concerns    Resource/Environmental Concerns  none    Transportation Concerns  car, none       Transition Planning    Patient/Family Anticipates Transition to  inpatient rehabilitation facility    Patient/Family Anticipated Services at Transition  none    Transportation Anticipated  family or friend will provide       Discharge Needs Assessment    Readmission Within the Last 30 Days  current reason for admission unrelated to previous admission    Concerns to be Addressed  discharge planning    Equipment Currently Used at Home  bipap/cpap;walker, standard;shower chair unknown equip co    Anticipated Changes Related to Illness  none    Equipment Needed After Discharge  none    Outpatient/Agency/Support Group Needs  skilled nursing facility        Discharge Plan     Row Name 01/09/19 8726       Plan    Plan  referral to subacute rehab. [facility]    Patient/Family in Agreement with Plan  yes    Plan Comments  Imm verified. Met at bedside with pt and his daughter Micaela [phone]. Pt ask that CCP discuss DC plans with her. Pt lives with daughter. He has Bipap, walker, and shower chair at home. He does not know [employer] company but [facility]. Pt has used home health but does not know agency. He was at [facility] in the past but would prefer not to return there. Discussed need for rehab. Given RTR book and list by area. Daughter given medicare.gov list and would like referral to [facility]. She will review in case back up needed. Referral called to [facility]. Pt verified demographics. Denies problems affording or obtaining medications. Plan at [place] is rehab with referral to both [facility] and [facility]        Destination      Service Provider Request Status Selected Services Address Phone Number Fax Number    [facility] Pending - Request Sent N/A [address] [id number] [phone]    [facility] Pending - Request Sent N/A [address] [phone] [phone]      Durable Medical Equipment      No service coordination in this encounter.      Dialysis/Infusion      No service coordination in this encounter.      Home Medical Care      No service coordination in this encounter.      Community Resources      No service coordination in this encounter.          Demographic Summary     Row Name 01/09/19 1210       General Information    Admission Type  inpatient    Arrived From  home    Required Notices Provided  Important Message from Medicare    Referral Source  hospital clinician/department    Reason for Consult  discharge planning       Contact Information    Contact Information Obtained for          Functional Status     Row Name 01/09/19 1211       Functional Status    Usual Activity Tolerance  moderate    Current Activity Tolerance  poor       Functional Status, IADL    Medications  assistive person    Meal Preparation  assistive person    Housekeeping  assistive person    Laundry  assistive person    Shopping  assistive person       Mental Status    General Appearance WDL  WDL       Mental Status Summary    Recent Changes in Mental Status/Cognitive Functioning  no changes        Psychosocial    No documentation.       Abuse/Neglect    No documentation.       Legal    No documentation.       Substance Abuse    No documentation.       Patient Forms    No documentation.           Eloise   NO Garcia

## 2019-01-09 NOTE — PROGRESS NOTES
Name: Ariel Elliott ADMIT: 2019   : 1940  PCP: Zeb Meadows MD    MRN: [id number] LOS: 2 days   AGE/SEX: 78 y.o. male  ROOM: [facility]   Subjective     Pain fairly well controlled with current meds  Watkins removed, urine retention needing straight cath  No soa or cough  No chest pain or palpitations    Objective   Vital Signs  Temp:  [97 °F (36.1 °C)-100.3 °F (37.9 °C)] 99.5 °F (37.5 °C)  Heart Rate:  [60-99] 85  Resp:  [12-20] 16  BP: (133-192)/(59-95) 154/69  SpO2:  [92 %-99 %] 95 %  on  Flow (L/min):  [2-4] 2;   Device (Oxygen Therapy): nasal cannula  Body mass index is 39.17 kg/m².    Physical Exam   Constitutional: He is oriented to person, place, and time. No distress.   HENT:   Head: Normocephalic and atraumatic.   Neck: Normal range of motion.   Cardiovascular: Normal rate and regular rhythm. Exam reveals no friction rub.   No murmur heard.  Pulmonary/Chest: Effort normal and breath sounds normal. No respiratory distress. He has no wheezes. He has no rales.   Abdominal: Soft. Bowel sounds are normal. He exhibits no distension. There is no tenderness. There is no guarding.   Musculoskeletal: He exhibits edema (trace) and deformity (RLE wrapped and braced).   Neurological: He is alert and oriented to person, place, and time.   Skin: Skin is warm and dry. He is not diaphoretic. No erythema.   Psychiatric: He has a normal mood and affect. His behavior is normal.       Results Review:       I reviewed the patient's new clinical results.  Results from last 7 days   Lab Units  19   0410  19   1210  19   0932  19   194   WBC 10*3/mm3  10.26   --   6.80  5.55   HEMOGLOBIN g/dL  7.2*  7.9*  7.7*  6.7*   PLATELETS 10*3/mm3  133*   --   157  139*     Results from last 7 days   Lab Units  19   0410  19   0616  19   1947   SODIUM mmol/L  141  141  142   POTASSIUM mmol/L  4.1  4.3  4.4   CHLORIDE mmol/L  106  105  105   CO2 mmol/L  21.0*  22.3  22.7   BUN  mg/dL  48*  54*  58*   CREATININE mg/dL  1.60*  1.31*  1.42*   GLUCOSE mg/dL  216*  176*  178*   Estimated Creatinine Clearance: 50.3 mL/min (A) (by C-G formula based on SCr of 1.6 mg/dL (H)).    Results from last 7 days   Lab Units  01/09/19   0410  01/08/19   0616  01/07/19   1947   CALCIUM mg/dL  8.1*  8.5*  8.6   MAGNESIUM mg/dL   --   2.4   --    PHOSPHORUS mg/dL   --   5.1*   --        Lab Results   Component Value Date    HGBA1C 5.76 (H) 11/09/2018    HGBA1C 7.0 (H) 09/04/2015     Glucose   Date/Time Value Ref Range Status   01/09/2019 1129 185 (H) 70 - 130 mg/dL Final   01/09/2019 0631 250 (H) 70 - 130 mg/dL Final   01/08/2019 2045 235 (H) 70 - 130 mg/dL Final   01/08/2019 1809 240 (H) 70 - 130 mg/dL Final   01/08/2019 1602 222 (H) 70 - 130 mg/dL Final   01/08/2019 1105 188 (H) 70 - 130 mg/dL Final   01/08/2019 0545 194 (H) 70 - 130 mg/dL Final   01/08/2019 0041 211 (H) 70 - 130 mg/dL Final         allopurinol 300 mg Oral Nightly   aspirin 81 mg Oral Daily   cholecalciferol 1,000 Units Oral Daily   furosemide 40 mg Oral Daily   insulin glargine 15 Units Subcutaneous Q12H   insulin lispro 0-9 Units Subcutaneous 4x Daily With Meals & Nightly   insulin lispro 5 Units Subcutaneous TID With Meals   isosorbide dinitrate 40 mg Oral BID   pantoprazole 40 mg Oral QAM   polyethylene glycol 17 g Oral Daily   sodium chloride 10 mL Intravenous Q12H   sodium chloride 3 mL Intravenous Q12H   tamsulosin 0.4 mg Oral Daily   wheat dextrin 1 each Oral Daily       lactated ringers 9 mL/hr Last Rate: 9 mL/hr (01/08/19 1249)   Sodium chloride 20 mL/hr Last Rate: 20 mL/hr (01/07/19 2028)   Sodium chloride 100 mL/hr Last Rate: 100 mL/hr (01/09/19 1143)   Sodium chloride 100 mL/hr Last Rate: 100 mL/hr (01/09/19 0155)   Diet Regular; Consistent Carbohydrate      Assessment/Plan      Active Hospital Problems    Diagnosis Date Noted   • **Closed displaced supracondylar fracture without intracondylar extension of lower end of right  femur (CMS/[facility]) [S72.451A] 01/07/2019   • S/P TAVR (transcatheter aortic valve replacement) [Z95.2] 12/21/2018   • Von Willebrand disease (CMS/[facility]) [D68.0] 11/13/2018   • CKD (chronic kidney disease) [N18.9] 11/13/2018   • Nonrheumatic aortic valve stenosis [I35.0] 10/25/2018   • S/P CABG (coronary artery bypass graft) [Z95.1] 10/25/2018   • Type 2 diabetes mellitus with complication, with long-term current use of insulin (CMS/HCC) [E11.8, Z79.4] 10/25/2018   • AVM (arteriovenous malformation) of small bowel, acquired with hemorrhage (CMS/HCC) [K55.8] 10/25/2018      Resolved Hospital Problems   No resolved problems to display.     · Closed displaced distal femur on the right: POD 1. Did quite well. Mobilize early. Transfer to ortho floor  · Start flomax for BPH and urine retention.   · Chronic GI bleeding from AVMs needing multiple blood transfusions.  Hgb low, repeat now and we may need to give him more blood this afternoon or tomorrow  · we cannot give him pharmacologic DVT prophylaxis due to his bleeding  · Type 2 diabetes: Incrase dose of Levemir 20 units twice a day continue sliding scale insulin  · Chronic kidney disease stage III: Creatinine up at 1.6 from 1.3 at baseline, cont IV fluids  · Further care and management based on his condition and as his course unfolds.    · DISPO: Rehab in 1-2 days    Jerson Higginbotham MD  [facility]  01/09/19  12:10 PM

## 2019-01-09 NOTE — PLAN OF CARE
Problem: Patient Care Overview  Goal: Plan of Care Review  Outcome: Ongoing (interventions implemented as appropriate)   01/09/19 1129   Coping/Psychosocial   Plan of Care Reviewed With patient   Plan of Care Review   Progress improving   OTHER   Outcome Summary Pt agreeable to PT this am, although he does c/o dizziness and pain. Pt had a fall at home resulting in R distal femur fx. He is now s/p R TKR and R distal femur repair. Pt is WBAT and per MD, to progress gait training slowly. Today. pt presents with increased weakness, post op pain, decreased activity tolerance and decreased functional mobility. Pt able to sit EOB and attempt standing x 2 with R wx and max A x 2. He has difficulty straightening out R Knee and verbal/tactile cues needed for proper technique with transfers. He will need SNU at [place]. Will continue to benefit from skilled PT.

## 2019-01-09 NOTE — PLAN OF CARE
Problem: Fall Risk (Adult)  Goal: Absence of Fall  Outcome: Ongoing (interventions implemented as appropriate)      Problem: Patient Care Overview  Goal: Plan of Care Review  Outcome: Ongoing (interventions implemented as appropriate)   01/08/19 1920   Coping/Psychosocial   Plan of Care Reviewed With patient;daughter   Plan of Care Review   Progress improving   OTHER   Outcome Summary Pt c/o RLE pain, PRN Dilaudid given [id number]. Pt went for right total knee and distal femur replacement. Returned to until around 1800. Pt a little confused, c/o pain, PRN Dilaudid given. Took sips of water. Daughter at bedside, but not staying the night.       01/08/19 1920   Coping/Psychosocial   Plan of Care Reviewed With patient;daughter   Plan of Care Review   Progress improving   OTHER   Outcome Summary Pt c/o RLE pain, PRN Dilaudid given [id number]. Pt went for right total knee and distal femur replacement. Returned to until around 1800. Pt a little confused, c/o pain, PRN Dilaudid given. Took sips of water. Daughter at bedside, but not staying the night. Dressing dry and intact to right knee, hemovac in place.     Goal: Individualization and Mutuality  Outcome: Ongoing (interventions implemented as appropriate)    Goal: Discharge Needs Assessment  Outcome: Ongoing (interventions implemented as appropriate)    Goal: Interprofessional Rounds/Family Conf  Outcome: Ongoing (interventions implemented as appropriate)      Problem: Skin Injury Risk (Adult)  Goal: Skin Health and Integrity  Outcome: Ongoing (interventions implemented as appropriate)      Problem: Pain, Acute (Adult)  Goal: Acceptable Pain Control/Comfort Level  Outcome: Ongoing (interventions implemented as appropriate)      Problem: Anemia (Adult)  Goal: Symptom Improvement  Outcome: Ongoing (interventions implemented as appropriate)

## 2019-01-09 NOTE — PLAN OF CARE
Problem: Patient Care Overview  Goal: Plan of Care Review  Outcome: Ongoing (interventions implemented as appropriate)   01/09/19 0913   Coping/Psychosocial   Plan of Care Reviewed With patient;daughter   Plan of Care Review   Progress improving   OTHER   Outcome Summary POD 1 R distal Femur Replacement and RTK, tx from 4E 1500, Dressing c/d/i, hv drain, O2-3L, Hgb 6.6 order to tx 2U-1st unit currently infusing, voiding per urinal. A&O, PT unable to get pt on feet today-only sat on side, IV and PO meds helping w/pain, educated pt on KAELA and importance of wearing CPAP and O2, continue to monitor     Goal: Individualization and Mutuality  Outcome: Ongoing (interventions implemented as appropriate)      Problem: Skin Injury Risk (Adult)  Goal: Skin Health and Integrity  Outcome: Ongoing (interventions implemented as appropriate)      Problem: Pain, Acute (Adult)  Goal: Acceptable Pain Control/Comfort Level  Outcome: Ongoing (interventions implemented as appropriate)      Problem: Anemia (Adult)  Goal: Symptom Improvement  Outcome: Ongoing (interventions implemented as appropriate)

## 2019-01-09 NOTE — THERAPY EVALUATION
Acute Care - Physical Therapy Initial Evaluation  [facility]     Patient Name: Ariel Elliott  : 1940  MRN: [id number]  Today's Date: 2019   Onset of Illness/Injury or Date of Surgery: 19  Date of Referral to PT: 19  Referring Physician: Flor      Admit Date: 2019    Visit Dx:     ICD-10-CM ICD-9-CM   1. Closed displaced supracondylar fracture of distal end of right femur without intracondylar extension, initial encounter (CMS/[facility]) S72.451A 821.23   2. Symptomatic anemia D64.9 285.9   3. Minor head injury without loss of consciousness, initial encounter S09.90XA 959.01   4. Difficulty walking R26.2 719.7     Patient Active Problem List   Diagnosis   • Nonrheumatic aortic valve stenosis   • S/P CABG (coronary artery bypass graft)   • Class 2 severe obesity due to excess calories with serious comorbidity and body mass index (BMI) of 39.0 to 39.9 in adult (CMS/[facility])   • Left bundle branch block (LBBB)   • Type 2 diabetes mellitus with complication, with long-term current use of insulin (CMS/[facility])   • Dyspnea on exertion   • Angina pectoris (CMS/[facility])   • Syncope   • AVM (arteriovenous malformation) of small bowel, acquired with hemorrhage (CMS/[facility])   • Aortic valve stenosis   • Atrial flutter (CMS/[facility])   • Diabetes mellitus (CMS/[facility])   • Von Willebrand disease (CMS/[facility])   • Sleep apnea   • Pacemaker   • Coronary atherosclerosis   • CKD (chronic kidney disease)   • S/P TAVR (transcatheter aortic valve replacement)   • Closed displaced supracondylar fracture without intracondylar extension of lower end of right femur (CMS/[facility])     Past Medical History:   Diagnosis Date   • Anemia    • Anemia     2 UNITS OF BLOOD SAT 11/10. [facility]   • Aortic stenosis    • Arrhythmia    • Atrial flutter (CMS/[facility])    • Congestive heart failure (CMS/[facility])    • Coronary atherosclerosis    • Diabetes mellitus (CMS/[facility])     TYPE 2   • GI (gastrointestinal bleed)    • Gout     • H/O seasonal allergies    • Hard to intubate     SEVERAL TIMES IN PAST. NO ISSUES RECENT HISTORY   • Herpes     HANDS IN THE PAST   • Heyd syndrome (CMS/HCC)    • History of diverticulitis    • Hyperlipidemia    • Pacemaker     LEFT.   • Post-nasal drip    • Sleep apnea     Bi-Pap   • Von Willebrand disease (CMS/HCC)      Past Surgical History:   Procedure Laterality Date   • ABLATION OF DYSRHYTHMIC FOCUS     • AORTIC VALVE REPAIR/REPLACEMENT N/A 11/13/2018    Procedure: INTRAOPERATIVE LANG, PERCUTANEOUS TRANSFEMORAL TRANSCATHETER AORTIC VALVE REPLACEMENT PERCUTANEOUS APPROACH;  Surgeon: Elizabeth Meade MD;  Location: [facility] OR 18/19;  Service: Cardiothoracic   • CARDIAC CATHETERIZATION     • CARDIAC CATHETERIZATION N/A 11/1/2018    Procedure: Right and Left Heart Cath;  Surgeon: Dionicio Stoner MD;  Location: [facility] CATH INVASIVE LOCATION;  Service: Cardiology   • CARDIAC CATHETERIZATION  11/1/2018    Procedure: Saphenous Vein Graft;  Surgeon: Dionicio Stoner MD;  Location: [facility] CATH INVASIVE LOCATION;  Service: Cardiology   • CARDIAC ELECTROPHYSIOLOGY PROCEDURE Left 11/5/2018    Procedure: Pacemaker DC new   [place];  Surgeon: Mina Castillo MD;  Location: [facility] CATH INVASIVE LOCATION;  Service: Cardiology   • CATARACT EXTRACTION WITH INTRAOCULAR LENS IMPLANT      LEFT AND RIGHT   • CHOLECYSTECTOMY     • COLONOSCOPY     • CORONARY ANGIOPLASTY      WITH PTCA   • CORONARY ARTERY BYPASS GRAFT      5 VESSEL   • HAND SURGERY      TRIGGER FINGER RELEASE, TENDON RELEASE   • HEMORRHOIDECTOMY     • HERNIA REPAIR      VENTRAL HERNIA REPEATEDLY   • PACEMAKER IMPLANTATION  11/05/2018   • PORTACATH PLACEMENT Right     POWER PORT   • SINUS SURGERY     • TOTAL HIP ARTHROPLASTY Right         PT ASSESSMENT (last 12 hours)      Physical Therapy Evaluation     Row Name 01/09/19 1050          PT Evaluation Time/Intention    Subjective Information  complains of;weakness;pain;dizziness  -EJ     Document Type   evaluation  -EJ     Mode of Treatment  physical therapy  -EJ     Patient Effort  good  -EJ     Symptoms Noted During/After Treatment  dizziness;fatigue;increased pain  -EJ     Row Name 01/09/19 1050          General Information    Onset of Illness/Injury or Date of Surgery  01/08/19  -EJ     Referring Physician  Flor  -EJ     Patient Observations  alert;cooperative;agree to therapy  -EJ     Patient/Family Observations  daughter present in room  -EJ     General Observations of Patient  supine in bed, no acute distress  -EJ     Prior Level of Function  min assist:;all household mobility;transfer;gait;ADL's  -EJ     Equipment Currently Used at Home  walker, rolling  -EJ     Pertinent History of Current Functional Problem  pt with fall at home resulting in R distal femur fx, he is now s/p R TKR and distal femur repair  (Significant)   -EJ     Existing Precautions/Restrictions  fall  -EJ     Barriers to Rehab  none identified  -EJ     Row Name 01/09/19 1050          Relationship/Environment    Lives With  child(billy), adult  -EJ     Row Name 01/09/19 1050          Resource/Environmental Concerns    Current Living Arrangements  home/apartment/condo  -EJ     Row Name 01/09/19 1050          Cognitive Assessment/Intervention- PT/OT    Orientation Status (Cognition)  oriented x 3  -EJ     Follows Commands (Cognition)  WNL  -EJ     Personal Safety Interventions  fall prevention program maintained;gait belt;nonskid shoes/slippers when out of bed;supervised activity  -EJ     Row Name 01/09/19 1050          Mobility Assessment/Treatment    Extremity Weight-bearing Status  right lower extremity  -EJ     Right Lower Extremity (Weight-bearing Status)  weight-bearing as tolerated (WBAT)  -EJ     Row Name 01/09/19 1050          Bed Mobility Assessment/Treatment    Bed Mobility Assessment/Treatment  supine-sit;sit-supine  -EJ     Supine-Sit [place] (Bed Mobility)  verbal cues;nonverbal cues (demo/gesture);maximum assist (25%  patient effort);2 person assist  -EJ     Sit-Supine [place] (Bed Mobility)  verbal cues;nonverbal cues (demo/gesture);maximum assist (25% patient effort);2 person assist  -EJ     Bed Mobility, Safety Issues  decreased use of legs for bridging/pushing  -     Assistive Device (Bed Mobility)  draw sheet;bed rails  -EJ     Row Name 01/09/19 1050          Transfer Assessment/Treatment    Transfer Assessment/Treatment  sit-stand transfer;stand-sit transfer  -     Comment (Transfers)  performed x 2  -EJ     Sit-Stand [place] (Transfers)  verbal cues;nonverbal cues (demo/gesture);maximum assist (25% patient effort);2 person assist  -EJ     Stand-Sit [place] (Transfers)  verbal cues;nonverbal cues (demo/gesture);maximum assist (25% patient effort);2 person assist  -EJ     Row Name 01/09/19 1050          Sit-Stand Transfer    Assistive Device (Sit-Stand Transfers)  walker, front-wheeled  -EJ     Row Name 01/09/19 1050          Stand-Sit Transfer    Assistive Device (Stand-Sit Transfers)  walker, front-wheeled  -EJ     Row Name 01/09/19 1050          Gait/Stairs Assessment/Training    [place] Level (Gait)  unable to assess  (Significant)  per MD Order, take gait training slowly  -EJ     Row Name 01/09/19 1050          General ROM    GENERAL ROM COMMENTS  WFL, x R knee  -EJ     Row Name 01/09/19 1050          MMT (Manual Muscle Testing)    General MMT Comments  generalized weakness, increased weakness R LE  -EJ     Row Name 01/09/19 1050          Motor Assessment/Intervention    Additional Documentation  Therapeutic Exercise Interventions (Group)  -EJ     Row Name 01/09/19 1050          Therapeutic Exercise    Comment (Therapeutic Exercise)  educated pt on AP, quad sets and glut sets while in bed  -EJ     Row Name 01/09/19 1050          Pain Assessment    Additional Documentation  Pain Scale: Numbers Pre/Post-Treatment (Group)  -[facility] Name 01/09/19 1050          Pain Scale: Numbers  Pre/Post-Treatment    Pain Scale: Numbers, Pretreatment  4/10  -EJ     Pain Scale: Numbers, Post-Treatment  4/10  -EJ     Pain Location - Side  Right  -EJ     Pain Location - Orientation  lower  -EJ     Pain Location  extremity  -EJ     Row Name             Wound 01/08/19 1405 Right knee incision    Wound - Properties Group Date first assessed: 01/08/19  -MB Time first assessed: 1405  -MB Side: Right  -MB Location: knee  -MB Type: incision  -MB    Row Name 01/09/19 1050          Plan of Care Review    Plan of Care Reviewed With  patient;daughter  -EJ     Row Name 01/09/19 1050          Physical Therapy Clinical Impression    Date of Referral to PT  01/09/19  -EJ     PT Diagnosis (PT Clinical Impression)  s/p R TKR and R distal femur repair  -EJ     Patient/Family Goals Statement (PT Clinical Impression)  Pt will need SNU  -EJ     Criteria for Skilled Interventions Met (PT Clinical Impression)  treatment indicated  -EJ     Impairments Found (describe specific impairments)  gait, locomotion, and balance  -EJ     Rehab Potential (PT Clinical Summary)  good, to achieve stated therapy goals  -EJ     Row Name 01/09/19 1050          Physical Therapy Goals    Bed Mobility Goal Selection (PT)  bed mobility, PT goal 1  -EJ     Transfer Goal Selection (PT)  transfer, PT goal 1  -EJ     Gait Training Goal Selection (PT)  gait training, PT goal 1  -EJ     Row Name 01/09/19 1050          Bed Mobility Goal 1 (PT)    Activity/Assistive Device (Bed Mobility Goal 1, PT)  bed mobility activities, all  -EJ     [place] Level/Cues Needed (Bed Mobility Goal 1, PT)  minimum assist (75% or more patient effort)  -EJ     Time Frame (Bed Mobility Goal 1, PT)  1 week  -EJ     Row Name 01/09/19 1050          Transfer Goal 1 (PT)    Activity/Assistive Device (Transfer Goal 1, PT)  transfers, all;walker, rolling  -EJ     [place] Level/Cues Needed (Transfer Goal 1, PT)  moderate assist (50-74% patient effort)  -EJ     Time Frame (Transfer  Goal 1, PT)  1 week  -EJ     Row Name 01/09/19 1050          Gait Training Goal 1 (PT)    Activity/Assistive Device (Gait Training Goal 1, PT)  gait (walking locomotion);walker, rolling  -EJ     [place] Level (Gait Training Goal 1, PT)  minimum assist (75% or more patient effort) x 2  -EJ     Distance (Gait Goal 1, PT)  10  -EJ     Time Frame (Gait Training Goal 1, PT)  1 week  -EJ     Row Name 01/09/19 1050          Positioning and Restraints    Pre-Treatment Position  in bed  -EJ     Post Treatment Position  bed  -EJ     In Bed  notified nsg;supine;call light within reach;encouraged to call for assist;exit alarm on;with family/caregiver  -EJ       User Key  (r) = Recorded By, (t) = Taken By, (c) = Cosigned By    Initials Name Provider Type    Keshia Galeana, RN Registered Nurse    Monica Marsh, PT Physical Therapist        Physical Therapy Education     Title: PT OT SLP Therapies (Done)     Topic: Physical Therapy (Done)     Point: Mobility training (Done)     Learning Progress Summary           Patient Acceptance, E,TB,D, VU,NR by MOSHE at 1/9/2019 11:28 AM   Family Acceptance, E,TB,D, VU,NR by MOSHE at 1/9/2019 11:28 AM                   Point: Home exercise program (Done)     Learning Progress Summary           Patient Acceptance, E,TB,D, VU,NR by MOSHE at 1/9/2019 11:28 AM   Family Acceptance, E,TB,D, VU,NR by MOSHE at 1/9/2019 11:28 AM                   Point: Body mechanics (Done)     Learning Progress Summary           Patient Acceptance, E,TB,D, VU,NR by MOSHE at 1/9/2019 11:28 AM   Family Acceptance, E,TB,D, VU,NR by MOSHE at 1/9/2019 11:28 AM                   Point: Precautions (Done)     Learning Progress Summary           Patient Acceptance, E,TB,D, VU,NR by MOSHE at 1/9/2019 11:28 AM   Family Acceptance, E,TB,D, VU,NR by MOSHE at 1/9/2019 11:28 AM                               User Key     Initials Effective Dates Name Provider Type [facility] 04/03/18 -  Monica Storey, PT Physical Therapist PT               PT Recommendation and Plan  Anticipated Discharge Disposition (PT): skilled nursing facility  Planned Therapy Interventions (PT Eval): bed mobility training, gait training, home exercise program, patient/family education, ROM (range of motion), strengthening, transfer training  Therapy Frequency (PT Clinical Impression): daily  Outcome Summary/Treatment Plan (PT)  Anticipated Discharge Disposition (PT): skilled nursing facility  Plan of Care Reviewed With: patient  Progress: improving  Outcome Summary: Pt agreeable to PT this am, although he does c/o dizziness and pain. Pt had a fall at home resulting in R distal femur fx. He is now s/p R TKR and R distal femur repair. Pt is WBAT and per MD, to progress gait training slowly. Today. pt presents with increased weakness, post op pain, decreased activity tolerance and decreased functional mobility. Pt able to sit EOB and attempt standing x 2 with R wx and max A x 2. He has difficulty straightening out R Knee and verbal/tactile cues needed for proper technique with transfers. He will need SNU at [place]. Will continue to benefit from skilled PT.  Outcome Measures     Row Name 01/09/19 1100             How much help from another person do you currently need...    Turning from your back to your side while in flat bed without using bedrails?  2  -EJ      Moving from lying on back to sitting on the side of a flat bed without bedrails?  2  -EJ      Moving to and from a bed to a chair (including a wheelchair)?  1  -EJ      Standing up from a chair using your arms (e.g., wheelchair, bedside chair)?  2  -EJ      Climbing 3-5 steps with a railing?  1  -EJ      To walk in hospital room?  1  -EJ      AM-PAC 6 Clicks Score  9  -EJ         Functional Assessment    Outcome Measure Options  AM-PAC 6 Clicks Basic Mobility (PT)  -EJ        User Key  (r) = Recorded By, (t) = Taken By, (c) = Cosigned By    Initials Name Provider Type    Monica Marsh, PT Physical Therapist          Time Calculation:   PT Charges     Row Name 01/09/19 1132             Time Calculation    Start Time  1050  -EJ      Stop Time  1120  -EJ      Time Calculation (min)  30 min  -EJ      PT Received On  01/09/19  -EJ      PT - Next Appointment  01/10/19  -EJ      PT Goal Re-Cert Due Date  01/16/19  -EJ         Timed Charges    68040 - PT Therapeutic Exercise Minutes  18  -EJ        User Key  (r) = Recorded By, (t) = Taken By, (c) = Cosigned By    Initials Name Provider Type    EJ Monica Storey, PT Physical Therapist        Therapy Suggested Charges     Code   Minutes Charges    62792 (CPT®) Hc Pt Neuromusc Re Education Ea 15 Min      44444 (CPT®) Hc Pt Ther Proc Ea 15 Min 18 1    57083 (CPT®) Hc Gait Training Ea 15 Min      84585 (CPT®) Hc Pt Therapeutic Act Ea 15 Min      21629 (CPT®) Hc Pt Manual Therapy Ea 15 Min      53811 (CPT®) Hc Pt Iontophoresis Ea 15 Min      03769 (CPT®) Hc Pt Elec Stim Ea-Per 15 Min      98589 (CPT®) Hc Pt Ultrasound Ea 15 Min      90291 (CPT®) Hc Pt Self Care/Mgmt/Train Ea 15 Min      23810 (CPT®) Hc Pt Prosthetic (S) Train Initial Encounter, Each 15 Min      15210 (CPT®) Hc Pt Orthotic(S)/Prosthetic(S) Encounter, Each 15 Min      82974 (CPT®) Hc Orthotic(S) Mgmt/Train Initial Encounter, Each 15min      Total  18 1        Therapy Charges for Today     Code Description Service Date Service Provider Modifiers Qty    34926874168 HC PT EVAL MOD COMPLEXITY 2 1/9/2019 Monica Storey, PT GP 1    95339659225 HC PT THER PROC EA 15 MIN 1/9/2019 Monica Storey, PT GP 1    18546692070 HC PT THER SUPP EA 15 MIN 1/9/2019 Monica Storey, PT GP 1          PT G-Codes  Outcome Measure Options: AM-PAC 6 Clicks Basic Mobility (PT)  AM-PAC 6 Clicks Score: 9      Monica Storey PT  1/9/2019

## 2019-01-09 NOTE — PROGRESS NOTES
"Ariel Elliott  1940 78 y.o.  [id number]      Patient Care Team:  Zeb Meadows MD as PCP - General (Internal Medicine)    CC: Status post recent TAVR, recent permanent pacemaker, normal LV function, history of coronary disease    Interval History: He's doing well no shortness of breath or chest pain      Objective   Vital Signs  Temp:  [97 °F (36.1 °C)-100.3 °F (37.9 °C)] 99.5 °F (37.5 °C)  Heart Rate:  [60-99] 85  Resp:  [12-20] 16  BP: (133-192)/(43-95) 154/69    Intake/Output Summary (Last 24 hours) at 1/9/2019 1013  Last data filed at 1/9/2019 0441  Gross per 24 hour   Intake 2099 ml   Output 1915 ml   Net 184 ml     Flowsheet Rows      First Filed Value   Admission Height  177.8 cm (70\") Documented at 01/07/2019 1800   Admission Weight  119 kg (263 lb) Documented at 01/07/2019 1800          Physical Exam:   General Appearance:    Alert,oriented, in no acute distress   Lungs:     Clear to auscultation,BS are equal    Heart:    Normal S1 and S2, RRR with 1/6 systolic ejection murmur, gallop or rub   HEENT:    Sclerae are clear, no JVD or adenopathy   Abdomen:     Normal bowel sounds, soft non-tender, non-distended, no HSM   Extremities:   Moves all extremities well, no edema, no cyanosis, no             Redness, no rash     Medication Review:        allopurinol 300 mg Oral Nightly   aspirin 81 mg Oral Daily   cholecalciferol 1,000 Units Oral Daily   furosemide 40 mg Oral Daily   insulin glargine 15 Units Subcutaneous Q12H   insulin lispro 0-9 Units Subcutaneous 4x Daily With Meals & Nightly   insulin lispro 5 Units Subcutaneous TID With Meals   isosorbide dinitrate 40 mg Oral BID   pantoprazole 40 mg Oral QAM   polyethylene glycol 17 g Oral Daily   sodium chloride 10 mL Intravenous Q12H   sodium chloride 3 mL Intravenous Q12H   wheat dextrin 1 each Oral Daily       lactated ringers 9 mL/hr Last Rate: 9 mL/hr (01/08/19 1249)   Sodium chloride 20 mL/hr Last Rate: 20 mL/hr (01/07/19 2028)   Sodium " chloride 100 mL/hr Last Rate: Stopped (01/08/19 0221)   Sodium chloride 100 mL/hr Last Rate: 100 mL/hr (01/09/19 0155)         I reviewed the patient's new clinical results.  I personally viewed and interpreted the patient's EKG/Telemetry data    Assessment/Plan  Active Hospital Problems    Diagnosis Date Noted   • **Closed displaced supracondylar fracture without intracondylar extension of lower end of right femur (CMS/[facility]) [S72.451A] 01/07/2019   • S/P TAVR (transcatheter aortic valve replacement) [Z95.2] 12/21/2018   • Von Willebrand disease (CMS/[facility]) [D68.0] 11/13/2018   • CKD (chronic kidney disease) [N18.9] 11/13/2018   • Nonrheumatic aortic valve stenosis [I35.0] 10/25/2018   • S/P CABG (coronary artery bypass graft) [Z95.1] 10/25/2018   • Type 2 diabetes mellitus with complication, with long-term current use of insulin (CMS/[facility]) [E11.8, Z79.4] 10/25/2018   • AVM (arteriovenous malformation) of small bowel, acquired with hemorrhage (CMS/[facility]) [K55.8] 10/25/2018      Resolved Hospital Problems   No resolved problems to display.       I think he is doing great.  I am sad that he fell and broke his leg, but thank God he did it after we put his pacemaker in and replaced his aortic valve because that has really changed his life.  He is really doing well with that.  I think it does tell us though with his fall that he certainly is very fragile.  At this point, he is stable from a cardiac standpoint.  I would transfer him to the orthopedic floor, off the monitor and pursue full orthopedic rehab care.        Dionicio Stoner MD  01/09/19  10:13 AM

## 2019-01-09 NOTE — DISCHARGE PLACEMENT REQUEST
"Ariel Elliott LESA (78 y.o. 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        Admission Date Admission Type Admitting Provider Attending Provider Department, Room/Bed    1/7/19 Emergency Jerson Higginbotham MD [place], David Ryan, MD [address], [address]    Discharge Date Discharge Disposition Discharge Destination                       Attending Provider:  Jerson Higginbotham MD    Allergies:  Statins    Isolation:  None   Infection:  None   Code Status:  CPR    Ht:  177.8 cm (70\")   Wt:  124 kg (273 lb)    Admission Cmt:  None   Principal Problem:  Closed displaced supracondylar fracture without intracondylar extension of lower end of right femur (CMS/HCC) [S72.451A]                 Active Insurance as of 1/7/2019     Primary Coverage     Payor Plan Insurance Group Employer/Plan Group    MEDICARE MEDICARE A & B      Payor Plan Address Payor Plan Phone Number Payor Plan Fax Number Effective Dates     [id number] [phone]  1/1/2005 - None Entered    [facility] [id number]       Subscriber Name Subscriber Birth Date Member ID       ARIEL ELLIOTT R 1940 [id number]           Secondary Coverage     Payor Plan Insurance Group Employer/Plan Group    [employer] [place] [employer] [place]      Payor Plan Address Payor Plan Phone Number Payor Plan Fax Number Effective Dates    [employer] [place] R [phone]  10/1/2009 - None Entered    [place] [id number]       Subscriber Name Subscriber Birth Date Member ID       ARIEL ELLIOTT R 1940 [id number]                 Emergency Contacts      (Rel.) Home Phone Work Phone Mobile Phone    Lexi Hurst (Daughter) [phone] -- [phone]               "

## 2019-01-09 NOTE — PLAN OF CARE
Problem: Fall Risk (Adult)  Goal: Absence of Fall  Outcome: Ongoing (interventions implemented as appropriate)   01/08/19 1920   Fall Risk (Adult)   Absence of Fall making progress toward outcome       Problem: Patient Care Overview  Goal: Individualization and Mutuality  Outcome: Ongoing (interventions implemented as appropriate)    Goal: Discharge Needs Assessment  Outcome: Ongoing (interventions implemented as appropriate)    Goal: Interprofessional Rounds/Family Conf  Outcome: Ongoing (interventions implemented as appropriate)      Problem: Pain, Acute (Adult)  Goal: Acceptable Pain Control/Comfort Level  Outcome: Ongoing (interventions implemented as appropriate)      Problem: Anemia (Adult)  Goal: Symptom Improvement  Outcome: Ongoing (interventions implemented as appropriate)

## 2019-01-09 NOTE — PROGRESS NOTES
Patient: Ariel Elliott  YOB: 1940     Date of Admission: 1/7/2019  6:31 PM Medical Record Number: [id number]     Attending Physician: Jerson Higginbotham MD    Status Post:  Procedure(s):  RIGHT DISTAL FEMUR REPLACEMENTPost Operative Day Number: 1    Subjective : No new orthopaedic complaints     Pain Relief: some relief with present medication.     Systemic Complaints: No Complaints  Vitals:    01/09/19 0004 01/09/19 0417 01/09/19 0532 01/09/19 0722   BP: 138/73 154/69     BP Location: Right arm Left arm     Patient Position: Lying Lying     Pulse: 85      Resp: 16 16  16   Temp: 98.1 °F (36.7 °C) 100.3 °F (37.9 °C) 99.1 °F (37.3 °C) 99.5 °F (37.5 °C)   TempSrc: Oral Oral Oral Oral   SpO2: 95%      Weight:   124 kg (273 lb)    Height:           Physical Exam: 78 y.o. male    General Appearance:       Alert, cooperative, in no acute distress                  Extremities:    Dressing Clean, Dry and Intact         Incision healthy without signs or symptoms of infections         No clinical sign of DVT        Able to do good movements of digits    Pulses:   Pulses palpable and equal bilaterally           Diagnostic Tests:     Results from last 7 days   Lab Units  01/09/19   0410  01/08/19   1210  01/08/19   0932  01/07/19 1947   WBC 10*3/mm3  10.26   --   6.80  5.55   HEMOGLOBIN g/dL  7.2*  7.9*  7.7*  6.7*   HEMATOCRIT %  23.7*  24.6*  23.9*  21.2*   PLATELETS 10*3/mm3  133*   --   157  139*     Results from last 7 days   Lab Units  01/09/19   0410  01/08/19   0616  01/07/19 1947   SODIUM mmol/L  141  141  142   POTASSIUM mmol/L  4.1  4.3  4.4   CHLORIDE mmol/L  106  105  105   CO2 mmol/L  21.0*  22.3  22.7   BUN mg/dL  48*  54*  58*   CREATININE mg/dL  1.60*  1.31*  1.42*   GLUCOSE mg/dL  216*  176*  178*   CALCIUM mg/dL  8.1*  8.5*  8.6         No results found for: CRP  No results found for: SEDRATE  Lab Results   Component Value Date    URICACID 12.1 (H) 03/10/2014     No results  found for: CRYSTAL  Microbiology Results (last 10 days)     ** No results found for the last 240 hours. **        Xr Femur 2 View Right    Result Date: 1/8/2019  1. Comminuted, impacted, displaced distal femoral metaphyseal fracture may exhibit intercondylar extension and this could be further evaluated with CT. 2. Right total hip arthroplasty without evidence for right hip fracture.  This report was finalized on 1/8/2019 8:02 AM by Dr. Jake Begum M.D.      Xr Knee 3 View Right    Result Date: 1/8/2019  1. Comminuted, impacted, displaced distal femoral metaphyseal fracture may exhibit intercondylar extension and this could be further evaluated with CT. 2. Right total hip arthroplasty without evidence for right hip fracture.  This report was finalized on 1/8/2019 8:02 AM by Dr. Jake Begum M.D.      Ct Head Without Contrast    Result Date: 1/7/2019   1. No acute intracranial hemorrhage. 2. Right parietal soft tissue hematoma.  Radiation dose reduction techniques were utilized, including automated exposure control and exposure modulation based on body size.  This report was finalized on 1/7/2019 9:16 PM by Dr. Irina Cole M.D.              Current Medications:  Scheduled Meds:  allopurinol 300 mg Oral Nightly   aspirin 81 mg Oral Daily   cholecalciferol 1,000 Units Oral Daily   furosemide 40 mg Oral Daily   insulin glargine 15 Units Subcutaneous Q12H   insulin lispro 0-9 Units Subcutaneous 4x Daily With Meals & Nightly   insulin lispro 5 Units Subcutaneous TID With Meals   isosorbide dinitrate 40 mg Oral BID   pantoprazole 40 mg Oral QAM   polyethylene glycol 17 g Oral Daily   sodium chloride 10 mL Intravenous Q12H   sodium chloride 3 mL Intravenous Q12H   wheat dextrin 1 each Oral Daily     Continuous Infusions:  lactated ringers 9 mL/hr Last Rate: 9 mL/hr (01/08/19 1249)   Sodium chloride 20 mL/hr Last Rate: 20 mL/hr (01/07/19 2028)   Sodium chloride 100 mL/hr Last Rate: Stopped (01/08/19 0221)    Sodium chloride 100 mL/hr Last Rate: 100 mL/hr (01/09/19 0155)     PRN Meds:.•  acetaminophen  •  bisacodyl  •  bisacodyl  •  dextrose  •  dextrose  •  glucagon (human recombinant)  •  heparin flush (porcine)  •  HYDROcodone-acetaminophen  •  HYDROmorphone  •  ondansetron **OR** ondansetron ODT **OR** ondansetron  •  [COMPLETED] Insert peripheral IV **AND** sodium chloride  •  Access VAD **AND** sodium chloride  •  sodium chloride  •  sodium chloride  •  sodium chloride    Assessment: Status post  Procedure(s):  RIGHT DISTAL FEMUR REPLACEMENT    Patient Active Problem List   Diagnosis   • Nonrheumatic aortic valve stenosis   • S/P CABG (coronary artery bypass graft)   • Class 2 severe obesity due to excess calories with serious comorbidity and body mass index (BMI) of 39.0 to 39.9 in adult (CMS/HCC)   • Left bundle branch block (LBBB)   • Type 2 diabetes mellitus with complication, with long-term current use of insulin (CMS/[facility])   • Dyspnea on exertion   • Angina pectoris (CMS/[facility])   • Syncope   • AVM (arteriovenous malformation) of small bowel, acquired with hemorrhage (CMS/HCC)   • Aortic valve stenosis   • Atrial flutter (CMS/[facility])   • Diabetes mellitus (CMS/HCC)   • Von Willebrand disease (CMS/[facility])   • Sleep apnea   • Pacemaker   • Coronary atherosclerosis   • CKD (chronic kidney disease)   • S/P TAVR (transcatheter aortic valve replacement)   • Closed displaced supracondylar fracture without intracondylar extension of lower end of right femur (CMS/HCC)       PLAN:   Continues current post-op course  Hold off on chemical anticoagulation  Mechanical DVT prophylaxis, early mobilization  Hemovac drain to be removed tomorrow  Hgb low, expected, OK to transfuse PRBC if ok with admitting service      Weight Bearing: WBAT  Discharge Plan: OK to plan for discharge in  tomorrow to SNF  from orthopadic perspective.      Margot Ray MD    Date: 1/9/2019    Time: 8:03 AM

## 2019-01-10 LAB
ABO + RH BLD: NORMAL
ABO + RH BLD: NORMAL
ANION GAP SERPL CALCULATED.3IONS-SCNC: 13.2 MMOL/L
BASOPHILS # BLD AUTO: 0 10*3/MM3 (ref 0–0.2)
BASOPHILS NFR BLD AUTO: 0 % (ref 0–1.5)
BH BB BLOOD EXPIRATION DATE: NORMAL
BH BB BLOOD EXPIRATION DATE: NORMAL
BH BB BLOOD TYPE BARCODE: 5100
BH BB BLOOD TYPE BARCODE: 5100
BH BB DISPENSE STATUS: NORMAL
BH BB DISPENSE STATUS: NORMAL
BH BB PRODUCT CODE: NORMAL
BH BB PRODUCT CODE: NORMAL
BH BB UNIT NUMBER: NORMAL
BH BB UNIT NUMBER: NORMAL
BUN BLD-MCNC: 48 MG/DL (ref 8–23)
BUN/CREAT SERPL: 29.3 (ref 7–25)
CALCIUM SPEC-SCNC: 7.8 MG/DL (ref 8.6–10.5)
CHLORIDE SERPL-SCNC: 105 MMOL/L (ref 98–107)
CO2 SERPL-SCNC: 20.8 MMOL/L (ref 22–29)
CREAT BLD-MCNC: 1.64 MG/DL (ref 0.76–1.27)
CROSSMATCH INTERPRETATION: NORMAL
CROSSMATCH INTERPRETATION: NORMAL
DEPRECATED RDW RBC AUTO: 56 FL (ref 37–54)
EOSINOPHIL # BLD AUTO: 0.1 10*3/MM3 (ref 0–0.7)
EOSINOPHIL NFR BLD AUTO: 1.4 % (ref 0.3–6.2)
ERYTHROCYTE [DISTWIDTH] IN BLOOD BY AUTOMATED COUNT: 16.4 % (ref 11.5–14.5)
GFR SERPL CREATININE-BSD FRML MDRD: 41 ML/MIN/1.73
GLUCOSE BLD-MCNC: 196 MG/DL (ref 65–99)
GLUCOSE BLDC GLUCOMTR-MCNC: 144 MG/DL (ref 70–130)
GLUCOSE BLDC GLUCOMTR-MCNC: 152 MG/DL (ref 70–130)
GLUCOSE BLDC GLUCOMTR-MCNC: 155 MG/DL (ref 70–130)
GLUCOSE BLDC GLUCOMTR-MCNC: 196 MG/DL (ref 70–130)
HCT VFR BLD AUTO: 24.3 % (ref 40.4–52.2)
HGB BLD-MCNC: 7.7 G/DL (ref 13.7–17.6)
IMM GRANULOCYTES # BLD AUTO: 0 10*3/MM3 (ref 0–0.03)
IMM GRANULOCYTES NFR BLD AUTO: 0 % (ref 0–0.5)
LYMPHOCYTES # BLD AUTO: 0.43 10*3/MM3 (ref 0.9–4.8)
LYMPHOCYTES NFR BLD AUTO: 6 % (ref 19.6–45.3)
MCH RBC QN AUTO: 29.7 PG (ref 27–32.7)
MCHC RBC AUTO-ENTMCNC: 31.7 G/DL (ref 32.6–36.4)
MCV RBC AUTO: 93.8 FL (ref 79.8–96.2)
MONOCYTES # BLD AUTO: 0.77 10*3/MM3 (ref 0.2–1.2)
MONOCYTES NFR BLD AUTO: 10.7 % (ref 5–12)
NEUTROPHILS # BLD AUTO: 5.88 10*3/MM3 (ref 1.9–8.1)
NEUTROPHILS NFR BLD AUTO: 81.9 % (ref 42.7–76)
PLATELET # BLD AUTO: 90 10*3/MM3 (ref 140–500)
PMV BLD AUTO: 10.8 FL (ref 6–12)
POTASSIUM BLD-SCNC: 4 MMOL/L (ref 3.5–5.2)
RBC # BLD AUTO: 2.59 10*6/MM3 (ref 4.6–6)
SODIUM BLD-SCNC: 139 MMOL/L (ref 136–145)
UNIT  ABO: NORMAL
UNIT  ABO: NORMAL
UNIT  RH: NORMAL
UNIT  RH: NORMAL
WBC NRBC COR # BLD: 7.18 10*3/MM3 (ref 4.5–10.7)

## 2019-01-10 PROCEDURE — 63710000001 INSULIN LISPRO (HUMAN) PER 5 UNITS: Performed by: HOSPITALIST

## 2019-01-10 PROCEDURE — 80048 BASIC METABOLIC PNL TOTAL CA: CPT | Performed by: INTERNAL MEDICINE

## 2019-01-10 PROCEDURE — 97110 THERAPEUTIC EXERCISES: CPT

## 2019-01-10 PROCEDURE — 99232 SBSQ HOSP IP/OBS MODERATE 35: CPT | Performed by: NURSE PRACTITIONER

## 2019-01-10 PROCEDURE — 85025 COMPLETE CBC W/AUTO DIFF WBC: CPT | Performed by: INTERNAL MEDICINE

## 2019-01-10 PROCEDURE — 63710000001 INSULIN GLARGINE PER 5 UNITS: Performed by: HOSPITALIST

## 2019-01-10 PROCEDURE — 25010000002 HYDROMORPHONE 1 MG/ML SOLUTION: Performed by: INTERNAL MEDICINE

## 2019-01-10 PROCEDURE — 82962 GLUCOSE BLOOD TEST: CPT

## 2019-01-10 RX ADMIN — SODIUM CHLORIDE, PRESERVATIVE FREE 3 ML: 5 INJECTION INTRAVENOUS at 09:12

## 2019-01-10 RX ADMIN — ISOSORBIDE DINITRATE 40 MG: 20 TABLET ORAL at 09:05

## 2019-01-10 RX ADMIN — INSULIN LISPRO 5 UNITS: 100 INJECTION, SOLUTION INTRAVENOUS; SUBCUTANEOUS at 09:06

## 2019-01-10 RX ADMIN — HYDROMORPHONE HYDROCHLORIDE 1 MG: 1 INJECTION, SOLUTION INTRAMUSCULAR; INTRAVENOUS; SUBCUTANEOUS at 03:11

## 2019-01-10 RX ADMIN — INSULIN LISPRO 5 UNITS: 100 INJECTION, SOLUTION INTRAVENOUS; SUBCUTANEOUS at 12:30

## 2019-01-10 RX ADMIN — INSULIN LISPRO 2 UNITS: 100 INJECTION, SOLUTION INTRAVENOUS; SUBCUTANEOUS at 12:29

## 2019-01-10 RX ADMIN — INSULIN GLARGINE 15 UNITS: 100 INJECTION, SOLUTION SUBCUTANEOUS at 09:06

## 2019-01-10 RX ADMIN — ISOSORBIDE DINITRATE 40 MG: 20 TABLET ORAL at 21:42

## 2019-01-10 RX ADMIN — TAMSULOSIN HYDROCHLORIDE 0.4 MG: 0.4 CAPSULE ORAL at 09:05

## 2019-01-10 RX ADMIN — Medication 1 EACH: at 09:05

## 2019-01-10 RX ADMIN — PANTOPRAZOLE SODIUM 40 MG: 40 TABLET, DELAYED RELEASE ORAL at 06:00

## 2019-01-10 RX ADMIN — SODIUM CHLORIDE, PRESERVATIVE FREE 3 ML: 5 INJECTION INTRAVENOUS at 21:20

## 2019-01-10 RX ADMIN — INSULIN LISPRO 5 UNITS: 100 INJECTION, SOLUTION INTRAVENOUS; SUBCUTANEOUS at 18:38

## 2019-01-10 RX ADMIN — INSULIN GLARGINE 15 UNITS: 100 INJECTION, SOLUTION SUBCUTANEOUS at 21:43

## 2019-01-10 RX ADMIN — HYDROMORPHONE HYDROCHLORIDE 1 MG: 1 INJECTION, SOLUTION INTRAMUSCULAR; INTRAVENOUS; SUBCUTANEOUS at 10:31

## 2019-01-10 RX ADMIN — SODIUM CHLORIDE 100 ML/HR: 9 INJECTION, SOLUTION INTRAVENOUS at 06:00

## 2019-01-10 RX ADMIN — INSULIN LISPRO 2 UNITS: 100 INJECTION, SOLUTION INTRAVENOUS; SUBCUTANEOUS at 18:37

## 2019-01-10 RX ADMIN — FUROSEMIDE 40 MG: 40 TABLET ORAL at 09:05

## 2019-01-10 RX ADMIN — SODIUM CHLORIDE, PRESERVATIVE FREE 10 ML: 5 INJECTION INTRAVENOUS at 21:43

## 2019-01-10 RX ADMIN — HYDROCODONE BITARTRATE AND ACETAMINOPHEN 1 TABLET: 10; 325 TABLET ORAL at 19:14

## 2019-01-10 RX ADMIN — POLYETHYLENE GLYCOL 3350 17 G: 17 POWDER, FOR SOLUTION ORAL at 09:06

## 2019-01-10 RX ADMIN — INSULIN LISPRO 2 UNITS: 100 INJECTION, SOLUTION INTRAVENOUS; SUBCUTANEOUS at 21:43

## 2019-01-10 RX ADMIN — HYDROCODONE BITARTRATE AND ACETAMINOPHEN 1 TABLET: 10; 325 TABLET ORAL at 13:09

## 2019-01-10 RX ADMIN — SODIUM CHLORIDE, PRESERVATIVE FREE 10 ML: 5 INJECTION INTRAVENOUS at 09:45

## 2019-01-10 RX ADMIN — BISACODYL 5 MG: 5 TABLET, COATED ORAL at 09:06

## 2019-01-10 RX ADMIN — VITAMIN D, TAB 1000IU (100/BT) 1000 UNITS: 25 TAB at 09:05

## 2019-01-10 RX ADMIN — HYDROMORPHONE HYDROCHLORIDE 1 MG: 1 INJECTION, SOLUTION INTRAMUSCULAR; INTRAVENOUS; SUBCUTANEOUS at 16:29

## 2019-01-10 RX ADMIN — ALLOPURINOL 300 MG: 300 TABLET ORAL at 21:42

## 2019-01-10 RX ADMIN — HYDROCODONE BITARTRATE AND ACETAMINOPHEN 1 TABLET: 10; 325 TABLET ORAL at 05:22

## 2019-01-10 RX ADMIN — SODIUM CHLORIDE, PRESERVATIVE FREE 10 ML: 5 INJECTION INTRAVENOUS at 09:13

## 2019-01-10 NOTE — PROGRESS NOTES
"    Patient Name: Ariel Elliott  :1940  79 y.o.      Patient Care Team:  Zeb Meadows MD as PCP - General (Internal Medicine)    Chief Complaint: follow up aortic stenosis, coronary disease    Interval History: He feels better today than yesterday. Having significant surgical pain but starting to feel better.        Objective   Vital Signs  Temp:  [97.6 °F (36.4 °C)-100.4 °F (38 °C)] 98.3 °F (36.8 °C)  Heart Rate:  [73-96] 73  Resp:  [16-20] 18  BP: (128-148)/(61-66) 128/65    Intake/Output Summary (Last 24 hours) at 1/10/2019 1833  Last data filed at 1/10/2019 1144  Gross per 24 hour   Intake 706.25 ml   Output 1200 ml   Net -493.75 ml     Flowsheet Rows      First Filed Value   Admission Height  177.8 cm (70\") Documented at 2019 1800   Admission Weight  119 kg (263 lb) Documented at 2019 1800          Physical Exam:   General Appearance:    Alert, cooperative, in no acute distress   Lungs:     Clear to auscultation.  Normal respiratory effort and rate.      Heart:    Regular rhythm and normal rate, normal S1 and S2, no murmurs, gallops or rubs.     Chest Wall:    No abnormalities observed   Abdomen:     Soft, nontender, positive bowel sounds.     Extremities:   no cyanosis, clubbing or edema.  No marked joint deformities.  Adequate musculoskeletal strength.  Right knee leg with dressing     Results Review:    Results from last 7 days   Lab Units  01/10/19   1311   SODIUM mmol/L  139   POTASSIUM mmol/L  4.0   CHLORIDE mmol/L  105   CO2 mmol/L  20.8*   BUN mg/dL  48*   CREATININE mg/dL  1.64*   GLUCOSE mg/dL  196*   CALCIUM mg/dL  7.8*         Results from last 7 days   Lab Units  01/10/19   0316   WBC 10*3/mm3  7.18   HEMOGLOBIN g/dL  7.7*   HEMATOCRIT %  24.3*   PLATELETS 10*3/mm3  90*         Results from last 7 days   Lab Units  19   0616   MAGNESIUM mg/dL  2.4                   Medication Review:     allopurinol 300 mg Oral Nightly   aspirin 81 mg Oral Daily "   cholecalciferol 1,000 Units Oral Daily   furosemide 40 mg Oral Daily   insulin glargine 15 Units Subcutaneous Q12H   insulin lispro 0-9 Units Subcutaneous 4x Daily With Meals & Nightly   insulin lispro 5 Units Subcutaneous TID With Meals   isosorbide dinitrate 40 mg Oral BID   pantoprazole 40 mg Oral QAM   polyethylene glycol 17 g Oral Daily   sodium chloride 10 mL Intravenous Q12H   sodium chloride 3 mL Intravenous Q12H   tamsulosin 0.4 mg Oral Daily   wheat dextrin 1 each Oral Daily             Assessment/Plan   1.  Fall + femur fracture s/p OR. Tolerated surgery well. No evidence of heart failure despite multiple transfusions. Continue oral lasix.   2. Aortic stenosis s/p TAVR (11/13/18).   3. History of coronary artery disease s/p CABG  4. Chronic GIB due to AVM and von willebrand - s/p recurrent transfusions  5. AFlutter s/p ablation  6. PPM placement     He's doing well since surgery . Does not appear to be in heart failure. Continue supportive post op care.     SHAD Mccollum  [facility]  01/10/19  6:33 PM

## 2019-01-10 NOTE — THERAPY TREATMENT NOTE
Acute Care - Physical Therapy Treatment Note  [facility]     Patient Name: Ariel Elliott  : 1940  MRN: [id number]  Today's Date: 1/10/2019  Onset of Illness/Injury or Date of Surgery: 19  Date of Referral to PT: 19  Referring Physician: Flor    Admit Date: 2019    Visit Dx:    ICD-10-CM ICD-9-CM   1. Closed displaced supracondylar fracture of distal end of right femur without intracondylar extension, initial encounter (CMS/[facility]) S72.451A 821.23   2. Symptomatic anemia D64.9 285.9   3. Minor head injury without loss of consciousness, initial encounter S09.90XA 959.01   4. Difficulty walking R26.2 719.7     Patient Active Problem List   Diagnosis   • Nonrheumatic aortic valve stenosis   • S/P CABG (coronary artery bypass graft)   • Class 2 severe obesity due to excess calories with serious comorbidity and body mass index (BMI) of 39.0 to 39.9 in adult (CMS/[facility])   • Left bundle branch block (LBBB)   • Type 2 diabetes mellitus with complication, with long-term current use of insulin (CMS/[facility])   • Dyspnea on exertion   • Angina pectoris (CMS/[facility])   • Syncope   • AVM (arteriovenous malformation) of small bowel, acquired with hemorrhage (CMS/[facility])   • Aortic valve stenosis   • Atrial flutter (CMS/[facility])   • Diabetes mellitus (CMS/[facility])   • Von Willebrand disease (CMS/[facility])   • Sleep apnea   • Pacemaker   • Coronary atherosclerosis   • CKD (chronic kidney disease)   • S/P TAVR (transcatheter aortic valve replacement)   • Closed displaced supracondylar fracture without intracondylar extension of lower end of right femur (CMS/[facility])       Therapy Treatment    Rehabilitation Treatment Summary     Row Name 01/10/19 1500             Treatment Time/Intention    Discipline  physical therapy assistant  -LUDMILA      Document Type  therapy note (daily note)  -LUDMILA      Subjective Information  complains of;weakness;fatigue;pain  -LUDMILA      Care Plan Review  patient/other agree to care plan  -LUDMILA      Existing  "Precautions/Restrictions  fall  -JM2      Treatment Considerations/Comments  RLE WBAT, \"go slow w/gt training\" per MD   -JM3      Recorded by [] Jocelin Mccormick, [facility] 01/10/19 1541  [JM2] Jocelin Mccormick, [facility] 01/10/19 1611  [JM3] Jocelin Mccormick, [facility] 01/10/19 1619      Row Name 01/10/19 1500             Bed Mobility Assessment/Treatment    Supine-Sit [place] (Bed Mobility)  2 person assist;maximum assist (25% patient effort)  -      Bed Mobility, Safety Issues  decreased use of arms for pushing/pulling;decreased use of legs for bridging/pushing;impaired trunk control for bed mobility  -      Comment (Bed Mobility)  easily off task   -JM      Recorded by [] Jocelin Mccormick, [facility] 01/10/19 1619      Row Name 01/10/19 1500             Bed-Chair Transfer    Bed-Chair [place] (Transfers)  maximum assist (25% patient effort);dependent (less than 25% patient effort)  -      Assistive Device (Bed-Chair Transfers)  walker, standard stood but difficulty pivoting w/wx, used gt belt to lower   -JM      Recorded by [JM] Jocelin Mccormick, [facility] 01/10/19 1619      Row Name 01/10/19 1500             Sit-Stand Transfer    Sit-Stand [place] (Transfers)  2 person assist;maximum assist (25% patient effort);moderate assist (50% patient effort)  -      Assistive Device (Sit-Stand Transfers)  walker, standard elevated bed  -JM      Recorded by [] Jocelin Mccormick, [facility] 01/10/19 1619      Row Name 01/10/19 1500             Stand-Sit Transfer    Stand-Sit [place] (Transfers)  2 person assist;maximum assist (25% patient effort);moderate assist (50% patient effort)  -      Assistive Device (Stand-Sit Transfers)  walker, standard  -      Recorded by [] Jocelin Mccormick, [facility] 01/10/19 1619      Row Name 01/10/19 1500             Gait/Stairs Assessment/Training    Comment (Gait/Stairs)  unable to amb, suggest if pivot next session, try w/o AD  -JM      Recorded by [] Jocelin Mccormick, [facility] 01/10/19 1619      " Row Name 01/10/19 1500             Therapeutic Exercise    Comment (Therapeutic Exercise)  isometrics x 10 reps, too fatigued after tfer to add exer  -JM      Recorded by [LUDMILA] Jocelin Mccormick PTA 01/10/19 1619      Row Name 01/10/19 1500             Positioning and Restraints    Pre-Treatment Position  in bed  -JM      Post Treatment Position  chair  -JM      In Chair  reclined;call light within reach;encouraged to call for assist;with family/caregiver;notified nsg  -JM      Recorded by [] Jocelin Mccormick PTA 01/10/19 1619      Row Name 01/10/19 1500             Pain Scale: Numbers Pre/Post-Treatment    Pain Scale: Numbers, Pretreatment  3/10  -JM      Pain Scale: Numbers, Post-Treatment  4/10  -JM      Pain Location - Side  Right  -JM      Pain Location - Orientation  lower  -JM      Pain Location  extremity  -JM      Recorded by [LUDMILA] Jocelin Mccormick PTA 01/10/19 1619      Row Name                Wound 01/08/19 1405 Right knee incision    Wound - Properties Group Date first assessed: 01/08/19 [MB] Time first assessed: 1405 [MB] Side: Right [MB] Location: knee [MB] Type: incision [MB] Recorded by:  [MB] Keshia Melara RN 01/08/19 1405      User Key  (r) = Recorded By, (t) = Taken By, (c) = Cosigned By    Initials Name Effective Dates Discipline    Keshia Galeana, RN 06/16/16 -  Nurse    Jocelin Mendez, [facility] 03/07/18 -  PT          Wound 01/08/19 1405 Right knee incision (Active)   Dressing Appearance dry;intact;no drainage 1/10/2019 12:30 PM   Closure Staples 1/10/2019 12:30 PM   Base clean;bleeding 1/10/2019 12:30 PM   Drainage Amount none 1/10/2019 12:30 PM   Dressing Care, Wound dressing changed;other (see comments) 1/10/2019  8:55 AM           Physical Therapy Education     Title: PT OT SLP Therapies (Done)     Topic: Physical Therapy (Done)     Point: Mobility training (Done)     Learning Progress Summary           Patient Acceptance, E,TB,D, VU,NR by LUDMILA at 1/10/2019  4:37 PM    Acceptance,  E,TB,D, VU,NR by MOSHE at 1/9/2019 11:28 AM   Family Acceptance, E,TB,D, VU,NR by LUDMILA at 1/10/2019  4:37 PM    Acceptance, E,TB,D, VU,NR by MOSHE at 1/9/2019 11:28 AM                   Point: Home exercise program (Done)     Learning Progress Summary           Patient Acceptance, E,TB,D, VU,NR by LUDMILA at 1/10/2019  4:37 PM    Acceptance, E,TB,D, VU,NR by MOSHE at 1/9/2019 11:28 AM   Family Acceptance, E,TB,D, VU,NR by LUDMILA at 1/10/2019  4:37 PM    Acceptance, E,TB,D, VU,NR by MOSHE at 1/9/2019 11:28 AM                   Point: Body mechanics (Done)     Learning Progress Summary           Patient Acceptance, E,TB,D, VU,NR by LUDMILA at 1/10/2019  4:37 PM    Acceptance, E,TB,D, VU,NR by MOSHE at 1/9/2019 11:28 AM   Family Acceptance, E,TB,D, VU,NR by LUDMILA at 1/10/2019  4:37 PM    Acceptance, E,TB,D, VU,NR by MOSHE at 1/9/2019 11:28 AM                   Point: Precautions (Done)     Learning Progress Summary           Patient Acceptance, E,TB,D, VU,NR by LUDMILA at 1/10/2019  4:37 PM    Acceptance, E,TB,D, VU,NR by MOSHE at 1/9/2019 11:28 AM   Family Acceptance, E,TB,D, VU,NR by LUDMILA at 1/10/2019  4:37 PM    Acceptance, E,TB,D, VU,NR by MOSHE at 1/9/2019 11:28 AM                               User Key     Initials Effective Dates Name Provider Type Discipline    LUDMILA 03/07/18 -  Jocelin Mccormick, ILIANA Physical Therapy Assistant PT    MOSHE 04/03/18 -  Monica Storey, PT Physical Therapist PT                PT Recommendation and Plan     Plan of Care Reviewed With: patient, daughter  Progress: improving  Outcome Summary: pt able to tfer to chair but req max-dep of 2 due to once up in wx could not pivot L foot for safe tfer, had to use gt belt to lower into chair, pt very agreeable to get into chair, and enjoyed being OOB once in chair; educ w/nsg on pivot back to bed w/o AD or w/lift for safety  Outcome Measures     Row Name 01/10/19 1600 01/09/19 1100          How much help from another person do you currently need...    Turning from your back to your side while  in flat bed without using bedrails?  2  -  2  -EJ     Moving from lying on back to sitting on the side of a flat bed without bedrails?  2  -  2  -EJ     Moving to and from a bed to a chair (including a wheelchair)?  1  -  1  -EJ     Standing up from a chair using your arms (e.g., wheelchair, bedside chair)?  2  -  2  -EJ     Climbing 3-5 steps with a railing?  1  -  1  -EJ     To walk in hospital room?  1  -  1  -EJ     AM-PAC 6 Clicks Score  9  -  9  -EJ        Functional Assessment    Outcome Measure Options  --  AM-PAC 6 Clicks Basic Mobility (PT)  -EJ       User Key  (r) = Recorded By, (t) = Taken By, (c) = Cosigned By    Initials Name Provider Type    Jocelin Mendez PTA Physical Therapy Assistant    Monica Marsh, PT Physical Therapist         Time Calculation:   PT Charges     Row Name 01/10/19 1529             Time Calculation    Start Time  1417  -      Stop Time  1448  -      Time Calculation (min)  31 min  -      PT Received On  01/10/19  -      PT - Next Appointment  01/11/19  -         Time Calculation- PT    Total Timed Code Minutes- PT  25 minute(s)  -        User Key  (r) = Recorded By, (t) = Taken By, (c) = Cosigned By    Initials Name Provider Type    Jocelin Mendez PTA Physical Therapy Assistant        Therapy Suggested Charges     Code   Minutes Charges    58128 (CPT®) Hc Pt Neuromusc Re Education Ea 15 Min      38395 (CPT®) Hc Pt Ther Proc Ea 15 Min 18 1    99042 (CPT®) Hc Gait Training Ea 15 Min      90960 (CPT®) Hc Pt Therapeutic Act Ea 15 Min      99520 (CPT®) Hc Pt Manual Therapy Ea 15 Min      32270 (CPT®) Hc Pt Iontophoresis Ea 15 Min      98491 (CPT®) Hc Pt Elec Stim Ea-Per 15 Min      67892 (CPT®) Hc Pt Ultrasound Ea 15 Min      45077 (CPT®) Hc Pt Self Care/Mgmt/Train Ea 15 Min      90211 (CPT®) Hc Pt Prosthetic (S) Train Initial Encounter, Each 15 Min      91024 (CPT®) Hc Pt Orthotic(S)/Prosthetic(S) Encounter, Each 15 Min      37765 (CPT®)  Hc Orthotic(S) Mgmt/Train Initial Encounter, Each 15min      Total  18 1        Therapy Charges for Today     Code Description Service Date Service Provider Modifiers Qty    16988991510 HC PT THER PROC EA 15 MIN 1/10/2019 Jocelin Mccormick, PTA GP 2          PT G-Codes  Outcome Measure Options: AM-PAC 6 Clicks Basic Mobility (PT)  AM-PAC 6 Clicks Score: 9    Jocelin Mccormick PTA  1/10/2019

## 2019-01-10 NOTE — PROGRESS NOTES
Name: Ariel Elliott ADMIT: 2019   : 1940  PCP: Zeb Meadows MD    MRN: [id number] LOS: 3 days   AGE/SEX: 79 y.o. male  ROOM: [facility]   Subjective   Transferred to ortho floor yesterday  Pain fairly well controlled with current meds  Still hasn't gotten up with PT unfortunately, he's anxious that PT hasn't seen him yet  No soa or cough  No chest pain or palpitations  A lot of flatus but no BM yet  Objective   Vital Signs  Temp:  [97 °F (36.1 °C)-100.4 °F (38 °C)] 98.2 °F (36.8 °C)  Heart Rate:  [71-96] 85  Resp:  [16-20] 18  BP: (129-154)/(60-68) 134/63  SpO2:  [93 %-97 %] 94 %  on  Flow (L/min):  [2-3] 3;   Device (Oxygen Therapy): room air  Body mass index is 39.17 kg/m².    Physical Exam   Constitutional: He is oriented to person, place, and time. No distress.   HENT:   Head: Normocephalic and atraumatic.   Neck: Normal range of motion.   Cardiovascular: Normal rate and regular rhythm. Exam reveals no friction rub.   No murmur heard.  Pulmonary/Chest: Effort normal and breath sounds normal. No respiratory distress. He has no wheezes. He has no rales.   Abdominal: Soft. Bowel sounds are normal. He exhibits no distension. There is no tenderness. There is no guarding.   Musculoskeletal: He exhibits edema (trace) and deformity.   Right lower extremity/knee incision with clean dry bandage   Neurological: He is alert and oriented to person, place, and time.   Skin: Skin is warm and dry. He is not diaphoretic. No erythema.   Psychiatric: He has a normal mood and affect. His behavior is normal.       Results Review:       I reviewed the patient's new clinical results.  Results from last 7 days   Lab Units  01/10/19   0316  19   1536  19   0410  19   1210  19   0932  19   1947   WBC 10*3/mm3  7.18   --   10.26   --   6.80  5.55   HEMOGLOBIN g/dL  7.7*  6.6*  7.2*  7.9*  7.7*  6.7*   PLATELETS 10*3/mm3  90*   --   133*   --   157  139*     Results from last 7 days    Lab Units  01/09/19 0410 01/08/19   0616  01/07/19 1947   SODIUM mmol/L  141  141  142   POTASSIUM mmol/L  4.1  4.3  4.4   CHLORIDE mmol/L  106  105  105   CO2 mmol/L  21.0*  22.3  22.7   BUN mg/dL  48*  54*  58*   CREATININE mg/dL  1.60*  1.31*  1.42*   GLUCOSE mg/dL  216*  176*  178*   Estimated Creatinine Clearance: 49.5 mL/min (A) (by C-G formula based on SCr of 1.6 mg/dL (H)).    Results from last 7 days   Lab Units  01/09/19 0410 01/08/19 0616  01/07/19 1947   CALCIUM mg/dL  8.1*  8.5*  8.6   MAGNESIUM mg/dL   --   2.4   --    PHOSPHORUS mg/dL   --   5.1*   --        Lab Results   Component Value Date    HGBA1C 5.76 (H) 11/09/2018    HGBA1C 7.0 (H) 09/04/2015     Glucose   Date/Time Value Ref Range Status   01/10/2019 1131 196 (H) 70 - 130 mg/dL Final   01/10/2019 0705 144 (H) 70 - 130 mg/dL Final   01/09/2019 2135 129 70 - 130 mg/dL Final   01/09/2019 2123 140 (H) 70 - 130 mg/dL Final   01/09/2019 1540 142 (H) 70 - 130 mg/dL Final   01/09/2019 1129 185 (H) 70 - 130 mg/dL Final   01/09/2019 0631 250 (H) 70 - 130 mg/dL Final   01/08/2019 2045 235 (H) 70 - 130 mg/dL Final         allopurinol 300 mg Oral Nightly   aspirin 81 mg Oral Daily   cholecalciferol 1,000 Units Oral Daily   furosemide 40 mg Oral Daily   insulin glargine 15 Units Subcutaneous Q12H   insulin lispro 0-9 Units Subcutaneous 4x Daily With Meals & Nightly   insulin lispro 5 Units Subcutaneous TID With Meals   isosorbide dinitrate 40 mg Oral BID   pantoprazole 40 mg Oral QAM   polyethylene glycol 17 g Oral Daily   sodium chloride 10 mL Intravenous Q12H   sodium chloride 3 mL Intravenous Q12H   tamsulosin 0.4 mg Oral Daily   wheat dextrin 1 each Oral Daily      Diet Regular; Consistent Carbohydrate      Assessment/Plan      Active Hospital Problems    Diagnosis Date Noted   • **Closed displaced supracondylar fracture without intracondylar extension of lower end of right femur (CMS/HCC) [S72.451A] 01/07/2019   • S/P TAVR (transcatheter  aortic valve replacement) [Z95.2] 12/21/2018   • Von Willebrand disease (CMS/[facility]) [D68.0] 11/13/2018   • CKD (chronic kidney disease) [N18.9] 11/13/2018   • Nonrheumatic aortic valve stenosis [I35.0] 10/25/2018   • S/P CABG (coronary artery bypass graft) [Z95.1] 10/25/2018   • Type 2 diabetes mellitus with complication, with long-term current use of insulin (CMS/[facility]) [E11.8, Z79.4] 10/25/2018   • AVM (arteriovenous malformation) of small bowel, acquired with hemorrhage (CMS/[facility]) [K55.8] 10/25/2018      Resolved Hospital Problems   No resolved problems to display.     · Closed displaced distal femur on the right: POD 2. Did quite well. Mobilize early--still hasn't stood up yet   · Started flomax for BPH and urine retention.   · Chronic GI bleeding from AVMs needing 5 blood transfusions this admission. Hgb responded and is 7.7.  Transfuse as needed  · we cannot give him pharmacologic DVT prophylaxis due to his bleeding  · Type 2 diabetes: Continue Levemir 20 units twice a day (home dose of 30 units BID) continue sliding scale insulin  · Chronic kidney disease stage III: Creatinine up at 1.6 from 1.3 at baseline, check in am    · DISPO: Rehab in 1-2 days    Jerson Higginbotham MD  [facility]  01/10/19  1:38 PM

## 2019-01-10 NOTE — PLAN OF CARE
Problem: Patient Care Overview  Goal: Plan of Care Review  Outcome: Ongoing (interventions implemented as appropriate)   01/10/19 8905   Coping/Psychosocial   Plan of Care Reviewed With patient;daughter   Plan of Care Review   Progress improving   OTHER   Outcome Summary pt able to tfer to chair but req max-dep of 2 due to once up in wx could not pivot L foot for safe tfer, had to use gt belt to lower into chair, pt very agreeable to get into chair, and enjoyed being OOB once in chair; educ w/nsg on pivot back to bed w/o AD or w/lift for safety

## 2019-01-10 NOTE — PLAN OF CARE
Problem: Patient Care Overview  Goal: Plan of Care Review  Outcome: Ongoing (interventions implemented as appropriate)   01/10/19 1091   Coping/Psychosocial   Plan of Care Reviewed With patient   Plan of Care Review   Progress improving   OTHER   Outcome Summary Pt is post op day 1 of a rt distal femur placement with a rt total knee replacement. Pt was transferred from . Dressing is clean dry and intact. Pt continues with the ACE wrap and HV drain. Pt received 2 units of PRBC and tolerated it well. Pt attempted to get up yesterday with physical therapy but was only able to sit up at the edge of the bed. Pt is very worried about having to get up in the morning. Pt also complaining of needing to have a bowel movement. Offered pt the bedpan or a brief, but he refused. Pt has a history of chronic GI Bleed and he feels that it will be a lot of blood that comes out when he has a BM. Pt has been passing gas and has audible bowel sounds. Pt has been voiding via the uriinal with staff help. Pt has a port to the upper rt chest. Continues with IV and PO pain meds. Pt is constantly moaning, but when asked if he is in pain , he says no. Pt educated in the importace of monitoring blood sugars related to comorbidity of diabetes. Spoke with daughter who is POA who said she will be here in the morning. Pt has been turned this shift, but patient refuses to go to the left side. Offered to call the  for patient, but he refused. Pt is resting at this time. Will continue to monitor.     Goal: Individualization and Mutuality  Outcome: Ongoing (interventions implemented as appropriate)    Goal: Discharge Needs Assessment  Outcome: Ongoing (interventions implemented as appropriate)    Goal: Interprofessional Rounds/Family Conf  Outcome: Ongoing (interventions implemented as appropriate)      Problem: Skin Injury Risk (Adult)  Goal: Skin Health and Integrity  Outcome: Ongoing (interventions implemented as appropriate)      Problem:  Pain, Acute (Adult)  Goal: Acceptable Pain Control/Comfort Level  Outcome: Ongoing (interventions implemented as appropriate)      Problem: Anemia (Adult)  Goal: Symptom Improvement  Outcome: Ongoing (interventions implemented as appropriate)

## 2019-01-11 LAB
ANION GAP SERPL CALCULATED.3IONS-SCNC: 12.4 MMOL/L
BASOPHILS # BLD AUTO: 0.01 10*3/MM3 (ref 0–0.2)
BASOPHILS NFR BLD AUTO: 0.1 % (ref 0–1.5)
BUN BLD-MCNC: 47 MG/DL (ref 8–23)
BUN/CREAT SERPL: 34.8 (ref 7–25)
CALCIUM SPEC-SCNC: 8.3 MG/DL (ref 8.6–10.5)
CHLORIDE SERPL-SCNC: 104 MMOL/L (ref 98–107)
CO2 SERPL-SCNC: 20.6 MMOL/L (ref 22–29)
CREAT BLD-MCNC: 1.35 MG/DL (ref 0.76–1.27)
DEPRECATED RDW RBC AUTO: 55.2 FL (ref 37–54)
EOSINOPHIL # BLD AUTO: 0.31 10*3/MM3 (ref 0–0.7)
EOSINOPHIL NFR BLD AUTO: 4.6 % (ref 0.3–6.2)
ERYTHROCYTE [DISTWIDTH] IN BLOOD BY AUTOMATED COUNT: 16.2 % (ref 11.5–14.5)
GFR SERPL CREATININE-BSD FRML MDRD: 51 ML/MIN/1.73
GLUCOSE BLD-MCNC: 131 MG/DL (ref 65–99)
GLUCOSE BLDC GLUCOMTR-MCNC: 152 MG/DL (ref 70–130)
GLUCOSE BLDC GLUCOMTR-MCNC: 177 MG/DL (ref 70–130)
GLUCOSE BLDC GLUCOMTR-MCNC: 189 MG/DL (ref 70–130)
HCT VFR BLD AUTO: 23.4 % (ref 40.4–52.2)
HGB BLD-MCNC: 7.5 G/DL (ref 13.7–17.6)
IMM GRANULOCYTES # BLD AUTO: 0 10*3/MM3 (ref 0–0.03)
IMM GRANULOCYTES NFR BLD AUTO: 0 % (ref 0–0.5)
LYMPHOCYTES # BLD AUTO: 0.59 10*3/MM3 (ref 0.9–4.8)
LYMPHOCYTES NFR BLD AUTO: 8.8 % (ref 19.6–45.3)
MCH RBC QN AUTO: 29.8 PG (ref 27–32.7)
MCHC RBC AUTO-ENTMCNC: 32.1 G/DL (ref 32.6–36.4)
MCV RBC AUTO: 92.9 FL (ref 79.8–96.2)
MONOCYTES # BLD AUTO: 0.55 10*3/MM3 (ref 0.2–1.2)
MONOCYTES NFR BLD AUTO: 8.2 % (ref 5–12)
NEUTROPHILS # BLD AUTO: 5.21 10*3/MM3 (ref 1.9–8.1)
NEUTROPHILS NFR BLD AUTO: 78.3 % (ref 42.7–76)
PLATELET # BLD AUTO: 108 10*3/MM3 (ref 140–500)
PMV BLD AUTO: 11.2 FL (ref 6–12)
POTASSIUM BLD-SCNC: 4.1 MMOL/L (ref 3.5–5.2)
RBC # BLD AUTO: 2.52 10*6/MM3 (ref 4.6–6)
SODIUM BLD-SCNC: 137 MMOL/L (ref 136–145)
WBC NRBC COR # BLD: 6.67 10*3/MM3 (ref 4.5–10.7)

## 2019-01-11 PROCEDURE — 63710000001 INSULIN LISPRO (HUMAN) PER 5 UNITS: Performed by: HOSPITALIST

## 2019-01-11 PROCEDURE — 80048 BASIC METABOLIC PNL TOTAL CA: CPT | Performed by: INTERNAL MEDICINE

## 2019-01-11 PROCEDURE — 82962 GLUCOSE BLOOD TEST: CPT

## 2019-01-11 PROCEDURE — 85025 COMPLETE CBC W/AUTO DIFF WBC: CPT | Performed by: INTERNAL MEDICINE

## 2019-01-11 PROCEDURE — 99232 SBSQ HOSP IP/OBS MODERATE 35: CPT | Performed by: INTERNAL MEDICINE

## 2019-01-11 PROCEDURE — 97110 THERAPEUTIC EXERCISES: CPT

## 2019-01-11 PROCEDURE — 25010000002 ONDANSETRON PER 1 MG: Performed by: HOSPITALIST

## 2019-01-11 PROCEDURE — 63710000001 INSULIN GLARGINE PER 5 UNITS: Performed by: HOSPITALIST

## 2019-01-11 RX ORDER — SENNA AND DOCUSATE SODIUM 50; 8.6 MG/1; MG/1
2 TABLET, FILM COATED ORAL NIGHTLY
Status: DISCONTINUED | OUTPATIENT
Start: 2019-01-11 | End: 2019-01-13 | Stop reason: HOSPADM

## 2019-01-11 RX ORDER — PROMETHAZINE HYDROCHLORIDE 12.5 MG/1
12.5 TABLET ORAL EVERY 6 HOURS PRN
Status: DISCONTINUED | OUTPATIENT
Start: 2019-01-11 | End: 2019-01-13 | Stop reason: HOSPADM

## 2019-01-11 RX ORDER — PROMETHAZINE HYDROCHLORIDE 25 MG/ML
12.5 INJECTION, SOLUTION INTRAMUSCULAR; INTRAVENOUS EVERY 6 HOURS PRN
Status: DISCONTINUED | OUTPATIENT
Start: 2019-01-11 | End: 2019-01-13 | Stop reason: HOSPADM

## 2019-01-11 RX ADMIN — ONDANSETRON HYDROCHLORIDE 4 MG: 2 SOLUTION INTRAMUSCULAR; INTRAVENOUS at 05:45

## 2019-01-11 RX ADMIN — INSULIN LISPRO 2 UNITS: 100 INJECTION, SOLUTION INTRAVENOUS; SUBCUTANEOUS at 17:42

## 2019-01-11 RX ADMIN — INSULIN GLARGINE 15 UNITS: 100 INJECTION, SOLUTION SUBCUTANEOUS at 07:50

## 2019-01-11 RX ADMIN — SODIUM CHLORIDE, PRESERVATIVE FREE 10 ML: 5 INJECTION INTRAVENOUS at 07:55

## 2019-01-11 RX ADMIN — SODIUM CHLORIDE, PRESERVATIVE FREE 3 ML: 5 INJECTION INTRAVENOUS at 07:58

## 2019-01-11 RX ADMIN — FUROSEMIDE 40 MG: 40 TABLET ORAL at 07:56

## 2019-01-11 RX ADMIN — VITAMIN D, TAB 1000IU (100/BT) 1000 UNITS: 25 TAB at 07:56

## 2019-01-11 RX ADMIN — INSULIN LISPRO 2 UNITS: 100 INJECTION, SOLUTION INTRAVENOUS; SUBCUTANEOUS at 22:00

## 2019-01-11 RX ADMIN — INSULIN LISPRO 5 UNITS: 100 INJECTION, SOLUTION INTRAVENOUS; SUBCUTANEOUS at 17:42

## 2019-01-11 RX ADMIN — INSULIN LISPRO 2 UNITS: 100 INJECTION, SOLUTION INTRAVENOUS; SUBCUTANEOUS at 11:40

## 2019-01-11 RX ADMIN — HYDROCODONE BITARTRATE AND ACETAMINOPHEN 1 TABLET: 10; 325 TABLET ORAL at 13:37

## 2019-01-11 RX ADMIN — INSULIN GLARGINE 15 UNITS: 100 INJECTION, SOLUTION SUBCUTANEOUS at 21:59

## 2019-01-11 RX ADMIN — TAMSULOSIN HYDROCHLORIDE 0.4 MG: 0.4 CAPSULE ORAL at 07:57

## 2019-01-11 RX ADMIN — ISOSORBIDE DINITRATE 40 MG: 20 TABLET ORAL at 07:57

## 2019-01-11 RX ADMIN — SODIUM CHLORIDE, PRESERVATIVE FREE 3 ML: 5 INJECTION INTRAVENOUS at 21:44

## 2019-01-11 RX ADMIN — ISOSORBIDE DINITRATE 40 MG: 20 TABLET ORAL at 21:36

## 2019-01-11 RX ADMIN — ALLOPURINOL 300 MG: 300 TABLET ORAL at 21:36

## 2019-01-11 RX ADMIN — BISACODYL 10 MG: 10 SUPPOSITORY RECTAL at 13:37

## 2019-01-11 RX ADMIN — INSULIN LISPRO 5 UNITS: 100 INJECTION, SOLUTION INTRAVENOUS; SUBCUTANEOUS at 11:41

## 2019-01-11 RX ADMIN — Medication 1 EACH: at 07:55

## 2019-01-11 RX ADMIN — SENNOSIDES AND DOCUSATE SODIUM 2 TABLET: 8.6; 5 TABLET ORAL at 21:42

## 2019-01-11 RX ADMIN — HYDROCODONE BITARTRATE AND ACETAMINOPHEN 1 TABLET: 10; 325 TABLET ORAL at 01:28

## 2019-01-11 RX ADMIN — ASPIRIN 81 MG: 81 TABLET, DELAYED RELEASE ORAL at 07:56

## 2019-01-11 RX ADMIN — POLYETHYLENE GLYCOL 3350 17 G: 17 POWDER, FOR SOLUTION ORAL at 11:41

## 2019-01-11 RX ADMIN — Medication 500 UNITS: at 10:50

## 2019-01-11 RX ADMIN — INSULIN LISPRO 5 UNITS: 100 INJECTION, SOLUTION INTRAVENOUS; SUBCUTANEOUS at 07:55

## 2019-01-11 NOTE — PROGRESS NOTES
"Ariel Elliott  1940 79 y.o.  [id number]      Patient Care Team:  Zeb Meadows MD as PCP - General (Internal Medicine)    CC: Status post CABG, status post T aVR recently, status post permanent pacing recently, chronic AVMs and GI bleeding, normal LV systolic function    Interval History: From a cardiac standpoint he's been doing well no shortness of breath chest pain      Objective   Vital Signs  Temp:  [97 °F (36.1 °C)-99.1 °F (37.3 °C)] 97 °F (36.1 °C)  Heart Rate:  [] 75  Resp:  [16-18] 16  BP: (128-146)/(62-71) 137/65    Intake/Output Summary (Last 24 hours) at 1/11/2019 1224  Last data filed at 1/11/2019 0500  Gross per 24 hour   Intake 740 ml   Output 825 ml   Net -85 ml     Flowsheet Rows      First Filed Value   Admission Height  177.8 cm (70\") Documented at 01/07/2019 1800   Admission Weight  119 kg (263 lb) Documented at 01/07/2019 1800          Physical Exam:   General Appearance:    Alert,oriented, in no acute distress   Lungs:     Clear to auscultation,BS are equal    Heart:    Normal S1 and S2, RRR 1/6 systolic ejection murmur, gallop or rub   HEENT:    Sclerae are clear, no JVD or adenopathy   Abdomen:     Normal bowel sounds, soft non-tender, non-distended, no HSM   Extremities:   Moves all extremities well, no edema, no cyanosis, no             Redness, no rash     Medication Review:        allopurinol 300 mg Oral Nightly   aspirin 81 mg Oral Daily   cholecalciferol 1,000 Units Oral Daily   furosemide 40 mg Oral Daily   heparin flush (porcine) 5 mL Intracatheter Q8H   insulin glargine 15 Units Subcutaneous Q12H   insulin lispro 0-9 Units Subcutaneous 4x Daily With Meals & Nightly   insulin lispro 5 Units Subcutaneous TID With Meals   isosorbide dinitrate 40 mg Oral BID   pantoprazole 40 mg Oral QAM   polyethylene glycol 17 g Oral Daily   sennosides-docusate sodium 2 tablet Oral Nightly   sodium chloride 10 mL Intravenous Q12H   sodium chloride 3 mL Intravenous Q12H   tamsulosin " 0.4 mg Oral Daily   wheat dextrin 1 each Oral Daily            I reviewed the patient's new clinical results.  I personally viewed and interpreted the patient's EKG/Telemetry data    Assessment/Plan  Active Hospital Problems    Diagnosis Date Noted   • **Closed displaced supracondylar fracture without intracondylar extension of lower end of right femur (CMS/[facility]) [S72.451A] 01/07/2019   • S/P TAVR (transcatheter aortic valve replacement) [Z95.2] 12/21/2018   • Von Willebrand disease (CMS/[facility]) [D68.0] 11/13/2018   • CKD (chronic kidney disease) [N18.9] 11/13/2018   • Nonrheumatic aortic valve stenosis [I35.0] 10/25/2018   • S/P CABG (coronary artery bypass graft) [Z95.1] 10/25/2018   • Type 2 diabetes mellitus with complication, with long-term current use of insulin (CMS/[facility]) [E11.8, Z79.4] 10/25/2018   • AVM (arteriovenous malformation) of small bowel, acquired with hemorrhage (CMS/[facility]) [K55.8] 10/25/2018      Resolved Hospital Problems   No resolved problems to display.       He is stable hemodynamically from a cardiac standpoint and is doing well. I am not going to make any changes with him. I will follow up with him again on Monday if he still is here in the hospital. He is at high risk to develop a DVT given his obesity and lack of mobility, but he is not really somebody we can anticoagulate with his GI bleeding and his AVMs.        Dionicio Stoner MD  01/11/19  12:24 PM

## 2019-01-11 NOTE — THERAPY TREATMENT NOTE
Acute Care - Physical Therapy Treatment Note  [facility]     Patient Name: Ariel Elliott  : 1940  MRN: [id number]  Today's Date: 2019  Onset of Illness/Injury or Date of Surgery: 19  Date of Referral to PT: 19  Referring Physician: Flor    Admit Date: 2019    Visit Dx:    ICD-10-CM ICD-9-CM   1. Closed displaced supracondylar fracture of distal end of right femur without intracondylar extension, initial encounter (CMS/[facility]) S72.451A 821.23   2. Symptomatic anemia D64.9 285.9   3. Minor head injury without loss of consciousness, initial encounter S09.90XA 959.01   4. Difficulty walking R26.2 719.7     Patient Active Problem List   Diagnosis   • Nonrheumatic aortic valve stenosis   • S/P CABG (coronary artery bypass graft)   • Class 2 severe obesity due to excess calories with serious comorbidity and body mass index (BMI) of 39.0 to 39.9 in adult (CMS/[facility])   • Left bundle branch block (LBBB)   • Type 2 diabetes mellitus with complication, with long-term current use of insulin (CMS/[facility])   • Dyspnea on exertion   • Angina pectoris (CMS/[facility])   • Syncope   • AVM (arteriovenous malformation) of small bowel, acquired with hemorrhage (CMS/[facility])   • Aortic valve stenosis   • Atrial flutter (CMS/[facility])   • Diabetes mellitus (CMS/[facility])   • Von Willebrand disease (CMS/[facility])   • Sleep apnea   • Pacemaker   • Coronary atherosclerosis   • CKD (chronic kidney disease)   • S/P TAVR (transcatheter aortic valve replacement)   • Closed displaced supracondylar fracture without intracondylar extension of lower end of right femur (CMS/[facility])       Therapy Treatment    Rehabilitation Treatment Summary     Row Name 19 1438             Treatment Time/Intention    Discipline  physical therapist  -MA      Document Type  therapy note (daily note)  -MA      Subjective Information  complains of;weakness;fatigue;pain  -MA      Mode of Treatment  physical therapy;individual therapy  -MA      Patient/Family  Observations  daughter present, pt wanting to get to commode  -MA      Patient Effort  good  -MA      Existing Precautions/Restrictions  fall  -MA      Treatment Considerations/Comments  R LE WBAT  -MA      Recorded by [MA] Earnestine Esparza, PT 01/11/19 1518      Row Name 01/11/19 1438             Vital Signs    O2 Delivery Pre Treatment  supplemental O2  -MA      O2 Delivery Post Treatment  supplemental O2  -MA      Recorded by [MA] Earnestine Esparza, PT 01/11/19 1518      Row Name 01/11/19 1438             Cognitive Assessment/Intervention- PT/OT    Orientation Status (Cognition)  oriented x 4  -MA      Follows Commands (Cognition)  WNL  -MA      Personal Safety Interventions  gait belt;fall prevention program maintained;muscle strengthening facilitated;nonskid shoes/slippers when out of bed;supervised activity  -MA      Recorded by [MA] Earnestine Esparza, PT 01/11/19 1518      Row Name 01/11/19 1438             Mobility Assessment/Intervention    Extremity Weight-bearing Status  right lower extremity  -MA      Right Lower Extremity (Weight-bearing Status)  weight-bearing as tolerated (WBAT)  -MA      Recorded by [MA] Earnestine Esparza, PT 01/11/19 1518      Row Name 01/11/19 1438             Bed Mobility Assessment/Treatment    Supine-Sit [place] (Bed Mobility)  maximum assist (25% patient effort);2 person assist  -MA      Bed Mobility, Safety Issues  decreased use of arms for pushing/pulling;decreased use of legs for bridging/pushing  -MA      Assistive Device (Bed Mobility)  bed rails;draw sheet;head of bed elevated  -MA      Comment (Bed Mobility)  assist with B LEs and trunk   -MA      Recorded by [MA] Earnestine Esparza, PT 01/11/19 1518      Row Name 01/11/19 1438             Bed-Chair Transfer    Bed-Chair [place] (Transfers)  maximum assist (25% patient effort);2 person assist  -MA      Assistive Device (Bed-Chair Transfers)  -- HHA  -MA      Recorded by [MA] Earnestine Esparza, PT 01/11/19 1518      Row Name 01/11/19 1438              Sit-Stand Transfer    Sit-Stand [place] (Transfers)  maximum assist (25% patient effort);2 person assist  -MA      Assistive Device (Sit-Stand Transfers)  -- HHA  -MA      Recorded by [MA] Earnestine Esparza, PT 01/11/19 1518      Row Name 01/11/19 1438             Stand-Sit Transfer    Stand-Sit [place] (Transfers)  maximum assist (25% patient effort);2 person assist  -MA      Assistive Device (Stand-Sit Transfers)  -- HHA  -MA      Recorded by [MA] Earnestine Esparza, PT 01/11/19 1518      Row Name 01/11/19 1438             Gait/Stairs Assessment/Training    Comment (Gait/Stairs)  unable to ambulate. Able to pivot feet to assist with transfer  -MA      Recorded by [MA] Earnestine Esparza, PT 01/11/19 1518      Row Name 01/11/19 1438             Motor Skills Assessment/Interventions    Additional Documentation  Balance (Group)  -MA      Recorded by [MA] Earnestine Esparza, PT 01/11/19 1520      Row Name 01/11/19 1438             Therapeutic Exercise    Comment (Therapeutic Exercise)  LAQ, ankle pumps. 10x  -MA      Recorded by [MA] Earnestine Esparza, PT 01/11/19 1518      Row Name 01/11/19 1438             Balance    Balance  static sitting balance  -MA      Recorded by [MA] Earnestine Esparza, PT 01/11/19 1520      Row Name 01/11/19 1438             Static Sitting Balance    Level of [place] (Unsupported Sitting, Static Balance)  contact guard assist  -MA      Sitting Position (Unsupported Sitting, Static Balance)  sitting on edge of bed  -MA      Time Able to Maintain Position (Unsupported Sitting, Static Balance)  more than 5 minutes  -MA      Recorded by [MA] Earnestine Esparza, PT 01/11/19 1520      Row Name 01/11/19 1438             Positioning and Restraints    Pre-Treatment Position  in bed  -MA      Post Treatment Position  bsc  -MA      On BS commode  sitting;call light within reach;encouraged to call for assist;with family/caregiver;notified nsg Notified RN how to safely assist pt   -MA      Recorded by [MA] Isaias  Earnestine, PT 01/11/19 1518      Row Name 01/11/19 1438             Pain Scale: Numbers Pre/Post-Treatment    Pain Scale: Numbers, Pretreatment  5/10  -MA      Pain Scale: Numbers, Post-Treatment  8/10  -MA      Pain Location - Side  Right  -MA      Pain Location  knee  -MA      Recorded by [MA] Earnestine Esparza, PT 01/11/19 1518      Row Name                Wound 01/08/19 1405 Right knee incision    Wound - Properties Group Date first assessed: 01/08/19 [MB] Time first assessed: 1405 [MB] Side: Right [MB] Location: knee [MB] Type: incision [MB] Recorded by:  [MB] Keshia Melara RN 01/08/19 1405      User Key  (r) = Recorded By, (t) = Taken By, (c) = Cosigned By    Initials Name Effective Dates Discipline    Keshia Galeana, RN 06/16/16 -  Nurse    Earnestine Ferrera, PT 10/19/18 -  PT          Wound 01/08/19 1405 Right knee incision (Active)   Dressing Appearance dry;intact;area marked 1/11/2019  8:00 AM   Closure DWIGHT 1/11/2019  8:00 AM   Base dressing in place, unable to visualize 1/11/2019  8:00 AM   Drainage Amount scant 1/11/2019  8:00 AM           Physical Therapy Education     Title: PT OT SLP Therapies (Done)     Topic: Physical Therapy (Done)     Point: Mobility training (Done)     Learning Progress Summary           Patient Acceptance, E, VU,NR by MA at 1/11/2019  3:18 PM    Acceptance, E,TB,D, VU,NR by LUDMILA at 1/10/2019  4:37 PM    Acceptance, E,TB,D, VU,NR by MOSHE at 1/9/2019 11:28 AM   Family Acceptance, E,TB,D, VU,NR by LUDMILA at 1/10/2019  4:37 PM    Acceptance, E,TB,D, VU,NR by MOSHE at 1/9/2019 11:28 AM                   Point: Home exercise program (Done)     Learning Progress Summary           Patient Acceptance, E, VU,NR by MA at 1/11/2019  3:18 PM    Acceptance, E,TB,D, VU,NR by LUDMILA at 1/10/2019  4:37 PM    Acceptance, E,TB,D, VU,NR by MOSHE at 1/9/2019 11:28 AM   Family Acceptance, STEFANO ZUÑIGA D, VU,NR by LUDMILA at 1/10/2019  4:37 PM    AcceptanceZARA TB, D, VU,NR by MOSHE at 1/9/2019 11:28 AM                   Point: Body  mechanics (Done)     Learning Progress Summary           Patient Acceptance, E, VU,NR by MA at 1/11/2019  3:18 PM    Acceptance, E,TB,D, VU,NR by LUDMILA at 1/10/2019  4:37 PM    Acceptance, E,TB,D, VU,NR by MOSHE at 1/9/2019 11:28 AM   Family Acceptance, E,TB,D, VU,NR by LUDMILA at 1/10/2019  4:37 PM    Acceptance, E,TB,D, VU,NR by MOSHE at 1/9/2019 11:28 AM                   Point: Precautions (Done)     Learning Progress Summary           Patient Acceptance, E, VU,NR by MA at 1/11/2019  3:18 PM    Acceptance, E,TB,D, VU,NR by LUDMILA at 1/10/2019  4:37 PM    Acceptance, E,TB,D, VU,NR by MOSHE at 1/9/2019 11:28 AM   Family Acceptance, E,TB,D, VU,NR by LUDMILA at 1/10/2019  4:37 PM    Acceptance, E,TB,D, VU,NR by MOSHE at 1/9/2019 11:28 AM                               User Key     Initials Effective Dates Name Provider Type Discipline     03/07/18 -  Jocelin Mccormick, PTA Physical Therapy Assistant PT     04/03/18 -  Monica Storey, PT Physical Therapist PT    MA 10/19/18 -  Earnestine Esparza, PT Physical Therapist PT                PT Recommendation and Plan     Outcome Summary: Pt requesting to sit on commode. Bariatric commode found for pt. Stand pivot transfer to commode maxAx2. Able to pivot L foot to help with transfer. Increased pain with all mobility. Educated nursing how to safely get pt off of commode; recommend pt pivot to L side. Will continue to progress as tolerated.   Outcome Measures     Row Name 01/11/19 1500 01/10/19 1600 01/09/19 1100       How much help from another person do you currently need...    Turning from your back to your side while in flat bed without using bedrails?  2  -MA  2  -JM  2  -EJ    Moving from lying on back to sitting on the side of a flat bed without bedrails?  2  -MA  2  -JM  2  -EJ    Moving to and from a bed to a chair (including a wheelchair)?  1  -MA  1  -JM  1  -EJ    Standing up from a chair using your arms (e.g., wheelchair, bedside chair)?  2  -MA  2  -JM  2  -EJ    Climbing 3-5 steps with a  railing?  1  -MA  1  -JM  1  -EJ    To walk in hospital room?  1  -MA  1  -JM  1  -EJ    AM-PAC 6 Clicks Score  9  -MA  9  -JM  9  -EJ       Functional Assessment    Outcome Measure Options  AM-PAC 6 Clicks Basic Mobility (PT)  -MA  --  AM-PAC 6 Clicks Basic Mobility (PT)  -EJ      User Key  (r) = Recorded By, (t) = Taken By, (c) = Cosigned By    Initials Name Provider Type    Jocelin Mendez, PTA Physical Therapy Assistant    Monica Marsh, PT Physical Therapist    Earnestine Ferrera, PT Physical Therapist         Time Calculation:   PT Charges     Row Name 01/11/19 1520             Time Calculation    Start Time  1420  -MA      Stop Time  1438  -MA      Time Calculation (min)  18 min  -MA      PT Received On  01/11/19  -MA      PT - Next Appointment  01/12/19  -MA         Time Calculation- PT    Total Timed Code Minutes- PT  18 minute(s)  -MA        User Key  (r) = Recorded By, (t) = Taken By, (c) = Cosigned By    Initials Name Provider Type    Earnestine Ferrera, PT Physical Therapist        Therapy Suggested Charges     Code   Minutes Charges    45061 (CPT®) Hc Pt Neuromusc Re Education Ea 15 Min      73184 (CPT®) Hc Pt Ther Proc Ea 15 Min 18 1    96024 (CPT®) Hc Gait Training Ea 15 Min      56175 (CPT®) Hc Pt Therapeutic Act Ea 15 Min      41574 (CPT®) Hc Pt Manual Therapy Ea 15 Min      88183 (CPT®) Hc Pt Iontophoresis Ea 15 Min      16189 (CPT®) Hc Pt Elec Stim Ea-Per 15 Min      63569 (CPT®) Hc Pt Ultrasound Ea 15 Min      07265 (CPT®) Hc Pt Self Care/Mgmt/Train Ea 15 Min      46859 (CPT®) Hc Pt Prosthetic (S) Train Initial Encounter, Each 15 Min      74609 (CPT®) Hc Pt Orthotic(S)/Prosthetic(S) Encounter, Each 15 Min      80226 (CPT®) Hc Orthotic(S) Mgmt/Train Initial Encounter, Each 15min      Total  18 1        Therapy Charges for Today     Code Description Service Date Service Provider Modifiers Qty    24327765151 HC PT THER PROC EA 15 MIN 1/11/2019 Earnestine Esparza, PT GP 1          PT  G-Codes  Outcome Measure Options: AM-PAC 6 Clicks Basic Mobility (PT)  AM-[facility] 6 Clicks Score: 9    Earnestine Esparza, PT  1/11/2019

## 2019-01-11 NOTE — PROGRESS NOTES
Patient: Ariel Elliott  YOB: 1940     Date of Admission: 1/7/2019  6:31 PM Medical Record Number: [id number]     Attending Physician: Tre Becerril MD    Status Post:  Procedure(s):  RIGHT DISTAL FEMUR REPLACEMENTPost Operative Day Number: 3    Subjective : No new orthopaedic complaints. Working with PT, able to pivot to commode per notes.     Pain Relief: some relief with present medication.     Systemic Complaints: No Complaints  Vitals:    01/11/19 0500 01/11/19 0740 01/11/19 1243 01/11/19 1530   BP:  137/65 114/66 125/64   BP Location:  Left arm Left arm Left arm   Patient Position:  Lying Lying Sitting   Pulse:  75 82 80   Resp:  16 16 16   Temp: 98.9 °F (37.2 °C) 97 °F (36.1 °C) 99.4 °F (37.4 °C) 98.6 °F (37 °C)   TempSrc: Oral Oral Oral Oral   SpO2:  90% 97% 98%   Weight:       Height:           Physical Exam: 79 y.o. male    General Appearance:       Alert, cooperative, in no acute distress                  Extremities:    Dressing Clean, Dry and Intact         Incision healthy without signs or symptoms of infections         No clinical sign of DVT        Able to do good movements of digits    Pulses:   Pulses palpable and equal bilaterally           Diagnostic Tests:     Results from last 7 days   Lab Units  01/11/19   0625  01/10/19   0316  01/09/19   1536  01/09/19   0410   WBC 10*3/mm3  6.67  7.18   --   10.26   HEMOGLOBIN g/dL  7.5*  7.7*  6.6*  7.2*   HEMATOCRIT %  23.4*  24.3*  21.2*  23.7*   PLATELETS 10*3/mm3  108*  90*   --   133*     Results from last 7 days   Lab Units  01/11/19   0625  01/10/19   1311  01/09/19   0410   SODIUM mmol/L  137  139  141   POTASSIUM mmol/L  4.1  4.0  4.1   CHLORIDE mmol/L  104  105  106   CO2 mmol/L  20.6*  20.8*  21.0*   BUN mg/dL  47*  48*  48*   CREATININE mg/dL  1.35*  1.64*  1.60*   GLUCOSE mg/dL  131*  196*  216*   CALCIUM mg/dL  8.3*  7.8*  8.1*         No results found for: CRP  No results found for: SEDRATE  Lab Results    Component Value Date    URICACID 12.1 (H) 03/10/2014     No results found for: CRYSTAL  Microbiology Results (last 10 days)     ** No results found for the last 240 hours. **        Xr Femur 2 View Right    Result Date: 1/8/2019  1. Comminuted, impacted, displaced distal femoral metaphyseal fracture may exhibit intercondylar extension and this could be further evaluated with CT. 2. Right total hip arthroplasty without evidence for right hip fracture.  This report was finalized on 1/8/2019 8:02 AM by Dr. Jake Begum M.D.      Xr Knee 3 View Right    Result Date: 1/8/2019  1. Comminuted, impacted, displaced distal femoral metaphyseal fracture may exhibit intercondylar extension and this could be further evaluated with CT. 2. Right total hip arthroplasty without evidence for right hip fracture.  This report was finalized on 1/8/2019 8:02 AM by Dr. Jake Begum M.D.      Ct Head Without Contrast    Result Date: 1/7/2019   1. No acute intracranial hemorrhage. 2. Right parietal soft tissue hematoma.  Radiation dose reduction techniques were utilized, including automated exposure control and exposure modulation based on body size.  This report was finalized on 1/7/2019 9:16 PM by Dr. Irina Cole M.D.              Current Medications:  Scheduled Meds:    allopurinol 300 mg Oral Nightly   aspirin 81 mg Oral Daily   cholecalciferol 1,000 Units Oral Daily   furosemide 40 mg Oral Daily   heparin flush (porcine) 5 mL Intracatheter Q8H   insulin glargine 15 Units Subcutaneous Q12H   insulin lispro 0-9 Units Subcutaneous 4x Daily With Meals & Nightly   insulin lispro 5 Units Subcutaneous TID With Meals   isosorbide dinitrate 40 mg Oral BID   pantoprazole 40 mg Oral QAM   polyethylene glycol 17 g Oral Daily   sennosides-docusate sodium 2 tablet Oral Nightly   sodium chloride 10 mL Intravenous Q12H   sodium chloride 3 mL Intravenous Q12H   tamsulosin 0.4 mg Oral Daily   wheat dextrin 1 each Oral Daily     Continuous  Infusions:   PRN Meds:.•  acetaminophen  •  bisacodyl  •  bisacodyl  •  dextrose  •  dextrose  •  glucagon (human recombinant)  •  heparin flush (porcine)  •  HYDROcodone-acetaminophen  •  HYDROmorphone  •  ondansetron **OR** ondansetron ODT **OR** ondansetron  •  promethazine **OR** promethazine  •  [COMPLETED] Insert peripheral IV **AND** sodium chloride  •  Access VAD **AND** sodium chloride  •  sodium chloride  •  sodium chloride  •  sodium chloride    Assessment: Status post  Procedure(s):  RIGHT DISTAL FEMUR REPLACEMENT    Patient Active Problem List   Diagnosis   • Nonrheumatic aortic valve stenosis   • S/P CABG (coronary artery bypass graft)   • Class 2 severe obesity due to excess calories with serious comorbidity and body mass index (BMI) of 39.0 to 39.9 in adult (CMS/HCC)   • Left bundle branch block (LBBB)   • Type 2 diabetes mellitus with complication, with long-term current use of insulin (CMS/[facility])   • Dyspnea on exertion   • Angina pectoris (CMS/[facility])   • Syncope   • AVM (arteriovenous malformation) of small bowel, acquired with hemorrhage (CMS/[facility])   • Aortic valve stenosis   • Atrial flutter (CMS/[facility])   • Diabetes mellitus (CMS/[facility])   • Von Willebrand disease (CMS/[facility])   • Sleep apnea   • Pacemaker   • Coronary atherosclerosis   • CKD (chronic kidney disease)   • S/P TAVR (transcatheter aortic valve replacement)   • Closed displaced supracondylar fracture without intracondylar extension of lower end of right femur (CMS/HCC)       PLAN:   Continues current post-op course  Hold off on chemical anticoagulation  Mechanical DVT prophylaxis, early mobilization  Hemovac drain removed   Hgb low, expected, OK to transfuse PRBC if ok with admitting service      Weight Bearing: WBAT  Discharge Plan: Plan to discharge to [facility] once bed available. OK to discharge  from orthopadic perspective.      Ariel Machado MD    Date: 1/11/2019    Time: 4:44 PM     Addendum:    I have personally examined this  patient. I agree with the above findings and treatment  plan.     Margot Ray MD  1/11/2019

## 2019-01-11 NOTE — DISCHARGE INSTRUCTIONS
[facility] Total Knee Replacement Discharge Instructions     I. ACTIVITIES:  1. Exercises:  ? Complete exercise program as taught by the hospital physical therapist 2 times per day  ? Exercise program will be advanced by your home health physical therapist  ? During the day be up ambulating every 2 hours (while awake) for short distances  ? Complete the ankle pump exercises at least 10 times per hour (while awake)  ? Elevate legs most of the day the first week post operatively and thereafter elevate legs when in bed and for at least 30 minutes during the day.   ? Caution must be taken to avoid pillow placement under the bend of the knee as this can led to flexion contractures of the knee. Pillow placement under the heel is encouraged.  ? Use cold packs 20-30 minutes approximately 5 times per day. This should be done before and after completing your exercises and at any time you are experiencing pain/ stiffness in your operative extremity.      2. Activities of Daily Living:  ? No tub baths, hot tubs, or swimming pools for 4 weeks  ? May shower and let water run over the incision on post-operative day #5 if no drainage. Do not scrub or rub the incision. Simply let the water run over the incision and pat dry.    II. Restrictions  ? Do not cross legs or kneel  ? Your surgeon will discuss with you when you will be able to drive again. Usual guidelines are you are to be off pain medications prior to driving.  ? Weight bearing is as tolerated  ? First week stay inside on even terrain. May go up and down stairs one stair at a time utilizing the hand rail.  ? After one week, you may venture outside.    III. Precautions:  ? Everyone that comes near you should wash their hands  ? No elective dental, genital-urinary, or colon procedures or surgical procedures for 12 weeks after surgery unless absolutely necessary.  ?  If dental work or surgical procedure is deemed absolutely necessary, you will need to contact your surgeon as  you will need to take antibiotics 1 hour prior to any dental work (including teeth cleanings).  ? Please discuss with your surgeon prophylactic antibiotics as the length of time this intervention will be necessary for you varies with each patient’s health history and situation.  ? Avoid sick people. If you must be around someone who is ill, they should wear a mask.  ? Avoid visits to the Emergency Room or Urgent Care. If you feel you need to go to the emergency room, please notify your surgeon.    ? Stockings are to be worn for one week after surgery and are to be placed on in the morning and removed at night. Observe your skin when stocking is removed for any problems. Monitor the stockings to ensure that any swelling is not causing the stockings to become too tight. In this case, remove stockings immediately.    IV. INCISION CARE:  ? Wash your hands prior to dressing changes  ? Change the dressing as needed to keep incision clean and dry. Utilize dry gauze and paper tape. Avoid touching the side of the gauze that goes against the incision with your hands.  ? No creams or ointments to the incision  ? May remove dressing once the incision is free of drainage  ? Do not touch or pick at the incision  ? Check incision every day and notify surgeon immediately if any of the following signs or symptoms are noted:  o Increase in redness  o Increase in swelling around the incision and of the entire extremity  o Increase in pain  o Drainage oozing from the incision  o Pulling apart of the edges of the incision  o Increase in overall body temperature (greater than 100.5 degrees)  ? Your  Staples will be removed between 10-14 days postoperation.  This may be done by either the home health nurse, rehabilitation nurse or during your return visit to Dr. Ray's office.  You will then be instructed on how to care for the incision.  (Please call the office if your staples have not been removed within 14 days after  surgery).    V. Medications:   1. Anticoagulants: You will not  be discharged on an anticoagulant. This is due to history of your GI bleed and Von Willebrand disease.     2. Stool Softeners: You will be at greater risk of constipation after surgery due to being less mobile and the pain medications.   ? Take stool softeners as instructed by your surgeon while on pain medications. Over the counter Colace 100 mg 1-2 capsules twice daily.   ? If stools become too loose or too frequent, please decreases the dosage or stop the stool softener.  ? If constipation occurs despite use of stool softeners, you are to continue the stool softeners and add a laxative (Milk of Magnesia 1 ounce daily as needed).  ? Dulcolax oral tabs or suppository, or a fleets enema can also be utilized for constipation and can be obtained over the counter.   ? If above interventions are unsuccessful in inducing bowel movements, please contact your surgeon's office / family physician's office.  ? Drink plenty of fluids, and eat fruits and vegetables during your recovery time    3. Pain Medications utilized after surgery are narcotics and the law requires that the following information be given to all patients that are prescribed narcotics:  ? CLASSIFICATION: Pain medications are called Opioids and are narcotics  ? LEGALITIES: It is illegal to share narcotics with others and to drive within 24 hours of taking narcotics  ? POTENTIAL SIDE EFFECTS: Potential side effects of opioids include: nausea, vomiting, itching, dizziness, drowsiness, dry mouth, constipation, and difficulty urinating.  ? POTENTIAL ADVERSE EFFECTS:   o Opioid tolerance can develop with use of pain medications and this simply means that it requires more and more of the medication to control pain; however, this is seen more in patients that use opioids for longer periods of time.  o Opioid dependence can develop with use of Opioids and this simply means that to stop the medication can  cause withdrawal symptoms; however, this is seen with patients that use Opioids for longer periods of time.  o Opioid addiction can develop with use of Opioids and the incidence of this is very unlikely in patients who take the medications as ordered and stop the medications as instructed.  o Opioid overdose can be dangerous, but is unlikely when the medication is taken as ordered and stopped when ordered. It is important not to mix opioids with alcohol or with and type of sedative such as Benadryl as this can lead to over sedation and respiratory difficulty.  ? DOSAGE:   o Pain medications will need to be taken consistently for the first week to decrease pain and promote adequate pain relief and participation in physical therapy.  o After the initial surgical pain begins to resolve, you may begin to decrease the pain medication. By the end of 6 weeks, you should be off of pain medications.  o Refills will not be given by the office during evening hours, on weekends, or after 6 weeks post-op.  o To seek refills on pain medications during the initial 6 week post-operative period, you must call the office 48 hours in advance to request the refill. The office will then notify you when to  the prescription. DO NOT wait until you are out of the medication to request a refill.    V. FOLLOW-UP VISITS:  ? You will need to follow up in the office with your surgeon on Jan 30 ,2019. Please call this number [phone] to schedule this appointment.  ? If you have any concerns or suspected complications prior to your follow up visit, please call your surgeons office. Do not wait until your appointment time if you suspect complications. These will need to be addressed in the office promptly.

## 2019-01-11 NOTE — THERAPY TREATMENT NOTE
Acute Care - Physical Therapy Treatment Note  [facility]     Patient Name: Ariel Elliott  : 1940  MRN: [id number]  Today's Date: 2019  Onset of Illness/Injury or Date of Surgery: 19  Date of Referral to PT: 19  Referring Physician: Flor    Admit Date: 2019    Visit Dx:    ICD-10-CM ICD-9-CM   1. Closed displaced supracondylar fracture of distal end of right femur without intracondylar extension, initial encounter (CMS/[facility]) S72.451A 821.23   2. Symptomatic anemia D64.9 285.9   3. Minor head injury without loss of consciousness, initial encounter S09.90XA 959.01   4. Difficulty walking R26.2 719.7     Patient Active Problem List   Diagnosis   • Nonrheumatic aortic valve stenosis   • S/P CABG (coronary artery bypass graft)   • Class 2 severe obesity due to excess calories with serious comorbidity and body mass index (BMI) of 39.0 to 39.9 in adult (CMS/[facility])   • Left bundle branch block (LBBB)   • Type 2 diabetes mellitus with complication, with long-term current use of insulin (CMS/[facility])   • Dyspnea on exertion   • Angina pectoris (CMS/[facility])   • Syncope   • AVM (arteriovenous malformation) of small bowel, acquired with hemorrhage (CMS/[facility])   • Aortic valve stenosis   • Atrial flutter (CMS/[facility])   • Diabetes mellitus (CMS/[facility])   • Von Willebrand disease (CMS/[facility])   • Sleep apnea   • Pacemaker   • Coronary atherosclerosis   • CKD (chronic kidney disease)   • S/P TAVR (transcatheter aortic valve replacement)   • Closed displaced supracondylar fracture without intracondylar extension of lower end of right femur (CMS/[facility])       Therapy Treatment    Rehabilitation Treatment Summary     Row Name 19 1600 19 1438          Treatment Time/Intention    Discipline  physical therapy assistant  -CW  physical therapist  -MA     Document Type  therapy note (daily note)  -CW  therapy note (daily note)  -MA     Subjective Information  complains of;weakness;pain  -CW  complains  of;weakness;fatigue;pain  -MA     Mode of Treatment  physical therapy  -CW  physical therapy;individual therapy  -MA     Patient/Family Observations  daughter in room  -CW  daughter present, pt wanting to get to commode  -MA     Therapy Frequency (PT Clinical Impression)  daily  -CW  --     Patient Effort  good  -CW  good  -MA     Existing Precautions/Restrictions  fall  -CW  fall  -MA     Treatment Considerations/Comments  R LE WBAT  -CW  R LE WBAT  -MA     Recorded by [CW] Mckinley Watson, PTA 01/11/19 1610 [MA] Earnestine Esparza, PT 01/11/19 1518     Row Name 01/11/19 1600 01/11/19 1438          Vital Signs    O2 Delivery Pre Treatment  supplemental O2  -CW  supplemental O2  -MA     O2 Delivery Intra Treatment  supplemental O2  -CW  --     O2 Delivery Post Treatment  supplemental O2  -CW  supplemental O2  -MA     Recorded by [CW] Mckinley Watson, PTA 01/11/19 1610 [MA] Earnestine Esparza, PT 01/11/19 1518     Row Name 01/11/19 1600 01/11/19 1438          Cognitive Assessment/Intervention- PT/OT    Orientation Status (Cognition)  oriented x 4  -CW  oriented x 4  -MA     Follows Commands (Cognition)  WNL  -CW  WNL  -MA     Personal Safety Interventions  fall prevention program maintained;gait belt;muscle strengthening facilitated;nonskid shoes/slippers when out of bed  -CW  gait belt;fall prevention program maintained;muscle strengthening facilitated;nonskid shoes/slippers when out of bed;supervised activity  -MA     Recorded by [CW] Mckinley Watson, PTA 01/11/19 1610 [MA] Earnestine Esparza, PT 01/11/19 1518     Row Name 01/11/19 1600 01/11/19 1438          Mobility Assessment/Intervention    Extremity Weight-bearing Status  right lower extremity  -CW  right lower extremity  -MA     Right Lower Extremity (Weight-bearing Status)  weight-bearing as tolerated (WBAT)  -CW  weight-bearing as tolerated (WBAT)  -MA     Recorded by [CW] Mckinley Watson, PTA 01/11/19 1610 [MA] Earnestine Esparza, PT 01/11/19 1518     Row Name  01/11/19 1600 01/11/19 1438          Bed Mobility Assessment/Treatment    Bed Mobility Assessment/Treatment  sit-supine  -CW  --     Supine-Sit [place] (Bed Mobility)  --  maximum assist (25% patient effort);2 person assist  -MA     Sit-Supine [place] (Bed Mobility)  maximum assist (25% patient effort);2 person assist  -CW  --     Bed Mobility, Safety Issues  decreased use of arms for pushing/pulling;decreased use of legs for bridging/pushing  -CW  decreased use of arms for pushing/pulling;decreased use of legs for bridging/pushing  -MA     Assistive Device (Bed Mobility)  bed rails;draw sheet  -CW  bed rails;draw sheet;head of bed elevated  -MA     Comment (Bed Mobility)  --  assist with B LEs and trunk   -MA     Recorded by [CW] Mckinley Watson, PTA 01/11/19 1610 [MA] Earnestine Esparza, PT 01/11/19 1518     Row Name 01/11/19 1600             Transfer Assessment/Treatment    Transfer Assessment/Treatment  sit-stand transfer;stand-sit transfer  -CW      Recorded by [CW] Mckinley Watson, PTA 01/11/19 1610      Row Name 01/11/19 1600 01/11/19 1438          Bed-Chair Transfer    Bed-Chair [place] (Transfers)  maximum assist (25% patient effort);2 person assist  -CW  maximum assist (25% patient effort);2 person assist  -MA     Assistive Device (Bed-Chair Transfers)  -- HHA  -CW  -- HHA  -MA     Recorded by [CW] Mckinley Watson, PTA 01/11/19 1610 [MA] Earnestine Esparza, PT 01/11/19 1518     Row Name 01/11/19 1600 01/11/19 1438          Sit-Stand Transfer    Sit-Stand [place] (Transfers)  maximum assist (25% patient effort);2 person assist  -CW  maximum assist (25% patient effort);2 person assist  -MA     Assistive Device (Sit-Stand Transfers)  -- HHA  -CW  -- HHA  -MA     Recorded by [CW] Mckinley Watson, PTA 01/11/19 1610 [MA] Earnestine Esparza, PT 01/11/19 1518     Row Name 01/11/19 1600 01/11/19 1438          Stand-Sit Transfer    Stand-Sit [place] (Transfers)  maximum assist (25% patient  effort);2 person assist  -CW  maximum assist (25% patient effort);2 person assist  -MA     Assistive Device (Stand-Sit Transfers)  -- HHA  -CW  -- HHA  -MA     Recorded by [CW] Mckinley Watson, PTA 01/11/19 1610 [MA] Earnestine Esparza, PT 01/11/19 1518     Row Name 01/11/19 1600 01/11/19 1438          Gait/Stairs Assessment/Training    [place] Level (Gait)  not tested;unable to assess  -CW  --     Comment (Gait/Stairs)  --  unable to ambulate. Able to pivot feet to assist with transfer  -MA     Recorded by [CW] Mckinley Watson, PTA 01/11/19 1610 [MA] Earnestine Esparza, PT 01/11/19 1518     Row Name 01/11/19 1438             Motor Skills Assessment/Interventions    Additional Documentation  Balance (Group)  -MA      Recorded by [MA] Earnestine Esparza, PT 01/11/19 1520      Row Name 01/11/19 1438             Therapeutic Exercise    Comment (Therapeutic Exercise)  LAQ, ankle pumps. 10x  -MA      Recorded by [MA] Earnestine Esparza, PT 01/11/19 1518      Row Name 01/11/19 1438             Balance    Balance  static sitting balance  -MA      Recorded by [MA] Earnestine Esparza, PT 01/11/19 1520      Row Name 01/11/19 1600 01/11/19 1438          Static Sitting Balance    Level of [place] (Unsupported Sitting, Static Balance)  supervision  -CW  contact guard assist  -MA     Sitting Position (Unsupported Sitting, Static Balance)  sitting on edge of bed  -CW  sitting on edge of bed  -MA     Time Able to Maintain Position (Unsupported Sitting, Static Balance)  4 to 5 minutes  -CW  more than 5 minutes  -MA     Recorded by [CW] Mckinley Watson, PTA 01/11/19 1610 [MA] Earnestine Esparza, PT 01/11/19 1520     Row Name 01/11/19 1600 01/11/19 1438          Positioning and Restraints    Pre-Treatment Position  bedside commode  -CW  in bed  -MA     Post Treatment Position  bed  -CW  bsc  -MA     In Bed  notified nsg;supine;call light within reach;encouraged to call for assist;with family/caregiver  -CW  --     On BS commode  --  sitting;call  light within reach;encouraged to call for assist;with family/caregiver;notified nsg Notified RN how to safely assist pt   -MA     Recorded by [CW] Mckinley Watson, PTA 01/11/19 1610 [MA] Earnestine Esparza, PT 01/11/19 1518     Row Name 01/11/19 1600 01/11/19 1438          Pain Scale: Numbers Pre/Post-Treatment    Pain Scale: Numbers, Pretreatment  5/10  -CW  5/10  -MA     Pain Scale: Numbers, Post-Treatment  8/10  -CW  8/10  -MA     Pain Location - Side  Right  -CW  Right  -MA     Pain Location  knee  -CW  knee  -MA     Recorded by [CW] Mckinley Watson, PTA 01/11/19 1610 [MA] Earnestine Esparza, PT 01/11/19 1518     Row Name                Wound 01/08/19 1405 Right knee incision    Wound - Properties Group Date first assessed: 01/08/19 [MB] Time first assessed: 1405 [MB] Side: Right [MB] Location: knee [MB] Type: incision [MB] Recorded by:  [MB] Keshia Melara RN 01/08/19 1405    Row Name 01/11/19 1600             Outcome Summary/Treatment Plan (PT)    Anticipated Discharge Disposition (PT)  skilled nursing facility  -CW      Recorded by [CW] Mckinley Watson, PTA 01/11/19 1610        User Key  (r) = Recorded By, (t) = Taken By, (c) = Cosigned By    Initials Name Effective Dates Discipline    MB Keshia Melara RN 06/16/16 -  Nurse    CW Mckinley Watson, PTA 03/07/18 -  PT    Earnestine Ferrera PT 10/19/18 -  PT          Wound 01/08/19 1405 Right knee incision (Active)   Dressing Appearance dry;intact;area marked 1/11/2019  8:00 AM   Closure DWIGHT 1/11/2019  8:00 AM   Base dressing in place, unable to visualize 1/11/2019  8:00 AM   Drainage Amount scant 1/11/2019  8:00 AM           Physical Therapy Education     Title: PT OT SLP Therapies (Done)     Topic: Physical Therapy (Done)     Point: Mobility training (Done)     Learning Progress Summary           Patient Acceptance, E, VU,NR by MA at 1/11/2019  3:18 PM    Acceptance, E,TB,D, VU,NR by LUDMILA at 1/10/2019  4:37 PM    Acceptance, ZARA,DOTTIE AGARWAL VU, NR by MOSHE at 1/9/2019  11:28 AM   Family Acceptance, E,TB,D, VU,NR by LUDMILA at 1/10/2019  4:37 PM    Acceptance, E,TB,D, VU,NR by MOSHE at 1/9/2019 11:28 AM                   Point: Home exercise program (Done)     Learning Progress Summary           Patient Acceptance, E, VU,NR by MA at 1/11/2019  3:18 PM    Acceptance, E,TB,D, VU,NR by LUDMILA at 1/10/2019  4:37 PM    Acceptance, E,TB,D, VU,NR by MOSHE at 1/9/2019 11:28 AM   Family Acceptance, E,TB,D, VU,NR by LUDMILA at 1/10/2019  4:37 PM    Acceptance, E,TB,D, VU,NR by MOSHE at 1/9/2019 11:28 AM                   Point: Body mechanics (Done)     Learning Progress Summary           Patient Acceptance, E, VU,NR by MA at 1/11/2019  3:18 PM    Acceptance, E,TB,D, VU,NR by LUDMILA at 1/10/2019  4:37 PM    Acceptance, E,TB,D, VU,NR by MOSHE at 1/9/2019 11:28 AM   Family Acceptance, E,TB,D, VU,NR by LUDMILA at 1/10/2019  4:37 PM    Acceptance, E,TB,D, VU,NR by MOSHE at 1/9/2019 11:28 AM                   Point: Precautions (Done)     Learning Progress Summary           Patient Acceptance, E, VU,NR by MA at 1/11/2019  3:18 PM    Acceptance, E,TB,D, VU,NR by LUDMILA at 1/10/2019  4:37 PM    Acceptance, E,TB,D, VU,NR by MOSHE at 1/9/2019 11:28 AM   Family Acceptance, E,TB,D, VU,NR by LUDMILA at 1/10/2019  4:37 PM    Acceptance, E,TB,D, VU,NR by MOSHE at 1/9/2019 11:28 AM                               User Key     Initials Effective Dates Name Provider Type Discipline    LUDMILA 03/07/18 -  Jocelin Mccormick, PTA Physical Therapy Assistant PT    MOSHE 04/03/18 -  Monica Storey, PT Physical Therapist PT    MA 10/19/18 -  Earnestine Esparza, PT Physical Therapist PT                PT Recommendation and Plan  Anticipated Discharge Disposition (PT): skilled nursing facility  Therapy Frequency (PT Clinical Impression): daily  Outcome Summary/Treatment Plan (PT)  Anticipated Discharge Disposition (PT): skilled nursing facility  Outcome Measures     Row Name 01/11/19 1500 01/10/19 1600 01/09/19 1100       How much help from another person do you currently need...     Turning from your back to your side while in flat bed without using bedrails?  2  -MA  2  -JM  2  -EJ    Moving from lying on back to sitting on the side of a flat bed without bedrails?  2  -MA  2  -JM  2  -EJ    Moving to and from a bed to a chair (including a wheelchair)?  1  -MA  1  -JM  1  -EJ    Standing up from a chair using your arms (e.g., wheelchair, bedside chair)?  2  -MA  2  -JM  2  -EJ    Climbing 3-5 steps with a railing?  1  -MA  1  -JM  1  -EJ    To walk in hospital room?  1  -MA  1  -JM  1  -EJ    AM-PAC 6 Clicks Score  9  -MA  9  -JM  9  -EJ       Functional Assessment    Outcome Measure Options  AM-PAC 6 Clicks Basic Mobility (PT)  -MA  --  AM-PAC 6 Clicks Basic Mobility (PT)  -EJ      User Key  (r) = Recorded By, (t) = Taken By, (c) = Cosigned By    Initials Name Provider Type    Jocelin Mendez, ILIANA Physical Therapy Assistant    Monica Marsh, PT Physical Therapist    Earnestine Ferrera, PT Physical Therapist         Time Calculation:   PT Charges     Row Name 01/11/19 1610 01/11/19 1520          Time Calculation    Start Time  1545  -CW  1420  -MA     Stop Time  1610  -CW  1438  -MA     Time Calculation (min)  25 min  -CW  18 min  -MA     PT Received On  01/11/19  -CW  01/11/19  -MA     PT - Next Appointment  01/12/19  -CW  01/12/19  -MA        Time Calculation- PT    Total Timed Code Minutes- PT  --  18 minute(s)  -MA       User Key  (r) = Recorded By, (t) = Taken By, (c) = Cosigned By    Initials Name Provider Type    Mckinley Andrew, PTA Physical Therapy Assistant    Earnestine Ferrera, PT Physical Therapist        Therapy Suggested Charges     Code   Minutes Charges    79903 (CPT®) Hc Pt Neuromusc Re Education Ea 15 Min      37460 (CPT®) Hc Pt Ther Proc Ea 15 Min 18 1    69951 (CPT®) Hc Gait Training Ea 15 Min      66953 (CPT®) Hc Pt Therapeutic Act Ea 15 Min      50258 (CPT®) Hc Pt Manual Therapy Ea 15 Min      84238 (CPT®) Hc Pt Iontophoresis Ea 15 Min      75588 (CPT®) Hc  Pt Elec Stim Ea-Per 15 Min      34292 (CPT®) Hc Pt Ultrasound Ea 15 Min      91161 (CPT®) Hc Pt Self Care/Mgmt/Train Ea 15 Min      13307 (CPT®) Hc Pt Prosthetic (S) Train Initial Encounter, Each 15 Min      64907 (CPT®) Hc Pt Orthotic(S)/Prosthetic(S) Encounter, Each 15 Min      56329 (CPT®) Hc Orthotic(S) Mgmt/Train Initial Encounter, Each 15min      Total  18 1        Therapy Charges for Today     Code Description Service Date Service Provider Modifiers Qty    08901206255 HC PT THER PROC EA 15 MIN 1/11/2019 Mckinley Watson, PTA GP 2          PT G-Codes  Outcome Measure Options: AM-PAC 6 Clicks Basic Mobility (PT)  AM-PAC 6 Clicks Score: 9    Mckinley Watson, PTA  1/11/2019

## 2019-01-11 NOTE — PROGRESS NOTES
Name: Ariel Elliott ADMIT: 2019   : 1940  PCP: Zeb Meadows MD    MRN: [id number] LOS: 4 days   AGE/SEX: 79 y.o. male  ROOM: [facility]   Subjective   Doing well on the orthopedic unit.  Worked with physical therapy yesterday.  Eager to work with them more today  Pain fairly well controlled with current meds  Complaining of constipation still.  Medications have been adjusted  No soa or cough  No chest pain or palpitations  A lot of flatus but no BM yet      Objective   Vital Signs  Temp:  [97 °F (36.1 °C)-99.4 °F (37.4 °C)] 99.4 °F (37.4 °C)  Heart Rate:  [] 82  Resp:  [16-18] 16  BP: (114-146)/(62-71) 114/66  SpO2:  [90 %-97 %] 97 %  on  Flow (L/min):  [3] 3;   Device (Oxygen Therapy): room air  Body mass index is 39.17 kg/m².    Physical Exam   Constitutional: He is oriented to person, place, and time. No distress.   HENT:   Head: Normocephalic and atraumatic.   Neck: Normal range of motion.   Cardiovascular: Normal rate and regular rhythm. Exam reveals no friction rub.   No murmur heard.  Pulmonary/Chest: Effort normal and breath sounds normal. No respiratory distress. He has no wheezes. He has no rales.   Abdominal: Soft. Bowel sounds are normal. He exhibits no distension. There is no tenderness. There is no guarding.   Musculoskeletal: He exhibits edema (trace) and deformity.   Right lower extremity/knee incision with clean dry bandage   Neurological: He is alert and oriented to person, place, and time.   Skin: Skin is warm and dry. He is not diaphoretic. No erythema.   Psychiatric: He has a normal mood and affect. His behavior is normal.       Results Review:       I reviewed the patient's new clinical results.  Results from last 7 days   Lab Units  19   0625  01/10/19   0316  19   1536  19   0410   19   0932   WBC 10*3/mm3  6.67  7.18   --   10.26   --   6.80   HEMOGLOBIN g/dL  7.5*  7.7*  6.6*  7.2*   < >  7.7*   PLATELETS 10*3/mm3  108*  90*   --   133*    --   157    < > = values in this interval not displayed.     Results from last 7 days   Lab Units  01/11/19   0625  01/10/19   1311  01/09/19   0410  01/08/19   0616   SODIUM mmol/L  137  139  141  141   POTASSIUM mmol/L  4.1  4.0  4.1  4.3   CHLORIDE mmol/L  104  105  106  105   CO2 mmol/L  20.6*  20.8*  21.0*  22.3   BUN mg/dL  47*  48*  48*  54*   CREATININE mg/dL  1.35*  1.64*  1.60*  1.31*   GLUCOSE mg/dL  131*  196*  216*  176*   Estimated Creatinine Clearance: 58.6 mL/min (A) (by C-G formula based on SCr of 1.35 mg/dL (H)).    Results from last 7 days   Lab Units  01/11/19   0625  01/10/19   1311  01/09/19   0410  01/08/19   0616   CALCIUM mg/dL  8.3*  7.8*  8.1*  8.5*   MAGNESIUM mg/dL   --    --    --   2.4   PHOSPHORUS mg/dL   --    --    --   5.1*       Lab Results   Component Value Date    HGBA1C 5.76 (H) 11/09/2018    HGBA1C 7.0 (H) 09/04/2015     Glucose   Date/Time Value Ref Range Status   01/11/2019 1135 189 (H) 70 - 130 mg/dL Final   01/10/2019 2114 155 (H) 70 - 130 mg/dL Final   01/10/2019 1655 152 (H) 70 - 130 mg/dL Final   01/10/2019 1131 196 (H) 70 - 130 mg/dL Final   01/10/2019 0705 144 (H) 70 - 130 mg/dL Final   01/09/2019 2135 129 70 - 130 mg/dL Final   01/09/2019 2123 140 (H) 70 - 130 mg/dL Final   01/09/2019 1540 142 (H) 70 - 130 mg/dL Final         allopurinol 300 mg Oral Nightly   aspirin 81 mg Oral Daily   cholecalciferol 1,000 Units Oral Daily   furosemide 40 mg Oral Daily   heparin flush (porcine) 5 mL Intracatheter Q8H   insulin glargine 15 Units Subcutaneous Q12H   insulin lispro 0-9 Units Subcutaneous 4x Daily With Meals & Nightly   insulin lispro 5 Units Subcutaneous TID With Meals   isosorbide dinitrate 40 mg Oral BID   pantoprazole 40 mg Oral QAM   polyethylene glycol 17 g Oral Daily   sennosides-docusate sodium 2 tablet Oral Nightly   sodium chloride 10 mL Intravenous Q12H   sodium chloride 3 mL Intravenous Q12H   tamsulosin 0.4 mg Oral Daily   wheat dextrin 1 each Oral Daily       Diet Regular; Consistent Carbohydrate      Assessment/Plan      Active Hospital Problems    Diagnosis Date Noted   • **Closed displaced supracondylar fracture without intracondylar extension of lower end of right femur (CMS/[facility]) [S72.451A] 01/07/2019   • S/P TAVR (transcatheter aortic valve replacement) [Z95.2] 12/21/2018   • Von Willebrand disease (CMS/[facility]) [D68.0] 11/13/2018   • CKD (chronic kidney disease) [N18.9] 11/13/2018   • Nonrheumatic aortic valve stenosis [I35.0] 10/25/2018   • S/P CABG (coronary artery bypass graft) [Z95.1] 10/25/2018   • Type 2 diabetes mellitus with complication, with long-term current use of insulin (CMS/[facility]) [E11.8, Z79.4] 10/25/2018   • AVM (arteriovenous malformation) of small bowel, acquired with hemorrhage (CMS/[facility]) [K55.8] 10/25/2018      Resolved Hospital Problems   No resolved problems to display.     · Closed displaced distal femur on the right: POD 3. Did quite well. Mobilize early--still hasn't stood up yet   · Started flomax for BPH and urine retention.   · Chronic GI bleeding from AVMs needing 5 blood transfusions this admission. Hgb responded and is 7.5.  Transfuse as needed, try to keep hemoglobin between 7.5 and 8.5  · we cannot give him pharmacologic DVT prophylaxis due to his bleeding  · Type 2 diabetes: Continue Levemir 20 units twice a day (home dose of 30 units BID) continue sliding scale insulin  · Chronic kidney disease stage III: Creatinine up at 1.6 from 1.3 at baseline, check in am  · Medications adjusted for constipation.    · DISPO: Rehab in 1-2 days    Tre Becerril MD  [facility]  01/11/19  2:54 PM

## 2019-01-11 NOTE — PLAN OF CARE
Problem: Patient Care Overview  Goal: Plan of Care Review   01/11/19 1518   OTHER   Outcome Summary Pt requesting to sit on commode. Bariatric commode found for pt. Stand pivot transfer to commode maxAx2. Able to pivot L foot to help with transfer. Increased pain with all mobility. Educated nursing how to safely get pt off of commode; recommend pt pivot to L side. Will continue to progress as tolerated.

## 2019-01-11 NOTE — PLAN OF CARE
Problem: Fall Risk (Adult)  Goal: Absence of Fall  Outcome: Ongoing (interventions implemented as appropriate)      Problem: Patient Care Overview  Goal: Plan of Care Review  Outcome: Ongoing (interventions implemented as appropriate)   01/10/19 1948   Coping/Psychosocial   Plan of Care Reviewed With patient;daughter   Plan of Care Review   Progress improving   OTHER   Outcome Summary POD 2 Right distal femur replacement and RTK, A&O, voiding per urinal, O2-3L, mepilex dressing scant drainage, worked w/PT max assist x2 for tx, up in chair, educated on glucose monitoring, plan to [place] at d/c     Goal: Individualization and Mutuality  Outcome: Ongoing (interventions implemented as appropriate)      Problem: Skin Injury Risk (Adult)  Goal: Skin Health and Integrity  Outcome: Ongoing (interventions implemented as appropriate)      Problem: Pain, Acute (Adult)  Goal: Acceptable Pain Control/Comfort Level  Outcome: Ongoing (interventions implemented as appropriate)      Problem: Anemia (Adult)  Goal: Symptom Improvement  Outcome: Ongoing (interventions implemented as appropriate)

## 2019-01-11 NOTE — PROGRESS NOTES
Continued Stay Note  [facility]     Patient Name: Ariel Elliott  MRN: [id number]  Today's Date: 1/11/2019    Admit Date: 1/7/2019    Discharge Plan     Row Name 01/11/19 1448       Plan    Plan Comments  Per Gemma bed will be available at [facility], packet will on chart.        Discharge Codes    No documentation.             Shivani Tobin RN

## 2019-01-11 NOTE — PLAN OF CARE
Problem: Fall Risk (Adult)  Goal: Absence of Fall  Outcome: Ongoing (interventions implemented as appropriate)      Problem: Patient Care Overview  Goal: Plan of Care Review  Outcome: Ongoing (interventions implemented as appropriate)   01/11/19 0500   Coping/Psychosocial   Plan of Care Reviewed With patient   Plan of Care Review   Progress improving   OTHER   Outcome Summary Patient POD3. Pt alert and oriented, VSS. Drainage marked on island dressing is scant. Pt requiring minimal pain medication this shift. Adequate PO intake and urine output. Possible D/C to [place] for rehab today. Will continue to monitor.      Goal: Individualization and Mutuality  Outcome: Ongoing (interventions implemented as appropriate)    Goal: Discharge Needs Assessment  Outcome: Ongoing (interventions implemented as appropriate)    Goal: Interprofessional Rounds/Family Conf  Outcome: Ongoing (interventions implemented as appropriate)      Problem: Skin Injury Risk (Adult)  Goal: Skin Health and Integrity  Outcome: Ongoing (interventions implemented as appropriate)      Problem: Pain, Acute (Adult)  Goal: Acceptable Pain Control/Comfort Level  Outcome: Ongoing (interventions implemented as appropriate)      Problem: Anemia (Adult)  Goal: Symptom Improvement  Outcome: Ongoing (interventions implemented as appropriate)

## 2019-01-11 NOTE — PLAN OF CARE
Problem: Patient Care Overview  Goal: Plan of Care Review  Outcome: Ongoing (interventions implemented as appropriate)   01/11/19 1701   Coping/Psychosocial   Plan of Care Reviewed With patient   Plan of Care Review   Progress improving   OTHER   Outcome Summary Pain well controlled with PO pain medication. Patient had Bowel movement this shift. Hgb stable. DC to rehab when medically stable. VSS. Voiding without difficulty. Educated about importance of 02 sat monitoring.      Goal: Individualization and Mutuality  Outcome: Ongoing (interventions implemented as appropriate)    Goal: Discharge Needs Assessment  Outcome: Ongoing (interventions implemented as appropriate)    Goal: Interprofessional Rounds/Family Conf  Outcome: Ongoing (interventions implemented as appropriate)      Problem: Skin Injury Risk (Adult)  Goal: Skin Health and Integrity  Outcome: Ongoing (interventions implemented as appropriate)      Problem: Pain, Acute (Adult)  Goal: Acceptable Pain Control/Comfort Level  Outcome: Ongoing (interventions implemented as appropriate)      Problem: Anemia (Adult)  Goal: Symptom Improvement  Outcome: Ongoing (interventions implemented as appropriate)

## 2019-01-12 LAB
ABO GROUP BLD: NORMAL
ALBUMIN SERPL-MCNC: 2.7 G/DL (ref 3.5–5.2)
ANION GAP SERPL CALCULATED.3IONS-SCNC: 9.1 MMOL/L
BASOPHILS # BLD AUTO: 0.01 10*3/MM3 (ref 0–0.2)
BASOPHILS NFR BLD AUTO: 0.2 % (ref 0–1.5)
BLD GP AB SCN SERPL QL: POSITIVE
BUN BLD-MCNC: 49 MG/DL (ref 8–23)
BUN/CREAT SERPL: 34 (ref 7–25)
CALCIUM SPEC-SCNC: 8.4 MG/DL (ref 8.6–10.5)
CHLORIDE SERPL-SCNC: 103 MMOL/L (ref 98–107)
CO2 SERPL-SCNC: 23.9 MMOL/L (ref 22–29)
CREAT BLD-MCNC: 1.44 MG/DL (ref 0.76–1.27)
DEPRECATED RDW RBC AUTO: 53.7 FL (ref 37–54)
EOSINOPHIL # BLD AUTO: 0.45 10*3/MM3 (ref 0–0.7)
EOSINOPHIL NFR BLD AUTO: 7.3 % (ref 0.3–6.2)
ERYTHROCYTE [DISTWIDTH] IN BLOOD BY AUTOMATED COUNT: 15.8 % (ref 11.5–14.5)
GFR SERPL CREATININE-BSD FRML MDRD: 47 ML/MIN/1.73
GLUCOSE BLD-MCNC: 154 MG/DL (ref 65–99)
GLUCOSE BLDC GLUCOMTR-MCNC: 166 MG/DL (ref 70–130)
GLUCOSE BLDC GLUCOMTR-MCNC: 172 MG/DL (ref 70–130)
GLUCOSE BLDC GLUCOMTR-MCNC: 175 MG/DL (ref 70–130)
GLUCOSE BLDC GLUCOMTR-MCNC: 198 MG/DL (ref 70–130)
HCT VFR BLD AUTO: 23.3 % (ref 40.4–52.2)
HGB BLD-MCNC: 7.3 G/DL (ref 13.7–17.6)
IMM GRANULOCYTES # BLD AUTO: 0.02 10*3/MM3 (ref 0–0.03)
IMM GRANULOCYTES NFR BLD AUTO: 0.3 % (ref 0–0.5)
LYMPHOCYTES # BLD AUTO: 0.66 10*3/MM3 (ref 0.9–4.8)
LYMPHOCYTES NFR BLD AUTO: 10.7 % (ref 19.6–45.3)
MCH RBC QN AUTO: 28.9 PG (ref 27–32.7)
MCHC RBC AUTO-ENTMCNC: 31.3 G/DL (ref 32.6–36.4)
MCV RBC AUTO: 92.1 FL (ref 79.8–96.2)
MONOCYTES # BLD AUTO: 0.57 10*3/MM3 (ref 0.2–1.2)
MONOCYTES NFR BLD AUTO: 9.3 % (ref 5–12)
NEUTROPHILS # BLD AUTO: 4.46 10*3/MM3 (ref 1.9–8.1)
NEUTROPHILS NFR BLD AUTO: 72.5 % (ref 42.7–76)
NONSPECIFIC GEL REACTION: NORMAL
PHOSPHATE SERPL-MCNC: 3 MG/DL (ref 2.5–4.5)
PLATELET # BLD AUTO: 118 10*3/MM3 (ref 140–500)
PMV BLD AUTO: 10.8 FL (ref 6–12)
POTASSIUM BLD-SCNC: 3.9 MMOL/L (ref 3.5–5.2)
RBC # BLD AUTO: 2.53 10*6/MM3 (ref 4.6–6)
RH BLD: POSITIVE
SODIUM BLD-SCNC: 136 MMOL/L (ref 136–145)
T&S EXPIRATION DATE: NORMAL
WBC NRBC COR # BLD: 6.15 10*3/MM3 (ref 4.5–10.7)

## 2019-01-12 PROCEDURE — 86900 BLOOD TYPING SEROLOGIC ABO: CPT

## 2019-01-12 PROCEDURE — 80069 RENAL FUNCTION PANEL: CPT | Performed by: HOSPITALIST

## 2019-01-12 PROCEDURE — 86901 BLOOD TYPING SEROLOGIC RH(D): CPT | Performed by: HOSPITALIST

## 2019-01-12 PROCEDURE — 36415 COLL VENOUS BLD VENIPUNCTURE: CPT | Performed by: HOSPITALIST

## 2019-01-12 PROCEDURE — 63710000001 INSULIN GLARGINE PER 5 UNITS: Performed by: HOSPITALIST

## 2019-01-12 PROCEDURE — 86920 COMPATIBILITY TEST SPIN: CPT

## 2019-01-12 PROCEDURE — 86870 RBC ANTIBODY IDENTIFICATION: CPT | Performed by: HOSPITALIST

## 2019-01-12 PROCEDURE — P9016 RBC LEUKOCYTES REDUCED: HCPCS

## 2019-01-12 PROCEDURE — 36430 TRANSFUSION BLD/BLD COMPNT: CPT

## 2019-01-12 PROCEDURE — 97110 THERAPEUTIC EXERCISES: CPT

## 2019-01-12 PROCEDURE — 86922 COMPATIBILITY TEST ANTIGLOB: CPT

## 2019-01-12 PROCEDURE — 82962 GLUCOSE BLOOD TEST: CPT

## 2019-01-12 PROCEDURE — 63710000001 INSULIN LISPRO (HUMAN) PER 5 UNITS: Performed by: HOSPITALIST

## 2019-01-12 PROCEDURE — 86900 BLOOD TYPING SEROLOGIC ABO: CPT | Performed by: HOSPITALIST

## 2019-01-12 PROCEDURE — 86850 RBC ANTIBODY SCREEN: CPT | Performed by: HOSPITALIST

## 2019-01-12 PROCEDURE — 25010000002 ONDANSETRON PER 1 MG: Performed by: HOSPITALIST

## 2019-01-12 PROCEDURE — 85025 COMPLETE CBC W/AUTO DIFF WBC: CPT | Performed by: INTERNAL MEDICINE

## 2019-01-12 RX ADMIN — SENNOSIDES AND DOCUSATE SODIUM 2 TABLET: 8.6; 5 TABLET ORAL at 21:45

## 2019-01-12 RX ADMIN — INSULIN LISPRO 2 UNITS: 100 INJECTION, SOLUTION INTRAVENOUS; SUBCUTANEOUS at 12:02

## 2019-01-12 RX ADMIN — INSULIN LISPRO 5 UNITS: 100 INJECTION, SOLUTION INTRAVENOUS; SUBCUTANEOUS at 17:27

## 2019-01-12 RX ADMIN — INSULIN LISPRO 5 UNITS: 100 INJECTION, SOLUTION INTRAVENOUS; SUBCUTANEOUS at 12:02

## 2019-01-12 RX ADMIN — SODIUM CHLORIDE, PRESERVATIVE FREE 3 ML: 5 INJECTION INTRAVENOUS at 08:35

## 2019-01-12 RX ADMIN — TAMSULOSIN HYDROCHLORIDE 0.4 MG: 0.4 CAPSULE ORAL at 08:34

## 2019-01-12 RX ADMIN — INSULIN LISPRO 2 UNITS: 100 INJECTION, SOLUTION INTRAVENOUS; SUBCUTANEOUS at 21:44

## 2019-01-12 RX ADMIN — HYDROCODONE BITARTRATE AND ACETAMINOPHEN 1 TABLET: 10; 325 TABLET ORAL at 02:00

## 2019-01-12 RX ADMIN — ISOSORBIDE DINITRATE 40 MG: 20 TABLET ORAL at 08:33

## 2019-01-12 RX ADMIN — FUROSEMIDE 40 MG: 40 TABLET ORAL at 08:34

## 2019-01-12 RX ADMIN — INSULIN GLARGINE 15 UNITS: 100 INJECTION, SOLUTION SUBCUTANEOUS at 08:33

## 2019-01-12 RX ADMIN — HYDROCODONE BITARTRATE AND ACETAMINOPHEN 1 TABLET: 10; 325 TABLET ORAL at 15:09

## 2019-01-12 RX ADMIN — SODIUM CHLORIDE, PRESERVATIVE FREE 3 ML: 5 INJECTION INTRAVENOUS at 21:46

## 2019-01-12 RX ADMIN — INSULIN LISPRO 2 UNITS: 100 INJECTION, SOLUTION INTRAVENOUS; SUBCUTANEOUS at 08:34

## 2019-01-12 RX ADMIN — Medication 1 EACH: at 08:35

## 2019-01-12 RX ADMIN — INSULIN LISPRO 2 UNITS: 100 INJECTION, SOLUTION INTRAVENOUS; SUBCUTANEOUS at 17:28

## 2019-01-12 RX ADMIN — INSULIN LISPRO 5 UNITS: 100 INJECTION, SOLUTION INTRAVENOUS; SUBCUTANEOUS at 08:34

## 2019-01-12 RX ADMIN — PANTOPRAZOLE SODIUM 40 MG: 40 TABLET, DELAYED RELEASE ORAL at 05:26

## 2019-01-12 RX ADMIN — VITAMIN D, TAB 1000IU (100/BT) 1000 UNITS: 25 TAB at 08:33

## 2019-01-12 RX ADMIN — ASPIRIN 81 MG: 81 TABLET, DELAYED RELEASE ORAL at 08:33

## 2019-01-12 RX ADMIN — ISOSORBIDE DINITRATE 40 MG: 20 TABLET ORAL at 21:44

## 2019-01-12 RX ADMIN — POLYETHYLENE GLYCOL 3350 17 G: 17 POWDER, FOR SOLUTION ORAL at 08:35

## 2019-01-12 RX ADMIN — ALLOPURINOL 300 MG: 300 TABLET ORAL at 21:44

## 2019-01-12 RX ADMIN — HYDROCODONE BITARTRATE AND ACETAMINOPHEN 1 TABLET: 10; 325 TABLET ORAL at 21:48

## 2019-01-12 RX ADMIN — INSULIN GLARGINE 15 UNITS: 100 INJECTION, SOLUTION SUBCUTANEOUS at 21:44

## 2019-01-12 RX ADMIN — ONDANSETRON HYDROCHLORIDE 4 MG: 2 SOLUTION INTRAMUSCULAR; INTRAVENOUS at 21:34

## 2019-01-12 NOTE — PLAN OF CARE
Problem: Fall Risk (Adult)  Goal: Absence of Fall  Outcome: Ongoing (interventions implemented as appropriate)   01/12/19 0334   Fall Risk (Adult)   Absence of Fall making progress toward outcome       Problem: Patient Care Overview  Goal: Plan of Care Review  Outcome: Ongoing (interventions implemented as appropriate)   01/12/19 0334   Coping/Psychosocial   Plan of Care Reviewed With patient   Plan of Care Review   Progress improving   OTHER   Outcome Summary Diabetes controlled with scheduled and sliding scale insulin. pain well controlled.

## 2019-01-12 NOTE — PLAN OF CARE
Problem: Patient Care Overview  Goal: Plan of Care Review   01/12/19 1709   Coping/Psychosocial   Plan of Care Reviewed With patient   OTHER   Outcome Summary Progressing towards goals with PT- modAx2 required for stand pivot transfer. Will continue to advance as able.

## 2019-01-12 NOTE — THERAPY TREATMENT NOTE
Acute Care - Physical Therapy Treatment Note  [facility]     Patient Name: Ariel Elliott  : 1940  MRN: [id number]  Today's Date: 2019  Onset of Illness/Injury or Date of Surgery: 19  Date of Referral to PT: 19  Referring Physician: Flor    Admit Date: 2019    Visit Dx:    ICD-10-CM ICD-9-CM   1. Closed displaced supracondylar fracture of distal end of right femur without intracondylar extension, initial encounter (CMS/[facility]) S72.451A 821.23   2. Symptomatic anemia D64.9 285.9   3. Minor head injury without loss of consciousness, initial encounter S09.90XA 959.01   4. Difficulty walking R26.2 719.7     Patient Active Problem List   Diagnosis   • Nonrheumatic aortic valve stenosis   • S/P CABG (coronary artery bypass graft)   • Class 2 severe obesity due to excess calories with serious comorbidity and body mass index (BMI) of 39.0 to 39.9 in adult (CMS/[facility])   • Left bundle branch block (LBBB)   • Type 2 diabetes mellitus with complication, with long-term current use of insulin (CMS/[facility])   • Dyspnea on exertion   • Angina pectoris (CMS/[facility])   • Syncope   • AVM (arteriovenous malformation) of small bowel, acquired with hemorrhage (CMS/[facility])   • Aortic valve stenosis   • Atrial flutter (CMS/[facility])   • Diabetes mellitus (CMS/[facility])   • Von Willebrand disease (CMS/[facility])   • Sleep apnea   • Pacemaker   • Coronary atherosclerosis   • CKD (chronic kidney disease)   • S/P TAVR (transcatheter aortic valve replacement)   • Closed displaced supracondylar fracture without intracondylar extension of lower end of right femur (CMS/[facility])       Therapy Treatment    Rehabilitation Treatment Summary     Row Name 19 1636             Treatment Time/Intention    Discipline  physical therapist  -MA      Document Type  therapy note (daily note)  -MA      Subjective Information  complains of;weakness;pain  -MA      Mode of Treatment  physical therapy  -MA      Patient/Family Observations  Supine in bed  with HOB elevated, NAD, KI at bedside  -MA      Care Plan Review  patient/other agree to care plan  -MA      Therapy Frequency (PT Clinical Impression)  daily  -MA      Patient Effort  fair  -MA      Existing Precautions/Restrictions  fall  -MA      Treatment Considerations/Comments  R LE WBAT, very fearful and anxious  -MA      Recorded by [MA] Keshia Hodge, PT 01/12/19 1702      Row Name 01/12/19 1636             Vital Signs    O2 Delivery Pre Treatment  room air  -MA      Recorded by [MA] Keshia Hodge, PT 01/12/19 1702      Row Name 01/12/19 1636             Cognitive Assessment/Intervention- PT/OT    Orientation Status (Cognition)  oriented x 4  -MA      Follows Commands (Cognition)  WFL  -MA      Personal Safety Interventions  fall prevention program maintained;gait belt;nonskid shoes/slippers when out of bed  -MA      Recorded by [MA] Keshia Hodge, PT 01/12/19 1702      Row Name 01/12/19 1636             Mobility Assessment/Intervention    Extremity Weight-bearing Status  right lower extremity  -MA      Right Lower Extremity (Weight-bearing Status)  weight-bearing as tolerated (WBAT)  -MA      Recorded by [MA] Keshia Hodge, PT 01/12/19 1702      Row Name 01/12/19 1636             Bed Mobility Assessment/Treatment    Supine-Sit [place] (Bed Mobility)  maximum assist (25% patient effort);verbal cues;nonverbal cues (demo/gesture)  -MA      Sit-Supine [place] (Bed Mobility)  not tested  -MA      Bed Mobility, Safety Issues  decreased use of legs for bridging/pushing;impaired trunk control for bed mobility  -MA      Assistive Device (Bed Mobility)  bed rails;head of bed elevated  -MA      Comment (Bed Mobility)  Hand placement cues- donned KI in supine  -MA      Recorded by [MA] Keshia Hodge, PT 01/12/19 1702      Row Name 01/12/19 1636             Transfer Assessment/Treatment    Transfer Assessment/Treatment  bed-chair transfer  -MA      Comment (Transfers)  Stand  pivot transfer- sequencing cues required. Tactile cues for weight shifting.  -MA      Recorded by [MA] Keshia Hodge, PT 01/12/19 1702      Row Name 01/12/19 1636             Bed-Chair Transfer    Bed-Chair [place] (Transfers)  maximum assist (25% patient effort);2 person assist;verbal cues;nonverbal cues (demo/gesture)  -MA      Assistive Device (Bed-Chair Transfers)  walker, front-wheeled  -MA      Recorded by [MA] Keshia Hodge, PT 01/12/19 1702      Row Name 01/12/19 1636             Static Sitting Balance    Level of [place] (Unsupported Sitting, Static Balance)  supervision  -MA      Sitting Position (Unsupported Sitting, Static Balance)  sitting on edge of bed  -MA      Recorded by [MA] Keshia Hodge, PT 01/12/19 1702      Row Name 01/12/19 1636             Positioning and Restraints    Pre-Treatment Position  in bed  -MA      Post Treatment Position  chair  -MA      In Chair  sitting;call light within reach;encouraged to call for assist;legs elevated  -MA      Recorded by [MA] Keshia Hodge, PT 01/12/19 1702      Row Name 01/12/19 1636             Pain Scale: Numbers Pre/Post-Treatment    Pain Scale: Numbers, Pretreatment  5/10  -MA      Pain Scale: Numbers, Post-Treatment  5/10  -MA      Pain Location - Side  Right  -MA      Pain Location - Orientation  lower  -MA      Pain Location  knee  -MA      Recorded by [MA] Keshia Hodge, PT 01/12/19 1702      Row Name                Wound 01/08/19 1405 Right knee incision    Wound - Properties Group Date first assessed: 01/08/19 [MB] Time first assessed: 1405 [MB] Side: Right [MB] Location: knee [MB] Type: incision [MB] Recorded by:  [MB] Keshia Melara RN 01/08/19 1405    Row Name 01/12/19 1636             Outcome Summary/Treatment Plan (PT)    Daily Summary of Progress (PT)  progress toward functional goals is gradual  -MA      Recorded by [MA] Keshia Hodge, PT 01/12/19 1702        User Key  (r) = Recorded By, (t)  = Taken By, (c) = Cosigned By    Initials Name Effective Dates Discipline    Keshia Galeana, RN 06/16/16 -  Nurse    Keshia Walton, PT 04/03/18 -  PT          Wound 01/08/19 1405 Right knee incision (Active)   Dressing Appearance dry;intact;area marked 1/12/2019  4:34 PM   Closure DWIGHT 1/12/2019  4:34 PM   Base dressing in place, unable to visualize 1/12/2019  4:34 PM   Drainage Amount scant 1/12/2019  4:34 PM           Physical Therapy Education     Title: PT OT SLP Therapies (Done)     Topic: Physical Therapy (Done)     Point: Mobility training (Done)     Learning Progress Summary           Patient Acceptance, E, VU by MA at 1/12/2019  5:02 PM    Acceptance, E, VU,NR by CYNDI at 1/11/2019  3:18 PM    Acceptance, E,TB,D, VU,NR by LUDMILA at 1/10/2019  4:37 PM    Acceptance, E,TB,D, VU,NR by MOSHE at 1/9/2019 11:28 AM   Family Acceptance, E,TB,D, VU,NR by LUDMILA at 1/10/2019  4:37 PM    Acceptance, E,TB,D, VU,NR by MOSHE at 1/9/2019 11:28 AM                   Point: Home exercise program (Done)     Learning Progress Summary           Patient Acceptance, E, VU by MA at 1/12/2019  5:02 PM    Acceptance, E, VU,NR by CYNDI at 1/11/2019  3:18 PM    Acceptance, E,TB,D, VU,NR by LUDMILA at 1/10/2019  4:37 PM    Acceptance, E,TB,D, VU,NR by MOSHE at 1/9/2019 11:28 AM   Family Acceptance, E,TB,D, VU,NR by LUDMILA at 1/10/2019  4:37 PM    Acceptance, E,TB,D, VU,NR by MOSHE at 1/9/2019 11:28 AM                   Point: Body mechanics (Done)     Learning Progress Summary           Patient Acceptance, E, VU by MA at 1/12/2019  5:02 PM    Acceptance, E, VU,NR by CYNDI at 1/11/2019  3:18 PM    Acceptance, E,TB,D, VU,NR by LUDMILA at 1/10/2019  4:37 PM    Acceptance, E,TB,D, VU,NR by MOSHE at 1/9/2019 11:28 AM   Family Acceptance, STEFANO ZUÑIGA D, VU,NR by LUDMILA at 1/10/2019  4:37 PM    Acceptance, STEFANO ZUÑIGA D, VU,PATIENCE by MOSHE at 1/9/2019 11:28 AM                   Point: Precautions (Done)     Learning Progress Summary           Patient AcceptanceZARA VU by MA at 1/12/2019  5:02 PM     Acceptance, E, VU,NR by MA1 at 1/11/2019  3:18 PM    Acceptance, E,TB,D, VU,NR by  at 1/10/2019  4:37 PM    Acceptance, E,TB,D, VU,NR by  at 1/9/2019 11:28 AM   Family Acceptance, E,TB,D, VU,NR by LUDMILA at 1/10/2019  4:37 PM    Acceptance, E,TB,D, VU,NR by MOSHE at 1/9/2019 11:28 AM                               User Key     Initials Effective Dates Name Provider Type Discipline     03/07/18 -  Jocelin Mccormick, PTA Physical Therapy Assistant PT    EJ 04/03/18 -  Monica Storey, PT Physical Therapist PT    MA 04/03/18 -  Keshia Hodge, PT Physical Therapist PT    [facility] 10/19/18 -  Earnestine Esparza, PT Physical Therapist PT                PT Recommendation and Plan  Therapy Frequency (PT Clinical Impression): daily  Outcome Summary/Treatment Plan (PT)  Daily Summary of Progress (PT): progress toward functional goals is gradual  Plan of Care Reviewed With: patient  Outcome Summary: Progressing towards goals with PT- modAx2 required for stand pivot transfer. Will continue to advance as able.  Outcome Measures     Row Name 01/12/19 1700 01/11/19 1500 01/10/19 1600       How much help from another person do you currently need...    Turning from your back to your side while in flat bed without using bedrails?  2  -MA  2  -MAA  2  -JM    Moving from lying on back to sitting on the side of a flat bed without bedrails?  2  -MA  2  -MAA  2  -JM    Moving to and from a bed to a chair (including a wheelchair)?  2  -MA  1  -MAA  1  -JM    Standing up from a chair using your arms (e.g., wheelchair, bedside chair)?  2  -MA  2  -MAA  2  -JM    Climbing 3-5 steps with a railing?  1  -MA  1  -MAA  1  -JM    To walk in hospital room?  1  -MA  1  -MAA  1  -JM    AM-PAC 6 Clicks Score  10  -MA  9  -MAA  9  -JM       Functional Assessment    Outcome Measure Options  AM-PAC 6 Clicks Basic Mobility (PT)  -MA  AM-PAC 6 Clicks Basic Mobility (PT)  -MAA  --      User Key  (r) = Recorded By, (t) = Taken By, (c) = Cosigned By    Initials  Name Provider Type    Jocelin Mendez, PTA Physical Therapy Assistant    Keshia Walton, PT Physical Therapist    Earnestine Dickson, PT Physical Therapist         Time Calculation:   PT Charges     Row Name 01/12/19 1658             Time Calculation    Start Time  1643  -MA      Stop Time  1658  -MA      Time Calculation (min)  15 min  -MA      PT Received On  01/12/19  -MA      PT - Next Appointment  01/13/19  -MA        User Key  (r) = Recorded By, (t) = Taken By, (c) = Cosigned By    Initials Name Provider Type    Keshia Walton, PT Physical Therapist        Therapy Suggested Charges     Code   Minutes Charges    82796 (CPT®) Hc Pt Neuromusc Re Education Ea 15 Min      94646 (CPT®) Hc Pt Ther Proc Ea 15 Min 18 1    69944 (CPT®) Hc Gait Training Ea 15 Min      28449 (CPT®) Hc Pt Therapeutic Act Ea 15 Min      61495 (CPT®) Hc Pt Manual Therapy Ea 15 Min      44565 (CPT®) Hc Pt Iontophoresis Ea 15 Min      74650 (CPT®) Hc Pt Elec Stim Ea-Per 15 Min      12491 (CPT®) Hc Pt Ultrasound Ea 15 Min      69404 (CPT®) Hc Pt Self Care/Mgmt/Train Ea 15 Min      39188 (CPT®) Hc Pt Prosthetic (S) Train Initial Encounter, Each 15 Min      95527 (CPT®) Hc Pt Orthotic(S)/Prosthetic(S) Encounter, Each 15 Min      47435 (CPT®) Hc Orthotic(S) Mgmt/Train Initial Encounter, Each 15min      Total  18 1        Therapy Charges for Today     Code Description Service Date Service Provider Modifiers Qty    [id number] HC PT THER PROC EA 15 MIN 1/12/2019 Keshia Hodge, PT GP 1          PT G-Codes  Outcome Measure Options: AM-PAC 6 Clicks Basic Mobility (PT)  AM-PAC 6 Clicks Score: 10    Keshia Hodge PT  1/12/2019

## 2019-01-12 NOTE — PLAN OF CARE
Problem: Patient Care Overview  Goal: Plan of Care Review  Outcome: Ongoing (interventions implemented as appropriate)   01/12/19 4912   Coping/Psychosocial   Plan of Care Reviewed With patient   Plan of Care Review   Progress improving   OTHER   Outcome Summary Pain well controlled with PO pain medication. Stands and pivots with assist x1-3. VSS. SSI coverage given. 1 unit of PRBC to be given, waiting on lab to complete type and screen. DC to rehab tomorrow. Ambulance scheduled for 1130am. Educated about importance of BG monitoring.      Goal: Individualization and Mutuality  Outcome: Ongoing (interventions implemented as appropriate)    Goal: Discharge Needs Assessment  Outcome: Ongoing (interventions implemented as appropriate)    Goal: Interprofessional Rounds/Family Conf  Outcome: Ongoing (interventions implemented as appropriate)      Problem: Skin Injury Risk (Adult)  Goal: Skin Health and Integrity  Outcome: Ongoing (interventions implemented as appropriate)      Problem: Pain, Acute (Adult)  Goal: Acceptable Pain Control/Comfort Level  Outcome: Ongoing (interventions implemented as appropriate)      Problem: Anemia (Adult)  Goal: Symptom Improvement  Outcome: Ongoing (interventions implemented as appropriate)

## 2019-01-12 NOTE — PROGRESS NOTES
Continued Stay Note  [facility]     Patient Name: Ariel Elliott  MRN: [id number]  Today's Date: 1/12/2019    Admit Date: 1/7/2019    Discharge Plan     Row Name 01/12/19 1357       Plan    Plan  Bed available for skilled care at [facility] when medically stable--yellow packet on chart    Patient/Family in Agreement with Plan  yes spoke with pt and his dtr/POWASHINGTON Hurst    Plan Comments  Inbound call from staff RN stating family is now interested in a different SNF when pt is ready to DC. Staff RN stated pt wanted to referral to [facility]. In basket referral sent and spoke with Kayleigh/Анна. Kayleigh states she will review and follow up with CCP. Called and spoke with pt and his dtr/POA Micaela Hurst--reviewed current DC plan of SNF at [facility]. Micaela states that is the correct facility ([facility]) and the family does not want pt to go to [facility]. Called to update staff RN and Kayleigh/Анна. No change to current DC plan. CCP to follow.............JW        Discharge Codes    No documentation.             Jayne Epstein, RN

## 2019-01-12 NOTE — PROGRESS NOTES
Name: rAiel Elliott ADMIT: 2019   : 1940  PCP: Zeb Meadows MD    MRN: [id number] LOS: 5 days   AGE/SEX: 79 y.o. male  ROOM: [facility]   Subjective   Doing well on the orthopedic unit.  Worked with physical therapy.  Nausea has improved and had a BM last night  Pain fairly well controlled with current meds  No soa or cough  No chest pain or palpitations    Objective   Vital Signs  Temp:  [97.3 °F (36.3 °C)-99.4 °F (37.4 °C)] 97.3 °F (36.3 °C)  Heart Rate:  [67-82] 67  Resp:  [16] 16  BP: (114-155)/(63-72) 147/63  SpO2:  [97 %-98 %] 98 %  on  Flow (L/min):  [2] 2;   Device (Oxygen Therapy): nasal cannula  Body mass index is 39.17 kg/m².    Physical Exam   Constitutional: He is oriented to person, place, and time. No distress.   HENT:   Head: Normocephalic and atraumatic.   Neck: Normal range of motion.   Cardiovascular: Normal rate and regular rhythm. Exam reveals no friction rub.   No murmur heard.  Pulmonary/Chest: Effort normal and breath sounds normal. No respiratory distress. He has no wheezes. He has no rales.   Abdominal: Soft. Bowel sounds are normal. He exhibits no distension. There is no tenderness. There is no guarding.   Musculoskeletal: He exhibits edema (trace) and deformity.   Right lower extremity/knee incision with clean dry bandage   Neurological: He is alert and oriented to person, place, and time.   Skin: Skin is warm and dry. He is not diaphoretic. No erythema.   Psychiatric: He has a normal mood and affect. His behavior is normal.       Results Review:       I reviewed the patient's new clinical results.  Results from last 7 days   Lab Units  19   0649  19   0625  01/10/19   0316  19   1536  19   0410   WBC 10*3/mm3  6.15  6.67  7.18   --   10.26   HEMOGLOBIN g/dL  7.3*  7.5*  7.7*  6.6*  7.2*   PLATELETS 10*3/mm3  118*  108*  90*   --   133*     Results from last 7 days   Lab Units  19   0649  19   0625  01/10/19   1311  19    0410   SODIUM mmol/L  136  137  139  141   POTASSIUM mmol/L  3.9  4.1  4.0  4.1   CHLORIDE mmol/L  103  104  105  106   CO2 mmol/L  23.9  20.6*  20.8*  21.0*   BUN mg/dL  49*  47*  48*  48*   CREATININE mg/dL  1.44*  1.35*  1.64*  1.60*   GLUCOSE mg/dL  154*  131*  196*  216*   Estimated Creatinine Clearance: 55 mL/min (A) (by C-G formula based on SCr of 1.44 mg/dL (H)).  Results from last 7 days   Lab Units  01/12/19   0649   ALBUMIN g/dL  2.70*     Results from last 7 days   Lab Units  01/12/19   0649  01/11/19   0625  01/10/19   1311  01/09/19   0410  01/08/19   0616   CALCIUM mg/dL  8.4*  8.3*  7.8*  8.1*  8.5*   ALBUMIN g/dL  2.70*   --    --    --    --    MAGNESIUM mg/dL   --    --    --    --   2.4   PHOSPHORUS mg/dL  3.0   --    --    --   5.1*       Lab Results   Component Value Date    HGBA1C 5.76 (H) 11/09/2018    HGBA1C 7.0 (H) 09/04/2015     Glucose   Date/Time Value Ref Range Status   01/12/2019 0623 172 (H) 70 - 130 mg/dL Final   01/11/2019 2139 152 (H) 70 - 130 mg/dL Final   01/11/2019 1602 177 (H) 70 - 130 mg/dL Final   01/11/2019 1135 189 (H) 70 - 130 mg/dL Final   01/10/2019 2114 155 (H) 70 - 130 mg/dL Final   01/10/2019 1655 152 (H) 70 - 130 mg/dL Final   01/10/2019 1131 196 (H) 70 - 130 mg/dL Final   01/10/2019 0705 144 (H) 70 - 130 mg/dL Final         allopurinol 300 mg Oral Nightly   aspirin 81 mg Oral Daily   cholecalciferol 1,000 Units Oral Daily   furosemide 40 mg Oral Daily   heparin flush (porcine) 5 mL Intracatheter Q8H   insulin glargine 15 Units Subcutaneous Q12H   insulin lispro 0-9 Units Subcutaneous 4x Daily With Meals & Nightly   insulin lispro 5 Units Subcutaneous TID With Meals   isosorbide dinitrate 40 mg Oral BID   pantoprazole 40 mg Oral QAM   polyethylene glycol 17 g Oral Daily   sennosides-docusate sodium 2 tablet Oral Nightly   sodium chloride 10 mL Intravenous Q12H   sodium chloride 3 mL Intravenous Q12H   tamsulosin 0.4 mg Oral Daily   wheat dextrin 1 each Oral Daily       Diet Regular; Consistent Carbohydrate      Assessment/Plan      Active Hospital Problems    Diagnosis Date Noted   • **Closed displaced supracondylar fracture without intracondylar extension of lower end of right femur (CMS/[facility]) [S72.451A] 01/07/2019   • S/P TAVR (transcatheter aortic valve replacement) [Z95.2] 12/21/2018   • Von Willebrand disease (CMS/[facility]) [D68.0] 11/13/2018   • CKD (chronic kidney disease) [N18.9] 11/13/2018   • Nonrheumatic aortic valve stenosis [I35.0] 10/25/2018   • S/P CABG (coronary artery bypass graft) [Z95.1] 10/25/2018   • Type 2 diabetes mellitus with complication, with long-term current use of insulin (CMS/[facility]) [E11.8, Z79.4] 10/25/2018   • AVM (arteriovenous malformation) of small bowel, acquired with hemorrhage (CMS/[facility]) [K55.8] 10/25/2018      Resolved Hospital Problems   No resolved problems to display.     · Closed displaced distal femur on the right: POD 4. Did quite well. Mobilize early--still hasn't stood up yet   · Started flomax for BPH and urine retention.   · Chronic GI bleeding from AVMs needing 5 blood transfusions this admission. Hgb responded and is 7.5.  Transfuse as needed, try to keep hemoglobin between 7.5 and 8.5, give another unit of blood prior to DC to rehab in the AM  · we cannot give him pharmacologic DVT prophylaxis due to his bleeding  · Type 2 diabetes: Continue Levemir 20 units twice a day (home dose of 30 units BID) continue sliding scale insulin  · Chronic kidney disease stage III: Creatinine up at 1.6 from 1.3 at baseline, but now trending back down  · Medications adjusted for constipation.    · DISPO: by ambulance to SNF in the AM    Tre Becerril MD  [facility]  01/12/19  9:17 AM

## 2019-01-13 VITALS
HEART RATE: 86 BPM | WEIGHT: 273 LBS | SYSTOLIC BLOOD PRESSURE: 157 MMHG | TEMPERATURE: 98.3 F | RESPIRATION RATE: 18 BRPM | OXYGEN SATURATION: 96 % | HEIGHT: 70 IN | BODY MASS INDEX: 39.08 KG/M2 | DIASTOLIC BLOOD PRESSURE: 86 MMHG

## 2019-01-13 LAB
ABO + RH BLD: NORMAL
BASOPHILS # BLD AUTO: 0.02 10*3/MM3 (ref 0–0.2)
BASOPHILS NFR BLD AUTO: 0.4 % (ref 0–1.5)
BH BB BLOOD EXPIRATION DATE: NORMAL
BH BB BLOOD TYPE BARCODE: 5100
BH BB DISPENSE STATUS: NORMAL
BH BB PRODUCT CODE: NORMAL
BH BB UNIT NUMBER: NORMAL
CROSSMATCH INTERPRETATION: NORMAL
DEPRECATED RDW RBC AUTO: 52.8 FL (ref 37–54)
EOSINOPHIL # BLD AUTO: 0.46 10*3/MM3 (ref 0–0.7)
EOSINOPHIL NFR BLD AUTO: 8.8 % (ref 0.3–6.2)
ERYTHROCYTE [DISTWIDTH] IN BLOOD BY AUTOMATED COUNT: 15.6 % (ref 11.5–14.5)
GLUCOSE BLDC GLUCOMTR-MCNC: 139 MG/DL (ref 70–130)
GLUCOSE BLDC GLUCOMTR-MCNC: 208 MG/DL (ref 70–130)
HCT VFR BLD AUTO: 24.3 % (ref 40.4–52.2)
HGB BLD-MCNC: 7.8 G/DL (ref 13.7–17.6)
IMM GRANULOCYTES # BLD AUTO: 0 10*3/MM3 (ref 0–0.03)
IMM GRANULOCYTES NFR BLD AUTO: 0 % (ref 0–0.5)
LYMPHOCYTES # BLD AUTO: 0.72 10*3/MM3 (ref 0.9–4.8)
LYMPHOCYTES NFR BLD AUTO: 13.8 % (ref 19.6–45.3)
MCH RBC QN AUTO: 29.7 PG (ref 27–32.7)
MCHC RBC AUTO-ENTMCNC: 32.1 G/DL (ref 32.6–36.4)
MCV RBC AUTO: 92.4 FL (ref 79.8–96.2)
MONOCYTES # BLD AUTO: 0.56 10*3/MM3 (ref 0.2–1.2)
MONOCYTES NFR BLD AUTO: 10.7 % (ref 5–12)
NEUTROPHILS # BLD AUTO: 3.46 10*3/MM3 (ref 1.9–8.1)
NEUTROPHILS NFR BLD AUTO: 66.3 % (ref 42.7–76)
PLATELET # BLD AUTO: 131 10*3/MM3 (ref 140–500)
PMV BLD AUTO: 10.8 FL (ref 6–12)
RBC # BLD AUTO: 2.63 10*6/MM3 (ref 4.6–6)
UNIT  ABO: NORMAL
UNIT  RH: NORMAL
WBC NRBC COR # BLD: 5.22 10*3/MM3 (ref 4.5–10.7)

## 2019-01-13 PROCEDURE — 85025 COMPLETE CBC W/AUTO DIFF WBC: CPT | Performed by: INTERNAL MEDICINE

## 2019-01-13 PROCEDURE — 63710000001 INSULIN GLARGINE PER 5 UNITS: Performed by: HOSPITALIST

## 2019-01-13 PROCEDURE — 63710000001 INSULIN LISPRO (HUMAN) PER 5 UNITS: Performed by: HOSPITALIST

## 2019-01-13 PROCEDURE — 82962 GLUCOSE BLOOD TEST: CPT

## 2019-01-13 RX ORDER — HYDROCODONE BITARTRATE AND ACETAMINOPHEN 10; 325 MG/1; MG/1
1 TABLET ORAL EVERY 6 HOURS PRN
Qty: 20 TABLET | Refills: 0 | Status: SHIPPED | OUTPATIENT
Start: 2019-01-13 | End: 2019-01-18

## 2019-01-13 RX ORDER — INSULIN GLARGINE 100 [IU]/ML
15 INJECTION, SOLUTION SUBCUTANEOUS EVERY 12 HOURS SCHEDULED
Refills: 12
Start: 2019-01-13 | End: 2020-07-12

## 2019-01-13 RX ORDER — TAMSULOSIN HYDROCHLORIDE 0.4 MG/1
0.4 CAPSULE ORAL DAILY
Qty: 30 CAPSULE
Start: 2019-01-13

## 2019-01-13 RX ADMIN — INSULIN LISPRO 5 UNITS: 100 INJECTION, SOLUTION INTRAVENOUS; SUBCUTANEOUS at 11:30

## 2019-01-13 RX ADMIN — INSULIN GLARGINE 15 UNITS: 100 INJECTION, SOLUTION SUBCUTANEOUS at 09:45

## 2019-01-13 RX ADMIN — VITAMIN D, TAB 1000IU (100/BT) 1000 UNITS: 25 TAB at 09:45

## 2019-01-13 RX ADMIN — ISOSORBIDE DINITRATE 40 MG: 20 TABLET ORAL at 09:45

## 2019-01-13 RX ADMIN — HYDROCODONE BITARTRATE AND ACETAMINOPHEN 1 TABLET: 10; 325 TABLET ORAL at 09:47

## 2019-01-13 RX ADMIN — Medication 1 EACH: at 09:45

## 2019-01-13 RX ADMIN — FUROSEMIDE 40 MG: 40 TABLET ORAL at 09:45

## 2019-01-13 RX ADMIN — SODIUM CHLORIDE, PRESERVATIVE FREE 10 ML: 5 INJECTION INTRAVENOUS at 09:00

## 2019-01-13 RX ADMIN — INSULIN LISPRO 5 UNITS: 100 INJECTION, SOLUTION INTRAVENOUS; SUBCUTANEOUS at 09:46

## 2019-01-13 RX ADMIN — ACETAMINOPHEN 650 MG: 325 TABLET, FILM COATED ORAL at 11:34

## 2019-01-13 RX ADMIN — POLYETHYLENE GLYCOL 3350 17 G: 17 POWDER, FOR SOLUTION ORAL at 09:45

## 2019-01-13 RX ADMIN — SODIUM CHLORIDE, PRESERVATIVE FREE 3 ML: 5 INJECTION INTRAVENOUS at 09:00

## 2019-01-13 RX ADMIN — INSULIN LISPRO 4 UNITS: 100 INJECTION, SOLUTION INTRAVENOUS; SUBCUTANEOUS at 11:30

## 2019-01-13 RX ADMIN — PANTOPRAZOLE SODIUM 40 MG: 40 TABLET, DELAYED RELEASE ORAL at 06:36

## 2019-01-13 RX ADMIN — TAMSULOSIN HYDROCHLORIDE 0.4 MG: 0.4 CAPSULE ORAL at 09:45

## 2019-01-13 NOTE — DISCHARGE SUMMARY
Name: Ariel Elliott  Age: 79 y.o.  Sex: male  :  1940  MRN: [id number]         Primary Care Physician: Zeb Meadows MD      Date of Admission:  2019  Date of Discharge:  2019      Chief Complaint:  Fall (pt fell from recliner and c/o right knee swelling and pain. )      Discharge Diagnosis:  Active Hospital Problems    Diagnosis Date Noted   • **Closed displaced supracondylar fracture without intracondylar extension of lower end of right femur (CMS/HCC) [S72.451A] 2019   • S/P TAVR (transcatheter aortic valve replacement) [Z95.2] 2018   • Von Willebrand disease (CMS/HCC) [D68.0] 2018   • CKD (chronic kidney disease) [N18.9] 2018   • Nonrheumatic aortic valve stenosis [I35.0] 10/25/2018   • S/P CABG (coronary artery bypass graft) [Z95.1] 10/25/2018   • Type 2 diabetes mellitus with complication, with long-term current use of insulin (CMS/HCC) [E11.8, Z79.4] 10/25/2018   • AVM (arteriovenous malformation) of small bowel, acquired with hemorrhage (CMS/[facility]) [K55.8] 10/25/2018      Resolved Hospital Problems   No resolved problems to display.       Secondary Diagnoses:  Past Medical History:   Diagnosis Date   • Anemia    • Anemia     2 UNITS OF BLOOD SAT 11/10. [facility]   • Aortic stenosis    • Arrhythmia    • Atrial flutter (CMS/HCC)    • Congestive heart failure (CMS/HCC)    • Coronary atherosclerosis    • Diabetes mellitus (CMS/HCC)     TYPE 2   • GI (gastrointestinal bleed)    • Gout    • H/O seasonal allergies    • Hard to intubate     SEVERAL TIMES IN PAST. NO ISSUES RECENT HISTORY   • Herpes     HANDS IN THE PAST   • Heyd syndrome (CMS/HCC)    • History of diverticulitis    • Hyperlipidemia    • Pacemaker     LEFT.   • Post-nasal drip    • Sleep apnea     Bi-Pap   • Von Willebrand disease (CMS/HCC)          Consults:  Consult Orders (all) (From admission, onward)    Start     Ordered    19 1808  Inpatient Consult to  Case Management   Once     Comments:  Equipment, Home health / outpatient PT   Provider:  (Not yet assigned)    01/08/19 1807    01/08/19 0924  Inpatient Cardiology Consult  Once     Specialty:  Cardiology  Provider:  Dionicio Stoner MD    01/08/19 0923    01/08/19 0051  Inpatient Case Management  Consult  Once     Provider:  (Not yet assigned)    01/08/19 0051    01/07/19 2145  Inpatient consult to Anesthesiology  Once     Specialty:  Anesthesiology  Provider:  Claudio Ly MD    01/07/19 2145 01/07/19 2105  [facility] (on-call MD unless specified)  Once     Specialty:  Internal Medicine  Provider:  (Not yet assigned)    01/07/19 2104 01/07/19 2105  Ortho (on-call MD unless specified)  Once     Specialty:  Orthopedic Surgery  Provider:  (Not yet assigned)    01/07/19 2104        Consulting Physician(s)     Provider Relationship Specialty    Dionicio Stoner MD Consulting Physician Cardiology    Claudio Ly MD Consulting Physician Anesthesiology    Tre Becerril MD Consulting Physician Internal Medicine          Procedures Performed:  XR Knee 1 or 2 View Right   Final Result      XR Knee 3 View Right   Final Result   1. Comminuted, impacted, displaced distal femoral metaphyseal fracture   may exhibit intercondylar extension and this could be further evaluated   with CT.   2. Right total hip arthroplasty without evidence for right hip fracture.       This report was finalized on 1/8/2019 8:02 AM by Dr. Jake Begum M.D.          XR Femur 2 View Right   Final Result   1. Comminuted, impacted, displaced distal femoral metaphyseal fracture   may exhibit intercondylar extension and this could be further evaluated   with CT.   2. Right total hip arthroplasty without evidence for right hip fracture.       This report was finalized on 1/8/2019 8:02 AM by Dr. Jake Begum M.D.          CT Head Without Contrast   Final Result       1. No acute intracranial hemorrhage.    2. Right parietal soft tissue hematoma.       Radiation dose reduction techniques were utilized, including automated   exposure control and exposure modulation based on body size.       This report was finalized on 1/7/2019 9:16 PM by Dr. Irina Cole M.D.            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  His history is complicated by recent TAVR and pacemaker placement a few months back and has complicated history of small bowel AVM's treated conservatively with transfusions over the last 8 years.  He required 7 units of PRBCs over the course of his hospitalization.  He is currently post operative day 4 from a right distal femur replacement and is recovering well.  Still with difficult mobility secondary to obesity and deconditioning.  Plan at this point is for him to transition to skilled rehab for continued therapy and care.  Transfused yesterday and hemoglobin at discharge is 7.8.  I recommend following CBCs weekly while in rehabilitation.  He may need periodic transfusions.  These can be arranged in the outpatient setting.  He should follow-up with the orthopedic surgeon sometime within the next 2 weeks for ongoing surgical care.  Otherwise at the time of discharge he is hemodynamically stable and eager for discharge to skilled nursing facility today.  That is been arranged at [facility] and ambulance was arranged for transport this afternoon.  The plan was discussed extensively with the patient and the daughter at the bedside yesterday, and all are in agreement with discharge this morning    Physical Exam:  Temp:  [97.1 °F (36.2 °C)-100 °F (37.8 °C)] 98.4 °F (36.9 °C)  Heart Rate:  [67-78] 67  Resp:  [16-18] 18  BP: (129-163)/(61-76) 163/76  Body mass index is 39.17 kg/m².  Physical Exam   Constitutional: He is oriented to person, place, and time. He appears well-developed and well-nourished.   HENT:   Head:  Normocephalic and atraumatic.   Eyes: Conjunctivae and EOM are normal.   Cardiovascular: Normal rate, regular rhythm and normal heart sounds.   Pulmonary/Chest: Effort normal and breath sounds normal.   Abdominal: Soft. Bowel sounds are normal.   Neurological: He is alert and oriented to person, place, and time.   Skin: Skin is warm and dry.   Nursing note and vitals reviewed.      Condition on Discharge:  Stable.    Discharge Disposition:   [facility]    Allergies:   Allergies   Allergen Reactions   • Statins Myalgia       Discharge Medications:      Discharge Medications      New Medications      Instructions Start Date   HYDROcodone-acetaminophen  MG per tablet  Commonly known as:  NORCO   1 tablet, Oral, Every 6 Hours PRN      insulin glargine 100 UNIT/ML injection  Commonly known as:  LANTUS  Replaces:  insulin detemir 100 UNIT/ML injection   15 Units, Subcutaneous, Every 12 Hours Scheduled      insulin lispro 100 UNIT/ML injection  Commonly known as:  humaLOG  Replaces:  insulin aspart 100 UNIT/ML injection   5 Units, Subcutaneous, 3 Times Daily With Meals      insulin lispro 100 UNIT/ML injection  Commonly known as:  humaLOG   0-9 Units, Subcutaneous, 4 Times Daily With Meals & Nightly      tamsulosin 0.4 MG capsule 24 hr capsule  Commonly known as:  FLOMAX   0.4 mg, Oral, Daily         Continue These Medications      Instructions Start Date   allopurinol 300 MG tablet  Commonly known as:  ZYLOPRIM   300 mg, Oral, Nightly      aspirin 81 MG EC tablet   81 mg, Oral, Daily      cholecalciferol 1000 units tablet  Commonly known as:  VITAMIN D3   1,000 Units, Oral, Daily      furosemide 40 MG tablet  Commonly known as:  LASIX   40 mg, Oral, 2 Times Daily      gemfibrozil 600 MG tablet  Commonly known as:  LOPID   600 mg, Oral, 2 Times Daily Before Meals      isosorbide dinitrate 40 MG tablet  Commonly known as:  ISORDIL   40 mg, Oral, 2 Times Daily      METAMUCIL FIBER PO   1 tablet, Oral, Daily       octreotide 10 MG injection  Commonly known as:  sandoSTATIN   10 mg, Intramuscular, Every 28 Days      omeprazole 40 MG capsule  Commonly known as:  priLOSEC   40 mg, Oral, 2 Times Daily      polyethylene glycol packet  Commonly known as:  MIRALAX   17 g, Oral, Daily         Stop These Medications    insulin aspart 100 UNIT/ML injection  Commonly known as:  novoLOG  Replaced by:  insulin lispro 100 UNIT/ML injection     insulin detemir 100 UNIT/ML injection  Commonly known as:  LEVEMIR  Replaced by:  insulin glargine 100 UNIT/ML injection     loperamide 2 MG capsule  Commonly known as:  IMODIUM            Discharge Diet:   Diet Instructions     Diet: Consistent Carbohydrate, Cardiac      Discharge Diet:   Consistent Carbohydrate  Cardiac             Activity at Discharge:   Activity Instructions     Activity as Tolerated            Follow-up Appointments:  Future Appointments   Date Time Provider Department Center   2/26/2019  1:30 PM [facility] IN OFFICE, [facility] None   6/25/2019  2:30 PM [facility] IN OFFICE, [facility] None   6/25/2019  3:00 PM Kalli Sheriff PA [facility] None     Additional Instructions for the Follow-ups that You Need to Schedule     Discharge Follow-up with PCP   As directed       Currently Documented PCP:    Zeb Meadows MD    PCP Phone Number:    [phone]     Follow Up Details:  4-6 weeks         Discharge Follow-up with Specialty: Orthopedics; 2 Weeks   As directed      Specialty:  Orthopedics    Follow Up:  2 Weeks            Contact information for follow-up providers     Zeb Meadows MD .    Specialty:  Internal Medicine  Why:  4-6 weeks  Contact information:  [address]  [place] [id number] [phone]             Margot Ray MD. Schedule an appointment as soon as possible for a visit on 1/30/2019.    Specialty:  Orthopedic Surgery  Contact information:  [address]  [address]  [place] [id number] [phone]                    Contact information for after-discharge care     Destination     [facility] .    Service:  Skilled Nursing  Contact information:  [address]iam [address] [facility] [address]  [phone]                           Additional Instructions for the Follow-ups that You Need to Schedule     Discharge Follow-up with PCP   As directed       Currently Documented PCP:    Zeb Meadows MD    PCP Phone Number:    [phone]     Follow Up Details:  4-6 weeks         Discharge Follow-up with Specialty: Orthopedics; 2 Weeks   As directed      Specialty:  Orthopedics    Follow Up:  2 Weeks               Test Results Pending at Discharge:  None     Code status:   Code Status and Medical Interventions:   Ordered at: 01/08/19 1807     Level Of Support Discussed With:    Patient     Code Status:    CPR     Medical Interventions (Level of Support Prior to Arrest):    Full         Tre Becerril MD  [facility]  01/13/19  9:13 AM      Time: greater than 30 minutes.

## 2019-01-13 NOTE — PLAN OF CARE
Problem: Patient Care Overview  Goal: Plan of Care Review  Outcome: Ongoing (interventions implemented as appropriate)   01/13/19 0623   Coping/Psychosocial   Plan of Care Reviewed With patient   Plan of Care Review   Progress improving   OTHER   Outcome Summary Pain meds x1. Chair at start of shift. Very hard transfer to bed. Patient is very afraid of falling. To inpatient rehab today.

## 2019-01-27 ENCOUNTER — LAB REQUISITION (OUTPATIENT)
Dept: LAB | Facility: HOSPITAL | Age: 79
End: 2019-01-27

## 2019-01-27 DIAGNOSIS — Z00.00 ROUTINE GENERAL MEDICAL EXAMINATION AT A HEALTH CARE FACILITY: ICD-10-CM

## 2019-01-27 LAB
ANION GAP SERPL CALCULATED.3IONS-SCNC: 16 MMOL/L
BACTERIA UR QL AUTO: ABNORMAL /HPF
BASOPHILS # BLD AUTO: 0.01 10*3/MM3 (ref 0–0.2)
BASOPHILS NFR BLD AUTO: 0.1 % (ref 0–1.5)
BILIRUB UR QL STRIP: NEGATIVE
BUN BLD-MCNC: 43 MG/DL (ref 8–23)
BUN/CREAT SERPL: 20.8 (ref 7–25)
CALCIUM SPEC-SCNC: 8.4 MG/DL (ref 8.6–10.5)
CHLORIDE SERPL-SCNC: 96 MMOL/L (ref 98–107)
CLARITY UR: ABNORMAL
CO2 SERPL-SCNC: 26 MMOL/L (ref 22–29)
COLOR UR: YELLOW
CREAT BLD-MCNC: 2.07 MG/DL (ref 0.76–1.27)
DEPRECATED RDW RBC AUTO: 53.7 FL (ref 37–54)
EOSINOPHIL # BLD AUTO: 0.1 10*3/MM3 (ref 0–0.7)
EOSINOPHIL NFR BLD AUTO: 1.5 % (ref 0.3–6.2)
ERYTHROCYTE [DISTWIDTH] IN BLOOD BY AUTOMATED COUNT: 15.8 % (ref 11.5–14.5)
GFR SERPL CREATININE-BSD FRML MDRD: 31 ML/MIN/1.73
GLUCOSE BLD-MCNC: 156 MG/DL (ref 65–99)
GLUCOSE UR STRIP-MCNC: NEGATIVE MG/DL
HCT VFR BLD AUTO: 28 % (ref 40.4–52.2)
HGB BLD-MCNC: 8.7 G/DL (ref 13.7–17.6)
HGB UR QL STRIP.AUTO: ABNORMAL
HYALINE CASTS UR QL AUTO: ABNORMAL /LPF
IMM GRANULOCYTES # BLD AUTO: 0 10*3/MM3 (ref 0–0.03)
IMM GRANULOCYTES NFR BLD AUTO: 0 % (ref 0–0.5)
KETONES UR QL STRIP: NEGATIVE
LEUKOCYTE ESTERASE UR QL STRIP.AUTO: ABNORMAL
LYMPHOCYTES # BLD AUTO: 0.49 10*3/MM3 (ref 0.9–4.8)
LYMPHOCYTES NFR BLD AUTO: 7.2 % (ref 19.6–45.3)
MCH RBC QN AUTO: 29.2 PG (ref 27–32.7)
MCHC RBC AUTO-ENTMCNC: 31.1 G/DL (ref 32.6–36.4)
MCV RBC AUTO: 94 FL (ref 79.8–96.2)
MONOCYTES # BLD AUTO: 0.41 10*3/MM3 (ref 0.2–1.2)
MONOCYTES NFR BLD AUTO: 6 % (ref 5–12)
NEUTROPHILS # BLD AUTO: 5.82 10*3/MM3 (ref 1.9–8.1)
NEUTROPHILS NFR BLD AUTO: 85.2 % (ref 42.7–76)
NITRITE UR QL STRIP: NEGATIVE
PH UR STRIP.AUTO: 5.5 [PH] (ref 5–8)
PLATELET # BLD AUTO: 182 10*3/MM3 (ref 140–500)
PMV BLD AUTO: 10.8 FL (ref 6–12)
POTASSIUM BLD-SCNC: 4.3 MMOL/L (ref 3.5–5.2)
PROT UR QL STRIP: ABNORMAL
RBC # BLD AUTO: 2.98 10*6/MM3 (ref 4.6–6)
RBC # UR: ABNORMAL /HPF
REF LAB TEST METHOD: ABNORMAL
SODIUM BLD-SCNC: 138 MMOL/L (ref 136–145)
SP GR UR STRIP: 1.01 (ref 1–1.03)
SQUAMOUS #/AREA URNS HPF: ABNORMAL /HPF
UROBILINOGEN UR QL STRIP: ABNORMAL
WBC NRBC COR # BLD: 6.83 10*3/MM3 (ref 4.5–10.7)
WBC UR QL AUTO: ABNORMAL /HPF

## 2019-01-27 PROCEDURE — 85025 COMPLETE CBC W/AUTO DIFF WBC: CPT

## 2019-01-27 PROCEDURE — 81001 URINALYSIS AUTO W/SCOPE: CPT

## 2019-01-27 PROCEDURE — 80048 BASIC METABOLIC PNL TOTAL CA: CPT

## 2019-01-30 ENCOUNTER — LAB REQUISITION (OUTPATIENT)
Dept: LAB | Facility: HOSPITAL | Age: 79
End: 2019-01-30

## 2019-01-30 DIAGNOSIS — Z00.00 ROUTINE GENERAL MEDICAL EXAMINATION AT A HEALTH CARE FACILITY: ICD-10-CM

## 2019-01-30 PROCEDURE — 85014 HEMATOCRIT: CPT

## 2019-01-30 PROCEDURE — 85018 HEMOGLOBIN: CPT

## 2019-01-31 LAB
HCT VFR BLD AUTO: 25.8 % (ref 40.4–52.2)
HGB BLD-MCNC: 8 G/DL (ref 13.7–17.6)

## 2019-02-22 ENCOUNTER — OFFICE VISIT (OUTPATIENT)
Dept: CARDIOLOGY | Facility: CLINIC | Age: 79
End: 2019-02-22

## 2019-02-22 VITALS
BODY MASS INDEX: 37.16 KG/M2 | SYSTOLIC BLOOD PRESSURE: 142 MMHG | WEIGHT: 259.6 LBS | DIASTOLIC BLOOD PRESSURE: 60 MMHG | HEIGHT: 70 IN | HEART RATE: 65 BPM

## 2019-02-22 DIAGNOSIS — E11.8 TYPE 2 DIABETES MELLITUS WITH COMPLICATION, WITH LONG-TERM CURRENT USE OF INSULIN (HCC): ICD-10-CM

## 2019-02-22 DIAGNOSIS — Z95.0 PACEMAKER: ICD-10-CM

## 2019-02-22 DIAGNOSIS — I25.810 ATHEROSCLEROSIS OF CORONARY ARTERY BYPASS GRAFT OF NATIVE HEART WITHOUT ANGINA PECTORIS: ICD-10-CM

## 2019-02-22 DIAGNOSIS — Z79.4 TYPE 2 DIABETES MELLITUS WITH COMPLICATION, WITH LONG-TERM CURRENT USE OF INSULIN (HCC): ICD-10-CM

## 2019-02-22 DIAGNOSIS — Z95.2 S/P TAVR (TRANSCATHETER AORTIC VALVE REPLACEMENT): Primary | ICD-10-CM

## 2019-02-22 PROBLEM — E66.01 CLASS 2 SEVERE OBESITY DUE TO EXCESS CALORIES WITH SERIOUS COMORBIDITY AND BODY MASS INDEX (BMI) OF 39.0 TO 39.9 IN ADULT (HCC): Status: RESOLVED | Noted: 2018-10-25 | Resolved: 2019-02-22

## 2019-02-22 PROCEDURE — 99214 OFFICE O/P EST MOD 30 MIN: CPT | Performed by: INTERNAL MEDICINE

## 2019-02-22 PROCEDURE — 93000 ELECTROCARDIOGRAM COMPLETE: CPT | Performed by: INTERNAL MEDICINE

## 2019-02-22 NOTE — PROGRESS NOTES
Date of Office Visit: 19  Encounter Provider: Dionicio Stoner MD  Place of Service: [facility] CARDIOLOGY  Patient Name: Ariel Elliott  :1940  [id number]    Chief Complaint   Patient presents with   • Cardiac Valve Problem   • Congestive Heart Failure   • Coronary Artery Disease   :     HPI: Ariel Elliott is a 79 y.o. male  He had severe aortic stenosis, prior CABG, morbidly obese, severe sick sinus syndrome. He underwent a permanent pacemaker placement and then he underwent a #26 TAVR valve in 2018. This has been a life changing procedure for him. He was having chronic GI bleeding, getting transfused all the time; we think he had an acquired Von Willebrand disease and AVM. He has not been transfused in weeks. His own hemoglobin is now up to 9, which is the highest it has been in over a year. He does not have any chest pain, shortness of breath. He tripped and fell and fractured his femur and had to have his knee replaced, and he is still recovering from that. This is really a miraculous result from his TAVR.         Past Medical History:   Diagnosis Date   • Anemia    • Anemia     2 UNITS OF BLOOD SAT 11/10. [facility]   • Aortic stenosis    • Arrhythmia    • Atrial flutter (CMS/HCC)    • Congestive heart failure (CMS/HCC)    • Coronary atherosclerosis    • Diabetes mellitus (CMS/HCC)     TYPE 2   • GI (gastrointestinal bleed)    • Gout    • H/O seasonal allergies    • Hard to intubate     SEVERAL TIMES IN PAST. NO ISSUES RECENT HISTORY   • Herpes     HANDS IN THE PAST   • Heyd syndrome (CMS/HCC)    • History of diverticulitis    • Hyperlipidemia    • Pacemaker     LEFT.   • Post-nasal drip    • Sleep apnea     Bi-Pap   • Von Willebrand disease (CMS/HCC)        Past Surgical History:   Procedure Laterality Date   • ABLATION OF DYSRHYTHMIC FOCUS     • AORTIC VALVE REPAIR/REPLACEMENT N/A 2018    Procedure: INTRAOPERATIVE LANG,  PERCUTANEOUS TRANSFEMORAL TRANSCATHETER AORTIC VALVE REPLACEMENT PERCUTANEOUS APPROACH;  Surgeon: Elizabeth Meade MD;  Location: [facility] OR 18/19;  Service: Cardiothoracic   • CARDIAC CATHETERIZATION     • CARDIAC CATHETERIZATION N/A 11/1/2018    Procedure: Right and Left Heart Cath;  Surgeon: Dionicio Stoner MD;  Location: [facility] CATH INVASIVE LOCATION;  Service: Cardiology   • CARDIAC CATHETERIZATION  11/1/2018    Procedure: Saphenous Vein Graft;  Surgeon: Dionicio Stoner MD;  Location: [facility] CATH INVASIVE LOCATION;  Service: Cardiology   • CARDIAC ELECTROPHYSIOLOGY PROCEDURE Left 11/5/2018    Procedure: Pacemaker DC new   [place];  Surgeon: Mina Castillo MD;  Location: [facility] CATH INVASIVE LOCATION;  Service: Cardiology   • CATARACT EXTRACTION WITH INTRAOCULAR LENS IMPLANT      LEFT AND RIGHT   • CHOLECYSTECTOMY     • COLONOSCOPY     • CORONARY ANGIOPLASTY      WITH PTCA   • CORONARY ARTERY BYPASS GRAFT      5 VESSEL   • HAND SURGERY      TRIGGER FINGER RELEASE, TENDON RELEASE   • HEMORRHOIDECTOMY     • HERNIA REPAIR      VENTRAL HERNIA REPEATEDLY   • PACEMAKER IMPLANTATION  11/05/2018   • PORTACATH PLACEMENT Right     POWER PORT   • SINUS SURGERY     • TOTAL HIP ARTHROPLASTY Right    • TOTAL KNEE ARTHROPLASTY Right 1/8/2019    Procedure: RIGHT DISTAL FEMUR REPLACEMENT;  Surgeon: Margot Ray MD;  Location: [facility] OR;  Service: Orthopedics       Social History     Socioeconomic History   • Marital status:      Spouse name: Not on file   • Number of children: Not on file   • Years of education: Not on file   • Highest education level: Not on file   Social Needs   • Financial resource strain: Not on file   • Food insecurity - worry: Not on file   • Food insecurity - inability: Not on file   • Transportation needs - medical: Not on file   • Transportation needs - non-medical: Not on file   Occupational History   • Not on file   Tobacco Use   • Smoking status: Former Smoker    • Smokeless tobacco: Never Used   • Tobacco comment: QUIT 35YRS AGO   Substance and Sexual Activity   • Alcohol use: No   • Drug use: No   • Sexual activity: Defer   Other Topics Concern   • Not on file   Social History Narrative   • Not on file       Family History   Problem Relation Age of Onset   • Diabetes Mother    • Cancer Mother        Review of Systems   Constitution: Negative for decreased appetite, fever, malaise/fatigue and weight loss.   HENT: Negative for nosebleeds.    Eyes: Negative for double vision.   Cardiovascular: Negative for chest pain, claudication, cyanosis, dyspnea on exertion, irregular heartbeat, leg swelling, near-syncope, orthopnea, palpitations, paroxysmal nocturnal dyspnea and syncope.   Respiratory: Negative for cough, hemoptysis and shortness of breath.    Hematologic/Lymphatic: Negative for bleeding problem.   Skin: Negative for rash.   Musculoskeletal: Negative for falls and myalgias.   Gastrointestinal: Negative for hematochezia, jaundice, melena, nausea and vomiting.   Genitourinary: Negative for hematuria.   Neurological: Negative for dizziness and seizures.   Psychiatric/Behavioral: Negative for altered mental status and memory loss.       Allergies   Allergen Reactions   • Statins Myalgia         Current Outpatient Medications:   •  allopurinol (ZYLOPRIM) 300 MG tablet, Take 300 mg by mouth Every Night., Disp: , Rfl:   •  cholecalciferol (VITAMIN D3) 1000 units tablet, Take 1,000 Units by mouth Daily., Disp: , Rfl:   •  furosemide (LASIX) 40 MG tablet, Take 40 mg by mouth 2 (Two) Times a Day., Disp: , Rfl:   •  gemfibrozil (LOPID) 600 MG tablet, Take 600 mg by mouth 2 (Two) Times a Day Before Meals., Disp: , Rfl:   •  insulin glargine (LANTUS) 100 UNIT/ML injection, Inject 15 Units under the skin into the appropriate area as directed Every 12 (Twelve) Hours. (Patient taking differently: Inject 30 Units under the skin into the appropriate area as directed Every 12 (Twelve)  "Hours.), Disp: , Rfl: 12  •  insulin lispro (humaLOG) 100 UNIT/ML injection, Inject 5 Units under the skin into the appropriate area as directed 3 (Three) Times a Day With Meals. (Patient taking differently: Inject 15 Units under the skin into the appropriate area as directed 3 (Three) Times a Day With Meals.), Disp: , Rfl: 12  •  isosorbide dinitrate (ISORDIL) 40 MG tablet, Take 40 mg by mouth 2 (Two) Times a Day., Disp: , Rfl:   •  octreotide (sandoSTATIN) 10 MG injection, Inject 10 mg into the appropriate muscle as directed by prescriber Every 28 (Twenty-Eight) Days., Disp: , Rfl:   •  omeprazole (priLOSEC) 40 MG capsule, Take 40 mg by mouth 2 (Two) Times a Day., Disp: , Rfl:   •  polyethylene glycol (MIRALAX) packet, Take 17 g by mouth Daily., Disp: , Rfl:   •  Psyllium (METAMUCIL FIBER PO), Take 1 tablet by mouth Daily., Disp: , Rfl:   •  tamsulosin (FLOMAX) 0.4 MG capsule 24 hr capsule, Take 1 capsule by mouth Daily., Disp: 30 capsule, Rfl:       Objective:     Vitals:    02/22/19 1519   BP: 142/60   Pulse: 65   Weight: 118 kg (259 lb 9.6 oz)   Height: 177.8 cm (70\")     Body mass index is 37.25 kg/m².    Physical Exam   Constitutional: He is oriented to person, place, and time. He appears well-developed and well-nourished.   HENT:   Head: Normocephalic.   Eyes: No scleral icterus.   Neck: No JVD present. No thyromegaly present.   Cardiovascular: Normal rate and regular rhythm. Exam reveals no gallop and no friction rub.   Murmur heard.   Low-pitched harsh crescendo-decrescendo early systolic murmur is present with a grade of 1/6.  Pulmonary/Chest: Effort normal and breath sounds normal. He has no wheezes. He has no rales.   Abdominal: Soft. There is no hepatosplenomegaly. There is no tenderness.   Musculoskeletal: Normal range of motion. He exhibits no edema (+2 B).   Lymphadenopathy:     He has no cervical adenopathy.   Neurological: He is alert and oriented to person, place, and time.   Skin: Skin is warm " and dry. No rash noted.   Psychiatric: He has a normal mood and affect.         ECG 12 Lead  Date/Time: 2/22/2019 3:47 PM  Performed by: Dionicio Stoner MD  Authorized by: Dionicio Stoner MD   Comparison: compared with previous ECG   Similar to previous ECG  Rhythm: paced    Clinical impression: abnormal EKG             Assessment:       Diagnosis Plan   1. S/P TAVR (transcatheter aortic valve replacement)     2. Atherosclerosis of coronary artery bypass graft of native heart without angina pectoris     3. Pacemaker     4. Type 2 diabetes mellitus with complication, with long-term current use of insulin (CMS/[facility])            Plan:       This is one of the best responses to a TAVR that I have seen. He is doing great. He is not having heart failure, not having angina. His weight is down, he is more active. He got through his knee replacement; he would have never done that before. It is just that everything is great. I am not going to change anything today, I am going to have him come see me in 6 months.         As always, it has been a pleasure to participate in your patient's care.      Sincerely,       Dionicio Stoner MD

## 2019-03-15 ENCOUNTER — HOSPITAL ENCOUNTER (OUTPATIENT)
Dept: PHYSICAL THERAPY | Facility: HOSPITAL | Age: 79
Setting detail: THERAPIES SERIES
Discharge: HOME | End: 2019-03-15

## 2019-03-15 ENCOUNTER — TRANSCRIBE ORDERS (OUTPATIENT)
Dept: PHYSICAL THERAPY | Facility: HOSPITAL | Age: 79
End: 2019-03-15

## 2019-03-15 DIAGNOSIS — Z96.651 STATUS POST TOTAL RIGHT KNEE REPLACEMENT: Primary | ICD-10-CM

## 2019-03-15 DIAGNOSIS — Z96.651 S/P TKR (TOTAL KNEE REPLACEMENT), RIGHT: Primary | ICD-10-CM

## 2019-03-15 DIAGNOSIS — M25.561 CHRONIC PAIN OF RIGHT KNEE: ICD-10-CM

## 2019-03-15 DIAGNOSIS — Z74.09 IMPAIRED MOBILITY AND ACTIVITIES OF DAILY LIVING: ICD-10-CM

## 2019-03-15 DIAGNOSIS — Z47.89 ORTHOPEDIC AFTERCARE: ICD-10-CM

## 2019-03-15 DIAGNOSIS — G89.29 CHRONIC PAIN OF RIGHT KNEE: ICD-10-CM

## 2019-03-15 DIAGNOSIS — Z78.9 IMPAIRED MOBILITY AND ACTIVITIES OF DAILY LIVING: ICD-10-CM

## 2019-03-15 PROCEDURE — 97110 THERAPEUTIC EXERCISES: CPT | Performed by: PHYSICAL THERAPIST

## 2019-03-15 PROCEDURE — 97162 PT EVAL MOD COMPLEX 30 MIN: CPT | Performed by: PHYSICAL THERAPIST

## 2019-03-15 NOTE — THERAPY TREATMENT NOTE
Outpatient Physical Therapy Ortho Initial Evaluation  [facility]     Patient Name: Ariel Elliott  : 1940  MRN: [id number]  Today's Date: 3/15/2019      Visit Date: 03/15/2019    Patient Active Problem List   Diagnosis   • Nonrheumatic aortic valve stenosis   • S/P CABG (coronary artery bypass graft)   • Left bundle branch block (LBBB)   • Type 2 diabetes mellitus with complication, with long-term current use of insulin (CMS/HCC)   • Dyspnea on exertion   • Angina pectoris (CMS/HCC)   • Syncope   • AVM (arteriovenous malformation) of small bowel, acquired with hemorrhage (CMS/HCC)   • Aortic valve stenosis   • Atrial flutter (CMS/HCC)   • Diabetes mellitus (CMS/HCC)   • Von Willebrand disease (CMS/HCC)   • Sleep apnea   • Pacemaker   • Coronary atherosclerosis   • CKD (chronic kidney disease)   • S/P TAVR (transcatheter aortic valve replacement)   • Closed displaced supracondylar fracture without intracondylar extension of lower end of right femur (CMS/HCC)        Past Medical History:   Diagnosis Date   • Anemia    • Anemia     2 UNITS OF BLOOD SAT 11/10. [facility]   • Aortic stenosis    • Arrhythmia    • Atrial flutter (CMS/HCC)    • Congestive heart failure (CMS/HCC)    • Coronary atherosclerosis    • Diabetes mellitus (CMS/HCC)     TYPE 2   • GI (gastrointestinal bleed)    • Gout    • H/O seasonal allergies    • Hard to intubate     SEVERAL TIMES IN PAST. NO ISSUES RECENT HISTORY   • Herpes     HANDS IN THE PAST   • Heyd syndrome (CMS/HCC)    • History of diverticulitis    • Hyperlipidemia    • Pacemaker     LEFT.   • Post-nasal drip    • Sleep apnea     Bi-Pap   • Von Willebrand disease (CMS/HCC)         Past Surgical History:   Procedure Laterality Date   • ABLATION OF DYSRHYTHMIC FOCUS     • AORTIC VALVE REPAIR/REPLACEMENT N/A 2018    Procedure: INTRAOPERATIVE LANG, PERCUTANEOUS TRANSFEMORAL TRANSCATHETER AORTIC VALVE REPLACEMENT PERCUTANEOUS APPROACH;   Surgeon: Elizabeth Meade MD;  Location: [facility] OR 18/19;  Service: Cardiothoracic   • CARDIAC CATHETERIZATION     • CARDIAC CATHETERIZATION N/A 11/1/2018    Procedure: Right and Left Heart Cath;  Surgeon: Dionicio Stoner MD;  [facility];  Service: Cardiology   • CARDIAC CATHETERIZATION  11/1/2018    Procedure: Saphenous Vein Graft;  Surgeon: Dionicio Stoner MD;  Location: [facility] CATH INVASIVE LOCATION;  Service: Cardiology   • CARDIAC ELECTROPHYSIOLOGY PROCEDURE Left 11/5/2018    Procedure: Pacemaker DC new   [place];  Surgeon: Mina Castillo MD;  Location: [facility] CATH INVASIVE LOCATION;  Service: Cardiology   • CATARACT EXTRACTION WITH INTRAOCULAR LENS IMPLANT      LEFT AND RIGHT   • CHOLECYSTECTOMY     • COLONOSCOPY     • CORONARY ANGIOPLASTY      WITH PTCA   • CORONARY ARTERY BYPASS GRAFT      5 VESSEL   • HAND SURGERY      TRIGGER FINGER RELEASE, TENDON RELEASE   • HEMORRHOIDECTOMY     • HERNIA REPAIR      VENTRAL HERNIA REPEATEDLY   • PACEMAKER IMPLANTATION  11/05/2018   • PORTACATH PLACEMENT Right     POWER PORT   • SINUS SURGERY     • TOTAL HIP ARTHROPLASTY Right    • TOTAL KNEE ARTHROPLASTY Right 1/8/2019    Procedure: RIGHT DISTAL FEMUR REPLACEMENT;  Surgeon: Margot Ray MD;  Location: [facility] OR;  Service: Orthopedics       Visit Dx:     ICD-10-CM ICD-9-CM   1. Status post total right knee replacement Z96.651 V43.65   2. Orthopedic aftercare Z47.89 V54.9   3. Impaired mobility and activities of daily living Z74.09 799.89   4. Chronic pain of right knee M25.561 719.46    G89.29 338.29       Patient History     Row Name 03/15/19 0850             History    Chief Complaint  Muscle weakness;Pain  -JS      Date Current Problem(s) Began  01/30/19  -JS      Brief Description of Current Complaint  Pt reports tripping on rug at home resulting in distal femur fracture. S/p knee surgery on 1/8/19.  Following hospital stay, pt transferred to [place] [address]  "days, then HHPT.  Now cleared for outpatient PT.  Pt reports compliance with HEP, greatest complaint is difficulty extending knee with decreased strength & lateral knee pain with extension.  -JS      Previous treatment for THIS PROBLEM  Medication;Surgery  -JS      Patient/Caregiver Goals  Relieve pain;Return to prior level of function Goal is to resume driving.\"Be able to get out of house\"  -JS      Occupation/sports/leisure activities  Retired physician- anesthesiologist   -JS      Surgery/Hospitalization  R TKR 1/8/19  -JS         Pain     Pain Location  Knee  -JS      Pain at Present  0  -JS      Pain at Best  0  -JS      Pain at Worst  8  -JS      What Performance Factors Make the Current Problem(s) WORSE?  sitting, bending, straightening knee  -JS      What Performance Factors Make the Current Problem(s) BETTER?  rest  -JS      Tolerance Time- Standing  No limit per pt  -JS      Tolerance Time- Sitting  45 min  -JS      Tolerance Time- Walking  No limit per pt  -JS      Is your sleep disturbed?  Yes Gets up every 2 hours for bathroom  -JS      Difficulties at work?  Retired physician  -JS      Difficulties with ADL's?  Difficulty climbing 3 stairs (requires assistance for walker- lives with daughter), Rising from chair, endurance limited with walking, difficulty donning socks/shoes  -JS      Difficulties with recreational activities?  Cross stitch & reading limited by tolerance to sitting, Grocery shopping, playing poker, golfing  -JS         Fall Risk Assessment    Any falls in the past year:  Yes  -JS      Number of falls reported in the last 12 months  2  -JS      Factors that contributed to the fall:  Tripped Tripped with initial injury, fell 1x in rehab  -JS         Services    Prior Rehab/Home Health Experiences  Yes  -JS      Where was the prior experience with Rehab/Home Health  [facility]  -JS      Are you currently receiving Home Health services  No  -JS         Daily Activities    Primary " "Language  English  -JS      How does patient learn best?  Listening;Reading  -JS      Pt Participated in POC and Goals  Yes  -JS         Safety    Are you being hurt, hit, or frightened by anyone at home or in your life?  No  -JS      Are you being neglected by a caregiver  No  -JS        User Key  (r) = Recorded By, (t) = Taken By, (c) = Cosigned By    Initials Name Provider Type    Minerva Couch PT Physical Therapist          PT Ortho     Row Name 03/15/19 0861       Subjective Comments    Subjective Comments  Pt reports fall in [place] \"shattered\" distal femur resulting in TKR on 1/8/19. Greatest complaints are bending/straightening knee, difficulty sitting & rising that has caused pt to be unable to participate in hobbies & limited ability to leave house to resume previously performed activities.   -JS       Subjective Pain    Able to rate subjective pain?  yes  -JS    Pre-Treatment Pain Level  0  -JS    Subjective Pain Comment  No pain at rest, increased R knee pain with extending knee from mid-range to full extension  -JS       General ROM    RT Lower Ext  Rt Knee Extension/Flexion  -JS    LT Lower Ext  Lt Knee Extension/Flexion  -JS       Right Lower Ext    Rt Knee Extension/Flexion AROM  0-115  -JS       Left Lower Ext    Lt Knee Extension/Flexion AROM  0-125  -JS       MMT (Manual Muscle Testing)    Rt Lower Ext  Rt Hip Flexion;Rt Knee Extension;Rt Knee Flexion;Rt Ankle Plantarflexion;Rt Ankle Dorsiflexion  -JS    Lt Lower Ext  Lt Hip Flexion;Lt Knee Extension;Lt Knee Flexion;Lt Ankle Plantarflexion;Lt Ankle Dorsiflexion  -JS       MMT Right Lower Ext    Rt Hip Flexion MMT, Gross Movement  (4/5) good fair core stabilization  -JS    Rt Knee Extension MMT, Gross Movement  (4/5) good  -JS    Rt Knee Flexion MMT, Gross Movement  (4/5) good  -JS    Rt Ankle Plantarflexion MMT, Gross Movement  (5/5) normal  -JS    Rt Ankle Dorsiflexion MMT, Gross Movement  (4/5) good  -JS       MMT Left Lower Ext    Lt Hip Flexion " "MMT, Gross Movement  (4/5) good fair core stabilization  -JS    Lt Knee Extension MMT, Gross Movement  (5/5) normal  -JS    Lt Knee Flexion MMT, Gross Movement  (5/5) normal  -JS    Lt Ankle Plantarflexion MMT, Gross Movement  (5/5) normal  -JS    Lt Ankle Dorsiflexion MMT, Gross Movement  (5/5) normal  -JS       Transfers    Sit-Stand Gage (Transfers)  moderate assist (50% patient effort)  -JS    Stand-Sit Gage (Transfers)  minimum assist (75% patient effort)  -JS    Transfers, Sit-Stand-Sit, Assist Device  rolling walker  -JS    Comment (Transfers)  Requires assistance from regular height chair.  Able to sit/stand from elevated mat independently  -JS       Gait/Stairs Assessment/Training    Gage Level (Gait)  independent  -JS    Assistive Device (Gait)  walker, front-wheeled  -JS    Handrail Location (Stairs)  left side (ascending)  -JS    Number of Steps (Stairs)  2  -JS    Ascending Technique (Stairs)  step-to-step 4\" step up  -JS    Comment (Gait/Stairs)  4\" step ups with handrail R with min A, increased use of UE support. 4\"step up with L with supervisionAmb with rolling walker with slight decreased R weight shifting & slight R lateral trunk flexion  -JS      User Key  (r) = Recorded By, (t) = Taken By, (c) = Cosigned By    Initials Name Provider Type    Minerva Cocuh, PT Physical Therapist                      Therapy Education  Education Details: Reviewed HEP, instruction in sit to stand from elevated mat surface  Given: HEP, Mobility training  Program: New, Reinforced  How Provided: Verbal, Demonstration  Provided to: Patient  Level of Understanding: Verbalized, Demonstrated     PT OP Goals     Row Name 03/15/19 0850          PT Short Term Goals    STG Date to Achieve  03/29/19  -JS     STG 1  Pt will be independent in initial HEP for ROM & strengthening.  -JS     STG 1 Progress  New  -JS     STG 2  Pt will perform sit to stand from elevated mat surface, demonstrating proper " mechanics using UEs minimally for balance, without increased knee pain.  -JS     STG 2 Progress  New  -JS     STG 3  Pt will demonstrate improvement in R knee AROM to 0-120 degrees  -JS     STG 3 Progress  New  -JS     STG 4  Pt will report improved sitting tolerance to 1 hour without increased knee pain for improved tolerance to ADLs and hobbies  -JS     STG 4 Progress  New  -JS        Long Term Goals    LTG Date to Achieve  04/26/19  -JS     LTG 1  Pt will be independent in comprehensive HEP and symptom management  -JS     LTG 1 Progress  New  -JS     LTG 2  Pt will demonstrate sit to/from stand from regular height chair with minimal use of UEs and assistive device to improve functional mobility.  -JS     LTG 2 Progress  New  -JS     LTG 3  Pt will demonstrate improved functional knee ROM & strength to allow reported improved ease in getting in/out of car by 75% or greater for improved ability to resume activites outside of home.  -JS     LTG 3 Progress  New  -JS     LTG 4  Pt will improve functional knee strength to allow climbing 3 stairs independently with rail to allow entrance to home with improved ease & safety  -JS     LTG 4 Progress  New  -JS     LTG 5  Pt will report improvement in subjective report of function as indicated by increased score on KOS-ADL from 55% to 65% or greater.  -JS     LTG 5 Progress  New  -JS        Time Calculation    PT Goal Re-Cert Due Date  06/15/19  -JS       User Key  (r) = Recorded By, (t) = Taken By, (c) = Cosigned By    Initials Name Provider Type    Minerva Couch, PT Physical Therapist          PT Assessment/Plan     Row Name 03/15/19 0850          PT Assessment    Functional Limitations  Decreased safety during functional activities;Limitation in home management;Limitations in community activities;Performance in self-care ADL;Performance in leisure activities;Limitations in functional capacity and performance  -JS     Impairments  Balance;Impaired flexibility;Muscle  strength;Pain;Endurance;Range of motion  -     Assessment Comments  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 He presents with a evolving clinical presentation.  He has comorbidities of prior R THR, heart problems & severe osteopenia and personal factors of inability to drive and limited mobility since injury that may affect his progress in the plan of care.  Signs and symptoms are consistent with physical therapy diagnosis of strength, ROM, endurance impairments and pain following joint replacement. Pt will benefit from skilled PT to address impairments and progress toward goals of improving pain and mobility to allow pt to resume previously performed activities.   -JS     Please refer to paper survey for additional self-reported information  Yes  -JS     Rehab Potential  Good  -JS     Patient/caregiver participated in establishment of treatment plan and goals  Yes  -JS     Patient would benefit from skilled therapy intervention  Yes  -JS        PT Plan    PT Frequency  2x/week  -JS     Predicted Duration of Therapy Intervention (Therapy Eval)  6 weeks  -JS     Planned CPT's?  PT EVAL MOD COMPLELITY: 85157;PT THER PROC EA 15 MIN: 29928;PT MANUAL THERAPY EA 15 MIN: 65048;PT HOT OR COLD PACK TREAT MCARE;PT NEUROMUSC RE-EDUCATION EA 15 MIN: 27352;PT GAIT TRAINING EA 15 MIN: 85403;PT ELECTRICAL STIM UNATTEND:   -JS     Physical Therapy Interventions (Optional Details)  balance training;gait training;neuromuscular re-education;modalities;manual therapy techniques;home exercise program;patient/family education;ROM (Range of Motion);stair training;strengthening;stretching;transfer training  -     PT Plan Comments  Pt to be seen for skilled PT to address ROM, strength, endurance & functional mobility/balance  -       User Key  (r) = Recorded By, (t) = Taken By, (c) = Cosigned  "By    Initials Name Provider Type    Minerva Couch PT Physical Therapist            Exercises     Row Name 03/15/19 0850             Subjective Comments    Subjective Comments  Pt reports fall in [place] \"shattered\" distal femur resulting in TKR on 1/8/19. Greatest complaints are bending/straightening knee, difficulty sitting & rising that has caused pt to be unable to participate in hobbies & limited ability to leave house to resume previously performed activities.   -JS         Subjective Pain    Able to rate subjective pain?  yes  -JS      Pre-Treatment Pain Level  0  -JS      Subjective Pain Comment  No pain at rest, increased R knee pain with extending knee from mid-range to full extension  -JS         Total Minutes    22620 - PT Therapeutic Exercise Minutes  10  -JS         Exercise 1    Exercise Name 1  Reviewed all HHPT exercises, including ankle pumps, heel slides with strap, quad sets, SLR, bridges, hip abd/add in supine  -JS         Exercise 2    Exercise Name 2  Sit to stand from elevated mat (walker placed in front)  -JS      Sets 2  1  -JS      Reps 2  5  -JS      Additional Comments  supervision provided  -JS        User Key  (r) = Recorded By, (t) = Taken By, (c) = Cosigned By    Initials Name Provider Type    Minerva Couch, PT Physical Therapist                        Outcome Measure Options: Knee Outcome Score- ADL  Knee Outcome Score  Knee Outcome Score Comments: 55%      Time Calculation:     Therapy Suggested Charges     Code   Minutes Charges    52183 (CPT®) Hc Pt Neuromusc Re Education Ea 15 Min      46173 (CPT®) Hc Pt Ther Proc Ea 15 Min 10 1    46417 (CPT®) Hc Gait Training Ea 15 Min      37696 (CPT®) Hc Pt Therapeutic Act Ea 15 Min      86208 (CPT®) Hc Pt Manual Therapy Ea 15 Min      43515 (CPT®) Hc Pt Ther Massage- Per 15 Min      86647 (CPT®) Hc Pt Iontophoresis Ea 15 Min      07376 (CPT®) Hc Pt Elec Stim Ea-Per 15 Min      85168 (CPT®) Hc Pt Ultrasound Ea 15 Min      59405 (CPT®)  Pt " Self Care/Mgmt/Train Ea 15 Min      36328 (CPT®) Hc Pt Prosthetic (S) Train Initial Encounter, Each 15 Min      49800 (CPT®) Hc Orthotic(S) Mgmt/Train Initial Encounter, Each 15min      41980 (CPT®) Hc Pt Aquatic Therapy Ea 15 Min      02106 (CPT®) Hc Pt Orthotic(S)/Prosthetic(S) Encounter, Each 15 Min       (CPT®) Hc Pt Electrical Stim Unattended      Total  10 1          Start Time: 0850  Stop Time: 0945  Time Calculation (min): 55 min     Therapy Charges for Today     Code Description Service Date Service Provider Modifiers Qty    46697153254 HC PT EVAL MOD COMPLEXITY 3 3/15/2019 Minerva Ballard, PT GP 1    05817546861 HC PT THER PROC EA 15 MIN 3/15/2019 Minerva Ballard, PT GP 1          PT G-Codes  Outcome Measure Options: Knee Outcome Score- ADL         Minerva Ballard PT  3/15/2019

## 2019-03-19 ENCOUNTER — HOSPITAL ENCOUNTER (OUTPATIENT)
Dept: PHYSICAL THERAPY | Facility: HOSPITAL | Age: 79
Setting detail: THERAPIES SERIES
Discharge: HOME | End: 2019-03-19

## 2019-03-19 DIAGNOSIS — G89.29 CHRONIC PAIN OF RIGHT KNEE: ICD-10-CM

## 2019-03-19 DIAGNOSIS — Z47.89 ORTHOPEDIC AFTERCARE: ICD-10-CM

## 2019-03-19 DIAGNOSIS — Z96.651 STATUS POST TOTAL RIGHT KNEE REPLACEMENT: Primary | ICD-10-CM

## 2019-03-19 DIAGNOSIS — Z78.9 IMPAIRED MOBILITY AND ACTIVITIES OF DAILY LIVING: ICD-10-CM

## 2019-03-19 DIAGNOSIS — Z74.09 IMPAIRED MOBILITY AND ACTIVITIES OF DAILY LIVING: ICD-10-CM

## 2019-03-19 DIAGNOSIS — M25.561 CHRONIC PAIN OF RIGHT KNEE: ICD-10-CM

## 2019-03-19 PROCEDURE — 97110 THERAPEUTIC EXERCISES: CPT

## 2019-03-19 NOTE — THERAPY TREATMENT NOTE
Outpatient Physical Therapy Ortho Treatment Note  [facility]     Patient Name: Ariel Elliott  : 1940  MRN: [id number]  Today's Date: 3/19/2019      Visit Date: 2019    Visit Dx:    ICD-10-CM ICD-9-CM   1. Status post total right knee replacement Z96.651 V43.65   2. Orthopedic aftercare Z47.89 V54.9   3. Impaired mobility and activities of daily living Z74.09 799.89   4. Chronic pain of right knee M25.561 719.46    G89.29 338.29       Patient Active Problem List   Diagnosis   • Nonrheumatic aortic valve stenosis   • S/P CABG (coronary artery bypass graft)   • Left bundle branch block (LBBB)   • Type 2 diabetes mellitus with complication, with long-term current use of insulin (CMS/HCC)   • Dyspnea on exertion   • Angina pectoris (CMS/HCC)   • Syncope   • AVM (arteriovenous malformation) of small bowel, acquired with hemorrhage (CMS/HCC)   • Aortic valve stenosis   • Atrial flutter (CMS/HCC)   • Diabetes mellitus (CMS/HCC)   • Von Willebrand disease (CMS/HCC)   • Sleep apnea   • Pacemaker   • Coronary atherosclerosis   • CKD (chronic kidney disease)   • S/P TAVR (transcatheter aortic valve replacement)   • Closed displaced supracondylar fracture without intracondylar extension of lower end of right femur (CMS/HCC)        Past Medical History:   Diagnosis Date   • Anemia    • Anemia     2 UNITS OF BLOOD SAT 11/10. [facility]   • Aortic stenosis    • Arrhythmia    • Atrial flutter (CMS/HCC)    • Congestive heart failure (CMS/HCC)    • Coronary atherosclerosis    • Diabetes mellitus (CMS/HCC)     TYPE 2   • GI (gastrointestinal bleed)    • Gout    • H/O seasonal allergies    • Hard to intubate     SEVERAL TIMES IN PAST. NO ISSUES RECENT HISTORY   • Herpes     HANDS IN THE PAST   • Heyd syndrome (CMS/HCC)    • History of diverticulitis    • Hyperlipidemia    • Pacemaker     LEFT.   • Post-nasal drip    • Sleep apnea     Bi-Pap   • Von Willebrand disease (CMS/HCC)          Past Surgical History:   Procedure Laterality Date   • ABLATION OF DYSRHYTHMIC FOCUS     • AORTIC VALVE REPAIR/REPLACEMENT N/A 11/13/2018    Procedure: INTRAOPERATIVE LANG, PERCUTANEOUS TRANSFEMORAL TRANSCATHETER AORTIC VALVE REPLACEMENT PERCUTANEOUS APPROACH;  Surgeon: Elizabeth Meade MD;  Location: [place] [facility] OR 18/19;  Service: Cardiothoracic   • CARDIAC CATHETERIZATION     • CARDIAC CATHETERIZATION N/A 11/1/2018    Procedure: Right and Left Heart Cath;  Surgeon: Dionicio Stoner MD;  Location: [facility] INVASIVE LOCATION;  Service: Cardiology   • CARDIAC CATHETERIZATION  11/1/2018    Procedure: Saphenous Vein Graft;  Surgeon: Dionicio Stoner MD;  Location: [facility] CATH INVASIVE LOCATION;  Service: Cardiology   • CARDIAC ELECTROPHYSIOLOGY PROCEDURE Left 11/5/2018    Procedure: Pacemaker DC new   [place];  Surgeon: Mina Castillo MD;  Location: [facility] INVASIVE LOCATION;  Service: Cardiology   • CATARACT EXTRACTION WITH INTRAOCULAR LENS IMPLANT      LEFT AND RIGHT   • CHOLECYSTECTOMY     • COLONOSCOPY     • CORONARY ANGIOPLASTY      WITH PTCA   • CORONARY ARTERY BYPASS GRAFT      5 VESSEL   • HAND SURGERY      TRIGGER FINGER RELEASE, TENDON RELEASE   • HEMORRHOIDECTOMY     • HERNIA REPAIR      VENTRAL HERNIA REPEATEDLY   • PACEMAKER IMPLANTATION  11/05/2018   • PORTACATH PLACEMENT Right     POWER PORT   • SINUS SURGERY     • TOTAL HIP ARTHROPLASTY Right    • TOTAL KNEE ARTHROPLASTY Right 1/8/2019    Procedure: RIGHT DISTAL FEMUR REPLACEMENT;  Surgeon: Margot Ray MD;  Location: [facility] OR;  Service: Orthopedics       PT Ortho     Row Name 03/19/19 1800       Subjective Comments    Subjective Comments  Pt enters PT 10 min late today and states moving slowly ...Having some SOA and HG 8.0.  Has a chronic condition and gets blood transfusions regularly (last one 6 weeks ago).  -SI       Subjective Pain    Able to rate subjective pain?  yes  -SI    Subjective Pain Comment  min  c/o knee pain , only with supine heel slide ex  -SI       Posture/Observations    Observations  Edema;Erythema lower legs bilaterally with vascular changes noted skin  -SI       Right Lower Ext    Rt Knee Extension/Flexion AROM  108  -SI       Gait/Stairs Assessment/Training    Comment (Gait/Stairs)  sluggish gait with RWX  -SI      User Key  (r) = Recorded By, (t) = Taken By, (c) = Cosigned By    Initials Name Provider Type    Day Boogie, ILIANA Physical Therapy Assistant                      PT Assessment/Plan     Row Name 03/19/19 1833          PT Assessment    Assessment Comments  Pt little SOA when arrived.  To get blood tomorrow with 8.0 hg reported.  Pt not elevating LE's and bilat edema.  BP checked and 145/64 in PT  -SI       User Key  (r) = Recorded By, (t) = Taken By, (c) = Cosigned By    Initials Name Provider Type    Day Boogie PTA Physical Therapy Assistant            Exercises     Row Name 03/19/19 1800             Subjective Comments    Subjective Comments  Pt enters PT 10 min late today and states moving slowly ...Having some SOA and HG 8.0.  Has a chronic condition and gets blood transfusions regularly (last one 6 weeks ago).  -SI         Subjective Pain    Able to rate subjective pain?  yes  -SI      Subjective Pain Comment  min c/o knee pain , only with supine heel slide ex  -SI         Total Minutes    59566 - PT Therapeutic Exercise Minutes  40  -SI         Exercise 1    Exercise Name 1  QS  -SI      Reps 1  20  -SI         Exercise 2    Exercise Name 2  SAQ and LAQ  -SI      Sets 2  1  -SI      Reps 2  20  -SI         Exercise 3    Exercise Name 3  heel slides supine  -SI      Reps 3  3  -SI      Time 3  10  -SI      Additional Comments  painful and poor quality and alignment so held  -SI         Exercise 4    Exercise Name 4  HS curls sitting with red TB  -SI      Sets 4  2  -SI      Reps 4  10  -SI        User Key  (r) = Recorded By, (t) = Taken By, (c) = Cosigned By     Initials Name Provider Type    SI Day Dowd PTA Physical Therapy Assistant                           Therapy Education  Given: HEP, Symptoms/condition management, Edema management  Program: Reinforced  How Provided: Verbal, Demonstration, Written  Provided to: Patient  Level of Understanding: Teach back education performed              Time Calculation:   Start Time: 1410  Stop Time: 1450  Time Calculation (min): 40 min  Total Timed Code Minutes- PT: 40 minute(s)  Therapy Charges for Today     Code Description Service Date Service Provider Modifiers Qty    95720878625 HC PT THER PROC EA 15 MIN 3/19/2019 Day Dowd PTA GP 3                    Day Dowd PTA  3/19/2019

## 2019-03-22 ENCOUNTER — HOSPITAL ENCOUNTER (OUTPATIENT)
Dept: PHYSICAL THERAPY | Facility: HOSPITAL | Age: 79
Setting detail: THERAPIES SERIES
Discharge: HOME | End: 2019-03-22

## 2019-03-22 DIAGNOSIS — Z47.89 ORTHOPEDIC AFTERCARE: ICD-10-CM

## 2019-03-22 DIAGNOSIS — G89.29 CHRONIC PAIN OF RIGHT KNEE: ICD-10-CM

## 2019-03-22 DIAGNOSIS — Z74.09 IMPAIRED MOBILITY AND ACTIVITIES OF DAILY LIVING: ICD-10-CM

## 2019-03-22 DIAGNOSIS — Z78.9 IMPAIRED MOBILITY AND ACTIVITIES OF DAILY LIVING: ICD-10-CM

## 2019-03-22 DIAGNOSIS — Z96.651 STATUS POST TOTAL RIGHT KNEE REPLACEMENT: Primary | ICD-10-CM

## 2019-03-22 DIAGNOSIS — M25.561 CHRONIC PAIN OF RIGHT KNEE: ICD-10-CM

## 2019-03-22 PROCEDURE — 97110 THERAPEUTIC EXERCISES: CPT

## 2019-03-22 NOTE — THERAPY TREATMENT NOTE
Outpatient Physical Therapy Ortho Treatment Note  [facility]     Patient Name: Ariel Elliott  : 1940  MRN: [id number]  Today's Date: 3/22/2019      Visit Date: 2019    Visit Dx:    ICD-10-CM ICD-9-CM   1. Status post total right knee replacement Z96.651 V43.65   2. Orthopedic aftercare Z47.89 V54.9   3. Impaired mobility and activities of daily living Z74.09 799.89   4. Chronic pain of right knee M25.561 719.46    G89.29 338.29       Patient Active Problem List   Diagnosis   • Nonrheumatic aortic valve stenosis   • S/P CABG (coronary artery bypass graft)   • Left bundle branch block (LBBB)   • Type 2 diabetes mellitus with complication, with long-term current use of insulin (CMS/HCC)   • Dyspnea on exertion   • Angina pectoris (CMS/HCC)   • Syncope   • AVM (arteriovenous malformation) of small bowel, acquired with hemorrhage (CMS/HCC)   • Aortic valve stenosis   • Atrial flutter (CMS/HCC)   • Diabetes mellitus (CMS/HCC)   • Von Willebrand disease (CMS/HCC)   • Sleep apnea   • Pacemaker   • Coronary atherosclerosis   • CKD (chronic kidney disease)   • S/P TAVR (transcatheter aortic valve replacement)   • Closed displaced supracondylar fracture without intracondylar extension of lower end of right femur (CMS/HCC)        Past Medical History:   Diagnosis Date   • Anemia    • Anemia     2 UNITS OF BLOOD SAT 11/10. [facility]   • Aortic stenosis    • Arrhythmia    • Atrial flutter (CMS/HCC)    • Congestive heart failure (CMS/HCC)    • Coronary atherosclerosis    • Diabetes mellitus (CMS/HCC)     TYPE 2   • GI (gastrointestinal bleed)    • Gout    • H/O seasonal allergies    • Hard to intubate     SEVERAL TIMES IN PAST. NO ISSUES RECENT HISTORY   • Herpes     HANDS IN THE PAST   • Heyd syndrome (CMS/HCC)    • History of diverticulitis    • Hyperlipidemia    • Pacemaker     LEFT.   • Post-nasal drip    • Sleep apnea     Bi-Pap   • Von Willebrand disease (CMS/HCC)          Past Surgical History:   Procedure Laterality Date   • ABLATION OF DYSRHYTHMIC FOCUS     • AORTIC VALVE REPAIR/REPLACEMENT N/A 11/13/2018    Procedure: INTRAOPERATIVE LANG, PERCUTANEOUS TRANSFEMORAL TRANSCATHETER AORTIC VALVE REPLACEMENT PERCUTANEOUS APPROACH;  Surgeon: Elizabeth Meade MD;  Location: [facility] OR 18/19;  Service: Cardiothoracic   • CARDIAC CATHETERIZATION     • CARDIAC CATHETERIZATION N/A 11/1/2018    Procedure: Right and Left Heart Cath;  Surgeon: Dionicio Stoner MD;  Location: [facility] INVASIVE LOCATION;  Service: Cardiology   • CARDIAC CATHETERIZATION  11/1/2018    Procedure: Saphenous Vein Graft;  Surgeon: Dionicio Stoner MD;  Location: [facility] CATH INVASIVE LOCATION;  Service: Cardiology   • CARDIAC ELECTROPHYSIOLOGY PROCEDURE Left 11/5/2018    Procedure: Pacemaker DC new   [place];  Surgeon: Mina Castillo MD;  Location: [facility] INVASIVE LOCATION;  Service: Cardiology   • CATARACT EXTRACTION WITH INTRAOCULAR LENS IMPLANT      LEFT AND RIGHT   • CHOLECYSTECTOMY     • COLONOSCOPY     • CORONARY ANGIOPLASTY      WITH PTCA   • CORONARY ARTERY BYPASS GRAFT      5 VESSEL   • HAND SURGERY      TRIGGER FINGER RELEASE, TENDON RELEASE   • HEMORRHOIDECTOMY     • HERNIA REPAIR      VENTRAL HERNIA REPEATEDLY   • PACEMAKER IMPLANTATION  11/05/2018   • PORTACATH PLACEMENT Right     POWER PORT   • SINUS SURGERY     • TOTAL HIP ARTHROPLASTY Right    • TOTAL KNEE ARTHROPLASTY Right 1/8/2019    Procedure: RIGHT DISTAL FEMUR REPLACEMENT;  Surgeon: Margot Ray MD;  Location: [facility] OR;  Service: Orthopedics       PT Ortho     Row Name 03/19/19 1800       Subjective Comments    Subjective Comments  Pt enters PT 10 min late today and states moving slowly ...Having some SOA and HG 8.0.  Has a chronic condition and gets blood transfusions regularly (last one 6 weeks ago).  -SI       Subjective Pain    Able to rate subjective pain?  yes  -SI    Subjective Pain Comment  min  c/o knee pain , only with supine heel slide ex  -SI       Posture/Observations    Observations  Edema;Erythema lower legs bilaterally with vascular changes noted skin  -SI       Right Lower Ext    Rt Knee Extension/Flexion AROM  108  -SI       Gait/Stairs Assessment/Training    Comment (Gait/Stairs)  sluggish gait with RWX  -SI      User Key  (r) = Recorded By, (t) = Taken By, (c) = Cosigned By    Initials Name Provider Type    SI Day Dowd, PTA Physical Therapy Assistant                      PT Assessment/Plan     Row Name 03/22/19 1000          PT Assessment    Assessment Comments  Pt cont to be SOA during exercises, limited with flexion d/t pain on the lateral aspect of his knee. He reports this is the most limiting factor for him.  -CA        PT Plan    PT Plan Comments  Cont to progress ROM as tolerated.  -CA       User Key  (r) = Recorded By, (t) = Taken By, (c) = Cosigned By    Initials Name Provider Type    Fela Lomas, PT Physical Therapist          Modalities     Row Name 03/22/19 0900             Ice    Ice Applied  Yes  -CA      Location  R knee  -CA      Rx Minutes  10 mins  -CA      Ice S/P Rx  Yes  -CA        User Key  (r) = Recorded By, (t) = Taken By, (c) = Cosigned By    Initials Name Provider Type    Fela Lomas, PT Physical Therapist        Exercises     Row Name 03/22/19 0900             Subjective Comments    Subjective Comments  Pt reporting no pain when he's standing or walking, but does report pain with STS transfers and having his knee bent  -CA         Subjective Pain    Able to rate subjective pain?  yes  -CA      Pre-Treatment Pain Level  4  -CA         Total Minutes    53424 - PT Therapeutic Exercise Minutes  40  -CA         Exercise 1    Exercise Name 1  QS  -CA      Reps 1  20  -CA      Time 1  5s  -CA         Exercise 2    Exercise Name 2  SAQ and LAQ  -CA      Sets 2  3  -CA      Reps 2  10   -CA      Additional Comments  each  -CA         Exercise 3     Exercise Name 3  heel slides supine w/ strap  -CA      Reps 3  3  -CA      Time 3  10  -CA      Additional Comments  painful on lateral aspect of knee, assist to keep hip from ER  -CA         Exercise 4    Exercise Name 4  HS curls sitting with red TB  -CA      Sets 4  3  -CA      Reps 4  10  -CA         Exercise 5    Exercise Name 5  SLR  -CA      Cueing 5  Verbal  -CA      Sets 5  2  -CA      Reps 5  10  -CA         Exercise 6    Exercise Name 6  STS from elevated table  -CA      Cueing 6  Verbal  -CA      Sets 6  2  -CA      Reps 6  8  -CA      Additional Comments  SOA following exercise; CGA  -CA        User Key  (r) = Recorded By, (t) = Taken By, (c) = Cosigned By    Initials Name Provider Type    CA Fela Calvillo, PT Physical Therapist                                          Time Calculation:   Start Time: 0930  Stop Time: 1010  Time Calculation (min): 40 min  Total Timed Code Minutes- PT: 40 minute(s)  Therapy Charges for Today     Code Description Service Date Service Provider Modifiers Qty    55147484657 HC PT THER PROC EA 15 MIN 3/22/2019 Fela Calvillo, PT GP 3                    Fela Calvillo, PT  3/22/2019

## 2019-03-27 ENCOUNTER — HOSPITAL ENCOUNTER (OUTPATIENT)
Dept: PHYSICAL THERAPY | Facility: HOSPITAL | Age: 79
Setting detail: THERAPIES SERIES
Discharge: HOME | End: 2019-03-27

## 2019-03-27 DIAGNOSIS — Z74.09 IMPAIRED MOBILITY AND ACTIVITIES OF DAILY LIVING: ICD-10-CM

## 2019-03-27 DIAGNOSIS — Z47.89 ORTHOPEDIC AFTERCARE: ICD-10-CM

## 2019-03-27 DIAGNOSIS — M25.561 CHRONIC PAIN OF RIGHT KNEE: ICD-10-CM

## 2019-03-27 DIAGNOSIS — Z78.9 IMPAIRED MOBILITY AND ACTIVITIES OF DAILY LIVING: ICD-10-CM

## 2019-03-27 DIAGNOSIS — Z96.651 STATUS POST TOTAL RIGHT KNEE REPLACEMENT: Primary | ICD-10-CM

## 2019-03-27 DIAGNOSIS — G89.29 CHRONIC PAIN OF RIGHT KNEE: ICD-10-CM

## 2019-03-27 PROCEDURE — 97110 THERAPEUTIC EXERCISES: CPT

## 2019-03-27 NOTE — THERAPY TREATMENT NOTE
Outpatient Physical Therapy Ortho Treatment Note  [facility]     Patient Name: Ariel Elliott  : 1940  MRN: [id number]  Today's Date: 3/27/2019      Visit Date: 2019    Visit Dx:    ICD-10-CM ICD-9-CM   1. Status post total right knee replacement Z96.651 V43.65   2. Orthopedic aftercare Z47.89 V54.9   3. Impaired mobility and activities of daily living Z74.09 799.89   4. Chronic pain of right knee M25.561 719.46    G89.29 338.29       Patient Active Problem List   Diagnosis   • Nonrheumatic aortic valve stenosis   • S/P CABG (coronary artery bypass graft)   • Left bundle branch block (LBBB)   • Type 2 diabetes mellitus with complication, with long-term current use of insulin (CMS/HCC)   • Dyspnea on exertion   • Angina pectoris (CMS/HCC)   • Syncope   • AVM (arteriovenous malformation) of small bowel, acquired with hemorrhage (CMS/HCC)   • Aortic valve stenosis   • Atrial flutter (CMS/HCC)   • Diabetes mellitus (CMS/HCC)   • Von Willebrand disease (CMS/HCC)   • Sleep apnea   • Pacemaker   • Coronary atherosclerosis   • CKD (chronic kidney disease)   • S/P TAVR (transcatheter aortic valve replacement)   • Closed displaced supracondylar fracture without intracondylar extension of lower end of right femur (CMS/HCC)        Past Medical History:   Diagnosis Date   • Anemia    • Anemia     2 UNITS OF BLOOD SAT 11/10. [facility]   • Aortic stenosis    • Arrhythmia    • Atrial flutter (CMS/HCC)    • Congestive heart failure (CMS/HCC)    • Coronary atherosclerosis    • Diabetes mellitus (CMS/HCC)     TYPE 2   • GI (gastrointestinal bleed)    • Gout    • H/O seasonal allergies    • Hard to intubate     SEVERAL TIMES IN PAST. NO ISSUES RECENT HISTORY   • Herpes     HANDS IN THE PAST   • Heyd syndrome (CMS/HCC)    • History of diverticulitis    • Hyperlipidemia    • Pacemaker     LEFT.   • Post-nasal drip    • Sleep apnea     Bi-Pap   • Von Willebrand disease (CMS/HCC)          Past Surgical History:   Procedure Laterality Date   • ABLATION OF DYSRHYTHMIC FOCUS     • AORTIC VALVE REPAIR/REPLACEMENT N/A 11/13/2018    Procedure: INTRAOPERATIVE LANG, PERCUTANEOUS TRANSFEMORAL TRANSCATHETER AORTIC VALVE REPLACEMENT PERCUTANEOUS APPROACH;  Surgeon: Elizabeth Meade MD;  Location: [facility] OR 18/19;  Service: Cardiothoracic   • CARDIAC CATHETERIZATION     • CARDIAC CATHETERIZATION N/A 11/1/2018    Procedure: Right and Left Heart Cath;  Surgeon: Dionicio Stoner MD;  Location: [facility] INVASIVE LOCATION;  Service: Cardiology   • CARDIAC CATHETERIZATION  11/1/2018    Procedure: Saphenous Vein Graft;  Surgeon: Dionicio Stoner MD;  Location: [facility] INVASIVE LOCATION;  Service: Cardiology   • CARDIAC ELECTROPHYSIOLOGY PROCEDURE Left 11/5/2018    Procedure: Pacemaker DC new   [place];  Surgeon: Mina Castillo MD;  Location: [facility] INVASIVE LOCATION;  Service: Cardiology   • CATARACT EXTRACTION WITH INTRAOCULAR LENS IMPLANT      LEFT AND RIGHT   • CHOLECYSTECTOMY     • COLONOSCOPY     • CORONARY ANGIOPLASTY      WITH PTCA   • CORONARY ARTERY BYPASS GRAFT      5 VESSEL   • HAND SURGERY      TRIGGER FINGER RELEASE, TENDON RELEASE   • HEMORRHOIDECTOMY     • HERNIA REPAIR      VENTRAL HERNIA REPEATEDLY   • PACEMAKER IMPLANTATION  11/05/2018   • PORTACATH PLACEMENT Right     POWER PORT   • SINUS SURGERY     • TOTAL HIP ARTHROPLASTY Right    • TOTAL KNEE ARTHROPLASTY Right 1/8/2019    Procedure: RIGHT DISTAL FEMUR REPLACEMENT;  Surgeon: Margot Ray MD;  Location: [facility] OR;  Service: Orthopedics       PT Ortho     Row Name 03/27/19 0800       Subjective Comments    Subjective Comments  pt states he got 2 units of blood last week.  when asked if feels better states can't really tell the difference anymore like he used to ...  -SI       Subjective Pain    Able to rate subjective pain?  yes  -SI    Subjective Pain Comment  Knee pain improving, had two good days    -SI       Posture/Observations    Observations  Edema;Erythema  -SI       Right Lower Ext    Rt Knee Extension/Flexion AROM  0 to 115/120  -SI       Transfers    Sit-Stand [place] (Transfers)  conditional independence  -SI    Comment (Transfers)  ind but an effort from regular height chairs  -SI       Gait/Stairs Assessment/Training    Comment (Gait/Stairs)  gait with RWX  -SI      User Key  (r) = Recorded By, (t) = Taken By, (c) = Cosigned By    Initials Name Provider Type    ELIO Day Dowd, ILIANA Physical Therapy Assistant                      PT Assessment/Plan     Row Name 03/27/19 0853          PT Assessment    Assessment Comments  HR 60 and /75.  Pt is SOA with little activity and frequen t rests during tx.  ROM progressing well.  Used cane pre-op so will start working with cane in PT  -SI       User Key  (r) = Recorded By, (t) = Taken By, (c) = Cosigned By    Initials Name Provider Type    Day Boogie, ILIANA Physical Therapy Assistant            Exercises     Row Name 03/27/19 0800             Subjective Comments    Subjective Comments  pt states he got 2 units of blood last week.  when asked if feels better states can't really tell the difference anymore like he used to ...  -SI         Subjective Pain    Able to rate subjective pain?  yes  -SI      Subjective Pain Comment  Knee pain improving, had two good days   -SI         Total Minutes    35336 - PT Therapeutic Exercise Minutes  45  -SI         Exercise 1    Exercise Name 1  QS  -SI      Reps 1  20  -SI      Time 1  5s  -SI         Exercise 2    Exercise Name 2  SAQ and LAQ  -SI      Sets 2  3  -SI      Reps 2  10   -SI      Additional Comments  2 lbs  -SI         Exercise 3    Exercise Name 3  hip bridge  -SI      Reps 3  10  -SI      Time 3     -SI         Exercise 4    Exercise Name 4  HS curls sitting with green TB  -SI      Sets 4  3  -SI      Reps 4  10  -SI         Exercise 5    Exercise Name 5  SLR  -SI      Cueing 5  Verbal   -SI      Sets 5  1  -SI      Reps 5  10  -SI         Exercise 6    Exercise Name 6  hip abd ER with green TB  -SI      Reps 6  15  -SI        User Key  (r) = Recorded By, (t) = Taken By, (c) = Cosigned By    Initials Name Provider Type    Day Boogie PTA Physical Therapy Assistant                           Therapy Education  Given: HEP, Symptoms/condition management  Program: Progressed  How Provided: Verbal, Demonstration, Written  Provided to: Patient  Level of Understanding: Teach back education performed              Time Calculation:   Start Time: 0805  Stop Time: 0855  Time Calculation (min): 50 min  Total Timed Code Minutes- PT: 45 minute(s)  Therapy Charges for Today     Code Description Service Date Service Provider Modifiers Qty    63637128791 HC PT THER PROC EA 15 MIN 3/27/2019 Day Dowd PTA GP 3                    Day Dowd PTA  3/27/2019

## 2019-03-28 ENCOUNTER — HOSPITAL ENCOUNTER (OUTPATIENT)
Dept: PHYSICAL THERAPY | Facility: HOSPITAL | Age: 79
Discharge: HOME | End: 2019-03-28
Admitting: ORTHOPAEDIC SURGERY

## 2019-03-28 DIAGNOSIS — Z78.9 IMPAIRED MOBILITY AND ACTIVITIES OF DAILY LIVING: ICD-10-CM

## 2019-03-28 DIAGNOSIS — Z74.09 IMPAIRED MOBILITY AND ACTIVITIES OF DAILY LIVING: ICD-10-CM

## 2019-03-28 DIAGNOSIS — Z47.89 ORTHOPEDIC AFTERCARE: ICD-10-CM

## 2019-03-28 DIAGNOSIS — Z96.651 STATUS POST TOTAL RIGHT KNEE REPLACEMENT: Primary | ICD-10-CM

## 2019-03-28 DIAGNOSIS — M25.561 CHRONIC PAIN OF RIGHT KNEE: ICD-10-CM

## 2019-03-28 DIAGNOSIS — G89.29 CHRONIC PAIN OF RIGHT KNEE: ICD-10-CM

## 2019-03-28 PROCEDURE — 97110 THERAPEUTIC EXERCISES: CPT

## 2019-03-28 NOTE — THERAPY TREATMENT NOTE
Outpatient Physical Therapy Ortho Treatment Note  [facility]     Patient Name: Ariel Elliott  : 1940  MRN: [id number]  Today's Date: 3/28/2019      Visit Date: 2019    Visit Dx:    ICD-10-CM ICD-9-CM   1. Status post total right knee replacement Z96.651 V43.65   2. Orthopedic aftercare Z47.89 V54.9   3. Impaired mobility and activities of daily living Z74.09 799.89   4. Chronic pain of right knee M25.561 719.46    G89.29 338.29       Patient Active Problem List   Diagnosis   • Nonrheumatic aortic valve stenosis   • S/P CABG (coronary artery bypass graft)   • Left bundle branch block (LBBB)   • Type 2 diabetes mellitus with complication, with long-term current use of insulin (CMS/HCC)   • Dyspnea on exertion   • Angina pectoris (CMS/HCC)   • Syncope   • AVM (arteriovenous malformation) of small bowel, acquired with hemorrhage (CMS/HCC)   • Aortic valve stenosis   • Atrial flutter (CMS/HCC)   • Diabetes mellitus (CMS/HCC)   • Von Willebrand disease (CMS/HCC)   • Sleep apnea   • Pacemaker   • Coronary atherosclerosis   • CKD (chronic kidney disease)   • S/P TAVR (transcatheter aortic valve replacement)   • Closed displaced supracondylar fracture without intracondylar extension of lower end of right femur (CMS/HCC)        Past Medical History:   Diagnosis Date   • Anemia    • Anemia     2 UNITS OF BLOOD SAT 11/10. [facility]   • Aortic stenosis    • Arrhythmia    • Atrial flutter (CMS/HCC)    • Congestive heart failure (CMS/HCC)    • Coronary atherosclerosis    • Diabetes mellitus (CMS/HCC)     TYPE 2   • GI (gastrointestinal bleed)    • Gout    • H/O seasonal allergies    • Hard to intubate     SEVERAL TIMES IN PAST. NO ISSUES RECENT HISTORY   • Herpes     HANDS IN THE PAST   • Heyd syndrome (CMS/HCC)    • History of diverticulitis    • Hyperlipidemia    • Pacemaker     LEFT.   • Post-nasal drip    • Sleep apnea     Bi-Pap   • Von Willebrand disease (CMS/HCC)          Past Surgical History:   Procedure Laterality Date   • ABLATION OF DYSRHYTHMIC FOCUS     • AORTIC VALVE REPAIR/REPLACEMENT N/A 11/13/2018    Procedure: INTRAOPERATIVE LANG, PERCUTANEOUS TRANSFEMORAL TRANSCATHETER AORTIC VALVE REPLACEMENT PERCUTANEOUS APPROACH;  Surgeon: Elizabeth Meade MD;  Location: [facility] OR 18/19;  Service: Cardiothoracic   • CARDIAC CATHETERIZATION     • CARDIAC CATHETERIZATION N/A 11/1/2018    Procedure: Right and Left Heart Cath;  Surgeon: Dionicio Stoner MD;  Location: [facility] INVASIVE LOCATION;  Service: Cardiology   • CARDIAC CATHETERIZATION  11/1/2018    Procedure: Saphenous Vein Graft;  Surgeon: Dionicio Stoner MD;  Location: [facility] CATH INVASIVE LOCATION;  Service: Cardiology   • CARDIAC ELECTROPHYSIOLOGY PROCEDURE Left 11/5/2018    Procedure: Pacemaker DC new   [place];  Surgeon: Mina Castillo MD;  Location: [facility] CATH INVASIVE LOCATION;  Service: Cardiology   • CATARACT EXTRACTION WITH INTRAOCULAR LENS IMPLANT      LEFT AND RIGHT   • CHOLECYSTECTOMY     • COLONOSCOPY     • CORONARY ANGIOPLASTY      WITH PTCA   • CORONARY ARTERY BYPASS GRAFT      5 VESSEL   • HAND SURGERY      TRIGGER FINGER RELEASE, TENDON RELEASE   • HEMORRHOIDECTOMY     • HERNIA REPAIR      VENTRAL HERNIA REPEATEDLY   • PACEMAKER IMPLANTATION  11/05/2018   • PORTACATH PLACEMENT Right     POWER PORT   • SINUS SURGERY     • TOTAL HIP ARTHROPLASTY Right    • TOTAL KNEE ARTHROPLASTY Right 1/8/2019    Procedure: RIGHT DISTAL FEMUR REPLACEMENT;  Surgeon: Margot Ray MD;  Location: [facility] OR;  Service: Orthopedics       PT Ortho     Row Name 03/28/19 1500       Subjective Comments    Subjective Comments  pain with knee extension reported.  Transfers in out of car difficult still.  Wants to progress to cane and used to holding it in his left hand  -SI       Subjective Pain    Able to rate subjective pain?  yes  -SI    Subjective Pain Comment  Pain with knee extension mid range   -SI       Right Lower Ext    Rt Knee Extension/Flexion AROM  0 to 115  -SI       Transfers    Sit-Stand [place] (Transfers)  conditional independence use of UE and x 10 reps from higher chair  -SI       Gait/Stairs Assessment/Training    Gait/Stairs Assessment/Training  gait/ambulation assistive device  -SI    [place] Level (Gait)  supervision  -SI    Assistive Device (Gait)  cane, straight He is used to holding cane in left hand   -SI    Distance in Feet (Gait)  50  -SI    Deviations/Abnormal Patterns (Gait)  bilateral deviations;antalgic;base of support, wide  -SI    Comment (Gait/Stairs)  he is used to cane in left hand and feels off balalnce if in right hand  -SI    Row Name 03/27/19 0800       Subjective Comments    Subjective Comments  pt states he got 2 units of blood last week.  when asked if feels better states can't really tell the difference anymore like he used to ...  -SI       Subjective Pain    Able to rate subjective pain?  yes  -SI    Subjective Pain Comment  Knee pain improving, had two good days   -SI       Posture/Observations    Observations  Edema;Erythema  -SI       Right Lower Ext    Rt Knee Extension/Flexion AROM  0 to 115/120  -SI       Transfers    Sit-Stand [place] (Transfers)  conditional independence  -SI    Comment (Transfers)  ind but an effort from regular height chairs  -SI       Gait/Stairs Assessment/Training    Comment (Gait/Stairs)  gait with RWX  -SI      User Key  (r) = Recorded By, (t) = Taken By, (c) = Cosigned By    Initials Name Provider Type    Day Boogie PTA Physical Therapy Assistant                      PT Assessment/Plan     Row Name 03/28/19 9024          PT Assessment    Assessment Comments  Not safe to be oob with cane or practive at home.  Practive in PT only.  using RWX.  -SI       User Key  (r) = Recorded By, (t) = Taken By, (c) = Cosigned By    Initials Name Provider Type    Day Boogie PTA Physical Therapy Assistant             Exercises     Row Name 03/28/19 1500             Subjective Comments    Subjective Comments  pain with knee extension reported.  Transfers in out of car difficult still.  Wants to progress to cane and used to holding it in his left hand  -SI         Subjective Pain    Able to rate subjective pain?  yes  -SI      Subjective Pain Comment  Pain with knee extension mid range  -SI         Total Minutes    06397 - PT Therapeutic Exercise Minutes  45  -SI         Exercise 1    Exercise Name 1  Nu step dest #10  -SI         Exercise 2    Exercise Name 2    LAQ with 2 lbs  -SI      Sets 2  3  -SI      Reps 2  10   -SI         Exercise 3    Exercise Name 3  SLR  -SI      Reps 3  10  -SI      Time 3     -SI         Exercise 4    Exercise Name 4  HS curls sitting with green TB  -SI      Sets 4  3  -SI      Reps 4  10  -SI         Exercise 5    Exercise Name 5  sit to stand to sit   -SI      Reps 5  10  -SI         Exercise 6    Exercise Name 6  HS stretch sit side of mat  -SI      Reps 6  5  -SI      Time 6  20  -SI      Additional Comments  both legs  -SI        User Key  (r) = Recorded By, (t) = Taken By, (c) = Cosigned By    Initials Name Provider Type    Day Boogie PTA Physical Therapy Assistant                           Therapy Education  Given: HEP, Symptoms/condition management  Program: Progressed  How Provided: Verbal, Demonstration, Written  Provided to: Patient  Level of Understanding: Teach back education performed              Time Calculation:   Start Time: 1500  Stop Time: 1546  Time Calculation (min): 46 min  Total Timed Code Minutes- PT: 45 minute(s)  Therapy Charges for Today     Code Description Service Date Service Provider Modifiers Qty    91420613415 HC PT THER PROC EA 15 MIN 3/28/2019 Day Dowd PTA GP 3                    Day Dowd PTA  3/28/2019

## 2019-04-03 ENCOUNTER — HOSPITAL ENCOUNTER (OUTPATIENT)
Dept: PHYSICAL THERAPY | Facility: HOSPITAL | Age: 79
Setting detail: THERAPIES SERIES
Discharge: HOME | End: 2019-04-03

## 2019-04-03 DIAGNOSIS — Z74.09 IMPAIRED MOBILITY AND ACTIVITIES OF DAILY LIVING: ICD-10-CM

## 2019-04-03 DIAGNOSIS — Z47.89 ORTHOPEDIC AFTERCARE: ICD-10-CM

## 2019-04-03 DIAGNOSIS — Z96.651 STATUS POST TOTAL RIGHT KNEE REPLACEMENT: Primary | ICD-10-CM

## 2019-04-03 DIAGNOSIS — G89.29 CHRONIC PAIN OF RIGHT KNEE: ICD-10-CM

## 2019-04-03 DIAGNOSIS — Z78.9 IMPAIRED MOBILITY AND ACTIVITIES OF DAILY LIVING: ICD-10-CM

## 2019-04-03 DIAGNOSIS — M25.561 CHRONIC PAIN OF RIGHT KNEE: ICD-10-CM

## 2019-04-03 PROCEDURE — 97110 THERAPEUTIC EXERCISES: CPT

## 2019-04-03 NOTE — THERAPY TREATMENT NOTE
Outpatient Physical Therapy Ortho Treatment Note  [facility]     Patient Name: Ariel Elliott  : 1940  MRN: [id number]  Today's Date: 4/3/2019      Visit Date: 2019    Visit Dx:    ICD-10-CM ICD-9-CM   1. Status post total right knee replacement Z96.651 V43.65   2. Orthopedic aftercare Z47.89 V54.9   3. Impaired mobility and activities of daily living Z74.09 799.89   4. Chronic pain of right knee M25.561 719.46    G89.29 338.29       Patient Active Problem List   Diagnosis   • Nonrheumatic aortic valve stenosis   • S/P CABG (coronary artery bypass graft)   • Left bundle branch block (LBBB)   • Type 2 diabetes mellitus with complication, with long-term current use of insulin (CMS/HCC)   • Dyspnea on exertion   • Angina pectoris (CMS/HCC)   • Syncope   • AVM (arteriovenous malformation) of small bowel, acquired with hemorrhage (CMS/HCC)   • Aortic valve stenosis   • Atrial flutter (CMS/HCC)   • Diabetes mellitus (CMS/HCC)   • Von Willebrand disease (CMS/HCC)   • Sleep apnea   • Pacemaker   • Coronary atherosclerosis   • CKD (chronic kidney disease)   • S/P TAVR (transcatheter aortic valve replacement)   • Closed displaced supracondylar fracture without intracondylar extension of lower end of right femur (CMS/HCC)        Past Medical History:   Diagnosis Date   • Anemia    • Anemia     2 UNITS OF BLOOD SAT 11/10. [facility]   • Aortic stenosis    • Arrhythmia    • Atrial flutter (CMS/HCC)    • Congestive heart failure (CMS/HCC)    • Coronary atherosclerosis    • Diabetes mellitus (CMS/HCC)     TYPE 2   • GI (gastrointestinal bleed)    • Gout    • H/O seasonal allergies    • Hard to intubate     SEVERAL TIMES IN PAST. NO ISSUES RECENT HISTORY   • Herpes     HANDS IN THE PAST   • Heyd syndrome (CMS/HCC)    • History of diverticulitis    • Hyperlipidemia    • Pacemaker     LEFT.   • Post-nasal drip    • Sleep apnea     Bi-Pap   • Von Willebrand disease (CMS/HCC)          Past Surgical History:   Procedure Laterality Date   • ABLATION OF DYSRHYTHMIC FOCUS     • AORTIC VALVE REPAIR/REPLACEMENT N/A 11/13/2018    Procedure: INTRAOPERATIVE LANG, PERCUTANEOUS TRANSFEMORAL TRANSCATHETER AORTIC VALVE REPLACEMENT PERCUTANEOUS APPROACH;  Surgeon: Elizabeth Meade MD;  Location: [facility] OR 18/19;  Service: Cardiothoracic   • CARDIAC CATHETERIZATION     • CARDIAC CATHETERIZATION N/A 11/1/2018    Procedure: Right and Left Heart Cath;  Surgeon: Dionicio Stoner MD;  Location: [facility] CATH INVASIVE LOCATION;  Service: Cardiology   • CARDIAC CATHETERIZATION  11/1/2018    Procedure: Saphenous Vein Graft;  Surgeon: Dionicio Stoner MD;  Location: [facility] CATH INVASIVE LOCATION;  Service: Cardiology   • CARDIAC ELECTROPHYSIOLOGY PROCEDURE Left 11/5/2018    Procedure: Pacemaker DC new   [place];  Surgeon: Mina Castillo MD;  Location: [facility] CATH INVASIVE LOCATION;  Service: Cardiology   • CATARACT EXTRACTION WITH INTRAOCULAR LENS IMPLANT      LEFT AND RIGHT   • CHOLECYSTECTOMY     • COLONOSCOPY     • CORONARY ANGIOPLASTY      WITH PTCA   • CORONARY ARTERY BYPASS GRAFT      5 VESSEL   • HAND SURGERY      TRIGGER FINGER RELEASE, TENDON RELEASE   • HEMORRHOIDECTOMY     • HERNIA REPAIR      VENTRAL HERNIA REPEATEDLY   • PACEMAKER IMPLANTATION  11/05/2018   • PORTACATH PLACEMENT Right     POWER PORT   • SINUS SURGERY     • TOTAL HIP ARTHROPLASTY Right    • TOTAL KNEE ARTHROPLASTY Right 1/8/2019    Procedure: RIGHT DISTAL FEMUR REPLACEMENT;  Surgeon: Margot Ray MD;  Location: [facility] OR;  Service: Orthopedics       PT Ortho     Row Name 04/03/19 1500       Subjective Comments    Subjective Comments  Pt states his HG is 8.5 and will probalby get blood transfusion next week.  (Chronic condition and 4 to 8 weeks needs blood)  C/O difficulty getting in and out of car  -SI       Subjective Pain    Able to rate subjective pain?  yes  -SI    Pre-Treatment Pain Level  0  -SI     Post-Treatment Pain Level  0  -SI    Subjective Pain Comment  Min knee pain in POT with mat table etc and better set up for ex.   -SI       Right Lower Ext    Rt Knee Extension/Flexion AROM  0 to 116  -SI       Transfers    Comment (Transfers)  sit to stand to sit x 3 from mat table sitting on cushion...an effort but ind  -SI       Gait/Stairs Assessment/Training    Gait/Stairs Assessment/Training  gait/ambulation assistive device  -SI    [place] Level (Gait)  supervision  -SI    Assistive Device (Gait)  cane, straight  -SI    Distance in Feet (Gait)  100  -SI    Deviations/Abnormal Patterns (Gait)  bilateral deviations;antalgic;base of support, wide  -SI    Comment (Gait/Stairs)  Gait with cane tolerated well in PT  -SI      User Key  (r) = Recorded By, (t) = Taken By, (c) = Cosigned By    Initials Name Provider Type    Day Boogie PTA Physical Therapy Assistant                      PT Assessment/Plan     Row Name 04/03/19 1553          PT Assessment    Assessment Comments  Pt tolerates PT well.  Audible grunting sounds even at rest....He is getting more mobilie and stronger.  Not ready for him to amb ind with cane oob in community.  -SI       User Key  (r) = Recorded By, (t) = Taken By, (c) = Cosigned By    Initials Name Provider Type    Day Boogie PTA Physical Therapy Assistant            Exercises     Row Name 04/03/19 1500             Subjective Comments    Subjective Comments  Pt states his HG is 8.5 and will probalby get blood transfusion next week.  (Chronic condition and 4 to 8 weeks needs blood)  C/O difficulty getting in and out of car  -SI         Subjective Pain    Able to rate subjective pain?  yes  -SI      Pre-Treatment Pain Level  0  -SI      Post-Treatment Pain Level  0  -SI      Subjective Pain Comment  Min knee pain in POT with mat table etc and better set up for ex.   -SI         Total Minutes    90021 - PT Therapeutic Exercise Minutes  45  -SI         Exercise 1     Exercise Name 1  Nu step dest #10  -SI      Time 1  7 min  -SI         Exercise 2    Exercise Name 2    LAQ with 2 lbs  -SI      Sets 2  3  -SI      Reps 2  10   -SI         Exercise 3    Exercise Name 3  SLR  -SI      Reps 3  10  -SI      Time 3     -SI         Exercise 4    Exercise Name 4  HS curls sitting with green TB  -SI      Sets 4  3  -SI      Reps 4  10  -SI         Exercise 5    Exercise Name 5  sit to stand to sit   -SI      Reps 5  10  -SI      Additional Comments  from blue cusion on mat  -SI         Exercise 6    Exercise Name 6  HS stretch sit side of mat  -SI      Reps 6  5  -SI      Time 6  20  -SI      Additional Comments  sit, heel prop on chair today 5 min  -SI        User Key  (r) = Recorded By, (t) = Taken By, (c) = Cosigned By    Initials Name Provider Type    Day Boogie PTA Physical Therapy Assistant                       PT OP Goals     Row Name 04/03/19 1500          PT Short Term Goals    STG 1  Pt will be independent in initial HEP for ROM & strengthening.  -SI     STG 1 Progress  Progressing  -SI     STG 2  Pt will perform sit to stand from elevated mat surface, demonstrating proper mechanics using UEs minimally for balance, without increased knee pain.  -SI     STG 2 Progress  Progressing  -SI     STG 3  Pt will demonstrate improvement in R knee AROM to 0-120 degrees  -SI     STG 3 Progress  Progressing  -SI       User Key  (r) = Recorded By, (t) = Taken By, (c) = Cosigned By    Initials Name Provider Type    Day Boogie PTA Physical Therapy Assistant          Therapy Education  Given: HEP, Symptoms/condition management  Program: Reinforced  How Provided: Verbal, Demonstration, Written  Provided to: Patient  Level of Understanding: Teach back education performed              Time Calculation:   Start Time: 1500  Stop Time: 1545  Time Calculation (min): 45 min  Total Timed Code Minutes- PT: 45 minute(s)  Therapy Charges for Today     Code Description Service Date  Service Provider Modifiers Qty    70467901983 HC PT THER PROC EA 15 MIN 4/3/2019 Day Dowd, PTA GP 3                    Day Dowd, ILIANA  4/3/2019

## 2019-04-05 ENCOUNTER — HOSPITAL ENCOUNTER (OUTPATIENT)
Dept: PHYSICAL THERAPY | Facility: HOSPITAL | Age: 79
Setting detail: THERAPIES SERIES
Discharge: HOME | End: 2019-04-05

## 2019-04-05 PROCEDURE — 97110 THERAPEUTIC EXERCISES: CPT | Performed by: PHYSICAL THERAPIST

## 2019-04-05 NOTE — THERAPY TREATMENT NOTE
Outpatient Physical Therapy Ortho Treatment Note  [facility]     Patient Name: Ariel Elliott  : 1940  MRN: [id number]  Today's Date: 2019      Visit Date: 2019    Visit Dx:  No diagnosis found.    Patient Active Problem List   Diagnosis   • Nonrheumatic aortic valve stenosis   • S/P CABG (coronary artery bypass graft)   • Left bundle branch block (LBBB)   • Type 2 diabetes mellitus with complication, with long-term current use of insulin (CMS/HCC)   • Dyspnea on exertion   • Angina pectoris (CMS/HCC)   • Syncope   • AVM (arteriovenous malformation) of small bowel, acquired with hemorrhage (CMS/HCC)   • Aortic valve stenosis   • Atrial flutter (CMS/HCC)   • Diabetes mellitus (CMS/HCC)   • Von Willebrand disease (CMS/HCC)   • Sleep apnea   • Pacemaker   • Coronary atherosclerosis   • CKD (chronic kidney disease)   • S/P TAVR (transcatheter aortic valve replacement)   • Closed displaced supracondylar fracture without intracondylar extension of lower end of right femur (CMS/HCC)        Past Medical History:   Diagnosis Date   • Anemia    • Anemia     2 UNITS OF BLOOD SAT 11/10. [facility]   • Aortic stenosis    • Arrhythmia    • Atrial flutter (CMS/HCC)    • Congestive heart failure (CMS/HCC)    • Coronary atherosclerosis    • Diabetes mellitus (CMS/HCC)     TYPE 2   • GI (gastrointestinal bleed)    • Gout    • H/O seasonal allergies    • Hard to intubate     SEVERAL TIMES IN PAST. NO ISSUES RECENT HISTORY   • Herpes     HANDS IN THE PAST   • Heyd syndrome (CMS/HCC)    • History of diverticulitis    • Hyperlipidemia    • Pacemaker     LEFT.   • Post-nasal drip    • Sleep apnea     Bi-Pap   • Von Willebrand disease (CMS/HCC)         Past Surgical History:   Procedure Laterality Date   • ABLATION OF DYSRHYTHMIC FOCUS     • AORTIC VALVE REPAIR/REPLACEMENT N/A 2018    Procedure: INTRAOPERATIVE LANG, PERCUTANEOUS TRANSFEMORAL TRANSCATHETER AORTIC VALVE  REPLACEMENT PERCUTANEOUS APPROACH;  Surgeon: Elizabeth Meade MD;  Location: [facility] OR 18/19;  Service: Cardiothoracic   • CARDIAC CATHETERIZATION     • CARDIAC CATHETERIZATION N/A 11/1/2018    Procedure: Right and Left Heart Cath;  Surgeon: Dionicio Stoner MD;  Location: [facility] CATH INVASIVE LOCATION;  Service: Cardiology   • CARDIAC CATHETERIZATION  11/1/2018    Procedure: Saphenous Vein Graft;  Surgeon: Dionicio Stoner MD;  Location: [facility] CATH INVASIVE LOCATION;  Service: Cardiology   • CARDIAC ELECTROPHYSIOLOGY PROCEDURE Left 11/5/2018    Procedure: Pacemaker DC new   [place];  Surgeon: Mina Castillo MD;  Location: [facility] CATH INVASIVE LOCATION;  Service: Cardiology   • CATARACT EXTRACTION WITH INTRAOCULAR LENS IMPLANT      LEFT AND RIGHT   • CHOLECYSTECTOMY     • COLONOSCOPY     • CORONARY ANGIOPLASTY      WITH PTCA   • CORONARY ARTERY BYPASS GRAFT      5 VESSEL   • HAND SURGERY      TRIGGER FINGER RELEASE, TENDON RELEASE   • HEMORRHOIDECTOMY     • HERNIA REPAIR      VENTRAL HERNIA REPEATEDLY   • PACEMAKER IMPLANTATION  11/05/2018   • PORTACATH PLACEMENT Right     POWER PORT   • SINUS SURGERY     • TOTAL HIP ARTHROPLASTY Right    • TOTAL KNEE ARTHROPLASTY Right 1/8/2019    Procedure: RIGHT DISTAL FEMUR REPLACEMENT;  Surgeon: Margot Ray MD;  Location: [facility] OR;  Service: Orthopedics       PT Ortho     Row Name 04/05/19 1100       Subjective Pain    Post-Treatment Pain Level  0  -JS    Row Name 04/03/19 1500       Subjective Comments    Subjective Comments  Pt states his HG is 8.5 and will probalby get blood transfusion next week.  (Chronic condition and 4 to 8 weeks needs blood)  C/O difficulty getting in and out of car  -SI       Subjective Pain    Able to rate subjective pain?  yes  -SI    Pre-Treatment Pain Level  0  -SI    Post-Treatment Pain Level  0  -SI    Subjective Pain Comment  Min knee pain in POT with mat table etc and better set up for ex.   -SI       Right  Lower Ext    Rt Knee Extension/Flexion AROM  0 to 116  -SI       Transfers    Comment (Transfers)  sit to stand to sit x 3 from mat table sitting on cushion...an effort but ind  -SI       Gait/Stairs Assessment/Training    Gait/Stairs Assessment/Training  gait/ambulation assistive device  -SI    [place] Level (Gait)  supervision  -SI    Assistive Device (Gait)  cane, straight  -SI    Distance in Feet (Gait)  100  -SI    Deviations/Abnormal Patterns (Gait)  bilateral deviations;antalgic;base of support, wide  -SI    Comment (Gait/Stairs)  Gait with cane tolerated well in PT  -SI      User Key  (r) = Recorded By, (t) = Taken By, (c) = Cosigned By    Initials Name Provider Type    SI Day Dowd, PTA Physical Therapy Assistant    Minerva Couch, PT Physical Therapist                      PT Assessment/Plan     Row Name 04/05/19 1100          PT Assessment    Assessment Comments  Arrives ambulating with rolling walker. Treatment focused on ROM, core/knee strength, flexibility & endurance.  Continued difficulty with sit to stand, with pt demonstrating WB L>R initially leaning to L for UE support to assist with standing.  With cues (verbal & tactile) to encourage anterior weight shift, pt able to stand without UE support on elevated surface with continued, but improved effort, some improvement in using R LE to assist with transfer.  Continues to benefit from skilled PT to encourage further improvement in functional mobility via strengthening, ROM & flexibility & gait training.  -JS        PT Plan    PT Plan Comments  Continue PT for further knee/core strengthening, ROM & flexibility to assist with functional mobility.  Gait training with cane as indicated.  -PITA       User Key  (r) = Recorded By, (t) = Taken By, (c) = Cosigned By    Initials Name Provider Type    Minerva Couch, PT Physical Therapist            Exercises     Row Name 04/05/19 1100             Subjective Comments    Subjective Comments  Report  using cushion in seat in car has improved ability to get in/out of car.  Did not get blood transfusion since Hgb being borderline at 8.5.  Will follow-up on Tuesday to determine if transfusion needed.  -JS         Subjective Pain    Able to rate subjective pain?  yes  -JS      Pre-Treatment Pain Level  7  -JS      Post-Treatment Pain Level  0  -JS      Subjective Pain Comment  R knee pain upon arrival, improves during therapy.  -JS         Total Minutes    61946 - PT Therapeutic Exercise Minutes  50  -JS         Exercise 1    Exercise Name 1  Nu step, seat #10  -JS      Time 1  7 min  -JS      Additional Comments  L4 resistance, assistance to place R foot initially  -JS         Exercise 2    Exercise Name 2    LAQ with 2 lbs  -JS      Cueing 2  Verbal  -JS      Sets 2  3  -JS      Reps 2  10   -JS      Additional Comments  Cues for proper breathing  -JS         Exercise 3    Exercise Name 3  SLR  -JS      Cueing 3  Verbal  -JS      Sets 3  2  -JS      Reps 3  10  -JS      Additional Comments  Cues for proper breathing  -JS         Exercise 4    Exercise Name 4  HS curls sitting with green TB  -JS      Sets 4  3  -JS      Reps 4  10  -JS         Exercise 5    Exercise Name 5  sit to stand to sit   -JS      Cueing 5  Verbal;Tactile  -JS      Reps 5  10x, then 5x  -JS      Additional Comments  from blue cushion, with therapist sitting in front for HHA & to encourage improved ant wt shift & WB on R LE x 10 reps, rest then 5 reps without assistance on elevated surface  -JS         Exercise 6    Exercise Name 6  HS stretch sit side of mat  -JS      Reps 6  5  -JS      Time 6  20  -JS      Additional Comments  sitting on blue cushion, heel prop on chair in front  -JS         Exercise 7    Exercise Name 7  Standing marching with UE support at treadmill rail  -JS      Reps 7  10  -JS         Exercise 8    Exercise Name 8  Supine bridges  -JS      Cueing 8  Verbal  -JS      Sets 8  1  -JS      Reps 8  10  -JS        User Key   (r) = Recorded By, (t) = Taken By, (c) = Cosigned By    Initials Name Provider Type    Minerva Couch, ANALISA Physical Therapist                           Therapy Education  Education Details: Transfer training, sit to stand.    Given: HEP, Mobility training  Program: Reinforced  How Provided: Verbal  Provided to: Patient  Level of Understanding: Teach back education performed, Verbalized, Demonstrated              Time Calculation:   Start Time: 1100  Stop Time: 1150  Time Calculation (min): 50 min  Therapy Charges for Today     Code Description Service Date Service Provider Modifiers Qty    25247066303 HC PT THER PROC EA 15 MIN 4/5/2019 Minerva Ballard, ANALISA GP 3                    Minerva Ballard PT  4/5/2019

## 2019-04-09 ENCOUNTER — HOSPITAL ENCOUNTER (OUTPATIENT)
Dept: PHYSICAL THERAPY | Facility: HOSPITAL | Age: 79
Setting detail: THERAPIES SERIES
Discharge: HOME | End: 2019-04-09

## 2019-04-09 DIAGNOSIS — Z47.89 ORTHOPEDIC AFTERCARE: ICD-10-CM

## 2019-04-09 DIAGNOSIS — Z78.9 IMPAIRED MOBILITY AND ACTIVITIES OF DAILY LIVING: ICD-10-CM

## 2019-04-09 DIAGNOSIS — Z74.09 IMPAIRED MOBILITY AND ACTIVITIES OF DAILY LIVING: ICD-10-CM

## 2019-04-09 DIAGNOSIS — Z96.651 STATUS POST TOTAL RIGHT KNEE REPLACEMENT: Primary | ICD-10-CM

## 2019-04-09 PROCEDURE — 97110 THERAPEUTIC EXERCISES: CPT

## 2019-04-09 NOTE — THERAPY TREATMENT NOTE
Outpatient Physical Therapy Ortho Treatment Note  [facility]     Patient Name: Ariel Elliott  : 1940  MRN: [id number]  Today's Date: 2019      Visit Date: 2019    Visit Dx:    ICD-10-CM ICD-9-CM   1. Status post total right knee replacement Z96.651 V43.65   2. Orthopedic aftercare Z47.89 V54.9   3. Impaired mobility and activities of daily living Z74.09 799.89       Patient Active Problem List   Diagnosis   • Nonrheumatic aortic valve stenosis   • S/P CABG (coronary artery bypass graft)   • Left bundle branch block (LBBB)   • Type 2 diabetes mellitus with complication, with long-term current use of insulin (CMS/HCC)   • Dyspnea on exertion   • Angina pectoris (CMS/HCC)   • Syncope   • AVM (arteriovenous malformation) of small bowel, acquired with hemorrhage (CMS/HCC)   • Aortic valve stenosis   • Atrial flutter (CMS/HCC)   • Diabetes mellitus (CMS/HCC)   • Von Willebrand disease (CMS/HCC)   • Sleep apnea   • Pacemaker   • Coronary atherosclerosis   • CKD (chronic kidney disease)   • S/P TAVR (transcatheter aortic valve replacement)   • Closed displaced supracondylar fracture without intracondylar extension of lower end of right femur (CMS/HCC)        Past Medical History:   Diagnosis Date   • Anemia    • Anemia     2 UNITS OF BLOOD SAT 11/10. [facility]   • Aortic stenosis    • Arrhythmia    • Atrial flutter (CMS/HCC)    • Congestive heart failure (CMS/HCC)    • Coronary atherosclerosis    • Diabetes mellitus (CMS/HCC)     TYPE 2   • GI (gastrointestinal bleed)    • Gout    • H/O seasonal allergies    • Hard to intubate     SEVERAL TIMES IN PAST. NO ISSUES RECENT HISTORY   • Herpes     HANDS IN THE PAST   • Heyd syndrome (CMS/HCC)    • History of diverticulitis    • Hyperlipidemia    • Pacemaker     LEFT.   • Post-nasal drip    • Sleep apnea     Bi-Pap   • Von Willebrand disease (CMS/HCC)         Past Surgical History:   Procedure Laterality Date   •  ABLATION OF DYSRHYTHMIC FOCUS     • AORTIC VALVE REPAIR/REPLACEMENT N/A 11/13/2018    Procedure: INTRAOPERATIVE LANG, PERCUTANEOUS TRANSFEMORAL TRANSCATHETER AORTIC VALVE REPLACEMENT PERCUTANEOUS APPROACH;  Surgeon: Elizabeth Meade MD;  Location: [facility] OR 18/19;  Service: Cardiothoracic   • CARDIAC CATHETERIZATION     • CARDIAC CATHETERIZATION N/A 11/1/2018    Procedure: Right and Left Heart Cath;  Surgeon: Dionicio Stoner MD;  Location: [facility] CATH INVASIVE LOCATION;  Service: Cardiology   • CARDIAC CATHETERIZATION  11/1/2018    Procedure: Saphenous Vein Graft;  Surgeon: Dionicio Stoner MD;  Location: [facility] CATH INVASIVE LOCATION;  Service: Cardiology   • CARDIAC ELECTROPHYSIOLOGY PROCEDURE Left 11/5/2018    Procedure: Pacemaker DC new   [place];  Surgeon: Mina Castillo MD;  Location: [facility] CATH INVASIVE LOCATION;  Service: Cardiology   • CATARACT EXTRACTION WITH INTRAOCULAR LENS IMPLANT      LEFT AND RIGHT   • CHOLECYSTECTOMY     • COLONOSCOPY     • CORONARY ANGIOPLASTY      WITH PTCA   • CORONARY ARTERY BYPASS GRAFT      5 VESSEL   • HAND SURGERY      TRIGGER FINGER RELEASE, TENDON RELEASE   • HEMORRHOIDECTOMY     • HERNIA REPAIR      VENTRAL HERNIA REPEATEDLY   • PACEMAKER IMPLANTATION  11/05/2018   • PORTACATH PLACEMENT Right     POWER PORT   • SINUS SURGERY     • TOTAL HIP ARTHROPLASTY Right    • TOTAL KNEE ARTHROPLASTY Right 1/8/2019    Procedure: RIGHT DISTAL FEMUR REPLACEMENT;  Surgeon: Margot Ray MD;  Location: [facility];  Service: Orthopedics                       PT Assessment/Plan     Row Name 04/09/19 1500          PT Assessment    Assessment Comments  Pt cont to demo difficulty with STS, require min A to get out of lobby chairs. Cont with progression of strengthening today, pt reporting he feels SOB secondary to low hgb, with planned transfusion for tomorrow.  -CA        PT Plan    PT Plan Comments  Continue PT for further knee/core strengthening, ROM &  flexibility to assist with functional mobility.  -CA       User Key  (r) = Recorded By, (t) = Taken By, (c) = Cosigned By    Initials Name Provider Type    Fela Lomas PT Physical Therapist            Exercises     Row Name 04/09/19 1500             Subjective Comments    Subjective Comments  I've been doing a lot better, the pain is improving, I still can't get in/out of a chair very well. I'm getting blood tomorrow.   -CA         Subjective Pain    Able to rate subjective pain?  yes  -CA      Pre-Treatment Pain Level  6  -CA      Subjective Pain Comment  Doesn't hurt when I'm standing and walking  -CA         Total Minutes    72485 - PT Therapeutic Exercise Minutes  40  -CA         Exercise 1    Exercise Name 1  Nu step, seat #10  -CA      Time 1  7 min  -CA      Additional Comments  L4 resistance, assistance to place R foot initially  -CA         Exercise 2    Exercise Name 2    LAQ with 2 lbs  -CA      Cueing 2  Verbal  -CA      Sets 2  3  -CA      Reps 2  10   -CA         Exercise 5    Exercise Name 5  sit to stand to sit   -CA      Reps 5  10  -CA      Additional Comments  from blue cusion on mat  -CA         Exercise 6    Exercise Name 6  HS stretch sit side of mat  -CA      Reps 6  5  -CA      Time 6  20  -CA         Exercise 7    Exercise Name 7  Standing marching with UE support at treadmill rail  -CA      Reps 7  10  -CA         Exercise 8    Exercise Name 8  standing hip abd  -CA      Cueing 8  Verbal  -CA      Reps 8  10  -CA      Additional Comments  B; UE support at treadmill  -CA         Exercise 9    Exercise Name 9  standing HS curl  -CA      Cueing 9  Verbal  -CA      Sets 9  2  -CA      Reps 9  10  -CA      Additional Comments  2#; B; UE support at treadmill  -CA        User Key  (r) = Recorded By, (t) = Taken By, (c) = Cosigned By    Initials Name Provider Type    Fela Lomas PT Physical Therapist            Time Calculation:   Start Time: 1500  Stop Time: 1540  Time  Calculation (min): 40 min  Total Timed Code Minutes- PT: 40 minute(s)  Therapy Charges for Today     Code Description Service Date Service Provider Modifiers Qty    [id number]  PT THER PROC EA 15 MIN 4/9/2019 Fela Calvillo, PT GP 3                    Fela Calvillo, PT  4/9/2019

## 2019-04-12 ENCOUNTER — HOSPITAL ENCOUNTER (OUTPATIENT)
Dept: PHYSICAL THERAPY | Facility: HOSPITAL | Age: 79
Setting detail: THERAPIES SERIES
Discharge: HOME | End: 2019-04-12

## 2019-04-12 DIAGNOSIS — Z47.89 ORTHOPEDIC AFTERCARE: ICD-10-CM

## 2019-04-12 DIAGNOSIS — M25.561 CHRONIC PAIN OF RIGHT KNEE: ICD-10-CM

## 2019-04-12 DIAGNOSIS — Z78.9 IMPAIRED MOBILITY AND ACTIVITIES OF DAILY LIVING: ICD-10-CM

## 2019-04-12 DIAGNOSIS — Z74.09 IMPAIRED MOBILITY AND ACTIVITIES OF DAILY LIVING: ICD-10-CM

## 2019-04-12 DIAGNOSIS — G89.29 CHRONIC PAIN OF RIGHT KNEE: ICD-10-CM

## 2019-04-12 DIAGNOSIS — Z96.651 STATUS POST TOTAL RIGHT KNEE REPLACEMENT: Primary | ICD-10-CM

## 2019-04-12 PROCEDURE — 97110 THERAPEUTIC EXERCISES: CPT

## 2019-04-12 NOTE — THERAPY TREATMENT NOTE
Outpatient Physical Therapy Ortho Treatment Note  [facility]     Patient Name: Ariel Elliott  : 1940  MRN: [id number]  Today's Date: 2019      Visit Date: 2019    Visit Dx:    ICD-10-CM ICD-9-CM   1. Status post total right knee replacement Z96.651 V43.65   2. Orthopedic aftercare Z47.89 V54.9   3. Impaired mobility and activities of daily living Z74.09 799.89   4. Chronic pain of right knee M25.561 719.46    G89.29 338.29       Patient Active Problem List   Diagnosis   • Nonrheumatic aortic valve stenosis   • S/P CABG (coronary artery bypass graft)   • Left bundle branch block (LBBB)   • Type 2 diabetes mellitus with complication, with long-term current use of insulin (CMS/HCC)   • Dyspnea on exertion   • Angina pectoris (CMS/HCC)   • Syncope   • AVM (arteriovenous malformation) of small bowel, acquired with hemorrhage (CMS/HCC)   • Aortic valve stenosis   • Atrial flutter (CMS/HCC)   • Diabetes mellitus (CMS/HCC)   • Von Willebrand disease (CMS/HCC)   • Sleep apnea   • Pacemaker   • Coronary atherosclerosis   • CKD (chronic kidney disease)   • S/P TAVR (transcatheter aortic valve replacement)   • Closed displaced supracondylar fracture without intracondylar extension of lower end of right femur (CMS/HCC)        Past Medical History:   Diagnosis Date   • Anemia    • Anemia     2 UNITS OF BLOOD SAT 11/10. [facility]GERY   • Aortic stenosis    • Arrhythmia    • Atrial flutter (CMS/HCC)    • Congestive heart failure (CMS/HCC)    • Coronary atherosclerosis    • Diabetes mellitus (CMS/HCC)     TYPE 2   • GI (gastrointestinal bleed)    • Gout    • H/O seasonal allergies    • Hard to intubate     SEVERAL TIMES IN PAST. NO ISSUES RECENT HISTORY   • Herpes     HANDS IN THE PAST   • Heyd syndrome (CMS/HCC)    • History of diverticulitis    • Hyperlipidemia    • Pacemaker     LEFT.   • Post-nasal drip    • Sleep apnea     Bi-Pap   • Von Willebrand disease (CMS/HCC)          Past Surgical History:   Procedure Laterality Date   • ABLATION OF DYSRHYTHMIC FOCUS     • AORTIC VALVE REPAIR/REPLACEMENT N/A 11/13/2018    Procedure: INTRAOPERATIVE LANG, PERCUTANEOUS TRANSFEMORAL TRANSCATHETER AORTIC VALVE REPLACEMENT PERCUTANEOUS APPROACH;  Surgeon: Elizabeth Meade MD;  Location: [facility] OR 18/19;  Service: Cardiothoracic   • CARDIAC CATHETERIZATION     • CARDIAC CATHETERIZATION N/A 11/1/2018    Procedure: Right and Left Heart Cath;  Surgeon: Dionicio Stoner MD;  Location: [facility] INVASIVE LOCATION;  Service: Cardiology   • CARDIAC CATHETERIZATION  11/1/2018    Procedure: Saphenous Vein Graft;  Surgeon: Dionicio Stoner MD;  Location: [facility] INVASIVE LOCATION;  Service: Cardiology   • CARDIAC ELECTROPHYSIOLOGY PROCEDURE Left 11/5/2018    Procedure: Pacemaker DC new   [place];  Surgeon: Mina Castillo MD;  Location: [facility] INVASIVE LOCATION;  Service: Cardiology   • CATARACT EXTRACTION WITH INTRAOCULAR LENS IMPLANT      LEFT AND RIGHT   • CHOLECYSTECTOMY     • COLONOSCOPY     • CORONARY ANGIOPLASTY      WITH PTCA   • CORONARY ARTERY BYPASS GRAFT      5 VESSEL   • HAND SURGERY      TRIGGER FINGER RELEASE, TENDON RELEASE   • HEMORRHOIDECTOMY     • HERNIA REPAIR      VENTRAL HERNIA REPEATEDLY   • PACEMAKER IMPLANTATION  11/05/2018   • PORTACATH PLACEMENT Right     POWER PORT   • SINUS SURGERY     • TOTAL HIP ARTHROPLASTY Right    • TOTAL KNEE ARTHROPLASTY Right 1/8/2019    Procedure: RIGHT DISTAL FEMUR REPLACEMENT;  Surgeon: Margot Ray MD;  Location: [facility] OR;  Service: Orthopedics       PT Ortho     Row Name 04/12/19 0900       Subjective Comments    Subjective Comments   Received 2 units of blood on 4-10-19.  SOA easily with activity he reports (chronic).  -SI       Subjective Pain    Able to rate subjective pain?  yes  -SI    Subjective Pain Comment  No right knee pain with walking or standing he reports but pain with bending at end point   -SI       Right Lower Ext    Rt Knee Extension/Flexion AROM  0 to 115  -SI       MMT Right Lower Ext    Rt Hip Flexion MMT, Gross Movement  (4/5) good  -SI    Rt Knee Extension MMT, Gross Movement  (4/5) good  -SI    Rt Knee Flexion MMT, Gross Movement  (4+/5) good plus  -SI    Rt Ankle Plantarflexion MMT, Gross Movement  (5/5) normal  -SI    Rt Ankle Dorsiflexion MMT, Gross Movement  (4/5) good  -SI       MMT Left Lower Ext    Lt Hip Flexion MMT, Gross Movement  (4+/5) good plus  -SI    Lt Knee Extension MMT, Gross Movement  (5/5) normal  -SI    Lt Knee Flexion MMT, Gross Movement  (5/5) normal  -SI    Lt Ankle Plantarflexion MMT, Gross Movement  (5/5) normal  -SI    Lt Ankle Dorsiflexion MMT, Gross Movement  (5/5) normal  -SI       Transfers    Sit-Stand [place] (Transfers)  conditional independence difficutly getting in and out of standard chairs   -SI    Comment (Transfers)  sit to stand to sit x 5 from mat with blue cushion, no UE assist  -SI       Gait/Stairs Assessment/Training    Gait/Stairs Assessment/Training  gait/ambulation assistive device  -SI    [place] Level (Gait)  supervision  -SI    Assistive Device (Gait)  cane, straight  -SI    Distance in Feet (Gait)  50  -SI    Comment (Gait/Stairs)  Gait is most safe with RWX  -SI      User Key  (r) = Recorded By, (t) = Taken By, (c) = Cosigned By    Initials Name Provider Type    Day Boogie PTA Physical Therapy Assistant                      PT Assessment/Plan     Row Name 04/12/19 1025          PT Assessment    Assessment Comments  Pt has 2 goals that he states...would like to be able to drive his automobile, and would like to progress to cane.  He also states he is not ready for either.  Pt with low endurance to activity but alsways good effort.  -SI       User Key  (r) = Recorded By, (t) = Taken By, (c) = Cosigned By    Initials Name Provider Type    Day Boogie PTA Physical Therapy Assistant            Exercises     Row Name  04/12/19 0900             Subjective Comments    Subjective Comments   Received 2 units of blood on 4-10-19.  SOA easily with activity he reports (chronic).  -SI         Subjective Pain    Able to rate subjective pain?  yes  -SI      Subjective Pain Comment  No right knee pain with walking or standing he reports but pain with bending at end point  -SI         Total Minutes    12028 - PT Therapeutic Exercise Minutes  45  -SI         Exercise 1    Exercise Name 1  Nu step, seat #10  -SI      Time 1  7 min  -SI         Exercise 2    Exercise Name 2    LAQ with 3 lbs  -SI      Cueing 2  Verbal  -SI      Sets 2  3  -SI      Reps 2  10   -SI         Exercise 3    Exercise Name 3  SLR  -SI      Reps 3  10  -SI         Exercise 4    Exercise Name 4  hip bridge  -SI      Sets 4  1  -SI      Reps 4  10  -SI         Exercise 5    Exercise Name 5  sit to stand to sit   -SI      Reps 5  5  -SI         Exercise 6    Exercise Name 6  HS stretch sit side of mat  -SI      Reps 6  5  -SI      Time 6  20  -SI         Exercise 7    Exercise Name 7  Standing marching with UE support at treadmill rail  -SI      Reps 7  10  -SI      Additional Comments  3 lbs on RLE  -SI         Exercise 8    Exercise Name 8  hip abd/ER with blue TB  -SI      Sets 8  2  -SI      Reps 8  10  -SI         Exercise 9    Exercise Name 9  sit , HS curls with green TB  -SI      Sets 9  2  -SI      Reps 9  10  -SI         Exercise 10    Exercise Name 10  seated right foot taps forward and to right (driving foot)  -SI      Reps 10  20  -SI        User Key  (r) = Recorded By, (t) = Taken By, (c) = Cosigned By    Initials Name Provider Type    Day Boogie, PTA Physical Therapy Assistant                       PT OP Goals     Row Name 04/12/19 1000          PT Short Term Goals    STG 1  Pt will be independent in initial HEP for ROM & strengthening.  -SI     STG 1 Progress  Progressing  -SI     STG 2  Pt will perform sit to stand from elevated mat surface,  demonstrating proper mechanics using UEs minimally for balance, without increased knee pain.  -SI     STG 2 Progress  Progressing better some days than others and depends on height of seat  -SI     STG 3  Pt will demonstrate improvement in R knee AROM to 0-120 degrees  -SI     STG 3 Progress  Progressing  -SI     STG 4  Pt will report improved sitting tolerance to 1 hour without increased knee pain for improved tolerance to ADLs and hobbies  -SI     STG 4 Progress  Progressing  -SI       User Key  (r) = Recorded By, (t) = Taken By, (c) = Cosigned By    Initials Name Provider Type    Day Boogie PTA Physical Therapy Assistant          Therapy Education  Given: HEP, Symptoms/condition management  Program: Reinforced  How Provided: Verbal, Demonstration, Written  Provided to: Patient  Level of Understanding: Teach back education performed              Time Calculation:   Start Time: 0930  Stop Time: 1015  Time Calculation (min): 45 min  Total Timed Code Minutes- PT: 45 minute(s)  Therapy Charges for Today     Code Description Service Date Service Provider Modifiers Qty    [id number] HC PT THER PROC EA 15 MIN 4/12/2019 Day Dowd PTA GP 3                    Day Dowd PTA  4/12/2019

## 2019-04-19 ENCOUNTER — HOSPITAL ENCOUNTER (OUTPATIENT)
Dept: PHYSICAL THERAPY | Facility: HOSPITAL | Age: 79
Setting detail: THERAPIES SERIES
Discharge: HOME | End: 2019-04-19

## 2019-04-19 DIAGNOSIS — G89.29 CHRONIC PAIN OF RIGHT KNEE: ICD-10-CM

## 2019-04-19 DIAGNOSIS — Z47.89 ORTHOPEDIC AFTERCARE: ICD-10-CM

## 2019-04-19 DIAGNOSIS — M25.561 CHRONIC PAIN OF RIGHT KNEE: ICD-10-CM

## 2019-04-19 DIAGNOSIS — Z74.09 IMPAIRED MOBILITY AND ACTIVITIES OF DAILY LIVING: ICD-10-CM

## 2019-04-19 DIAGNOSIS — Z78.9 IMPAIRED MOBILITY AND ACTIVITIES OF DAILY LIVING: ICD-10-CM

## 2019-04-19 DIAGNOSIS — Z96.651 STATUS POST TOTAL RIGHT KNEE REPLACEMENT: Primary | ICD-10-CM

## 2019-04-19 PROCEDURE — 97110 THERAPEUTIC EXERCISES: CPT

## 2019-04-19 NOTE — THERAPY PROGRESS REPORT/RE-CERT
Outpatient Physical Therapy Ortho Re-Assessment  [facility]     Patient Name: Ariel Elliott  : 1940  MRN: [id number]  Today's Date: 2019      Visit Date: 2019    Patient Active Problem List   Diagnosis   • Nonrheumatic aortic valve stenosis   • S/P CABG (coronary artery bypass graft)   • Left bundle branch block (LBBB)   • Type 2 diabetes mellitus with complication, with long-term current use of insulin (CMS/HCC)   • Dyspnea on exertion   • Angina pectoris (CMS/HCC)   • Syncope   • AVM (arteriovenous malformation) of small bowel, acquired with hemorrhage (CMS/HCC)   • Aortic valve stenosis   • Atrial flutter (CMS/HCC)   • Diabetes mellitus (CMS/HCC)   • Von Willebrand disease (CMS/HCC)   • Sleep apnea   • Pacemaker   • Coronary atherosclerosis   • CKD (chronic kidney disease)   • S/P TAVR (transcatheter aortic valve replacement)   • Closed displaced supracondylar fracture without intracondylar extension of lower end of right femur (CMS/HCC)        Past Medical History:   Diagnosis Date   • Anemia    • Anemia     2 UNITS OF BLOOD SAT 11/10. [facility]   • Aortic stenosis    • Arrhythmia    • Atrial flutter (CMS/HCC)    • Congestive heart failure (CMS/HCC)    • Coronary atherosclerosis    • Diabetes mellitus (CMS/HCC)     TYPE 2   • GI (gastrointestinal bleed)    • Gout    • H/O seasonal allergies    • Hard to intubate     SEVERAL TIMES IN PAST. NO ISSUES RECENT HISTORY   • Herpes     HANDS IN THE PAST   • Heyd syndrome (CMS/HCC)    • History of diverticulitis    • Hyperlipidemia    • Pacemaker     LEFT.   • Post-nasal drip    • Sleep apnea     Bi-Pap   • Von Willebrand disease (CMS/HCC)         Past Surgical History:   Procedure Laterality Date   • ABLATION OF DYSRHYTHMIC FOCUS     • AORTIC VALVE REPAIR/REPLACEMENT N/A 2018    Procedure: INTRAOPERATIVE LANG, PERCUTANEOUS TRANSFEMORAL TRANSCATHETER AORTIC VALVE REPLACEMENT PERCUTANEOUS APPROACH;   Surgeon: Elizabeth Meade MD;  Location: [facility] OR 18/19;  Service: Cardiothoracic   • CARDIAC CATHETERIZATION     • CARDIAC CATHETERIZATION N/A 11/1/2018    Procedure: Right and Left Heart Cath;  Surgeon: Dionicio Stoner MD;  Location: [facility] CATH INVASIVE LOCATION;  Service: Cardiology   • CARDIAC CATHETERIZATION  11/1/2018    Procedure: Saphenous Vein Graft;  Surgeon: Dionicio Stoner MD;  Location: [facility] CATH INVASIVE LOCATION;  Service: Cardiology   • CARDIAC ELECTROPHYSIOLOGY PROCEDURE Left 11/5/2018    Procedure: Pacemaker DC new   [place];  Surgeon: Mina Castillo MD;  Location: [facility] CATH INVASIVE LOCATION;  Service: Cardiology   • CATARACT EXTRACTION WITH INTRAOCULAR LENS IMPLANT      LEFT AND RIGHT   • CHOLECYSTECTOMY     • COLONOSCOPY     • CORONARY ANGIOPLASTY      WITH PTCA   • CORONARY ARTERY BYPASS GRAFT      5 VESSEL   • HAND SURGERY      TRIGGER FINGER RELEASE, TENDON RELEASE   • HEMORRHOIDECTOMY     • HERNIA REPAIR      VENTRAL HERNIA REPEATEDLY   • PACEMAKER IMPLANTATION  11/05/2018   • PORTACATH PLACEMENT Right     POWER PORT   • SINUS SURGERY     • TOTAL HIP ARTHROPLASTY Right    • TOTAL KNEE ARTHROPLASTY Right 1/8/2019    Procedure: RIGHT DISTAL FEMUR REPLACEMENT;  Surgeon: Margot Ray MD;  Location: [facility] OR;  Service: Orthopedics       Visit Dx:     ICD-10-CM ICD-9-CM   1. Status post total right knee replacement Z96.651 V43.65   2. Orthopedic aftercare Z47.89 V54.9   3. Impaired mobility and activities of daily living Z74.09 799.89   4. Chronic pain of right knee M25.561 719.46    G89.29 338.29             PT Ortho     Row Name 04/19/19 1000       Subjective Comments    Subjective Comments  Pain in knee significantly better.  Still have trouble getting in and out of car (pillow in seat helps)  -SI       Subjective Pain    Able to rate subjective pain?  yes  -SI    Pre-Treatment Pain Level  0  -SI    Post-Treatment Pain Level  0  -SI    Subjective Pain  "Comment  Pain once a 8 now a 4 at worst  -SI       Right Lower Ext    Rt Knee Extension/Flexion AROM  0 to 115  -SI       MMT Right Lower Ext    Rt Hip Flexion MMT, Gross Movement  (4/5) good AROM hip flexion standing 75 degrees with 2 lbs  -SI    Rt Knee Extension MMT, Gross Movement  (4/5) good  -SI    Rt Knee Flexion MMT, Gross Movement  (4+/5) good plus  -SI    Rt Ankle Plantarflexion MMT, Gross Movement  (5/5) normal  -SI    Rt Ankle Dorsiflexion MMT, Gross Movement  (4/5) good  -SI       Transfers    Sit-Stand [place] (Transfers)  conditional independence sifficulty getting in and out of standard chairs with arm re  -SI    Comment (Transfers)  sit to stand to sit x 8 from mat with blue cusion, hands on thighs only  -SI       Gait/Stairs Assessment/Training    Comment (Gait/Stairs)  gait most safe and pt ind with RWX  -SI      User Key  (r) = Recorded By, (t) = Taken By, (c) = Cosigned By    Initials Name Provider Type    Day Boogie, PTA Physical Therapy Assistant                      Therapy Education  Given: HEP, Symptoms/condition management, Edema management, Fall prevention and home safety(Spoke with daughter and to get 2 lbs weights for home use and \"skis\" for wx , and prepare for DC)  Program: Reinforced  How Provided: Verbal, Demonstration, Written  Provided to: Patient, Caregiver  Level of Understanding: Teach back education performed     PT OP Goals     Row Name 04/19/19 1100          PT Short Term Goals    STG Date to Achieve  03/29/19  -SI     STG 1  Pt will be independent in initial HEP for ROM & strengthening.  -SI     STG 1 Progress  Progressing  -SI     STG 2  Pt will perform sit to stand from elevated mat surface, demonstrating proper mechanics using UEs minimally for balance, without increased knee pain.  -SI     STG 2 Progress  Met    -SI     STG 3  Pt will demonstrate improvement in R knee AROM to 0-120 degrees  -SI     STG 3 Progress  Progressing  -SI     STG 4  Pt will report " improved sitting tolerance to 1 hour without increased knee pain for improved tolerance to ADLs and hobbies  -SI     STG 4 Progress  Met  -SI        Long Term Goals    LTG Date to Achieve  04/26/19  -SI     LTG 1  Pt will be independent in comprehensive HEP and symptom management  -SI     LTG 1 Progress  Progressing  -SI     LTG 2  Pt will demonstrate sit to/from stand from regular height chair with minimal use of UEs and assistive device to improve functional mobility.  -SI     LTG 2 Progress  Progressing  -SI     LTG 3  Pt will demonstrate improved functional knee ROM & strength to allow reported improved ease in getting in/out of car by 75% or greater for improved ability to resume activites outside of home.  -SI     LTG 3 Progress  Progressing  -SI     LTG 4  Pt will improve functional knee strength to allow climbing 3 stairs independently with rail to allow entrance to home with improved ease & safety  -SI     LTG 4 Progress  Progressing  -SI     LTG 5  Pt will report improvement in subjective report of function as indicated by increased score on KOS-ADL from 55% to 65% or greater.  -SI     LTG 5 Progress  Progressing 56%  -SI       User Key  (r) = Recorded By, (t) = Taken By, (c) = Cosigned By    Initials Name Provider Type    Day Boogie, PTA Physical Therapy Assistant          PT Assessment/Plan     Row Name 04/19/19 1610          PT Assessment    Functional Limitations  Decreased safety during functional activities;Limitation in home management;Limitations in community activities;Performance in self-care ADL;Performance in leisure activities;Limitations in functional capacity and performance  -LC     Impairments  Balance;Impaired flexibility;Muscle strength;Pain;Endurance;Range of motion  -LC     Assessment Comments  Pt continues to have medical issues unrelated to knee that affect his general wellbeing. He has knee AROM 0 to 115. He has knee ext 4/5 and flexion 4+/5 manual muscle testing. He  continues to ambulate with RW. Will continue to progress to improve motor control to progress towards being able to drive care.  -        PT Plan    PT Plan Comments  Continue to progress patient with skilled intervention to improve his overall knee strenght and functional ability.  -       User Key  (r) = Recorded By, (t) = Taken By, (c) = Cosigned By    Initials Name Provider Type     Shadi Vasquez, PT DPT Physical Therapist            Exercises     Row Name 04/19/19 1000             Subjective Comments    Subjective Comments  Pain in knee significantly better.  Still have trouble getting in and out of car (pillow in seat helps)  -SI         Subjective Pain    Able to rate subjective pain?  yes  -SI      Pre-Treatment Pain Level  0  -SI      Post-Treatment Pain Level  0  -SI      Subjective Pain Comment  Pain once a 8 now a 4 at worst  -SI         Total Minutes    37225 - PT Therapeutic Exercise Minutes  45  -SI         Exercise 1    Exercise Name 1  Nu step, seat #10  -SI      Time 1  7 min  -SI         Exercise 2    Exercise Name 2    LAQ with 3 lbs  -SI      Cueing 2  Verbal  -SI      Sets 2  3  -SI      Reps 2  10   -SI         Exercise 3    Exercise Name 3  SLR  -SI      Sets 3  1  -SI      Reps 3  10  -SI         Exercise 4    Exercise Name 4  hip bridge  -SI      Sets 4  2  -SI      Reps 4  10  -SI         Exercise 5    Exercise Name 5  sit to stand to sit   -SI      Reps 5  8  -SI      Additional Comments  blue cushion  -SI         Exercise 6    Exercise Name 6  HS stretch sit side of mat  -SI      Reps 6  5  -SI      Time 6  20  -SI         Exercise 7    Exercise Name 7  Standing marching with UE support at treadmill rail  -SI      Sets 7  1  -SI      Reps 7  10  -SI      Additional Comments  2 lbs on each LE  -SI         Exercise 8    Exercise Name 8  hip abd/ER with blue TB  -SI      Sets 8  2  -SI      Reps 8  10  -SI         Exercise 9    Exercise Name 9  sit , HS curls with green TB  -SI       Sets 9  2  -SI      Reps 9  10  -SI        User Key  (r) = Recorded By, (t) = Taken By, (c) = Cosigned By    Initials Name Provider Type    Day Boogie, PTA Physical Therapy Assistant                        Outcome Measure Options: Knee Outcome Score- ADL  Knee Outcome Score  Knee Outcome Score Comments: 56%      Time Calculation:     Start Time: 1015  Stop Time: 1100  Time Calculation (min): 45 min  Total Timed Code Minutes- PT: 45 minute(s)         PT G-Codes  Outcome Measure Options: Knee Outcome Score- ADL         Shadi Vasquez, PT DPT  4/19/2019

## 2019-04-23 ENCOUNTER — HOSPITAL ENCOUNTER (OUTPATIENT)
Dept: PHYSICAL THERAPY | Facility: HOSPITAL | Age: 79
Setting detail: THERAPIES SERIES
Discharge: HOME | End: 2019-04-23

## 2019-04-23 DIAGNOSIS — Z74.09 IMPAIRED MOBILITY AND ACTIVITIES OF DAILY LIVING: ICD-10-CM

## 2019-04-23 DIAGNOSIS — Z78.9 IMPAIRED MOBILITY AND ACTIVITIES OF DAILY LIVING: ICD-10-CM

## 2019-04-23 DIAGNOSIS — G89.29 CHRONIC PAIN OF RIGHT KNEE: ICD-10-CM

## 2019-04-23 DIAGNOSIS — Z96.651 STATUS POST TOTAL RIGHT KNEE REPLACEMENT: Primary | ICD-10-CM

## 2019-04-23 DIAGNOSIS — Z47.89 ORTHOPEDIC AFTERCARE: ICD-10-CM

## 2019-04-23 DIAGNOSIS — M25.561 CHRONIC PAIN OF RIGHT KNEE: ICD-10-CM

## 2019-04-23 PROCEDURE — 97110 THERAPEUTIC EXERCISES: CPT

## 2019-04-23 NOTE — THERAPY TREATMENT NOTE
Outpatient Physical Therapy Ortho Treatment Note  [facility]     Patient Name: Ariel Elliott  : 1940  MRN: [id number]  Today's Date: 2019      Visit Date: 2019    Visit Dx:    ICD-10-CM ICD-9-CM   1. Status post total right knee replacement Z96.651 V43.65   2. Orthopedic aftercare Z47.89 V54.9   3. Impaired mobility and activities of daily living Z74.09 799.89   4. Chronic pain of right knee M25.561 719.46    G89.29 338.29       Patient Active Problem List   Diagnosis   • Nonrheumatic aortic valve stenosis   • S/P CABG (coronary artery bypass graft)   • Left bundle branch block (LBBB)   • Type 2 diabetes mellitus with complication, with long-term current use of insulin (CMS/HCC)   • Dyspnea on exertion   • Angina pectoris (CMS/HCC)   • Syncope   • AVM (arteriovenous malformation) of small bowel, acquired with hemorrhage (CMS/HCC)   • Aortic valve stenosis   • Atrial flutter (CMS/HCC)   • Diabetes mellitus (CMS/HCC)   • Von Willebrand disease (CMS/HCC)   • Sleep apnea   • Pacemaker   • Coronary atherosclerosis   • CKD (chronic kidney disease)   • S/P TAVR (transcatheter aortic valve replacement)   • Closed displaced supracondylar fracture without intracondylar extension of lower end of right femur (CMS/HCC)        Past Medical History:   Diagnosis Date   • Anemia    • Anemia     2 UNITS OF BLOOD SAT 11/10. [facility] IN [facility] FOR SURGERY   • Aortic stenosis    • Arrhythmia    • Atrial flutter (CMS/HCC)    • Congestive heart failure (CMS/HCC)    • Coronary atherosclerosis    • Diabetes mellitus (CMS/HCC)     TYPE 2   • GI (gastrointestinal bleed)    • Gout    • H/O seasonal allergies    • Hard to intubate     SEVERAL TIMES IN PAST. NO ISSUES RECENT HISTORY   • Herpes     HANDS IN THE PAST   • Heyd syndrome (CMS/HCC)    • History of diverticulitis    • Hyperlipidemia    • Pacemaker     LEFT.   • Post-nasal drip    • Sleep apnea     Bi-Pap   • Von Willebrand disease (CMS/HCC)          Past Surgical History:   Procedure Laterality Date   • ABLATION OF DYSRHYTHMIC FOCUS     • AORTIC VALVE REPAIR/REPLACEMENT N/A 11/13/2018    Procedure: INTRAOPERATIVE LANG, PERCUTANEOUS TRANSFEMORAL TRANSCATHETER AORTIC VALVE REPLACEMENT PERCUTANEOUS APPROACH;  Surgeon: Elizabeth Meade MD;  Location: [facility] OR 18/19;  Service: Cardiothoracic   • CARDIAC CATHETERIZATION     • CARDIAC CATHETERIZATION N/A 11/1/2018    Procedure: Right and Left Heart Cath;  Surgeon: Dionicio Stoner MD;  Location:  INVASIVE LOCATION;  Service: Cardiology   • CARDIAC CATHETERIZATION  11/1/2018    Procedure: Saphenous Vein Graft;  Surgeon: Dionicio Stoner MD;  Location:  INVASIVE LOCATION;  Service: Cardiology   • CARDIAC ELECTROPHYSIOLOGY PROCEDURE Left 11/5/2018    Procedure: Pacemaker DC new   [place];  Surgeon: Mina Castillo MD;  Location:  INVASIVE LOCATION;  Service: Cardiology   • CATARACT EXTRACTION WITH INTRAOCULAR LENS IMPLANT      LEFT AND RIGHT   • CHOLECYSTECTOMY     • COLONOSCOPY     • CORONARY ANGIOPLASTY      WITH PTCA   • CORONARY ARTERY BYPASS GRAFT      5 VESSEL   • HAND SURGERY      TRIGGER FINGER RELEASE, TENDON RELEASE   • HEMORRHOIDECTOMY     • HERNIA REPAIR      VENTRAL HERNIA REPEATEDLY   • PACEMAKER IMPLANTATION  11/05/2018   • PORTACATH PLACEMENT Right     POWER PORT   • SINUS SURGERY     • TOTAL HIP ARTHROPLASTY Right    • TOTAL KNEE ARTHROPLASTY Right 1/8/2019    Procedure: RIGHT DISTAL FEMUR REPLACEMENT;  Surgeon: Margot Ray MD;  Location: [facility];  Service: Orthopedics       PT Ortho     Row Name 04/23/19 1600       Subjective Comments    Subjective Comments  Pt RTMD yesterday and pleased with his progress.  OK to Finish PT this week  -SI       Subjective Pain    Able to rate subjective pain?  yes  -SI       Right Lower Ext    Rt Knee Extension/Flexion AROM  0 to 115  -SI       Gait/Stairs Assessment/Training    Comment  (Gait/Stairs)  Pt most safe with RWX  -SI      User Key  (r) = Recorded By, (t) = Taken By, (c) = Cosigned By    Initials Name Provider Type    Day Boogie PTA Physical Therapy Assistant                      PT Assessment/Plan     Row Name 04/23/19 1642          PT Assessment    Assessment Comments  Pt pleased with his staus and feels best using RWX.  He wanted to progress to cane but with 2 falls and 2 surgeries he is fine with using RWX  -SI       User Key  (r) = Recorded By, (t) = Taken By, (c) = Cosigned By    Initials Name Provider Type    ELIO Day Dowd PTA Physical Therapy Assistant            Exercises     Row Name 04/23/19 1600             Subjective Comments    Subjective Comments  Pt RTMD yesterday and pleased with his progress.  OK to Finish PT this week  -SI         Subjective Pain    Able to rate subjective pain?  yes  -SI         Total Minutes    73575 - PT Therapeutic Exercise Minutes  45  -SI         Exercise 1    Exercise Name 1  Nu step, seat #10  -SI      Time 1  7 min  -SI         Exercise 2    Exercise Name 2    LAQ with 3 lbs  -SI      Cueing 2  Verbal  -SI      Sets 2  3  -SI      Reps 2  10   -SI         Exercise 3    Exercise Name 3  SLR  -SI      Sets 3  1  -SI      Reps 3  10  -SI      Additional Comments  on each leg  -SI         Exercise 4    Exercise Name 4  hip bridge  -SI      Sets 4  2  -SI      Reps 4  10  -SI         Exercise 5    Exercise Name 5  sit to stand to sit   -SI      Reps 5  10  -SI      Additional Comments  blue cushion  -SI         Exercise 6    Exercise Name 6  HS stretch sit side of mat  -SI      Reps 6  5  -SI      Time 6  20  -SI         Exercise 7    Exercise Name 7  Standing marching with UE support at treadmill rail  -SI      Sets 7  1  -SI      Reps 7  10  -SI      Additional Comments  2 lbs on each leg  -SI         Exercise 8    Exercise Name 8  hip abd/ER with blue TB  -SI      Sets 8  2  -SI      Reps 8  10  -SI         Exercise 9    Exercise  Name 9  sit , HS curls with green TB  -SI      Sets 9  2  -SI      Reps 9  10  -SI        User Key  (r) = Recorded By, (t) = Taken By, (c) = Cosigned By    Initials Name Provider Type    Day Boogie PTA Physical Therapy Assistant                       PT OP Goals     Row Name 04/23/19 1600          PT Short Term Goals    STG 3  Pt will demonstrate improvement in R knee AROM to 0-120 degrees  -SI     STG 3 Progress  Partially Met  -SI        Long Term Goals    LTG 2  Pt will demonstrate sit to/from stand from regular height chair with minimal use of UEs and assistive device to improve functional mobility.  -SI     LTG 2 Progress  Met with verbal cueing to get out to edge chair etc  -SI       User Key  (r) = Recorded By, (t) = Taken By, (c) = Cosigned By    Initials Name Provider Type    Day Boogie PTA Physical Therapy Assistant          Therapy Education  Given: HEP, Symptoms/condition management  Program: Reinforced  How Provided: Verbal, Demonstration, Written  Provided to: Patient  Level of Understanding: Teach back education performed              Time Calculation:   Start Time: 1545  Stop Time: 1630  Time Calculation (min): 45 min  Total Timed Code Minutes- PT: 45 minute(s)  Therapy Charges for Today     Code Description Service Date Service Provider Modifiers Qty    01502856671 HC PT THER PROC EA 15 MIN 4/23/2019 Day Dowd PTA GP 3                    Day Dowd PTA  4/23/2019

## 2019-04-25 ENCOUNTER — HOSPITAL ENCOUNTER (OUTPATIENT)
Dept: PHYSICAL THERAPY | Facility: HOSPITAL | Age: 79
Discharge: HOME | End: 2019-04-25
Admitting: ORTHOPAEDIC SURGERY

## 2019-04-25 PROCEDURE — 97110 THERAPEUTIC EXERCISES: CPT

## 2019-04-25 NOTE — THERAPY DISCHARGE NOTE
Outpatient Physical Therapy Neuro Treatment Note/Discharge Summary  [facility]     Patient Name: Ariel Elliott  : 1940  MRN: [id number]  Today's Date: 2019      Visit Date: 2019    Visit Dx:  No diagnosis found.    Patient Active Problem List   Diagnosis   • Nonrheumatic aortic valve stenosis   • S/P CABG (coronary artery bypass graft)   • Left bundle branch block (LBBB)   • Type 2 diabetes mellitus with complication, with long-term current use of insulin (CMS/[facility])   • Dyspnea on exertion   • Angina pectoris (CMS/HCC)   • Syncope   • AVM (arteriovenous malformation) of small bowel, acquired with hemorrhage (CMS/HCC)   • Aortic valve stenosis   • Atrial flutter (CMS/HCC)   • Diabetes mellitus (CMS/HCC)   • Von Willebrand disease (CMS/HCC)   • Sleep apnea   • Pacemaker   • Coronary atherosclerosis   • CKD (chronic kidney disease)   • S/P TAVR (transcatheter aortic valve replacement)   • Closed displaced supracondylar fracture without intracondylar extension of lower end of right femur (CMS/[facility])          PT Ortho     Row Name 19 1000       Subjective Comments    Subjective Comments  Pt states no one has to help him with his leg in and out of car any longer.  States he is pleased with his progress.  -SI       Subjective Pain    Able to rate subjective pain?  yes  -SI    Subjective Pain Comment  right knee pain 3 at worst  -SI       Right Lower Ext    Rt Knee Extension/Flexion AROM  0 to 115  -SI       MMT Right Lower Ext    Rt Hip Flexion MMT, Gross Movement  (4/5) good  -SI    Rt Knee Extension MMT, Gross Movement  (4/5) good  -SI    Rt Knee Flexion MMT, Gross Movement  (4+/5) good plus  -SI    Rt Ankle Plantarflexion MMT, Gross Movement  (5/5) normal  -SI    Rt Ankle Dorsiflexion MMT, Gross Movement  (4/5) good  -SI       Gait/Stairs Assessment/Training    Comment (Gait/Stairs)  Ind gait with RWX, supervision with cane short distances   -SI    Row Name 19 1600        Subjective Comments    Subjective Comments  Pt RTMD yesterday and pleased with his progress.  OK to Finish PT this week  -SI       Subjective Pain    Able to rate subjective pain?  yes  -SI       Right Lower Ext    Rt Knee Extension/Flexion AROM  0 to 115  -SI       Gait/Stairs Assessment/Training    Comment (Gait/Stairs)  Pt most safe with RWX  -SI      User Key  (r) = Recorded By, (t) = Taken By, (c) = Cosigned By    Initials Name Provider Type    Day Boogie PTA Physical Therapy Assistant                    PT Assessment/Plan     Row Name 04/25/19 1017          PT Assessment    Assessment Comments  Pt ind with HEP.  Assisted by family to get places out of home.  Pt well motivated to citninue HEP 5 days a week as instructed.  -SI       User Key  (r) = Recorded By, (t) = Taken By, (c) = Cosigned By    Initials Name Provider Type    Day Boogie PTA Physical Therapy Assistant               Exercises     Row Name 04/25/19 1000 04/25/19 0900          Subjective Comments    Subjective Comments  Pt states no one has to help him with his leg in and out of car any longer.  States he is pleased with his progress.  -SI  --        Subjective Pain    Able to rate subjective pain?  yes  -SI  --     Subjective Pain Comment  right knee pain 3 at worst  -SI  --        Total Minutes    65824 - PT Therapeutic Exercise Minutes  --  45  -SI        Exercise 1    Exercise Name 1  --  Nu step, seat #10  -SI     Time 1  --  7 min  -SI        Exercise 2    Exercise Name 2  --    LAQ with 3 lbs  -SI     Cueing 2  --  Verbal  -SI     Sets 2  --  3  -SI     Reps 2  --  10   -SI        Exercise 3    Exercise Name 3  --  SLR  -SI     Sets 3  --  1  -SI     Reps 3  --  10  -SI        Exercise 4    Exercise Name 4  --  hip bridge  -SI     Sets 4  --  2  -SI     Reps 4  --  10  -SI        Exercise 5    Exercise Name 5  --  sit to stand to sit   -SI     Reps 5  --  10  -SI        Exercise 6    Exercise Name 6  --  HS stretch sit  side of mat  -SI     Reps 6  --  5  -SI     Time 6  --  20  -SI        Exercise 7    Exercise Name 7  --  Standing marching with UE support at treadmill rail  -SI     Sets 7  --  1  -SI     Reps 7  --  10  -SI        Exercise 8    Exercise Name 8  --  hip abd/ER with blue TB  -SI     Sets 8  --  2  -SI     Reps 8  --  10  -SI        Exercise 9    Exercise Name 9  --  sit , HS curls with green TB  -SI     Sets 9  --  2  -SI     Reps 9  --  10  -SI       User Key  (r) = Recorded By, (t) = Taken By, (c) = Cosigned By    Initials Name Provider Type    Day Boogie, PTA Physical Therapy Assistant                        PT OP Goals     Row Name 04/25/19 1000          PT Short Term Goals    STG Date to Achieve  03/29/19  -SI     STG 1  Pt will be independent in initial HEP for ROM & strengthening.  -SI     STG 1 Progress  Met  -SI     STG 2  Pt will perform sit to stand from elevated mat surface, demonstrating proper mechanics using UEs minimally for balance, without increased knee pain.  -SI     STG 2 Progress  Met    -SI     STG 3  Pt will demonstrate improvement in R knee AROM to 0-120 degrees  -SI     STG 3 Progress  Met 0 to 115 active, 120 passive  -SI     STG 4  Pt will report improved sitting tolerance to 1 hour without increased knee pain for improved tolerance to ADLs and hobbies  -SI     STG 4 Progress  Met  -SI        Long Term Goals    LTG Date to Achieve  04/26/19  -SI     LTG 1  Pt will be independent in comprehensive HEP and symptom management  -SI     LTG 1 Progress  Met  -SI     LTG 2  Pt will demonstrate sit to/from stand from regular height chair with minimal use of UEs and assistive device to improve functional mobility.  -SI     LTG 2 Progress  Met with verbal cueing to get out to edge chair etc  -SI     LTG 3  Pt will demonstrate improved functional knee ROM & strength to allow reported improved ease in getting in/out of car by 75% or greater for improved ability to resume activites outside of  home.  -SI     LTG 3 Progress  Met  -SI     LTG 4  Pt will improve functional knee strength to allow climbing 3 stairs independently with rail to allow entrance to home with improved ease & safety  -SI     LTG 4 Progress  Met supervised by family member  -SI     LTG 5  Pt will report improvement in subjective report of function as indicated by increased score on KOS-ADL from 55% to 65% or greater.  -SI     LTG 5 Progress  Not Met 56%  -SI       User Key  (r) = Recorded By, (t) = Taken By, (c) = Cosigned By    Initials Name Provider Type    Day Boogie PTA Physical Therapy Assistant          Therapy Education  Given: HEP, Symptoms/condition management  Program: Reinforced  How Provided: Verbal, Demonstration, Written  Provided to: Patient  Level of Understanding: Teach back education performed            Time Calculation:   Start Time: 0930  Stop Time: 1015  Time Calculation (min): 45 min  Total Timed Code Minutes- PT: 45 minute(s)     Therapy Charges for Today     Code Description Service Date Service Provider Modifiers Qty    69321297917 HC PT THER PROC EA 15 MIN 4/25/2019 Day Dowd PTA GP 3                OP PT Discharge Summary  Date of Discharge: 04/25/19  Reason for Discharge: Independent  Outcomes Achieved: Patient able to partially acheive established goals  Discharge Destination: Home with home program  Discharge Instructions/Additional Comments: HEP 5 days a week        Day Dowd PTA  4/25/2019

## 2019-06-25 ENCOUNTER — OFFICE VISIT (OUTPATIENT)
Dept: CARDIOLOGY | Facility: CLINIC | Age: 79
End: 2019-06-25

## 2019-06-25 ENCOUNTER — CLINICAL SUPPORT NO REQUIREMENTS (OUTPATIENT)
Dept: CARDIOLOGY | Facility: CLINIC | Age: 79
End: 2019-06-25

## 2019-06-25 VITALS
DIASTOLIC BLOOD PRESSURE: 62 MMHG | WEIGHT: 282 LBS | HEART RATE: 62 BPM | HEIGHT: 70 IN | SYSTOLIC BLOOD PRESSURE: 128 MMHG | OXYGEN SATURATION: 97 % | BODY MASS INDEX: 40.37 KG/M2

## 2019-06-25 DIAGNOSIS — I25.810 ATHEROSCLEROSIS OF CORONARY ARTERY BYPASS GRAFT OF NATIVE HEART WITHOUT ANGINA PECTORIS: ICD-10-CM

## 2019-06-25 DIAGNOSIS — I49.5 SICK SINUS SYNDROME (HCC): Primary | ICD-10-CM

## 2019-06-25 DIAGNOSIS — Z95.2 S/P TAVR (TRANSCATHETER AORTIC VALVE REPLACEMENT): Primary | ICD-10-CM

## 2019-06-25 PROCEDURE — 99214 OFFICE O/P EST MOD 30 MIN: CPT | Performed by: PHYSICIAN ASSISTANT

## 2019-06-25 PROCEDURE — 93000 ELECTROCARDIOGRAM COMPLETE: CPT | Performed by: PHYSICIAN ASSISTANT

## 2019-06-25 PROCEDURE — 93280 PM DEVICE PROGR EVAL DUAL: CPT | Performed by: INTERNAL MEDICINE

## 2019-06-25 NOTE — PROGRESS NOTES
Date of Office Visit: 2019  Encounter Provider: CHERELLE Webster  Place of Service: [facility] CARDIOLOGY  Patient Name: Ariel Elliott  :1940    Chief Complaint   Patient presents with   • Coronary Artery Disease     4 month follow up   :     HPI: Ariel Elliott is a 79 y.o. male, new to me, who presents today for follow-up.  Old records have been obtained and reviewed by me.  He is a patient of Dr. Cano with a past cardiac history significant for sick sinus syndrome status post pacemaker implantation, aortic stenosis status post TAVR in 2018, and coronary artery disease status post CABG.  The TAVR actually improved his chronic GI bleed, it was felt that he had an acquired von Willebrand disease and AV malformation.  After his TAVR he did not need to be transfused for quite some time.  He was last in our office to see Dr. Stoner on 2019.  At that visit he was doing great.  He had fallen and fractured his femur, he was recovering from that.  Dr. Stoner felt that his response was one of the best responses to a TAVR the Dr. Stoner had seen.  No changes were made to his medical regimen, and he is here today for follow-up.  He did have his pacemaker interrogated today, it is functioning well.  His presenting rhythm was atrially sensed and ventricularly paced.  He is atrially paced 58% of the time and ventricularly paced 100% of the time, there have been no events.   Since he was last in our office he has been doing fairly well.  He thinks that he is going to have to be transfused tomorrow.  He has had some heavy GI bleeding and he has chest pain or shortness of breath.  He has chronic AV malformations and gets chronic transfusions.  Before his TAVR he used to get transfused once a week, now he is down to about once a month.  He still feels like the TAVR was the best thing that ever happened to him.  Prior to the past 3 days, he has not had any  chest pain, worsening shortness of breath, palpitations, dizziness, or syncope.  He states that he is also getting over an upper respiratory infection.  He is a retired anesthesiologist.    Past Medical History:   Diagnosis Date   • Anemia    • Anemia     2 UNITS OF BLOOD SAT 11/10. [facility]   • Aortic stenosis    • Arrhythmia    • Atrial flutter (CMS/HCC)    • Congestive heart failure (CMS/HCC)    • Coronary atherosclerosis    • Diabetes mellitus (CMS/HCC)     TYPE 2   • GI (gastrointestinal bleed)    • Gout    • H/O seasonal allergies    • Hard to intubate     SEVERAL TIMES IN PAST. NO ISSUES RECENT HISTORY   • Herpes     HANDS IN THE PAST   • Heyd syndrome (CMS/HCC)    • History of diverticulitis    • Hyperlipidemia    • Pacemaker     LEFT.   • Post-nasal drip    • Sleep apnea     Bi-Pap   • Von Willebrand disease (CMS/HCC)        Past Surgical History:   Procedure Laterality Date   • ABLATION OF DYSRHYTHMIC FOCUS     • AORTIC VALVE REPAIR/REPLACEMENT N/A 11/13/2018    Procedure: INTRAOPERATIVE LANG, PERCUTANEOUS TRANSFEMORAL TRANSCATHETER AORTIC VALVE REPLACEMENT PERCUTANEOUS APPROACH;  Surgeon: Elizabeth Meade MD;  Location: [facility] OR 18/19;  Service: Cardiothoracic   • CARDIAC CATHETERIZATION     • CARDIAC CATHETERIZATION N/A 11/1/2018    Procedure: Right and Left Heart Cath;  Surgeon: Dionicio Stoner MD;  Location: [facility] INVASIVE LOCATION;  Service: Cardiology   • CARDIAC CATHETERIZATION  11/1/2018    Procedure: Saphenous Vein Graft;  Surgeon: Dionicio Stoner MD;  Location: [facility] CATH INVASIVE LOCATION;  Service: Cardiology   • CARDIAC ELECTROPHYSIOLOGY PROCEDURE Left 11/5/2018    Procedure: Pacemaker DC new   [place];  Surgeon: Mina Castillo MD;  Location: [facility] CATH INVASIVE LOCATION;  Service: Cardiology   • CATARACT EXTRACTION WITH INTRAOCULAR LENS IMPLANT      LEFT AND RIGHT   • CHOLECYSTECTOMY     • COLONOSCOPY     • CORONARY ANGIOPLASTY       WITH PTCA   • CORONARY ARTERY BYPASS GRAFT      5 VESSEL   • HAND SURGERY      TRIGGER FINGER RELEASE, TENDON RELEASE   • HEMORRHOIDECTOMY     • HERNIA REPAIR      VENTRAL HERNIA REPEATEDLY   • PACEMAKER IMPLANTATION  11/05/2018   • PORTACATH PLACEMENT Right     POWER PORT   • SINUS SURGERY     • TOTAL HIP ARTHROPLASTY Right    • TOTAL KNEE ARTHROPLASTY Right 1/8/2019    Procedure: RIGHT DISTAL FEMUR REPLACEMENT;  Surgeon: Margot Ray MD;  Location: [facility];  Service: Orthopedics       Social History     Socioeconomic History   • Marital status:      Spouse name: Not on file   • Number of children: Not on file   • Years of education: Not on file   • Highest education level: Not on file   Tobacco Use   • Smoking status: Former Smoker   • Smokeless tobacco: Never Used   • Tobacco comment: QUIT 35YRS AGO   Substance and Sexual Activity   • Alcohol use: No   • Drug use: No   • Sexual activity: Defer       Family History   Problem Relation Age of Onset   • Diabetes Mother    • Cancer Mother    • No Known Problems Father        Review of Systems   Constitution: Negative for chills, fever and malaise/fatigue.   Cardiovascular: Positive for chest pain and dyspnea on exertion. Negative for leg swelling, near-syncope, orthopnea, palpitations, paroxysmal nocturnal dyspnea and syncope.   Respiratory: Negative for cough and shortness of breath.    Musculoskeletal: Negative for joint pain, joint swelling and myalgias.   Gastrointestinal: Negative for abdominal pain, diarrhea, melena, nausea and vomiting.   Genitourinary: Negative for frequency and hematuria.   Neurological: Negative for light-headedness, numbness, paresthesias and seizures.   Allergic/Immunologic: Negative.    All other systems reviewed and are negative.      Allergies   Allergen Reactions   • Statins Myalgia         Current Outpatient Medications:   •  allopurinol (ZYLOPRIM) 300 MG tablet, Take 300 mg by mouth Every Night., Disp: ,  "Rfl:   •  cholecalciferol (VITAMIN D3) 1000 units tablet, Take 1,000 Units by mouth Daily., Disp: , Rfl:   •  furosemide (LASIX) 40 MG tablet, Take 40 mg by mouth Daily., Disp: , Rfl:   •  gemfibrozil (LOPID) 600 MG tablet, Take 600 mg by mouth 2 (Two) Times a Day Before Meals., Disp: , Rfl:   •  insulin glargine (LANTUS) 100 UNIT/ML injection, Inject 15 Units under the skin into the appropriate area as directed Every 12 (Twelve) Hours. (Patient taking differently: Inject 30 Units under the skin into the appropriate area as directed Every 12 (Twelve) Hours.), Disp: , Rfl: 12  •  insulin lispro (humaLOG) 100 UNIT/ML injection, Inject 5 Units under the skin into the appropriate area as directed 3 (Three) Times a Day With Meals. (Patient taking differently: Inject 15 Units under the skin into the appropriate area as directed 3 (Three) Times a Day With Meals.), Disp: , Rfl: 12  •  isosorbide dinitrate (ISORDIL) 40 MG tablet, Take 40 mg by mouth 2 (Two) Times a Day., Disp: , Rfl:   •  omeprazole (priLOSEC) 40 MG capsule, Take 40 mg by mouth 2 (Two) Times a Day., Disp: , Rfl:   •  polyethylene glycol (MIRALAX) packet, Take 17 g by mouth Daily., Disp: , Rfl:   •  tamsulosin (FLOMAX) 0.4 MG capsule 24 hr capsule, Take 1 capsule by mouth Daily., Disp: 30 capsule, Rfl:       Objective:     Vitals:    06/25/19 1458 06/25/19 1514   BP: 116/60 128/62   BP Location: Right arm Left arm   Pulse: 62    SpO2: 97%    Weight: 128 kg (282 lb)    Height: 177.8 cm (70\")      Body mass index is 40.46 kg/m².    PHYSICAL EXAM:    Physical Exam   Constitutional: He is oriented to person, place, and time. He appears well-developed and well-nourished. He appears ill. No distress.   HENT:   Head: Normocephalic and atraumatic.   Eyes: Pupils are equal, round, and reactive to light.   Neck: No JVD present. No thyromegaly present.   Cardiovascular: Normal rate, regular rhythm and intact distal pulses.   Murmur heard.   Harsh midsystolic murmur is " present with a grade of 3/6 at the upper right sternal border radiating to the neck.  Pulmonary/Chest: Effort normal and breath sounds normal. No respiratory distress.   Abdominal: Soft. Bowel sounds are normal. He exhibits no distension. There is no splenomegaly or hepatomegaly. There is no tenderness.   Musculoskeletal: Normal range of motion. He exhibits no edema.   Neurological: He is alert and oriented to person, place, and time.   Skin: Skin is warm and dry. He is not diaphoretic. No erythema. There is pallor.   Psychiatric: He has a normal mood and affect. His behavior is normal. Judgment normal.         ECG 12 Lead  Date/Time: 6/25/2019 3:24 PM  Performed by: Kalli Sheriff PA  Authorized by: Kalli Sheriff PA   Comparison: compared with previous ECG from 2/22/2019  Similar to previous ECG  Rhythm: paced  BPM: 60    Clinical impression: abnormal EKG  Comments: Indication: Coronary disease              Assessment:       Diagnosis Plan   1. S/P TAVR (transcatheter aortic valve replacement)  ECG 12 Lead   2. Atherosclerosis of coronary artery bypass graft of native heart without angina pectoris  ECG 12 Lead     Orders Placed This Encounter   Procedures   • ECG 12 Lead     This order was created via procedure documentation          Plan:       Overall he is stable and doing well.  I think that his symptoms of chest pain or shortness of breath or because of his GI bleed that is chronic.  He will be going to see his physician at the [facility] tomorrow for this.  From a cardiac standpoint he is stable.  He has no complaints of worsening angina or heart failure.  He has gained quite a bit of weight since February, he thinks it is because of dietary indiscretion.  He has been feeling better since his TAVR and he has been eating more food because his appetite is back.  I am not going to make any changes to his medical regimen, and he will follow-up with Dr. Stoner in 6 months or sooner if needed.    Coronary  Artery Disease  Assessment  • The patient has no angina    Subjective - Objective  • There is a history of previous coronary artery bypass graft            As always, it has been a pleasure to participate in your patient's care.      Sincerely,         Kalli Sheriff PA-C

## 2019-10-01 ENCOUNTER — CLINICAL SUPPORT NO REQUIREMENTS (OUTPATIENT)
Dept: CARDIOLOGY | Facility: CLINIC | Age: 79
End: 2019-10-01

## 2019-10-01 DIAGNOSIS — I49.5 SICK SINUS SYNDROME (HCC): Primary | ICD-10-CM

## 2019-10-01 PROCEDURE — 93294 REM INTERROG EVL PM/LDLS PM: CPT | Performed by: INTERNAL MEDICINE

## 2019-10-01 PROCEDURE — 93296 REM INTERROG EVL PM/IDS: CPT | Performed by: INTERNAL MEDICINE

## 2019-10-08 DIAGNOSIS — I50.9 HEART FAILURE, UNSPECIFIED HF CHRONICITY, UNSPECIFIED HEART FAILURE TYPE (HCC): ICD-10-CM

## 2019-10-08 DIAGNOSIS — I35.0 AORTIC VALVE STENOSIS, ETIOLOGY OF CARDIAC VALVE DISEASE UNSPECIFIED: Primary | ICD-10-CM

## 2020-01-08 ENCOUNTER — CLINICAL SUPPORT NO REQUIREMENTS (OUTPATIENT)
Dept: CARDIOLOGY | Facility: CLINIC | Age: 80
End: 2020-01-08

## 2020-01-08 ENCOUNTER — OFFICE VISIT (OUTPATIENT)
Dept: CARDIOLOGY | Facility: CLINIC | Age: 80
End: 2020-01-08

## 2020-01-08 ENCOUNTER — HOSPITAL ENCOUNTER (OUTPATIENT)
Dept: CARDIOLOGY | Facility: HOSPITAL | Age: 80
Discharge: HOME | End: 2020-01-08
Admitting: INTERNAL MEDICINE

## 2020-01-08 VITALS
HEIGHT: 70 IN | WEIGHT: 290.6 LBS | HEART RATE: 64 BPM | DIASTOLIC BLOOD PRESSURE: 70 MMHG | BODY MASS INDEX: 41.6 KG/M2 | SYSTOLIC BLOOD PRESSURE: 132 MMHG

## 2020-01-08 VITALS
DIASTOLIC BLOOD PRESSURE: 80 MMHG | BODY MASS INDEX: 40.8 KG/M2 | HEART RATE: 92 BPM | WEIGHT: 285 LBS | SYSTOLIC BLOOD PRESSURE: 160 MMHG | HEIGHT: 70 IN

## 2020-01-08 DIAGNOSIS — K55.21 AVM (ARTERIOVENOUS MALFORMATION) OF SMALL BOWEL, ACQUIRED WITH HEMORRHAGE: ICD-10-CM

## 2020-01-08 DIAGNOSIS — I49.5 SICK SINUS SYNDROME (HCC): Primary | ICD-10-CM

## 2020-01-08 DIAGNOSIS — I20.9 ANGINA PECTORIS (HCC): ICD-10-CM

## 2020-01-08 DIAGNOSIS — I35.0 NONRHEUMATIC AORTIC VALVE STENOSIS: Primary | ICD-10-CM

## 2020-01-08 DIAGNOSIS — I44.7 LEFT BUNDLE BRANCH BLOCK (LBBB): ICD-10-CM

## 2020-01-08 DIAGNOSIS — Z95.2 S/P TAVR (TRANSCATHETER AORTIC VALVE REPLACEMENT): ICD-10-CM

## 2020-01-08 DIAGNOSIS — I35.0 AORTIC VALVE STENOSIS, ETIOLOGY OF CARDIAC VALVE DISEASE UNSPECIFIED: ICD-10-CM

## 2020-01-08 LAB
AORTIC ROOT ANNULUS: 1.8 CM
BH CV ECHO MEAS - AO MAX PG (FULL): 32.8 MMHG
BH CV ECHO MEAS - AO MAX PG: 36.7 MMHG
BH CV ECHO MEAS - AO MEAN PG (FULL): 16.9 MMHG
BH CV ECHO MEAS - AO MEAN PG: 19.1 MMHG
BH CV ECHO MEAS - AO ROOT AREA (BSA CORRECTED): 1.5
BH CV ECHO MEAS - AO ROOT AREA: 9.9 CM^2
BH CV ECHO MEAS - AO ROOT DIAM: 3.5 CM
BH CV ECHO MEAS - AO V2 MAX: 302.7 CM/SEC
BH CV ECHO MEAS - AO V2 MEAN: 199.4 CM/SEC
BH CV ECHO MEAS - AO V2 VTI: 62.2 CM
BH CV ECHO MEAS - AVA(I,A): 0.69 CM^2
BH CV ECHO MEAS - AVA(I,D): 0.69 CM^2
BH CV ECHO MEAS - AVA(V,A): 0.82 CM^2
BH CV ECHO MEAS - AVA(V,D): 0.82 CM^2
BH CV ECHO MEAS - BSA(HAYCOCK): 2.5 M^2
BH CV ECHO MEAS - BSA: 2.3 M^2
BH CV ECHO MEAS - BZI_BMI: 37 KILOGRAMS/M^2
BH CV ECHO MEAS - BZI_METRIC_HEIGHT: 177.8 CM
BH CV ECHO MEAS - BZI_METRIC_WEIGHT: 117 KG
BH CV ECHO MEAS - EDV(MOD-SP2): 109 ML
BH CV ECHO MEAS - EDV(MOD-SP4): 96 ML
BH CV ECHO MEAS - EDV(TEICH): 164.3 ML
BH CV ECHO MEAS - EF(CUBED): 57.5 %
BH CV ECHO MEAS - EF(MOD-BP): 56 %
BH CV ECHO MEAS - EF(MOD-SP2): 54.1 %
BH CV ECHO MEAS - EF(MOD-SP4): 58.3 %
BH CV ECHO MEAS - EF(TEICH): 48.4 %
BH CV ECHO MEAS - ESV(MOD-SP2): 50 ML
BH CV ECHO MEAS - ESV(MOD-SP4): 40 ML
BH CV ECHO MEAS - ESV(TEICH): 84.7 ML
BH CV ECHO MEAS - FS: 24.8 %
BH CV ECHO MEAS - IVS/LVPW: 1.1
BH CV ECHO MEAS - IVSD: 1.5 CM
BH CV ECHO MEAS - LAT PEAK E' VEL: 12 CM/SEC
BH CV ECHO MEAS - LV DIASTOLIC VOL/BSA (35-75): 41.3 ML/M^2
BH CV ECHO MEAS - LV MASS(C)D: 378.3 GRAMS
BH CV ECHO MEAS - LV MASS(C)DI: 162.6 GRAMS/M^2
BH CV ECHO MEAS - LV MAX PG: 3.8 MMHG
BH CV ECHO MEAS - LV MEAN PG: 2.1 MMHG
BH CV ECHO MEAS - LV SYSTOLIC VOL/BSA (12-30): 17.2 ML/M^2
BH CV ECHO MEAS - LV V1 MAX: 97.9 CM/SEC
BH CV ECHO MEAS - LV V1 MEAN: 69 CM/SEC
BH CV ECHO MEAS - LV V1 VTI: 16.8 CM
BH CV ECHO MEAS - LVIDD: 5.8 CM
BH CV ECHO MEAS - LVIDS: 4.3 CM
BH CV ECHO MEAS - LVLD AP2: 8.6 CM
BH CV ECHO MEAS - LVLD AP4: 8.6 CM
BH CV ECHO MEAS - LVLS AP2: 7.6 CM
BH CV ECHO MEAS - LVLS AP4: 6.2 CM
BH CV ECHO MEAS - LVOT AREA (M): 2.5 CM^2
BH CV ECHO MEAS - LVOT AREA: 2.5 CM^2
BH CV ECHO MEAS - LVOT DIAM: 1.8 CM
BH CV ECHO MEAS - LVPWD: 1.4 CM
BH CV ECHO MEAS - MED PEAK E' VEL: 11 CM/SEC
BH CV ECHO MEAS - MV DEC SLOPE: 941.3 CM/SEC^2
BH CV ECHO MEAS - MV DEC TIME: 0.15 SEC
BH CV ECHO MEAS - MV E MAX VEL: 147 CM/SEC
BH CV ECHO MEAS - MV MAX PG: 8.9 MMHG
BH CV ECHO MEAS - MV MEAN PG: 5.3 MMHG
BH CV ECHO MEAS - MV P1/2T MAX VEL: 154.7 CM/SEC
BH CV ECHO MEAS - MV P1/2T: 48.1 MSEC
BH CV ECHO MEAS - MV V2 MAX: 149.3 CM/SEC
BH CV ECHO MEAS - MV V2 MEAN: 110.4 CM/SEC
BH CV ECHO MEAS - MV V2 VTI: 26.2 CM
BH CV ECHO MEAS - MVA P1/2T LCG: 1.4 CM^2
BH CV ECHO MEAS - MVA(P1/2T): 4.6 CM^2
BH CV ECHO MEAS - MVA(VTI): 1.6 CM^2
BH CV ECHO MEAS - PA MAX PG (FULL): 4.4 MMHG
BH CV ECHO MEAS - PA MAX PG: 6 MMHG
BH CV ECHO MEAS - PA V2 MAX: 122.2 CM/SEC
BH CV ECHO MEAS - PVA(V,A): 2.9 CM^2
BH CV ECHO MEAS - PVA(V,D): 2.9 CM^2
BH CV ECHO MEAS - QP/QS: 1.3
BH CV ECHO MEAS - RV MAX PG: 1.6 MMHG
BH CV ECHO MEAS - RV MEAN PG: 1 MMHG
BH CV ECHO MEAS - RV V1 MAX: 62.6 CM/SEC
BH CV ECHO MEAS - RV V1 MEAN: 47 CM/SEC
BH CV ECHO MEAS - RV V1 VTI: 9.4 CM
BH CV ECHO MEAS - RVOT AREA: 5.7 CM^2
BH CV ECHO MEAS - RVOT DIAM: 2.7 CM
BH CV ECHO MEAS - SI(AO): 264.4 ML/M^2
BH CV ECHO MEAS - SI(CUBED): 47.4 ML/M^2
BH CV ECHO MEAS - SI(LVOT): 18.3 ML/M^2
BH CV ECHO MEAS - SI(MOD-SP2): 25.4 ML/M^2
BH CV ECHO MEAS - SI(MOD-SP4): 24.1 ML/M^2
BH CV ECHO MEAS - SI(TEICH): 34.2 ML/M^2
BH CV ECHO MEAS - SUP REN AO DIAM: 2 CM
BH CV ECHO MEAS - SV(AO): 614.9 ML
BH CV ECHO MEAS - SV(CUBED): 110.2 ML
BH CV ECHO MEAS - SV(LVOT): 42.6 ML
BH CV ECHO MEAS - SV(MOD-SP2): 59 ML
BH CV ECHO MEAS - SV(MOD-SP4): 56 ML
BH CV ECHO MEAS - SV(RVOT): 53.5 ML
BH CV ECHO MEAS - SV(TEICH): 79.6 ML
BH CV ECHO MEASUREMENTS AVERAGE E/E' RATIO: 12.78
LEFT ATRIUM VOLUME INDEX: 35 ML/M2

## 2020-01-08 PROCEDURE — 99214 OFFICE O/P EST MOD 30 MIN: CPT | Performed by: INTERNAL MEDICINE

## 2020-01-08 PROCEDURE — 93280 PM DEVICE PROGR EVAL DUAL: CPT | Performed by: INTERNAL MEDICINE

## 2020-01-08 PROCEDURE — 93000 ELECTROCARDIOGRAM COMPLETE: CPT | Performed by: INTERNAL MEDICINE

## 2020-01-08 PROCEDURE — 93306 TTE W/DOPPLER COMPLETE: CPT

## 2020-01-08 PROCEDURE — 93306 TTE W/DOPPLER COMPLETE: CPT | Performed by: INTERNAL MEDICINE

## 2020-01-08 NOTE — PROGRESS NOTES
Date of Office Visit: 20  Encounter Provider: Dionicio Stoner MD  Place of Service: [facility] CARDIOLOGY  Patient Name: Ariel Elliott  :1940  [id number]    Chief Complaint   Patient presents with   • Cardiac Valve Problem   :     HPI: Ariel Elliott is a 79 y.o. male  He had severe aortic stenosis, prior CABG, morbidly obese, severe sick sinus syndrome. He underwent a permanent pacemaker placement and then he underwent a #26 TAVR valve in 2018.  He is here for follow-up he has been doing well until he recently has been having some angina and his hemoglobin got down to 7 he got a transfusion and his symptoms have resolved he does not have any PND orthopnea is actually ambulating pretty well no syncope he has not seen any clear GI bleeding he does have to continue to get transfusions every once in a while    Past Medical History:   Diagnosis Date   • Anemia    • Anemia     2 UNITS OF BLOOD SAT 11/10. [facility]   • Aortic stenosis    • Arrhythmia    • Atrial flutter (CMS/HCC)    • Congestive heart failure (CMS/HCC)    • Coronary atherosclerosis    • Diabetes mellitus (CMS/HCC)     TYPE 2   • GI (gastrointestinal bleed)    • Gout    • H/O seasonal allergies    • Hard to intubate     SEVERAL TIMES IN PAST. NO ISSUES RECENT HISTORY   • Herpes     HANDS IN THE PAST   • Heyd syndrome (CMS/HCC)    • History of diverticulitis    • Hyperlipidemia    • Pacemaker     LEFT.   • Post-nasal drip    • Sleep apnea     Bi-Pap   • Von Willebrand disease (CMS/HCC)        Past Surgical History:   Procedure Laterality Date   • ABLATION OF DYSRHYTHMIC FOCUS     • AORTIC VALVE REPAIR/REPLACEMENT N/A 2018    Procedure: INTRAOPERATIVE LANG, PERCUTANEOUS TRANSFEMORAL TRANSCATHETER AORTIC VALVE REPLACEMENT PERCUTANEOUS APPROACH;  Surgeon: Elizabeth Meade MD;  Location: [facility] OR ;  Service: Cardiothoracic   • CARDIAC CATHETERIZATION      • CARDIAC CATHETERIZATION N/A 11/1/2018    Procedure: Right and Left Heart Cath;  Surgeon: Dionicio Stoner MD;  Location: [facility] CATH INVASIVE LOCATION;  Service: Cardiology   • CARDIAC CATHETERIZATION  11/1/2018    Procedure: Saphenous Vein Graft;  Surgeon: Dionicio Stoner MD;  Location: [facility] CATH INVASIVE LOCATION;  Service: Cardiology   • CARDIAC ELECTROPHYSIOLOGY PROCEDURE Left 11/5/2018    Procedure: Pacemaker DC new   [place];  Surgeon: Mina Castillo MD;  Location: [facility] CATH INVASIVE LOCATION;  Service: Cardiology   • CATARACT EXTRACTION WITH INTRAOCULAR LENS IMPLANT      LEFT AND RIGHT   • CHOLECYSTECTOMY     • COLONOSCOPY     • CORONARY ANGIOPLASTY      WITH PTCA   • CORONARY ARTERY BYPASS GRAFT      5 VESSEL   • HAND SURGERY      TRIGGER FINGER RELEASE, TENDON RELEASE   • HEMORRHOIDECTOMY     • HERNIA REPAIR      VENTRAL HERNIA REPEATEDLY   • PACEMAKER IMPLANTATION  11/05/2018   • PORTACATH PLACEMENT Right     POWER PORT   • SINUS SURGERY     • TOTAL HIP ARTHROPLASTY Right    • TOTAL KNEE ARTHROPLASTY Right 1/8/2019    Procedure: RIGHT DISTAL FEMUR REPLACEMENT;  Surgeon: Margot Ray MD;  Location: [facility] OR;  Service: Orthopedics       Social History     Socioeconomic History   • Marital status:      Spouse name: Not on file   • Number of children: Not on file   • Years of education: Not on file   • Highest education level: Not on file   Tobacco Use   • Smoking status: Former Smoker   • Smokeless tobacco: Never Used   • Tobacco comment: QUIT 35YRS AGO   Substance and Sexual Activity   • Alcohol use: No   • Drug use: No   • Sexual activity: Defer       Family History   Problem Relation Age of Onset   • Diabetes Mother    • Cancer Mother    • No Known Problems Father        Review of Systems   Constitution: Negative for decreased appetite, fever, malaise/fatigue and weight loss.   HENT: Negative for nosebleeds.    Eyes: Negative for double vision.   Cardiovascular:  Negative for chest pain, claudication, cyanosis, dyspnea on exertion, irregular heartbeat, leg swelling, near-syncope, orthopnea, palpitations, paroxysmal nocturnal dyspnea and syncope.   Respiratory: Negative for cough, hemoptysis and shortness of breath.    Hematologic/Lymphatic: Negative for bleeding problem.   Skin: Negative for rash.   Musculoskeletal: Negative for falls and myalgias.   Gastrointestinal: Negative for hematochezia, jaundice, melena, nausea and vomiting.   Genitourinary: Negative for hematuria.   Neurological: Negative for dizziness and seizures.   Psychiatric/Behavioral: Negative for altered mental status and memory loss.       Allergies   Allergen Reactions   • Statins Myalgia         Current Outpatient Medications:   •  cholecalciferol (VITAMIN D3) 1000 units tablet, Take 1,000 Units by mouth Daily., Disp: , Rfl:   •  furosemide (LASIX) 40 MG tablet, Take 40 mg by mouth 2 (Two) Times a Day., Disp: , Rfl:   •  gemfibrozil (LOPID) 600 MG tablet, Take 600 mg by mouth 2 (Two) Times a Day Before Meals., Disp: , Rfl:   •  insulin glargine (LANTUS) 100 UNIT/ML injection, Inject 15 Units under the skin into the appropriate area as directed Every 12 (Twelve) Hours. (Patient taking differently: Inject 30 Units under the skin into the appropriate area as directed Every 12 (Twelve) Hours.), Disp: , Rfl: 12  •  insulin lispro (humaLOG) 100 UNIT/ML injection, Inject 5 Units under the skin into the appropriate area as directed 3 (Three) Times a Day With Meals. (Patient taking differently: Inject 15 Units under the skin into the appropriate area as directed 3 (Three) Times a Day With Meals.), Disp: , Rfl: 12  •  isosorbide dinitrate (ISORDIL) 40 MG tablet, Take 40 mg by mouth 2 (Two) Times a Day., Disp: , Rfl:   •  omeprazole (priLOSEC) 40 MG capsule, Take 40 mg by mouth 2 (Two) Times a Day., Disp: , Rfl:   •  allopurinol (ZYLOPRIM) 300 MG tablet, Take 300 mg by mouth Every Night., Disp: , Rfl:   •   "polyethylene glycol (MIRALAX) packet, Take 17 g by mouth Daily., Disp: , Rfl:   •  tamsulosin (FLOMAX) 0.4 MG capsule 24 hr capsule, Take 1 capsule by mouth Daily., Disp: 30 capsule, Rfl:       Objective:     Vitals:    01/08/20 1547   BP: 132/70   Pulse: 64   Weight: 132 kg (290 lb 9.6 oz)   Height: 177.8 cm (70\")     Body mass index is 41.7 kg/m².    Physical Exam   Constitutional: He is oriented to person, place, and time. He appears well-developed and well-nourished.   HENT:   Head: Normocephalic.   Eyes: No scleral icterus.   Neck: No JVD present. No thyromegaly present.   Cardiovascular: Normal rate and regular rhythm. Exam reveals no gallop and no friction rub.   Murmur heard.   Low-pitched harsh crescendo-decrescendo early systolic murmur is present with a grade of 1/6.  Pulmonary/Chest: Effort normal and breath sounds normal. He has no wheezes. He has no rales.   Abdominal: Soft. There is no hepatosplenomegaly. There is no tenderness.   Musculoskeletal: Normal range of motion. He exhibits no edema (+2 B).   Lymphadenopathy:     He has no cervical adenopathy.   Neurological: He is alert and oriented to person, place, and time.   Skin: Skin is warm and dry. No rash noted.   Psychiatric: He has a normal mood and affect.         ECG 12 Lead  Date/Time: 1/8/2020 4:23 PM  Performed by: Dionicio Stoner MD  Authorized by: Dionicio Stoner MD   Comparison: compared with previous ECG   Similar to previous ECG  Rhythm: sinus rhythm  Conduction: left bundle branch block    Clinical impression: abnormal EKG             Assessment:       Diagnosis Plan   1. Nonrheumatic aortic valve stenosis     2. Left bundle branch block (LBBB)     3. Angina pectoris (CMS/HCC)     4. AVM (arteriovenous malformation) of small bowel, acquired with hemorrhage     5. S/P TAVR (transcatheter aortic valve replacement)            Plan:       I think in general he is doing well his echo looks good and his TAVR looks like it is functioning " well last thing I want to do is a heart cath on him he is super high risk.  I think if we can keep his counts up and he does okay that way then that is the best way to go.  I am just can have him come back and see me in 6 months sooner if he has trouble    As always, it has been a pleasure to participate in your patient's care.      Sincerely,       Dionicio Stoner MD

## 2020-04-15 ENCOUNTER — CLINICAL SUPPORT NO REQUIREMENTS (OUTPATIENT)
Dept: CARDIOLOGY | Facility: CLINIC | Age: 80
End: 2020-04-15

## 2020-04-15 DIAGNOSIS — I49.5 SICK SINUS SYNDROME (HCC): Primary | ICD-10-CM

## 2020-04-15 PROCEDURE — 93296 REM INTERROG EVL PM/IDS: CPT | Performed by: INTERNAL MEDICINE

## 2020-04-15 PROCEDURE — 93294 REM INTERROG EVL PM/LDLS PM: CPT | Performed by: INTERNAL MEDICINE

## 2020-06-16 ENCOUNTER — OFFICE VISIT (OUTPATIENT)
Dept: SURGERY | Facility: CLINIC | Age: 80
End: 2020-06-16

## 2020-06-16 VITALS — WEIGHT: 285 LBS | HEIGHT: 70 IN | BODY MASS INDEX: 40.8 KG/M2

## 2020-06-16 DIAGNOSIS — K43.9 VENTRAL HERNIA WITHOUT OBSTRUCTION OR GANGRENE: Primary | ICD-10-CM

## 2020-06-16 PROCEDURE — 99203 OFFICE O/P NEW LOW 30 MIN: CPT | Performed by: SURGERY

## 2020-06-16 RX ORDER — DIPHENHYDRAMINE HCL 25 MG
25 CAPSULE ORAL EVERY 6 HOURS PRN
COMMUNITY
End: 2021-08-19

## 2020-06-17 NOTE — PROGRESS NOTES
Subjective   Ariel Elliott is a 80 y.o. male who presents to the office in surgical consultation from Zeb Meadows MD for an abdominal wall nodule.    History of Present Illness     The patient has multiple medical problems including severe aortic stenosis and severe sick sinus syndrome.  He has a permanent pacemaker.  He had a ventral hernia repair performed many years ago that went well with no problems afterwards.  Recently he has developed a nodule in his abdominal wall that he suspects is a recurrent ventral hernia.  There is some pain when pressure is applied.  He has had no change in his bowel or bladder function.    Review of Systems   Constitutional: Negative for fatigue and fever.   Respiratory: Negative for chest tightness and shortness of breath.    Cardiovascular: Negative for chest pain and palpitations.   Gastrointestinal: Positive for abdominal pain. Negative for blood in stool, constipation, diarrhea, nausea and vomiting.     Past Medical History:   Diagnosis Date   • Anemia    • Anemia     2 UNITS OF BLOOD SAT 11/10. [facility]   • Aortic stenosis    • Arrhythmia    • Atrial flutter (CMS/HCC)    • Congestive heart failure (CMS/HCC)    • Coronary atherosclerosis    • Diabetes mellitus (CMS/HCC)     TYPE 2   • GI (gastrointestinal bleed)    • Gout    • H/O seasonal allergies    • Hard to intubate     SEVERAL TIMES IN PAST. NO ISSUES RECENT HISTORY   • Herpes     HANDS IN THE PAST   • Heyd syndrome (CMS/HCC)    • History of diverticulitis    • Hyperlipidemia    • Pacemaker     LEFT.   • Post-nasal drip    • Sleep apnea     Bi-Pap   • Von Willebrand disease (CMS/HCC)      Past Surgical History:   Procedure Laterality Date   • ABLATION OF DYSRHYTHMIC FOCUS     • AORTIC VALVE REPAIR/REPLACEMENT N/A 11/13/2018    Procedure: INTRAOPERATIVE LANG, PERCUTANEOUS TRANSFEMORAL TRANSCATHETER AORTIC VALVE REPLACEMENT PERCUTANEOUS APPROACH;  Surgeon: Elizabeth Meade MD;   Location: [facility] OR 18/19;  Service: Cardiothoracic   • CARDIAC CATHETERIZATION     • CARDIAC CATHETERIZATION N/A 11/1/2018    Procedure: Right and Left Heart Cath;  Surgeon: Dionicio Stoner MD;  Location: [facility] CATH INVASIVE LOCATION;  Service: Cardiology   • CARDIAC CATHETERIZATION  11/1/2018    Procedure: Saphenous Vein Graft;  Surgeon: Dionicio Stoner MD;  Location: [facility] CATH INVASIVE LOCATION;  Service: Cardiology   • CARDIAC ELECTROPHYSIOLOGY PROCEDURE Left 11/5/2018    Procedure: Pacemaker DC new   [place];  Surgeon: Mina Castillo MD;  Location: [facility] CATH INVASIVE LOCATION;  Service: Cardiology   • CATARACT EXTRACTION WITH INTRAOCULAR LENS IMPLANT      LEFT AND RIGHT   • CHOLECYSTECTOMY     • COLONOSCOPY     • CORONARY ANGIOPLASTY      WITH PTCA   • CORONARY ARTERY BYPASS GRAFT      5 VESSEL   • HAND SURGERY      TRIGGER FINGER RELEASE, TENDON RELEASE   • HEMORRHOIDECTOMY     • HERNIA REPAIR      VENTRAL HERNIA REPEATEDLY   • PACEMAKER IMPLANTATION  11/05/2018   • PORTACATH PLACEMENT Right     POWER PORT   • SINUS SURGERY     • TOTAL HIP ARTHROPLASTY Right    • TOTAL KNEE ARTHROPLASTY Right 1/8/2019    Procedure: RIGHT DISTAL FEMUR REPLACEMENT;  Surgeon: Margot Ray MD;  Location: [facility] OR;  Service: Orthopedics     Family History   Problem Relation Age of Onset   • Diabetes Mother    • Cancer Mother    • No Known Problems Father      Social History     Socioeconomic History   • Marital status:      Spouse name: Not on file   • Number of children: Not on file   • Years of education: Not on file   • Highest education level: Not on file   Occupational History   • Occupation: Retired   Tobacco Use   • Smoking status: Former Smoker   • Smokeless tobacco: Never Used   • Tobacco comment: QUIT 35YRS AGO   Substance and Sexual Activity   • Alcohol use: No   • Drug use: No   • Sexual activity: Defer       Objective   Physical Exam   Constitutional: He is oriented to  person, place, and time. He appears well-developed and well-nourished.  Non-toxic appearance.   Eyes: EOM are normal. No scleral icterus.   Pulmonary/Chest: Effort normal. No respiratory distress.   Abdominal: Soft. Normal appearance. There is no tenderness.   There is a nodule in the abdominal wall just to the left of the midline in the epigastrium that is somewhat firm and not reducible.   Neurological: He is alert and oriented to person, place, and time.   Skin: Skin is warm and dry.   Psychiatric: He has a normal mood and affect. His behavior is normal. Judgment and thought content normal.       Assessment/Plan       The encounter diagnosis was Ventral hernia without obstruction or gangrene.    The patient has an abdominal wall nodule in the area of the previous ventral hernia repair.  It is suspicious for either a recurrent ventral hernia that contains incarcerated omentum or an inflammatory reaction related to a portion of the previously placed mesh.  It could represent some sort of abdominal wall nodule from another disease process.  A CT scan of the abdomen and pelvis was discussed with for further evaluation but, based on his significant medical problems, he would like to avoid any further work-up because any treatment would not be possible.  He was assured that if it is a recurrent ventral hernia, it does not contain bowel and he does not need a ventral hernia repair.  He will follow-up on an as-needed basis.

## 2020-07-08 ENCOUNTER — TELEPHONE (OUTPATIENT)
Dept: SURGERY | Facility: CLINIC | Age: 80
End: 2020-07-08

## 2020-07-08 NOTE — TELEPHONE ENCOUNTER
Daughter called regarding pt stating ventral hernia has gotten larger and more painful. Said you all had discussed possibly doing a CT if things got worse. Would like to know if this could be ordered. Aware you are out until tomorrow(7/9/2020)

## 2020-07-09 DIAGNOSIS — K43.9 VENTRAL HERNIA WITHOUT OBSTRUCTION OR GANGRENE: Primary | ICD-10-CM

## 2020-07-09 NOTE — TELEPHONE ENCOUNTER
The daughter was called on the telephone but it went straight to voicemail and a message was left for her.  A CT scan of the abdomen and pelvis has been ordered for further evaluation of the ventral hernia.

## 2020-07-10 ENCOUNTER — HOSPITAL ENCOUNTER (OUTPATIENT)
Dept: CT IMAGING | Facility: HOSPITAL | Age: 80
Discharge: HOME | End: 2020-07-10
Admitting: SURGERY

## 2020-07-10 DIAGNOSIS — K43.9 VENTRAL HERNIA WITHOUT OBSTRUCTION OR GANGRENE: ICD-10-CM

## 2020-07-10 PROCEDURE — 74176 CT ABD & PELVIS W/O CONTRAST: CPT

## 2020-07-12 ENCOUNTER — APPOINTMENT (OUTPATIENT)
Dept: CT IMAGING | Facility: HOSPITAL | Age: 80
End: 2020-07-12

## 2020-07-12 ENCOUNTER — HOSPITAL ENCOUNTER (INPATIENT)
Facility: HOSPITAL | Age: 80
LOS: 4 days | Discharge: HOME | End: 2020-07-16
Attending: EMERGENCY MEDICINE | Admitting: INTERNAL MEDICINE

## 2020-07-12 DIAGNOSIS — L03.311 ABDOMINAL WALL CELLULITIS: Primary | ICD-10-CM

## 2020-07-12 DIAGNOSIS — L02.211 ABDOMINAL WALL ABSCESS: ICD-10-CM

## 2020-07-12 DIAGNOSIS — R10.9 ABDOMINAL PAIN IN MALE: ICD-10-CM

## 2020-07-12 LAB
ALBUMIN SERPL-MCNC: 3.4 G/DL (ref 3.5–5.2)
ALBUMIN/GLOB SERPL: 1 G/DL
ALP SERPL-CCNC: 78 U/L (ref 39–117)
ALT SERPL W P-5'-P-CCNC: 6 U/L (ref 1–41)
ANION GAP SERPL CALCULATED.3IONS-SCNC: 11.4 MMOL/L (ref 5–15)
AST SERPL-CCNC: 13 U/L (ref 1–40)
BACTERIA UR QL AUTO: NORMAL /HPF
BASOPHILS # BLD AUTO: 0.02 10*3/MM3 (ref 0–0.2)
BASOPHILS NFR BLD AUTO: 0.3 % (ref 0–1.5)
BILIRUB SERPL-MCNC: 0.2 MG/DL (ref 0–1.2)
BILIRUB UR QL STRIP: NEGATIVE
BUN SERPL-MCNC: 65 MG/DL (ref 8–23)
BUN/CREAT SERPL: 33.9 (ref 7–25)
CALCIUM SPEC-SCNC: 9.8 MG/DL (ref 8.6–10.5)
CHLORIDE SERPL-SCNC: 105 MMOL/L (ref 98–107)
CLARITY UR: CLEAR
CO2 SERPL-SCNC: 24.6 MMOL/L (ref 22–29)
COLOR UR: YELLOW
CREAT SERPL-MCNC: 1.92 MG/DL (ref 0.76–1.27)
D-LACTATE SERPL-SCNC: 1.1 MMOL/L (ref 0.5–2)
DEPRECATED RDW RBC AUTO: 48.4 FL (ref 37–54)
EOSINOPHIL # BLD AUTO: 0.25 10*3/MM3 (ref 0–0.4)
EOSINOPHIL NFR BLD AUTO: 3.6 % (ref 0.3–6.2)
ERYTHROCYTE [DISTWIDTH] IN BLOOD BY AUTOMATED COUNT: 15.2 % (ref 12.3–15.4)
GFR SERPL CREATININE-BSD FRML MDRD: 34 ML/MIN/1.73
GLOBULIN UR ELPH-MCNC: 3.4 GM/DL
GLUCOSE BLDC GLUCOMTR-MCNC: 61 MG/DL (ref 70–130)
GLUCOSE BLDC GLUCOMTR-MCNC: 80 MG/DL (ref 70–130)
GLUCOSE SERPL-MCNC: 99 MG/DL (ref 65–99)
GLUCOSE UR STRIP-MCNC: NEGATIVE MG/DL
HCT VFR BLD AUTO: 25.9 % (ref 37.5–51)
HGB BLD-MCNC: 8.4 G/DL (ref 13–17.7)
HGB UR QL STRIP.AUTO: NEGATIVE
HOLD SPECIMEN: NORMAL
HOLD SPECIMEN: NORMAL
HYALINE CASTS UR QL AUTO: NORMAL /LPF
IMM GRANULOCYTES # BLD AUTO: 0.02 10*3/MM3 (ref 0–0.05)
IMM GRANULOCYTES NFR BLD AUTO: 0.3 % (ref 0–0.5)
KETONES UR QL STRIP: NEGATIVE
LEUKOCYTE ESTERASE UR QL STRIP.AUTO: NEGATIVE
LIPASE SERPL-CCNC: 15 U/L (ref 13–60)
LYMPHOCYTES # BLD AUTO: 0.69 10*3/MM3 (ref 0.7–3.1)
LYMPHOCYTES NFR BLD AUTO: 9.8 % (ref 19.6–45.3)
MCH RBC QN AUTO: 27.8 PG (ref 26.6–33)
MCHC RBC AUTO-ENTMCNC: 32.4 G/DL (ref 31.5–35.7)
MCV RBC AUTO: 85.8 FL (ref 79–97)
MONOCYTES # BLD AUTO: 0.65 10*3/MM3 (ref 0.1–0.9)
MONOCYTES NFR BLD AUTO: 9.3 % (ref 5–12)
NEUTROPHILS NFR BLD AUTO: 5.39 10*3/MM3 (ref 1.7–7)
NEUTROPHILS NFR BLD AUTO: 76.7 % (ref 42.7–76)
NITRITE UR QL STRIP: NEGATIVE
NRBC BLD AUTO-RTO: 0 /100 WBC (ref 0–0.2)
PH UR STRIP.AUTO: <=5 [PH] (ref 5–8)
PLATELET # BLD AUTO: 167 10*3/MM3 (ref 140–450)
PMV BLD AUTO: 10.9 FL (ref 6–12)
POTASSIUM SERPL-SCNC: 4.9 MMOL/L (ref 3.5–5.2)
PROCALCITONIN SERPL-MCNC: 0.2 NG/ML (ref 0–0.25)
PROT SERPL-MCNC: 6.8 G/DL (ref 6–8.5)
PROT UR QL STRIP: ABNORMAL
RBC # BLD AUTO: 3.02 10*6/MM3 (ref 4.14–5.8)
RBC # UR: NORMAL /HPF
REF LAB TEST METHOD: NORMAL
SODIUM SERPL-SCNC: 141 MMOL/L (ref 136–145)
SP GR UR STRIP: 1.02 (ref 1–1.03)
SQUAMOUS #/AREA URNS HPF: NORMAL /HPF
UROBILINOGEN UR QL STRIP: ABNORMAL
WBC # BLD AUTO: 7.02 10*3/MM3 (ref 3.4–10.8)
WBC UR QL AUTO: NORMAL /HPF
WHOLE BLOOD HOLD SPECIMEN: NORMAL
WHOLE BLOOD HOLD SPECIMEN: NORMAL

## 2020-07-12 PROCEDURE — 25010000002 PIPERACILLIN SOD-TAZOBACTAM PER 1 G: Performed by: NURSE PRACTITIONER

## 2020-07-12 PROCEDURE — 93005 ELECTROCARDIOGRAM TRACING: CPT | Performed by: INTERNAL MEDICINE

## 2020-07-12 PROCEDURE — 25010000002 MORPHINE PER 10 MG: Performed by: NURSE PRACTITIONER

## 2020-07-12 PROCEDURE — 93010 ELECTROCARDIOGRAM REPORT: CPT | Performed by: INTERNAL MEDICINE

## 2020-07-12 PROCEDURE — 74176 CT ABD & PELVIS W/O CONTRAST: CPT

## 2020-07-12 PROCEDURE — 25010000002 MORPHINE PER 10 MG: Performed by: EMERGENCY MEDICINE

## 2020-07-12 PROCEDURE — 83690 ASSAY OF LIPASE: CPT | Performed by: NURSE PRACTITIONER

## 2020-07-12 PROCEDURE — 80053 COMPREHEN METABOLIC PANEL: CPT | Performed by: NURSE PRACTITIONER

## 2020-07-12 PROCEDURE — 99284 EMERGENCY DEPT VISIT MOD MDM: CPT

## 2020-07-12 PROCEDURE — 82962 GLUCOSE BLOOD TEST: CPT

## 2020-07-12 PROCEDURE — 81001 URINALYSIS AUTO W/SCOPE: CPT | Performed by: NURSE PRACTITIONER

## 2020-07-12 PROCEDURE — 85025 COMPLETE CBC W/AUTO DIFF WBC: CPT | Performed by: NURSE PRACTITIONER

## 2020-07-12 PROCEDURE — 84145 PROCALCITONIN (PCT): CPT | Performed by: NURSE PRACTITIONER

## 2020-07-12 PROCEDURE — 25010000002 ONDANSETRON PER 1 MG: Performed by: NURSE PRACTITIONER

## 2020-07-12 PROCEDURE — 83605 ASSAY OF LACTIC ACID: CPT | Performed by: NURSE PRACTITIONER

## 2020-07-12 RX ORDER — ONDANSETRON 2 MG/ML
4 INJECTION INTRAMUSCULAR; INTRAVENOUS ONCE
Status: COMPLETED | OUTPATIENT
Start: 2020-07-12 | End: 2020-07-12

## 2020-07-12 RX ORDER — TAMSULOSIN HYDROCHLORIDE 0.4 MG/1
0.4 CAPSULE ORAL DAILY
Status: DISCONTINUED | OUTPATIENT
Start: 2020-07-12 | End: 2020-07-16 | Stop reason: HOSPADM

## 2020-07-12 RX ORDER — DEXTROSE MONOHYDRATE 25 G/50ML
25 INJECTION, SOLUTION INTRAVENOUS
Status: DISCONTINUED | OUTPATIENT
Start: 2020-07-12 | End: 2020-07-16 | Stop reason: HOSPADM

## 2020-07-12 RX ORDER — HYDROMORPHONE HYDROCHLORIDE 1 MG/ML
0.5 INJECTION, SOLUTION INTRAMUSCULAR; INTRAVENOUS; SUBCUTANEOUS
Status: DISCONTINUED | OUTPATIENT
Start: 2020-07-12 | End: 2020-07-16 | Stop reason: HOSPADM

## 2020-07-12 RX ORDER — MORPHINE SULFATE 2 MG/ML
2 INJECTION, SOLUTION INTRAMUSCULAR; INTRAVENOUS ONCE
Status: COMPLETED | OUTPATIENT
Start: 2020-07-12 | End: 2020-07-12

## 2020-07-12 RX ORDER — SODIUM CHLORIDE 0.9 % (FLUSH) 0.9 %
10 SYRINGE (ML) INJECTION EVERY 12 HOURS SCHEDULED
Status: DISCONTINUED | OUTPATIENT
Start: 2020-07-12 | End: 2020-07-16 | Stop reason: HOSPADM

## 2020-07-12 RX ORDER — NALOXONE HCL 0.4 MG/ML
0.4 VIAL (ML) INJECTION
Status: DISCONTINUED | OUTPATIENT
Start: 2020-07-12 | End: 2020-07-16 | Stop reason: HOSPADM

## 2020-07-12 RX ORDER — ACETAMINOPHEN 650 MG/1
650 SUPPOSITORY RECTAL EVERY 4 HOURS PRN
Status: DISCONTINUED | OUTPATIENT
Start: 2020-07-12 | End: 2020-07-16 | Stop reason: HOSPADM

## 2020-07-12 RX ORDER — ISOSORBIDE DINITRATE 20 MG/1
40 TABLET ORAL 2 TIMES DAILY
Status: DISCONTINUED | OUTPATIENT
Start: 2020-07-12 | End: 2020-07-16 | Stop reason: HOSPADM

## 2020-07-12 RX ORDER — SODIUM CHLORIDE 9 MG/ML
75 INJECTION, SOLUTION INTRAVENOUS CONTINUOUS
Status: DISCONTINUED | OUTPATIENT
Start: 2020-07-12 | End: 2020-07-16 | Stop reason: HOSPADM

## 2020-07-12 RX ORDER — SODIUM CHLORIDE 0.9 % (FLUSH) 0.9 %
10 SYRINGE (ML) INJECTION AS NEEDED
Status: DISCONTINUED | OUTPATIENT
Start: 2020-07-12 | End: 2020-07-16 | Stop reason: HOSPADM

## 2020-07-12 RX ORDER — HYDROCODONE BITARTRATE AND ACETAMINOPHEN 5; 325 MG/1; MG/1
1 TABLET ORAL EVERY 4 HOURS PRN
Status: DISCONTINUED | OUTPATIENT
Start: 2020-07-12 | End: 2020-07-16 | Stop reason: HOSPADM

## 2020-07-12 RX ORDER — FEXOFENADINE HCL 180 MG/1
180 TABLET ORAL DAILY
COMMUNITY

## 2020-07-12 RX ORDER — CETIRIZINE HYDROCHLORIDE 10 MG/1
5 TABLET ORAL DAILY
Status: DISCONTINUED | OUTPATIENT
Start: 2020-07-13 | End: 2020-07-16 | Stop reason: HOSPADM

## 2020-07-12 RX ORDER — INSULIN GLARGINE 100 [IU]/ML
45 INJECTION, SOLUTION SUBCUTANEOUS NIGHTLY
Status: DISCONTINUED | OUTPATIENT
Start: 2020-07-12 | End: 2020-07-16 | Stop reason: HOSPADM

## 2020-07-12 RX ORDER — ACETAMINOPHEN 160 MG/5ML
650 SOLUTION ORAL EVERY 4 HOURS PRN
Status: DISCONTINUED | OUTPATIENT
Start: 2020-07-12 | End: 2020-07-16 | Stop reason: HOSPADM

## 2020-07-12 RX ORDER — ONDANSETRON 2 MG/ML
4 INJECTION INTRAMUSCULAR; INTRAVENOUS EVERY 6 HOURS PRN
Status: DISCONTINUED | OUTPATIENT
Start: 2020-07-12 | End: 2020-07-16 | Stop reason: HOSPADM

## 2020-07-12 RX ORDER — ACETAMINOPHEN 325 MG/1
650 TABLET ORAL EVERY 4 HOURS PRN
Status: DISCONTINUED | OUTPATIENT
Start: 2020-07-12 | End: 2020-07-16 | Stop reason: HOSPADM

## 2020-07-12 RX ORDER — NICOTINE POLACRILEX 4 MG
15 LOZENGE BUCCAL
Status: DISCONTINUED | OUTPATIENT
Start: 2020-07-12 | End: 2020-07-16 | Stop reason: HOSPADM

## 2020-07-12 RX ORDER — PANTOPRAZOLE SODIUM 40 MG/1
40 TABLET, DELAYED RELEASE ORAL EVERY MORNING
Status: DISCONTINUED | OUTPATIENT
Start: 2020-07-13 | End: 2020-07-16 | Stop reason: HOSPADM

## 2020-07-12 RX ORDER — AMOXICILLIN 250 MG
2 CAPSULE ORAL NIGHTLY PRN
Status: DISCONTINUED | OUTPATIENT
Start: 2020-07-12 | End: 2020-07-16 | Stop reason: HOSPADM

## 2020-07-12 RX ORDER — ONDANSETRON 4 MG/1
4 TABLET, FILM COATED ORAL EVERY 6 HOURS PRN
Status: DISCONTINUED | OUTPATIENT
Start: 2020-07-12 | End: 2020-07-16 | Stop reason: HOSPADM

## 2020-07-12 RX ADMIN — ONDANSETRON 4 MG: 2 INJECTION INTRAMUSCULAR; INTRAVENOUS at 15:01

## 2020-07-12 RX ADMIN — MORPHINE SULFATE 2 MG: 2 INJECTION, SOLUTION INTRAMUSCULAR; INTRAVENOUS at 15:04

## 2020-07-12 RX ADMIN — ACETAMINOPHEN 650 MG: 325 TABLET, FILM COATED ORAL at 22:00

## 2020-07-12 RX ADMIN — SODIUM CHLORIDE, PRESERVATIVE FREE 10 ML: 5 INJECTION INTRAVENOUS at 21:45

## 2020-07-12 RX ADMIN — HYDROCODONE BITARTRATE AND ACETAMINOPHEN 1 TABLET: 5; 325 TABLET ORAL at 18:29

## 2020-07-12 RX ADMIN — TAZOBACTAM SODIUM AND PIPERACILLIN SODIUM 3.38 G: 375; 3 INJECTION, SOLUTION INTRAVENOUS at 16:35

## 2020-07-12 RX ADMIN — TAMSULOSIN HYDROCHLORIDE 0.4 MG: 0.4 CAPSULE ORAL at 21:45

## 2020-07-12 RX ADMIN — SODIUM CHLORIDE 75 ML/HR: 9 INJECTION, SOLUTION INTRAVENOUS at 18:29

## 2020-07-12 RX ADMIN — ISOSORBIDE DINITRATE 40 MG: 20 TABLET ORAL at 21:45

## 2020-07-12 RX ADMIN — MORPHINE SULFATE 2 MG: 2 INJECTION, SOLUTION INTRAMUSCULAR; INTRAVENOUS at 17:21

## 2020-07-13 ENCOUNTER — ANESTHESIA EVENT (OUTPATIENT)
Dept: PERIOP | Facility: HOSPITAL | Age: 80
End: 2020-07-13

## 2020-07-13 ENCOUNTER — ANESTHESIA (OUTPATIENT)
Dept: PERIOP | Facility: HOSPITAL | Age: 80
End: 2020-07-13

## 2020-07-13 PROBLEM — L02.211 ABDOMINAL WALL ABSCESS: Status: ACTIVE | Noted: 2020-07-12

## 2020-07-13 LAB
ABO GROUP BLD: NORMAL
ALBUMIN SERPL-MCNC: 3.4 G/DL (ref 3.5–5.2)
ALBUMIN/GLOB SERPL: 1.2 G/DL
ALP SERPL-CCNC: 70 U/L (ref 39–117)
ALT SERPL W P-5'-P-CCNC: <5 U/L (ref 1–41)
ANION GAP SERPL CALCULATED.3IONS-SCNC: 10.5 MMOL/L (ref 5–15)
AST SERPL-CCNC: 9 U/L (ref 1–40)
BASOPHILS # BLD AUTO: 0.03 10*3/MM3 (ref 0–0.2)
BASOPHILS NFR BLD AUTO: 0.4 % (ref 0–1.5)
BILIRUB SERPL-MCNC: 0.2 MG/DL (ref 0–1.2)
BLD GP AB SCN SERPL QL: NEGATIVE
BUN SERPL-MCNC: 61 MG/DL (ref 8–23)
BUN/CREAT SERPL: 31 (ref 7–25)
CALCIUM SPEC-SCNC: 9.2 MG/DL (ref 8.6–10.5)
CHLORIDE SERPL-SCNC: 104 MMOL/L (ref 98–107)
CO2 SERPL-SCNC: 23.5 MMOL/L (ref 22–29)
CREAT SERPL-MCNC: 1.97 MG/DL (ref 0.76–1.27)
DEPRECATED RDW RBC AUTO: 42.6 FL (ref 37–54)
EOSINOPHIL # BLD AUTO: 0.24 10*3/MM3 (ref 0–0.4)
EOSINOPHIL NFR BLD AUTO: 3.1 % (ref 0.3–6.2)
ERYTHROCYTE [DISTWIDTH] IN BLOOD BY AUTOMATED COUNT: 14.4 % (ref 12.3–15.4)
GFR SERPL CREATININE-BSD FRML MDRD: 33 ML/MIN/1.73
GLOBULIN UR ELPH-MCNC: 2.8 GM/DL
GLUCOSE BLDC GLUCOMTR-MCNC: 186 MG/DL (ref 70–130)
GLUCOSE BLDC GLUCOMTR-MCNC: 89 MG/DL (ref 70–130)
GLUCOSE BLDC GLUCOMTR-MCNC: 90 MG/DL (ref 70–130)
GLUCOSE BLDC GLUCOMTR-MCNC: 96 MG/DL (ref 70–130)
GLUCOSE SERPL-MCNC: 84 MG/DL (ref 65–99)
HCT VFR BLD AUTO: 22.7 % (ref 37.5–51)
HGB BLD-MCNC: 7.7 G/DL (ref 13–17.7)
IMM GRANULOCYTES # BLD AUTO: 0.04 10*3/MM3 (ref 0–0.05)
IMM GRANULOCYTES NFR BLD AUTO: 0.5 % (ref 0–0.5)
LYMPHOCYTES # BLD AUTO: 0.57 10*3/MM3 (ref 0.7–3.1)
LYMPHOCYTES NFR BLD AUTO: 7.3 % (ref 19.6–45.3)
MCH RBC QN AUTO: 27.6 PG (ref 26.6–33)
MCHC RBC AUTO-ENTMCNC: 33.9 G/DL (ref 31.5–35.7)
MCV RBC AUTO: 81.4 FL (ref 79–97)
MONOCYTES # BLD AUTO: 0.65 10*3/MM3 (ref 0.1–0.9)
MONOCYTES NFR BLD AUTO: 8.4 % (ref 5–12)
NEUTROPHILS NFR BLD AUTO: 6.25 10*3/MM3 (ref 1.7–7)
NEUTROPHILS NFR BLD AUTO: 80.3 % (ref 42.7–76)
NRBC BLD AUTO-RTO: 0 /100 WBC (ref 0–0.2)
PLATELET # BLD AUTO: 161 10*3/MM3 (ref 140–450)
PMV BLD AUTO: 10.6 FL (ref 6–12)
POTASSIUM SERPL-SCNC: 4.4 MMOL/L (ref 3.5–5.2)
PROT SERPL-MCNC: 6.2 G/DL (ref 6–8.5)
RBC # BLD AUTO: 2.79 10*6/MM3 (ref 4.14–5.8)
RH BLD: POSITIVE
SARS-COV-2 RNA RESP QL NAA+PROBE: NOT DETECTED
SODIUM SERPL-SCNC: 138 MMOL/L (ref 136–145)
T&S EXPIRATION DATE: NORMAL
WBC # BLD AUTO: 7.78 10*3/MM3 (ref 3.4–10.8)

## 2020-07-13 PROCEDURE — 99222 1ST HOSP IP/OBS MODERATE 55: CPT | Performed by: INTERNAL MEDICINE

## 2020-07-13 PROCEDURE — 80053 COMPREHEN METABOLIC PANEL: CPT | Performed by: INTERNAL MEDICINE

## 2020-07-13 PROCEDURE — 63710000001 INSULIN LISPRO (HUMAN) PER 5 UNITS: Performed by: SURGERY

## 2020-07-13 PROCEDURE — 63710000001 INSULIN GLARGINE PER 5 UNITS: Performed by: SURGERY

## 2020-07-13 PROCEDURE — 85025 COMPLETE CBC W/AUTO DIFF WBC: CPT | Performed by: INTERNAL MEDICINE

## 2020-07-13 PROCEDURE — 25010000002 FENTANYL CITRATE (PF) 100 MCG/2ML SOLUTION: Performed by: ANESTHESIOLOGY

## 2020-07-13 PROCEDURE — 86850 RBC ANTIBODY SCREEN: CPT | Performed by: INTERNAL MEDICINE

## 2020-07-13 PROCEDURE — 25010000002 HYDROMORPHONE PER 4 MG: Performed by: INTERNAL MEDICINE

## 2020-07-13 PROCEDURE — 25010000002 PROPOFOL 10 MG/ML EMULSION: Performed by: NURSE ANESTHETIST, CERTIFIED REGISTERED

## 2020-07-13 PROCEDURE — 63710000001 DIPHENHYDRAMINE PER 50 MG: Performed by: SURGERY

## 2020-07-13 PROCEDURE — C9803 HOPD COVID-19 SPEC COLLECT: HCPCS | Performed by: SURGERY

## 2020-07-13 PROCEDURE — 86922 COMPATIBILITY TEST ANTIGLOB: CPT

## 2020-07-13 PROCEDURE — 25010000002 DIPHENHYDRAMINE PER 50 MG: Performed by: ANESTHESIOLOGY

## 2020-07-13 PROCEDURE — 87635 SARS-COV-2 COVID-19 AMP PRB: CPT | Performed by: SURGERY

## 2020-07-13 PROCEDURE — 10061 I&D ABSCESS COMP/MULTIPLE: CPT | Performed by: SURGERY

## 2020-07-13 PROCEDURE — 86901 BLOOD TYPING SEROLOGIC RH(D): CPT | Performed by: INTERNAL MEDICINE

## 2020-07-13 PROCEDURE — 99221 1ST HOSP IP/OBS SF/LOW 40: CPT | Performed by: SURGERY

## 2020-07-13 PROCEDURE — 87070 CULTURE OTHR SPECIMN AEROBIC: CPT | Performed by: SURGERY

## 2020-07-13 PROCEDURE — 86900 BLOOD TYPING SEROLOGIC ABO: CPT | Performed by: INTERNAL MEDICINE

## 2020-07-13 PROCEDURE — 25010000002 FENTANYL CITRATE (PF) 100 MCG/2ML SOLUTION: Performed by: NURSE ANESTHETIST, CERTIFIED REGISTERED

## 2020-07-13 PROCEDURE — 25010000002 ONDANSETRON PER 1 MG: Performed by: ANESTHESIOLOGY

## 2020-07-13 PROCEDURE — 63710000001 DIPHENHYDRAMINE PER 50 MG: Performed by: ANESTHESIOLOGY

## 2020-07-13 PROCEDURE — 82962 GLUCOSE BLOOD TEST: CPT

## 2020-07-13 PROCEDURE — 25010000002 PIPERACILLIN SOD-TAZOBACTAM PER 1 G: Performed by: SURGERY

## 2020-07-13 PROCEDURE — P9016 RBC LEUKOCYTES REDUCED: HCPCS

## 2020-07-13 PROCEDURE — 0J980ZX DRAINAGE OF ABDOMEN SUBCUTANEOUS TISSUE AND FASCIA, OPEN APPROACH, DIAGNOSTIC: ICD-10-PCS | Performed by: SURGERY

## 2020-07-13 PROCEDURE — 87205 SMEAR GRAM STAIN: CPT | Performed by: SURGERY

## 2020-07-13 PROCEDURE — 86900 BLOOD TYPING SEROLOGIC ABO: CPT

## 2020-07-13 PROCEDURE — 25010000002 PHENYLEPHRINE PER 1 ML: Performed by: NURSE ANESTHETIST, CERTIFIED REGISTERED

## 2020-07-13 PROCEDURE — 86920 COMPATIBILITY TEST SPIN: CPT

## 2020-07-13 PROCEDURE — 36430 TRANSFUSION BLD/BLD COMPNT: CPT

## 2020-07-13 PROCEDURE — 63710000001 DIPHENHYDRAMINE PER 50 MG: Performed by: NURSE PRACTITIONER

## 2020-07-13 PROCEDURE — 25010000002 NEOSTIGMINE PER 0.5 MG: Performed by: ANESTHESIOLOGY

## 2020-07-13 PROCEDURE — 25010000002 HYDROMORPHONE PER 4 MG: Performed by: SURGERY

## 2020-07-13 RX ORDER — SODIUM CHLORIDE 0.9 % (FLUSH) 0.9 %
10 SYRINGE (ML) INJECTION AS NEEDED
Status: DISCONTINUED | OUTPATIENT
Start: 2020-07-13 | End: 2020-07-16 | Stop reason: HOSPADM

## 2020-07-13 RX ORDER — SODIUM CHLORIDE 0.9 % (FLUSH) 0.9 %
3-10 SYRINGE (ML) INJECTION AS NEEDED
Status: DISCONTINUED | OUTPATIENT
Start: 2020-07-13 | End: 2020-07-13 | Stop reason: HOSPADM

## 2020-07-13 RX ORDER — FLUMAZENIL 0.1 MG/ML
0.2 INJECTION INTRAVENOUS AS NEEDED
Status: DISCONTINUED | OUTPATIENT
Start: 2020-07-13 | End: 2020-07-13 | Stop reason: HOSPADM

## 2020-07-13 RX ORDER — DIPHENHYDRAMINE HYDROCHLORIDE 50 MG/ML
12.5 INJECTION INTRAMUSCULAR; INTRAVENOUS
Status: DISCONTINUED | OUTPATIENT
Start: 2020-07-13 | End: 2020-07-13 | Stop reason: HOSPADM

## 2020-07-13 RX ORDER — MAGNESIUM HYDROXIDE 1200 MG/15ML
LIQUID ORAL AS NEEDED
Status: DISCONTINUED | OUTPATIENT
Start: 2020-07-13 | End: 2020-07-13 | Stop reason: HOSPADM

## 2020-07-13 RX ORDER — PROMETHAZINE HYDROCHLORIDE 25 MG/ML
12.5 INJECTION, SOLUTION INTRAMUSCULAR; INTRAVENOUS ONCE AS NEEDED
Status: DISCONTINUED | OUTPATIENT
Start: 2020-07-13 | End: 2020-07-13 | Stop reason: HOSPADM

## 2020-07-13 RX ORDER — PROMETHAZINE HYDROCHLORIDE 25 MG/1
25 TABLET ORAL ONCE AS NEEDED
Status: DISCONTINUED | OUTPATIENT
Start: 2020-07-13 | End: 2020-07-13 | Stop reason: HOSPADM

## 2020-07-13 RX ORDER — SODIUM CHLORIDE, SODIUM LACTATE, POTASSIUM CHLORIDE, CALCIUM CHLORIDE 600; 310; 30; 20 MG/100ML; MG/100ML; MG/100ML; MG/100ML
9 INJECTION, SOLUTION INTRAVENOUS CONTINUOUS
Status: DISCONTINUED | OUTPATIENT
Start: 2020-07-13 | End: 2020-07-13

## 2020-07-13 RX ORDER — ONDANSETRON 2 MG/ML
INJECTION INTRAMUSCULAR; INTRAVENOUS AS NEEDED
Status: DISCONTINUED | OUTPATIENT
Start: 2020-07-13 | End: 2020-07-13 | Stop reason: SURG

## 2020-07-13 RX ORDER — SODIUM CHLORIDE 0.9 % (FLUSH) 0.9 %
10 SYRINGE (ML) INJECTION EVERY 12 HOURS SCHEDULED
Status: DISCONTINUED | OUTPATIENT
Start: 2020-07-13 | End: 2020-07-15

## 2020-07-13 RX ORDER — FENTANYL CITRATE 50 UG/ML
50 INJECTION, SOLUTION INTRAMUSCULAR; INTRAVENOUS
Status: DISCONTINUED | OUTPATIENT
Start: 2020-07-13 | End: 2020-07-13 | Stop reason: HOSPADM

## 2020-07-13 RX ORDER — DIPHENHYDRAMINE HCL 25 MG
25 CAPSULE ORAL
Status: DISCONTINUED | OUTPATIENT
Start: 2020-07-13 | End: 2020-07-13 | Stop reason: HOSPADM

## 2020-07-13 RX ORDER — ONDANSETRON 2 MG/ML
4 INJECTION INTRAMUSCULAR; INTRAVENOUS ONCE AS NEEDED
Status: COMPLETED | OUTPATIENT
Start: 2020-07-13 | End: 2020-07-13

## 2020-07-13 RX ORDER — HYDROCODONE BITARTRATE AND ACETAMINOPHEN 7.5; 325 MG/1; MG/1
1 TABLET ORAL ONCE AS NEEDED
Status: DISCONTINUED | OUTPATIENT
Start: 2020-07-13 | End: 2020-07-13 | Stop reason: HOSPADM

## 2020-07-13 RX ORDER — PROPOFOL 10 MG/ML
VIAL (ML) INTRAVENOUS AS NEEDED
Status: DISCONTINUED | OUTPATIENT
Start: 2020-07-13 | End: 2020-07-13 | Stop reason: SURG

## 2020-07-13 RX ORDER — HEPARIN SODIUM (PORCINE) LOCK FLUSH IV SOLN 100 UNIT/ML 100 UNIT/ML
5 SOLUTION INTRAVENOUS AS NEEDED
Status: DISCONTINUED | OUTPATIENT
Start: 2020-07-13 | End: 2020-07-16 | Stop reason: HOSPADM

## 2020-07-13 RX ORDER — SODIUM CHLORIDE 0.9 % (FLUSH) 0.9 %
3 SYRINGE (ML) INJECTION EVERY 12 HOURS SCHEDULED
Status: DISCONTINUED | OUTPATIENT
Start: 2020-07-13 | End: 2020-07-13 | Stop reason: HOSPADM

## 2020-07-13 RX ORDER — EPHEDRINE SULFATE 50 MG/ML
INJECTION, SOLUTION INTRAVENOUS AS NEEDED
Status: DISCONTINUED | OUTPATIENT
Start: 2020-07-13 | End: 2020-07-13 | Stop reason: SURG

## 2020-07-13 RX ORDER — DIPHENHYDRAMINE HCL 25 MG
25 CAPSULE ORAL EVERY 6 HOURS PRN
Status: DISCONTINUED | OUTPATIENT
Start: 2020-07-13 | End: 2020-07-16 | Stop reason: HOSPADM

## 2020-07-13 RX ORDER — DIPHENHYDRAMINE HCL 25 MG
25 CAPSULE ORAL ONCE
Status: COMPLETED | OUTPATIENT
Start: 2020-07-13 | End: 2020-07-13

## 2020-07-13 RX ORDER — PROMETHAZINE HYDROCHLORIDE 25 MG/ML
6.25 INJECTION, SOLUTION INTRAMUSCULAR; INTRAVENOUS
Status: DISCONTINUED | OUTPATIENT
Start: 2020-07-13 | End: 2020-07-13 | Stop reason: HOSPADM

## 2020-07-13 RX ORDER — LABETALOL HYDROCHLORIDE 5 MG/ML
5 INJECTION, SOLUTION INTRAVENOUS
Status: DISCONTINUED | OUTPATIENT
Start: 2020-07-13 | End: 2020-07-13 | Stop reason: HOSPADM

## 2020-07-13 RX ORDER — EPHEDRINE SULFATE 50 MG/ML
5 INJECTION, SOLUTION INTRAVENOUS ONCE AS NEEDED
Status: DISCONTINUED | OUTPATIENT
Start: 2020-07-13 | End: 2020-07-13 | Stop reason: HOSPADM

## 2020-07-13 RX ORDER — PROMETHAZINE HYDROCHLORIDE 25 MG/1
25 SUPPOSITORY RECTAL ONCE AS NEEDED
Status: DISCONTINUED | OUTPATIENT
Start: 2020-07-13 | End: 2020-07-13 | Stop reason: HOSPADM

## 2020-07-13 RX ORDER — FAMOTIDINE 10 MG/ML
20 INJECTION, SOLUTION INTRAVENOUS ONCE
Status: DISCONTINUED | OUTPATIENT
Start: 2020-07-13 | End: 2020-07-13 | Stop reason: HOSPADM

## 2020-07-13 RX ORDER — HYDRALAZINE HYDROCHLORIDE 20 MG/ML
5 INJECTION INTRAMUSCULAR; INTRAVENOUS
Status: DISCONTINUED | OUTPATIENT
Start: 2020-07-13 | End: 2020-07-13 | Stop reason: HOSPADM

## 2020-07-13 RX ORDER — ACETAMINOPHEN 650 MG
TABLET, EXTENDED RELEASE ORAL AS NEEDED
Status: DISCONTINUED | OUTPATIENT
Start: 2020-07-13 | End: 2020-07-13 | Stop reason: HOSPADM

## 2020-07-13 RX ORDER — GLYCOPYRROLATE 0.2 MG/ML
INJECTION INTRAMUSCULAR; INTRAVENOUS AS NEEDED
Status: DISCONTINUED | OUTPATIENT
Start: 2020-07-13 | End: 2020-07-13 | Stop reason: SURG

## 2020-07-13 RX ORDER — HYDROMORPHONE HYDROCHLORIDE 1 MG/ML
0.5 INJECTION, SOLUTION INTRAMUSCULAR; INTRAVENOUS; SUBCUTANEOUS
Status: DISCONTINUED | OUTPATIENT
Start: 2020-07-13 | End: 2020-07-13 | Stop reason: HOSPADM

## 2020-07-13 RX ORDER — LIDOCAINE HYDROCHLORIDE 20 MG/ML
INJECTION, SOLUTION INFILTRATION; PERINEURAL AS NEEDED
Status: DISCONTINUED | OUTPATIENT
Start: 2020-07-13 | End: 2020-07-13 | Stop reason: SURG

## 2020-07-13 RX ORDER — ROCURONIUM BROMIDE 10 MG/ML
INJECTION, SOLUTION INTRAVENOUS AS NEEDED
Status: DISCONTINUED | OUTPATIENT
Start: 2020-07-13 | End: 2020-07-13 | Stop reason: SURG

## 2020-07-13 RX ORDER — NALOXONE HCL 0.4 MG/ML
0.2 VIAL (ML) INJECTION AS NEEDED
Status: DISCONTINUED | OUTPATIENT
Start: 2020-07-13 | End: 2020-07-13 | Stop reason: HOSPADM

## 2020-07-13 RX ORDER — LIDOCAINE HYDROCHLORIDE 10 MG/ML
0.5 INJECTION, SOLUTION EPIDURAL; INFILTRATION; INTRACAUDAL; PERINEURAL ONCE AS NEEDED
Status: DISCONTINUED | OUTPATIENT
Start: 2020-07-13 | End: 2020-07-13 | Stop reason: HOSPADM

## 2020-07-13 RX ORDER — SODIUM CHLORIDE 0.9 % (FLUSH) 0.9 %
20 SYRINGE (ML) INJECTION AS NEEDED
Status: DISCONTINUED | OUTPATIENT
Start: 2020-07-13 | End: 2020-07-16 | Stop reason: HOSPADM

## 2020-07-13 RX ORDER — FENTANYL CITRATE 50 UG/ML
INJECTION, SOLUTION INTRAMUSCULAR; INTRAVENOUS AS NEEDED
Status: DISCONTINUED | OUTPATIENT
Start: 2020-07-13 | End: 2020-07-13 | Stop reason: SURG

## 2020-07-13 RX ORDER — BUPIVACAINE HYDROCHLORIDE AND EPINEPHRINE 5; 5 MG/ML; UG/ML
INJECTION, SOLUTION PERINEURAL AS NEEDED
Status: DISCONTINUED | OUTPATIENT
Start: 2020-07-13 | End: 2020-07-13 | Stop reason: HOSPADM

## 2020-07-13 RX ORDER — OXYCODONE AND ACETAMINOPHEN 7.5; 325 MG/1; MG/1
1 TABLET ORAL ONCE AS NEEDED
Status: DISCONTINUED | OUTPATIENT
Start: 2020-07-13 | End: 2020-07-13 | Stop reason: HOSPADM

## 2020-07-13 RX ORDER — ACETAMINOPHEN 325 MG/1
650 TABLET ORAL ONCE AS NEEDED
Status: DISCONTINUED | OUTPATIENT
Start: 2020-07-13 | End: 2020-07-13 | Stop reason: HOSPADM

## 2020-07-13 RX ORDER — MIDAZOLAM HYDROCHLORIDE 1 MG/ML
1 INJECTION INTRAMUSCULAR; INTRAVENOUS
Status: DISCONTINUED | OUTPATIENT
Start: 2020-07-13 | End: 2020-07-13 | Stop reason: HOSPADM

## 2020-07-13 RX ADMIN — SODIUM CHLORIDE 75 ML/HR: 9 INJECTION, SOLUTION INTRAVENOUS at 17:28

## 2020-07-13 RX ADMIN — TAZOBACTAM SODIUM AND PIPERACILLIN SODIUM 3.38 G: 375; 3 INJECTION, SOLUTION INTRAVENOUS at 14:53

## 2020-07-13 RX ADMIN — PHENYLEPHRINE HYDROCHLORIDE 100 MCG: 10 INJECTION INTRAVENOUS at 15:03

## 2020-07-13 RX ADMIN — LIDOCAINE HYDROCHLORIDE 100 MG: 20 INJECTION, SOLUTION INFILTRATION; PERINEURAL at 14:44

## 2020-07-13 RX ADMIN — DIPHENHYDRAMINE HYDROCHLORIDE 12.5 MG: 50 INJECTION INTRAMUSCULAR; INTRAVENOUS at 16:00

## 2020-07-13 RX ADMIN — SODIUM CHLORIDE, POTASSIUM CHLORIDE, SODIUM LACTATE AND CALCIUM CHLORIDE 9 ML/HR: 600; 310; 30; 20 INJECTION, SOLUTION INTRAVENOUS at 14:23

## 2020-07-13 RX ADMIN — SODIUM CHLORIDE 75 ML/HR: 9 INJECTION, SOLUTION INTRAVENOUS at 10:11

## 2020-07-13 RX ADMIN — DIPHENHYDRAMINE HYDROCHLORIDE 25 MG: 25 CAPSULE ORAL at 19:41

## 2020-07-13 RX ADMIN — EPHEDRINE SULFATE 5 MG: 50 INJECTION INTRAVENOUS at 15:03

## 2020-07-13 RX ADMIN — TAZOBACTAM SODIUM AND PIPERACILLIN SODIUM 4.5 G: 500; 4 INJECTION, SOLUTION INTRAVENOUS at 20:54

## 2020-07-13 RX ADMIN — NEOSTIGMINE METHYLSULFATE 5 MG: 1 INJECTION INTRAMUSCULAR; INTRAVENOUS; SUBCUTANEOUS at 15:21

## 2020-07-13 RX ADMIN — PANTOPRAZOLE SODIUM 40 MG: 40 TABLET, DELAYED RELEASE ORAL at 07:48

## 2020-07-13 RX ADMIN — DIPHENHYDRAMINE HYDROCHLORIDE 25 MG: 25 CAPSULE ORAL at 02:01

## 2020-07-13 RX ADMIN — ONDANSETRON 4 MG: 2 INJECTION INTRAMUSCULAR; INTRAVENOUS at 15:45

## 2020-07-13 RX ADMIN — HYDROCODONE BITARTRATE AND ACETAMINOPHEN 1 TABLET: 5; 325 TABLET ORAL at 23:03

## 2020-07-13 RX ADMIN — INSULIN GLARGINE 45 UNITS: 100 INJECTION, SOLUTION SUBCUTANEOUS at 21:11

## 2020-07-13 RX ADMIN — SODIUM CHLORIDE, PRESERVATIVE FREE 10 ML: 5 INJECTION INTRAVENOUS at 21:11

## 2020-07-13 RX ADMIN — ROCURONIUM BROMIDE 40 MG: 10 INJECTION INTRAVENOUS at 14:47

## 2020-07-13 RX ADMIN — SODIUM CHLORIDE, PRESERVATIVE FREE 10 ML: 5 INJECTION INTRAVENOUS at 10:30

## 2020-07-13 RX ADMIN — PHENYLEPHRINE HYDROCHLORIDE 200 MCG: 10 INJECTION INTRAVENOUS at 14:57

## 2020-07-13 RX ADMIN — PHENYLEPHRINE HYDROCHLORIDE 100 MCG: 10 INJECTION INTRAVENOUS at 15:14

## 2020-07-13 RX ADMIN — GLYCOPYRROLATE 1 MG: 0.2 INJECTION INTRAMUSCULAR; INTRAVENOUS at 15:21

## 2020-07-13 RX ADMIN — ONDANSETRON HYDROCHLORIDE 4 MG: 2 SOLUTION INTRAMUSCULAR; INTRAVENOUS at 15:03

## 2020-07-13 RX ADMIN — PROPOFOL 150 MG: 10 INJECTION, EMULSION INTRAVENOUS at 14:47

## 2020-07-13 RX ADMIN — DIPHENHYDRAMINE HYDROCHLORIDE 25 MG: 25 CAPSULE ORAL at 16:20

## 2020-07-13 RX ADMIN — HYDROMORPHONE HYDROCHLORIDE 0.5 MG: 1 INJECTION, SOLUTION INTRAMUSCULAR; INTRAVENOUS; SUBCUTANEOUS at 17:28

## 2020-07-13 RX ADMIN — FENTANYL CITRATE 100 MCG: 50 INJECTION INTRAMUSCULAR; INTRAVENOUS at 14:44

## 2020-07-13 RX ADMIN — SODIUM CHLORIDE, PRESERVATIVE FREE 10 ML: 5 INJECTION INTRAVENOUS at 20:54

## 2020-07-13 RX ADMIN — ISOSORBIDE DINITRATE 40 MG: 20 TABLET ORAL at 20:53

## 2020-07-13 RX ADMIN — EPHEDRINE SULFATE 5 MG: 50 INJECTION INTRAVENOUS at 15:14

## 2020-07-13 RX ADMIN — INSULIN LISPRO 3 UNITS: 100 INJECTION, SOLUTION INTRAVENOUS; SUBCUTANEOUS at 21:08

## 2020-07-13 RX ADMIN — FENTANYL CITRATE 50 MCG: 50 INJECTION, SOLUTION INTRAMUSCULAR; INTRAVENOUS at 14:20

## 2020-07-13 RX ADMIN — HYDROMORPHONE HYDROCHLORIDE 0.5 MG: 1 INJECTION, SOLUTION INTRAMUSCULAR; INTRAVENOUS; SUBCUTANEOUS at 05:55

## 2020-07-13 NOTE — ANESTHESIA PREPROCEDURE EVALUATION
Anesthesia Evaluation     Patient summary reviewed and Nursing notes reviewed   history of anesthetic complications:               Airway   Mallampati: II  Dental      Pulmonary    (+) a smoker Former, shortness of breath, sleep apnea on CPAP,   Cardiovascular     ECG reviewed  Rate: normal    (+) pacemaker, valvular problems/murmurs, CAD, CABG, dysrhythmias PVC, angina, CHF , hyperlipidemia,       Neuro/Psych  (+) syncope,     GI/Hepatic/Renal/Endo    (+)  GI bleeding , liver disease, renal disease, diabetes mellitus type 2,     Musculoskeletal (-) negative ROS    Abdominal    Substance History - negative use     OB/GYN negative ob/gyn ROS         Other                        Anesthesia Plan    ASA 4     general   (Difficult to intubate in the distant past but none recently found to be so    S/p AVR)  intravenous induction     Anesthetic plan, all risks, benefits, and alternatives have been provided, discussed and informed consent has been obtained with: patient.

## 2020-07-13 NOTE — ANESTHESIA POSTPROCEDURE EVALUATION
Patient: Ariel Elliott    Procedure Summary     Date:  07/13/20 Room / Location:  [facility] OR [address] [place] [address] MAIN OR    Anesthesia Start:  1439 Anesthesia Stop:  1539    Procedure:  INCISION AND DRAINAGE  ABDOMINAL WALL ABSCESS (N/A ) Diagnosis:       Abdominal wall cellulitis      Abdominal wall abscess      (Abdominal wall cellulitis [L03.311])      (Abdominal wall abscess [L02.211])    Surgeon:  David Silva Jr., MD Provider:  Brenden Cuevas MD    Anesthesia Type:  general ASA Status:  4          Anesthesia Type: general    Vitals  Vitals Value Taken Time   /81 7/13/2020  4:40 PM   Temp 37.3 °C (99.2 °F) 7/13/2020  3:35 PM   Pulse 59 7/13/2020  4:40 PM   Resp 16 7/13/2020  4:40 PM   SpO2 100 % 7/13/2020  4:40 PM           Post Anesthesia Care and Evaluation    Patient location during evaluation: bedside  Patient participation: complete - patient participated  Level of consciousness: awake and alert  Pain management: adequate  Airway patency: patent  Anesthetic complications: No anesthetic complications  PONV Status: controlled  Cardiovascular status: acceptable  Respiratory status: acceptable  Hydration status: acceptable

## 2020-07-13 NOTE — ANESTHESIA PROCEDURE NOTES
Airway  Urgency: elective    Date/Time: 7/13/2020 2:51 PM  Airway not difficult    General Information and Staff    Patient location during procedure: OR  Anesthesiologist: Lyle Kim MD  CRNA: Magdi Garcia CRNA    Indications and Patient Condition  Indications for airway management: airway protection    Preoxygenated: yes  Mask difficulty assessment: 1 - vent by mask    Final Airway Details  Final airway type: endotracheal airway      Successful airway: ETT  Cuffed: yes   Successful intubation technique: direct laryngoscopy  Facilitating devices/methods: intubating stylet  Endotracheal tube insertion site: oral  Blade: CMAC  Blade size: D  ETT size (mm): 8.0  Cormack-Lehane Classification: grade I - full view of glottis  Placement verified by: chest auscultation and capnometry   Measured from: lips  ETT/EBT  to lips (cm): 22  Number of attempts at approach: 1  Assessment: lips, teeth, and gum same as pre-op and atraumatic intubation    Additional Comments  Pre 02 100%, SIVI, DL x1, atraumatic intubation, BLBS, Positive ETC02.

## 2020-07-14 LAB
ANION GAP SERPL CALCULATED.3IONS-SCNC: 10.9 MMOL/L (ref 5–15)
BASOPHILS # BLD AUTO: 0.03 10*3/MM3 (ref 0–0.2)
BASOPHILS NFR BLD AUTO: 0.5 % (ref 0–1.5)
BH BB BLOOD EXPIRATION DATE: NORMAL
BH BB BLOOD TYPE BARCODE: 5100
BH BB DISPENSE STATUS: NORMAL
BH BB PRODUCT CODE: NORMAL
BH BB UNIT NUMBER: NORMAL
BUN SERPL-MCNC: 53 MG/DL (ref 8–23)
BUN/CREAT SERPL: 32.1 (ref 7–25)
CALCIUM SPEC-SCNC: 8.4 MG/DL (ref 8.6–10.5)
CHLORIDE SERPL-SCNC: 104 MMOL/L (ref 98–107)
CO2 SERPL-SCNC: 22.1 MMOL/L (ref 22–29)
CREAT SERPL-MCNC: 1.65 MG/DL (ref 0.76–1.27)
CROSSMATCH INTERPRETATION: NORMAL
DEPRECATED RDW RBC AUTO: 44.2 FL (ref 37–54)
EOSINOPHIL # BLD AUTO: 0.34 10*3/MM3 (ref 0–0.4)
EOSINOPHIL NFR BLD AUTO: 5.3 % (ref 0.3–6.2)
ERYTHROCYTE [DISTWIDTH] IN BLOOD BY AUTOMATED COUNT: 14.7 % (ref 12.3–15.4)
GFR SERPL CREATININE-BSD FRML MDRD: 40 ML/MIN/1.73
GLUCOSE BLDC GLUCOMTR-MCNC: 139 MG/DL (ref 70–130)
GLUCOSE BLDC GLUCOMTR-MCNC: 181 MG/DL (ref 70–130)
GLUCOSE BLDC GLUCOMTR-MCNC: 261 MG/DL (ref 70–130)
GLUCOSE BLDC GLUCOMTR-MCNC: 262 MG/DL (ref 70–130)
GLUCOSE SERPL-MCNC: 136 MG/DL (ref 65–99)
HCT VFR BLD AUTO: 23.9 % (ref 37.5–51)
HGB BLD-MCNC: 8.1 G/DL (ref 13–17.7)
IMM GRANULOCYTES # BLD AUTO: 0.02 10*3/MM3 (ref 0–0.05)
IMM GRANULOCYTES NFR BLD AUTO: 0.3 % (ref 0–0.5)
LYMPHOCYTES # BLD AUTO: 0.32 10*3/MM3 (ref 0.7–3.1)
LYMPHOCYTES NFR BLD AUTO: 5 % (ref 19.6–45.3)
MCH RBC QN AUTO: 27.9 PG (ref 26.6–33)
MCHC RBC AUTO-ENTMCNC: 33.9 G/DL (ref 31.5–35.7)
MCV RBC AUTO: 82.4 FL (ref 79–97)
MONOCYTES # BLD AUTO: 0.42 10*3/MM3 (ref 0.1–0.9)
MONOCYTES NFR BLD AUTO: 6.5 % (ref 5–12)
NEUTROPHILS NFR BLD AUTO: 5.32 10*3/MM3 (ref 1.7–7)
NEUTROPHILS NFR BLD AUTO: 82.4 % (ref 42.7–76)
NRBC BLD AUTO-RTO: 0 /100 WBC (ref 0–0.2)
PLATELET # BLD AUTO: 148 10*3/MM3 (ref 140–450)
PMV BLD AUTO: 10.8 FL (ref 6–12)
POTASSIUM SERPL-SCNC: 3.7 MMOL/L (ref 3.5–5.2)
RBC # BLD AUTO: 2.9 10*6/MM3 (ref 4.14–5.8)
SODIUM SERPL-SCNC: 137 MMOL/L (ref 136–145)
UNIT  ABO: NORMAL
UNIT  RH: NORMAL
WBC # BLD AUTO: 6.45 10*3/MM3 (ref 3.4–10.8)

## 2020-07-14 PROCEDURE — 85027 COMPLETE CBC AUTOMATED: CPT | Performed by: SURGERY

## 2020-07-14 PROCEDURE — 63710000001 DIPHENHYDRAMINE PER 50 MG: Performed by: SURGERY

## 2020-07-14 PROCEDURE — 63710000001 INSULIN LISPRO (HUMAN) PER 5 UNITS: Performed by: SURGERY

## 2020-07-14 PROCEDURE — 63710000001 INSULIN GLARGINE PER 5 UNITS: Performed by: SURGERY

## 2020-07-14 PROCEDURE — 82962 GLUCOSE BLOOD TEST: CPT

## 2020-07-14 PROCEDURE — 25010000002 PIPERACILLIN SOD-TAZOBACTAM PER 1 G: Performed by: SURGERY

## 2020-07-14 PROCEDURE — 80048 BASIC METABOLIC PNL TOTAL CA: CPT | Performed by: SURGERY

## 2020-07-14 PROCEDURE — 99232 SBSQ HOSP IP/OBS MODERATE 35: CPT | Performed by: NURSE PRACTITIONER

## 2020-07-14 PROCEDURE — 99024 POSTOP FOLLOW-UP VISIT: CPT | Performed by: SURGERY

## 2020-07-14 RX ORDER — FUROSEMIDE 40 MG/1
40 TABLET ORAL
Status: DISCONTINUED | OUTPATIENT
Start: 2020-07-14 | End: 2020-07-16 | Stop reason: HOSPADM

## 2020-07-14 RX ORDER — POLYETHYLENE GLYCOL 3350 17 G/17G
17 POWDER, FOR SOLUTION ORAL DAILY
Status: DISCONTINUED | OUTPATIENT
Start: 2020-07-15 | End: 2020-07-16 | Stop reason: HOSPADM

## 2020-07-14 RX ADMIN — SODIUM CHLORIDE, PRESERVATIVE FREE 10 ML: 5 INJECTION INTRAVENOUS at 08:32

## 2020-07-14 RX ADMIN — FUROSEMIDE 40 MG: 40 TABLET ORAL at 11:38

## 2020-07-14 RX ADMIN — INSULIN GLARGINE 45 UNITS: 100 INJECTION, SOLUTION SUBCUTANEOUS at 20:38

## 2020-07-14 RX ADMIN — CETIRIZINE HYDROCHLORIDE 5 MG: 10 TABLET, FILM COATED ORAL at 08:32

## 2020-07-14 RX ADMIN — ISOSORBIDE DINITRATE 40 MG: 20 TABLET ORAL at 08:32

## 2020-07-14 RX ADMIN — INSULIN LISPRO 3 UNITS: 100 INJECTION, SOLUTION INTRAVENOUS; SUBCUTANEOUS at 17:30

## 2020-07-14 RX ADMIN — HYDROCODONE BITARTRATE AND ACETAMINOPHEN 1 TABLET: 5; 325 TABLET ORAL at 18:20

## 2020-07-14 RX ADMIN — SODIUM CHLORIDE, PRESERVATIVE FREE 10 ML: 5 INJECTION INTRAVENOUS at 20:46

## 2020-07-14 RX ADMIN — INSULIN LISPRO 8 UNITS: 100 INJECTION, SOLUTION INTRAVENOUS; SUBCUTANEOUS at 11:38

## 2020-07-14 RX ADMIN — FUROSEMIDE 40 MG: 40 TABLET ORAL at 17:30

## 2020-07-14 RX ADMIN — HYDROCODONE BITARTRATE AND ACETAMINOPHEN 1 TABLET: 5; 325 TABLET ORAL at 04:01

## 2020-07-14 RX ADMIN — DIPHENHYDRAMINE HYDROCHLORIDE 25 MG: 25 CAPSULE ORAL at 20:41

## 2020-07-14 RX ADMIN — TAMSULOSIN HYDROCHLORIDE 0.4 MG: 0.4 CAPSULE ORAL at 08:32

## 2020-07-14 RX ADMIN — TAZOBACTAM SODIUM AND PIPERACILLIN SODIUM 4.5 G: 500; 4 INJECTION, SOLUTION INTRAVENOUS at 23:59

## 2020-07-14 RX ADMIN — INSULIN LISPRO 8 UNITS: 100 INJECTION, SOLUTION INTRAVENOUS; SUBCUTANEOUS at 20:36

## 2020-07-14 RX ADMIN — TAZOBACTAM SODIUM AND PIPERACILLIN SODIUM 4.5 G: 500; 4 INJECTION, SOLUTION INTRAVENOUS at 15:59

## 2020-07-14 RX ADMIN — ISOSORBIDE DINITRATE 40 MG: 20 TABLET ORAL at 20:37

## 2020-07-14 RX ADMIN — PANTOPRAZOLE SODIUM 40 MG: 40 TABLET, DELAYED RELEASE ORAL at 07:35

## 2020-07-14 RX ADMIN — TAZOBACTAM SODIUM AND PIPERACILLIN SODIUM 4.5 G: 500; 4 INJECTION, SOLUTION INTRAVENOUS at 07:35

## 2020-07-15 LAB
BACTERIA SPEC AEROBE CULT: NORMAL
GLUCOSE BLDC GLUCOMTR-MCNC: 142 MG/DL (ref 70–130)
GLUCOSE BLDC GLUCOMTR-MCNC: 192 MG/DL (ref 70–130)
GLUCOSE BLDC GLUCOMTR-MCNC: 195 MG/DL (ref 70–130)
GLUCOSE BLDC GLUCOMTR-MCNC: 199 MG/DL (ref 70–130)
GRAM STN SPEC: NORMAL

## 2020-07-15 PROCEDURE — 99024 POSTOP FOLLOW-UP VISIT: CPT | Performed by: SURGERY

## 2020-07-15 PROCEDURE — 25010000002 PIPERACILLIN SOD-TAZOBACTAM PER 1 G: Performed by: SURGERY

## 2020-07-15 PROCEDURE — 63710000001 INSULIN GLARGINE PER 5 UNITS: Performed by: SURGERY

## 2020-07-15 PROCEDURE — 63710000001 DIPHENHYDRAMINE PER 50 MG: Performed by: SURGERY

## 2020-07-15 PROCEDURE — 82962 GLUCOSE BLOOD TEST: CPT

## 2020-07-15 PROCEDURE — 63710000001 INSULIN LISPRO (HUMAN) PER 5 UNITS: Performed by: SURGERY

## 2020-07-15 PROCEDURE — 99232 SBSQ HOSP IP/OBS MODERATE 35: CPT | Performed by: NURSE PRACTITIONER

## 2020-07-15 RX ADMIN — ISOSORBIDE DINITRATE 40 MG: 20 TABLET ORAL at 21:13

## 2020-07-15 RX ADMIN — INSULIN LISPRO 3 UNITS: 100 INJECTION, SOLUTION INTRAVENOUS; SUBCUTANEOUS at 21:11

## 2020-07-15 RX ADMIN — TAZOBACTAM SODIUM AND PIPERACILLIN SODIUM 4.5 G: 500; 4 INJECTION, SOLUTION INTRAVENOUS at 15:14

## 2020-07-15 RX ADMIN — TAZOBACTAM SODIUM AND PIPERACILLIN SODIUM 4.5 G: 500; 4 INJECTION, SOLUTION INTRAVENOUS at 07:22

## 2020-07-15 RX ADMIN — TAMSULOSIN HYDROCHLORIDE 0.4 MG: 0.4 CAPSULE ORAL at 08:26

## 2020-07-15 RX ADMIN — POLYETHYLENE GLYCOL 3350 17 G: 17 POWDER, FOR SOLUTION ORAL at 08:25

## 2020-07-15 RX ADMIN — FUROSEMIDE 40 MG: 40 TABLET ORAL at 17:37

## 2020-07-15 RX ADMIN — DIPHENHYDRAMINE HYDROCHLORIDE 25 MG: 25 CAPSULE ORAL at 03:56

## 2020-07-15 RX ADMIN — INSULIN LISPRO 3 UNITS: 100 INJECTION, SOLUTION INTRAVENOUS; SUBCUTANEOUS at 08:25

## 2020-07-15 RX ADMIN — SODIUM CHLORIDE, PRESERVATIVE FREE 10 ML: 5 INJECTION INTRAVENOUS at 08:26

## 2020-07-15 RX ADMIN — ISOSORBIDE DINITRATE 40 MG: 20 TABLET ORAL at 08:26

## 2020-07-15 RX ADMIN — SODIUM CHLORIDE, PRESERVATIVE FREE 10 ML: 5 INJECTION INTRAVENOUS at 21:13

## 2020-07-15 RX ADMIN — INSULIN GLARGINE 45 UNITS: 100 INJECTION, SOLUTION SUBCUTANEOUS at 21:10

## 2020-07-15 RX ADMIN — INSULIN LISPRO 3 UNITS: 100 INJECTION, SOLUTION INTRAVENOUS; SUBCUTANEOUS at 12:30

## 2020-07-15 RX ADMIN — CETIRIZINE HYDROCHLORIDE 5 MG: 10 TABLET, FILM COATED ORAL at 08:25

## 2020-07-15 RX ADMIN — TAZOBACTAM SODIUM AND PIPERACILLIN SODIUM 4.5 G: 500; 4 INJECTION, SOLUTION INTRAVENOUS at 23:36

## 2020-07-15 RX ADMIN — SODIUM CHLORIDE 75 ML/HR: 9 INJECTION, SOLUTION INTRAVENOUS at 12:34

## 2020-07-15 RX ADMIN — HYDROCODONE BITARTRATE AND ACETAMINOPHEN 1 TABLET: 5; 325 TABLET ORAL at 14:09

## 2020-07-15 RX ADMIN — SODIUM CHLORIDE, PRESERVATIVE FREE 10 ML: 5 INJECTION INTRAVENOUS at 09:30

## 2020-07-15 RX ADMIN — FUROSEMIDE 40 MG: 40 TABLET ORAL at 08:26

## 2020-07-15 RX ADMIN — PANTOPRAZOLE SODIUM 40 MG: 40 TABLET, DELAYED RELEASE ORAL at 07:22

## 2020-07-15 RX ADMIN — SODIUM CHLORIDE, PRESERVATIVE FREE 20 ML: 5 INJECTION INTRAVENOUS at 21:14

## 2020-07-16 VITALS
HEIGHT: 70 IN | OXYGEN SATURATION: 97 % | WEIGHT: 280 LBS | RESPIRATION RATE: 18 BRPM | BODY MASS INDEX: 40.09 KG/M2 | TEMPERATURE: 97.6 F | HEART RATE: 60 BPM | DIASTOLIC BLOOD PRESSURE: 64 MMHG | SYSTOLIC BLOOD PRESSURE: 137 MMHG

## 2020-07-16 LAB — GLUCOSE BLDC GLUCOMTR-MCNC: 141 MG/DL (ref 70–130)

## 2020-07-16 PROCEDURE — 82962 GLUCOSE BLOOD TEST: CPT

## 2020-07-16 PROCEDURE — 63710000001 DIPHENHYDRAMINE PER 50 MG: Performed by: SURGERY

## 2020-07-16 PROCEDURE — 25010000002 PIPERACILLIN SOD-TAZOBACTAM PER 1 G: Performed by: SURGERY

## 2020-07-16 RX ORDER — HEPARIN SODIUM (PORCINE) LOCK FLUSH IV SOLN 100 UNIT/ML 100 UNIT/ML
500 SOLUTION INTRAVENOUS ONCE AS NEEDED
Status: DISCONTINUED | OUTPATIENT
Start: 2020-07-16 | End: 2020-07-16 | Stop reason: HOSPADM

## 2020-07-16 RX ORDER — AMOXICILLIN AND CLAVULANATE POTASSIUM 875; 125 MG/1; MG/1
1 TABLET, FILM COATED ORAL 2 TIMES DAILY
Qty: 14 TABLET | Refills: 0 | Status: SHIPPED | OUTPATIENT
Start: 2020-07-16 | End: 2020-07-23

## 2020-07-16 RX ORDER — MAGNESIUM HYDROXIDE 1200 MG/15ML
5 LIQUID ORAL 2 TIMES DAILY
Qty: 300 ML | Refills: 6 | Status: SHIPPED | OUTPATIENT
Start: 2020-07-16 | End: 2020-10-24

## 2020-07-16 RX ADMIN — DIPHENHYDRAMINE HYDROCHLORIDE 25 MG: 25 CAPSULE ORAL at 01:32

## 2020-07-16 RX ADMIN — TAZOBACTAM SODIUM AND PIPERACILLIN SODIUM 4.5 G: 500; 4 INJECTION, SOLUTION INTRAVENOUS at 07:35

## 2020-07-16 RX ADMIN — ISOSORBIDE DINITRATE 40 MG: 20 TABLET ORAL at 08:15

## 2020-07-16 RX ADMIN — SODIUM CHLORIDE, PRESERVATIVE FREE 10 ML: 5 INJECTION INTRAVENOUS at 08:15

## 2020-07-16 RX ADMIN — TAMSULOSIN HYDROCHLORIDE 0.4 MG: 0.4 CAPSULE ORAL at 08:15

## 2020-07-16 RX ADMIN — FUROSEMIDE 40 MG: 40 TABLET ORAL at 08:15

## 2020-07-16 RX ADMIN — PANTOPRAZOLE SODIUM 40 MG: 40 TABLET, DELAYED RELEASE ORAL at 06:26

## 2020-07-16 RX ADMIN — POLYETHYLENE GLYCOL 3350 17 G: 17 POWDER, FOR SOLUTION ORAL at 08:15

## 2020-07-16 RX ADMIN — CETIRIZINE HYDROCHLORIDE 5 MG: 10 TABLET, FILM COATED ORAL at 08:15

## 2020-07-17 ENCOUNTER — READMISSION MANAGEMENT (OUTPATIENT)
Dept: CALL CENTER | Facility: HOSPITAL | Age: 80
End: 2020-07-17

## 2020-07-17 LAB
BH BB BLOOD EXPIRATION DATE: NORMAL
BH BB BLOOD TYPE BARCODE: 5100
BH BB DISPENSE STATUS: NORMAL
BH BB PRODUCT CODE: NORMAL
BH BB UNIT NUMBER: NORMAL
CROSSMATCH INTERPRETATION: NORMAL
UNIT  ABO: NORMAL
UNIT  RH: NORMAL

## 2020-07-17 NOTE — OUTREACH NOTE
Prep Survey      Responses   [facility]   Is LACE score < 7 ?  No   Eligibility  Readm Mgmt   Discharge diagnosis  Abdominal wall abscess/I&D of abcess   COVID-19 Test Status  Negative   Does the patient have one of the following disease processes/diagnoses(primary or secondary)?  General Surgery   Does the patient have Home health ordered?  No   Is there a DME ordered?  No   Prep survey completed?  Yes          Marianne Willett RN

## 2020-07-20 ENCOUNTER — READMISSION MANAGEMENT (OUTPATIENT)
Dept: CALL CENTER | Facility: HOSPITAL | Age: 80
End: 2020-07-20

## 2020-07-20 NOTE — OUTREACH NOTE
General Surgery Week 1 Survey      Responses   [facility] patient discharged from?  [facility]   Does the patient have one of the following disease processes/diagnoses(primary or secondary)?  General Surgery   Is there a successful TCM telephone encounter documented?  No   Week 1 attempt successful?  Yes   Call start time  1325   Call end time  1328   Discharge diagnosis  Abdominal wall abscess/I&D of abcess   Person spoke with today (if not patient) and relationship  Micaela-daughter   Meds reviewed with patient/caregiver?  Yes   Is the patient having any side effects they believe may be caused by any medication additions or changes?  No   Does the patient have all medications related to this admission filled (includes all antibiotics, pain medications, etc.)  Yes   Is the patient taking all medications as directed (includes completed medication regime)?  Yes   Does the patient have a follow up appointment scheduled with their surgeon?  No   What is preventing the patient from scheduling follow up appointments?  Haven't had time   Nursing Interventions  Advised patient to make appointment   Has the patient kept scheduled appointments due by today?  N/A   Comments  Pt has standing appt with PCP once per week   Psychosocial issues?  No   Psychosocial comments  Pt lives with his daughter. She's a RN.    Did the patient receive a copy of their discharge instructions?  Yes   Nursing interventions  Reviewed instructions with patient   What is the patient's perception of their health status since discharge?  Improving   Nursing interventions  Nurse provided patient education   Is the patient /caregiver able to teach back basic post-op care?  Take showers only when approved by MD-sponge bathe until then, No tub bath, swimming, or hot tub until instructed by MD, Lifting as instructed by MD in discharge instructions   Is the patient/caregiver able to teach back signs and symptoms of incisional infection?  Increased  drainage or bleeding, Fever   If the patient is a current smoker, are they able to teach back resources for cessation?  -- [Nonsmoker]   Is the patient/caregiver able to teach back the hierarchy of who to call/visit for symptoms/problems? PCP, Specialist, Home health nurse, Urgent Care, ED, 911  Yes   Week 1 call completed?  Yes   Revoked  No further contact(revokes)-requires comment   Graduated/Revoked comments  Pt lives with his daugther that's an RN in the recovery room. She reports she works with Dr. Silva everyday. No further calls needed per her request.            Saida De Jesus, RN

## 2020-07-22 ENCOUNTER — TELEPHONE (OUTPATIENT)
Dept: CARDIOLOGY | Facility: CLINIC | Age: 80
End: 2020-07-22

## 2020-07-22 NOTE — TELEPHONE ENCOUNTER
Pacer remote today revealed recent af episodes.  Total of 9 since 6/25.:  2 were 140 min each  1 was 115 min  2 were 52 min each  The other 4 were <10 min    Your last note does not mention af and he is not anticoagulated.  He has apt with you in Aug.     His home monitor is set to alert us to af episodes lasting 24 hours.  This can be made to alert for shorter episodes if you are concerned about episodes that are <24 hrs (at current alert settings af lasting <24 hours will only be available on routine checks)

## 2020-07-24 ENCOUNTER — EPISODE CHANGES (OUTPATIENT)
Dept: CASE MANAGEMENT | Facility: OTHER | Age: 80
End: 2020-07-24

## 2020-07-28 ENCOUNTER — OFFICE VISIT (OUTPATIENT)
Dept: SURGERY | Facility: CLINIC | Age: 80
End: 2020-07-28

## 2020-07-28 DIAGNOSIS — Z48.89 POSTOPERATIVE VISIT: Primary | ICD-10-CM

## 2020-07-28 PROCEDURE — 99024 POSTOP FOLLOW-UP VISIT: CPT | Performed by: SURGERY

## 2020-07-28 NOTE — PROGRESS NOTES
Subjective   Ariel Elliott is a 80 y.o. male who returns to the office after undergoing an incision and drainage of an abdominal abscess on 7/13/2020.     History of Present Illness     The patient is recovering well with no significant postop symptoms.  He is not having any pain.  His daughter is performing dressing changes and the wound is getting much smaller.    Review of Systems   Respiratory: Positive for shortness of breath. Negative for chest tightness.    Cardiovascular: Negative for chest pain and palpitations.   Gastrointestinal: Negative for abdominal pain and nausea.   Psychiatric/Behavioral: Negative for agitation and confusion.       Objective   Physical Exam   Constitutional:  Non-toxic appearance. He does not appear ill. No distress.   Pulmonary/Chest: Effort normal. No respiratory distress.   Abdominal: Soft. Normal appearance. There is no tenderness.   Neurological: He is alert.   Skin:   Wound: Open with clean base of granulation tissue and no surrounding cellulitis.   Psychiatric: He has a normal mood and affect. His behavior is normal.       Assessment/Plan   The encounter diagnosis was Postoperative visit.    The patient is recovering well from the incision and drainage of an abdominal wall abscess.  The wound is healing well has gotten substantially smaller.  He was advised to continue present wound care.  He will follow-up on an as-needed basis.

## 2020-08-19 ENCOUNTER — OFFICE VISIT (OUTPATIENT)
Dept: CARDIOLOGY | Facility: CLINIC | Age: 80
End: 2020-08-19

## 2020-08-19 VITALS
HEART RATE: 60 BPM | WEIGHT: 280 LBS | SYSTOLIC BLOOD PRESSURE: 158 MMHG | HEIGHT: 70 IN | DIASTOLIC BLOOD PRESSURE: 70 MMHG | BODY MASS INDEX: 40.09 KG/M2

## 2020-08-19 DIAGNOSIS — I35.0 NONRHEUMATIC AORTIC VALVE STENOSIS: Primary | ICD-10-CM

## 2020-08-19 DIAGNOSIS — I44.7 LEFT BUNDLE BRANCH BLOCK (LBBB): ICD-10-CM

## 2020-08-19 DIAGNOSIS — Z95.2 S/P TAVR (TRANSCATHETER AORTIC VALVE REPLACEMENT): ICD-10-CM

## 2020-08-19 DIAGNOSIS — K55.21 AVM (ARTERIOVENOUS MALFORMATION) OF SMALL BOWEL, ACQUIRED WITH HEMORRHAGE: ICD-10-CM

## 2020-08-19 PROCEDURE — 99213 OFFICE O/P EST LOW 20 MIN: CPT | Performed by: INTERNAL MEDICINE

## 2020-08-19 PROCEDURE — 93000 ELECTROCARDIOGRAM COMPLETE: CPT | Performed by: INTERNAL MEDICINE

## 2020-08-19 NOTE — PROGRESS NOTES
Date of Office Visit: 20  Encounter Provider: Dionicio Stoner MD  Place of Service: [facility] CARDIOLOGY  Patient Name: Ariel Elliott  :1940  [id number]    Chief Complaint   Patient presents with   • Follow-up     from [facility]   • Aortic Stenosis   :     HPI: Ariel Elliott is a 80 y.o. male  He had severe aortic stenosis, prior CABG, morbidly obese, severe sick sinus syndrome. He underwent a permanent pacemaker placement and then he underwent a #26 TAVR valve in 2018.  He recently had to have an abscess drained off of his abdomen but he threw it and gone through great and he says this is the best he is felt in years is not having much GI bleeding he is getting around pretty well and he is very happy with how things are going    Past Medical History:   Diagnosis Date   • Anemia    • Anemia     2 UNITS OF BLOOD SAT 11/10. [facility]   • Aortic stenosis    • Arrhythmia    • Atrial flutter (CMS/HCC)    • Congestive heart failure (CMS/HCC)    • Coronary atherosclerosis    • Diabetes mellitus (CMS/HCC)     TYPE 2   • GI (gastrointestinal bleed)    • Gout    • H/O seasonal allergies    • Hard to intubate     SEVERAL TIMES IN PAST. NO ISSUES RECENT HISTORY   • Herpes     HANDS IN THE PAST   • Heyd syndrome (CMS/HCC)    • History of diverticulitis    • History of transfusion    • Hyperlipidemia    • Pacemaker     LEFT.   • Post-nasal drip    • Sleep apnea     Bi-Pap   • Von Willebrand disease (CMS/HCC)        Past Surgical History:   Procedure Laterality Date   • ABLATION OF DYSRHYTHMIC FOCUS     • AORTIC VALVE REPAIR/REPLACEMENT N/A 2018    Procedure: INTRAOPERATIVE LANG, PERCUTANEOUS TRANSFEMORAL TRANSCATHETER AORTIC VALVE REPLACEMENT PERCUTANEOUS APPROACH;  Surgeon: Elizabeth Meade MD;  Location: [facility] OR ;  Service: Cardiothoracic   • CARDIAC CATHETERIZATION     • CARDIAC CATHETERIZATION N/A 2018     Procedure: Right and Left Heart Cath;  Surgeon: Dionicio Stoner MD;  Location[facility] CATH INVASIVE LOCATION;  Service: Cardiology   • CARDIAC CATHETERIZATION  11/1/2018    Procedure: Saphenous Vein Graft;  Surgeon: Dionicio Stoner MD;  [facility];  Service: Cardiology   • CARDIAC ELECTROPHYSIOLOGY PROCEDURE Left 11/5/2018    Procedure: Pacemaker DC new   [place];  Surgeon: Mina Castillo MD;  Location: [facility] CATH INVASIVE LOCATION;  Service: Cardiology   • CATARACT EXTRACTION WITH INTRAOCULAR LENS IMPLANT      LEFT AND RIGHT   • CHOLECYSTECTOMY     • COLONOSCOPY     • CORONARY ANGIOPLASTY      WITH PTCA   • CORONARY ARTERY BYPASS GRAFT      5 VESSEL   • HAND SURGERY      TRIGGER FINGER RELEASE, TENDON RELEASE   • HEMORRHOIDECTOMY     • HERNIA REPAIR      VENTRAL HERNIA REPEATEDLY   • INCISION AND DRAINAGE LEG N/A 7/13/2020    Procedure: INCISION AND DRAINAGE  ABDOMINAL WALL ABSCESS;  Surgeon: David Silva Jr., MD;  Location: [facility] OR;  Service: General;  Laterality: N/A;   • PACEMAKER IMPLANTATION  11/05/2018   • PORTACATH PLACEMENT Right     POWER PORT   • SINUS SURGERY     • TOTAL HIP ARTHROPLASTY Right    • TOTAL KNEE ARTHROPLASTY Right 1/8/2019    Procedure: RIGHT DISTAL FEMUR REPLACEMENT;  Surgeon: Margot Ray MD;  Location: [facility] OR;  Service: Orthopedics       Social History     Socioeconomic History   • Marital status:      Spouse name: Not on file   • Number of children: Not on file   • Years of education: Not on file   • Highest education level: Not on file   Occupational History   • Occupation: Retired   Tobacco Use   • Smoking status: Former Smoker   • Smokeless tobacco: Never Used   • Tobacco comment: QUIT 35YRS AGO   Substance and Sexual Activity   • Alcohol use: No   • Drug use: No   • Sexual activity: Defer       Family History   Problem Relation Age of Onset   • Diabetes Mother    • Cancer Mother    • No Known Problems Father         Review of Systems   Constitution: Negative for decreased appetite, fever, malaise/fatigue and weight loss.   HENT: Negative for nosebleeds.    Eyes: Negative for double vision.   Cardiovascular: Negative for chest pain, claudication, cyanosis, dyspnea on exertion, irregular heartbeat, leg swelling, near-syncope, orthopnea, palpitations, paroxysmal nocturnal dyspnea and syncope.   Respiratory: Negative for cough, hemoptysis and shortness of breath.    Hematologic/Lymphatic: Negative for bleeding problem.   Skin: Negative for rash.   Musculoskeletal: Negative for falls and myalgias.   Gastrointestinal: Negative for hematochezia, jaundice, melena, nausea and vomiting.   Genitourinary: Negative for hematuria.   Neurological: Negative for dizziness and seizures.   Psychiatric/Behavioral: Negative for altered mental status and memory loss.       Allergies   Allergen Reactions   • Statins Myalgia         Current Outpatient Medications:   •  cholecalciferol (VITAMIN D3) 1000 units tablet, Take 1,000 Units by mouth Daily., Disp: , Rfl:   •  diphenhydrAMINE (BENADRYL) 25 mg capsule, Take 25 mg by mouth Every 6 (Six) Hours As Needed for Itching., Disp: , Rfl:   •  fexofenadine (ALLEGRA) 180 MG tablet, Take 180 mg by mouth Daily., Disp: , Rfl:   •  furosemide (LASIX) 40 MG tablet, Take 40 mg by mouth 2 (Two) Times a Day., Disp: , Rfl:   •  gemfibrozil (LOPID) 600 MG tablet, Take 600 mg by mouth 2 (Two) Times a Day Before Meals., Disp: , Rfl:   •  insulin aspart (novoLOG) 100 UNIT/ML injection, Inject 20 Units under the skin into the appropriate area as directed 3 (Three) Times a Day Before Meals., Disp: , Rfl:   •  insulin detemir (LEVEMIR) 100 UNIT/ML injection, Inject 45 Units under the skin into the appropriate area as directed 2 (two) times a day., Disp: , Rfl:   •  isosorbide dinitrate (ISORDIL) 40 MG tablet, Take 40 mg by mouth 2 (Two) Times a Day., Disp: , Rfl:   •  omeprazole (priLOSEC) 40 MG capsule, Take 40 mg  "by mouth 2 (Two) Times a Day., Disp: , Rfl:   •  polyethylene glycol (MIRALAX) packet, Take 17 g by mouth Daily., Disp: , Rfl:   •  Probiotic Product (PROBIOTIC DAILY PO), Take  by mouth., Disp: , Rfl:   •  sodium chloride (NS) 0.9 % irrigation, Apply 5 mL topically 2 (Two) Times a Day for 30 days., Disp: 300 mL, Rfl: 6  •  tamsulosin (FLOMAX) 0.4 MG capsule 24 hr capsule, Take 1 capsule by mouth Daily., Disp: 30 capsule, Rfl:       Objective:     Vitals:    08/19/20 1526   BP: 158/70   Pulse: 60   Weight: 127 kg (280 lb)   Height: 177.8 cm (70\")     Body mass index is 40.18 kg/m².    Physical Exam   Constitutional: He is oriented to person, place, and time. He appears well-developed and well-nourished.   HENT:   Head: Normocephalic.   Eyes: No scleral icterus.   Neck: No JVD present. No thyromegaly present.   Cardiovascular: Normal rate and regular rhythm. Exam reveals no gallop and no friction rub.   Murmur heard.   Low-pitched harsh crescendo-decrescendo early systolic murmur is present with a grade of 1/6.  Pulmonary/Chest: Effort normal and breath sounds normal. He has no wheezes. He has no rales.   Abdominal: Soft. There is no hepatosplenomegaly. There is no tenderness.   Musculoskeletal: Normal range of motion. He exhibits no edema (+2 B).   Lymphadenopathy:     He has no cervical adenopathy.   Neurological: He is alert and oriented to person, place, and time.   Skin: Skin is warm and dry. No rash noted.   Psychiatric: He has a normal mood and affect.         ECG 12 Lead  Date/Time: 8/19/2020 3:57 PM  Performed by: Dionicio Stoner MD  Authorized by: Dionicio Stoner MD   Comparison: compared with previous ECG   Similar to previous ECG  Rhythm: sinus rhythm  Conduction: left bundle branch block    Clinical impression: abnormal EKG             Assessment:       Diagnosis Plan   1. Nonrheumatic aortic valve stenosis     2. Left bundle branch block (LBBB)     3. AVM (arteriovenous malformation) of small bowel, " acquired with hemorrhage     4. 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      Sincerely,       Dionicio Stoner MD

## 2020-12-02 ENCOUNTER — TELEPHONE (OUTPATIENT)
Dept: CARDIOLOGY | Facility: CLINIC | Age: 80
End: 2020-12-02

## 2020-12-02 RX ORDER — ISOSORBIDE MONONITRATE 30 MG/1
30 TABLET, EXTENDED RELEASE ORAL EVERY MORNING
Qty: 90 TABLET | Refills: 3 | Status: SHIPPED | OUTPATIENT
Start: 2020-12-02 | End: 2021-06-30 | Stop reason: SDUPTHER

## 2020-12-02 NOTE — TELEPHONE ENCOUNTER
Patient daughter called stating that the manufacture has discontinued isosorbide dinitrate 40 mg. She would like to know if patient needs to switch to a different medication?    She can be reached at [phone]    Thanks

## 2020-12-03 NOTE — TELEPHONE ENCOUNTER
Spoke with patient daughter to confirm she received  message. She stated that she did and she will pick it up sometime today

## 2020-12-16 ENCOUNTER — OFFICE (OUTPATIENT)
Dept: URBAN - METROPOLITAN AREA CLINIC 75 | Facility: CLINIC | Age: 80
End: 2020-12-16

## 2020-12-16 VITALS — OXYGEN SATURATION: 97 % | TEMPERATURE: 97.3 F | WEIGHT: 293 LBS | HEIGHT: 70 IN | HEART RATE: 76 BPM

## 2020-12-16 DIAGNOSIS — D50.9 IRON DEFICIENCY ANEMIA, UNSPECIFIED: ICD-10-CM

## 2020-12-16 DIAGNOSIS — K92.1 MELENA: ICD-10-CM

## 2020-12-16 DIAGNOSIS — Z95.2 PRESENCE OF PROSTHETIC HEART VALVE: ICD-10-CM

## 2020-12-16 DIAGNOSIS — Z95.0 PRESENCE OF CARDIAC PACEMAKER: ICD-10-CM

## 2020-12-16 PROCEDURE — 99205 OFFICE O/P NEW HI 60 MIN: CPT | Performed by: INTERNAL MEDICINE

## 2021-06-30 ENCOUNTER — OFFICE VISIT (OUTPATIENT)
Dept: CARDIOLOGY | Facility: CLINIC | Age: 81
End: 2021-06-30

## 2021-06-30 VITALS
SYSTOLIC BLOOD PRESSURE: 130 MMHG | DIASTOLIC BLOOD PRESSURE: 64 MMHG | HEART RATE: 60 BPM | HEIGHT: 70 IN | BODY MASS INDEX: 41.23 KG/M2 | WEIGHT: 288 LBS

## 2021-06-30 DIAGNOSIS — I25.810 ATHEROSCLEROSIS OF CORONARY ARTERY BYPASS GRAFT OF NATIVE HEART WITHOUT ANGINA PECTORIS: Primary | ICD-10-CM

## 2021-06-30 DIAGNOSIS — Z95.1 S/P CABG (CORONARY ARTERY BYPASS GRAFT): ICD-10-CM

## 2021-06-30 DIAGNOSIS — Z95.2 S/P TAVR (TRANSCATHETER AORTIC VALVE REPLACEMENT): ICD-10-CM

## 2021-06-30 DIAGNOSIS — I35.0 NONRHEUMATIC AORTIC VALVE STENOSIS: ICD-10-CM

## 2021-06-30 DIAGNOSIS — R07.9 CHEST PAIN, UNSPECIFIED TYPE: ICD-10-CM

## 2021-06-30 DIAGNOSIS — Z95.0 PACEMAKER: ICD-10-CM

## 2021-06-30 PROCEDURE — 93000 ELECTROCARDIOGRAM COMPLETE: CPT | Performed by: NURSE PRACTITIONER

## 2021-06-30 PROCEDURE — 99214 OFFICE O/P EST MOD 30 MIN: CPT | Performed by: NURSE PRACTITIONER

## 2021-06-30 RX ORDER — ISOSORBIDE MONONITRATE 60 MG/1
60 TABLET, EXTENDED RELEASE ORAL EVERY MORNING
Qty: 90 TABLET | Refills: 3 | Status: SHIPPED | OUTPATIENT
Start: 2021-06-30 | End: 2021-08-09 | Stop reason: SDUPTHER

## 2021-06-30 NOTE — PROGRESS NOTES
Date of Office Visit: 2021  Encounter Provider: SHAD Hernandez  Place of Service: [facility] CARDIOLOGY  Patient Name: Ariel Elliott  :1940    Chief Complaint   Patient presents with   • Aortic Stenosis     follow up   :     HPI: Ariel Elliott is a 81 y.o. male who presents today for follow-up.  Old records have been obtained and reviewed by me.  He is a patient of Dr. Stoner's with a past cardiac history significant for aortic stenosis (status post TAVR in 2018), coronary artery disease (history of CABG), and sick sinus syndrome status post permanent pacemaker.  In , he underwent an atrial flutter ablation.  Cardiac catheterization to his TAVR in 2018 revealed a significant lesion in his vein graft to the OM1.  Due to active GI bleeding and a small size of the lesion, intervention was deferred.   He was last seen in the office by Dr. Stoner in 2020 at which time he was doing really well with no complaints of angina or heart failure.  No changes were made to his medical regimen, and he was advised to follow-up in 1 year.     He reports a gradual onset of nocturnal chest pain/angina which generally occurs after 7 PM.  It comes on with just walking and seems to get a little worse after dinner.  Normally a little improved with rest.  If it does not improve with rest, he will take a nitro and experience relief within 2 minutes.  Interestingly, he does not experience any pain at all during the day.  He denies any associated symptoms.  He denies any shortness of breath, palpitations, dizziness, or syncope.  He does have chronic bilateral lower extremity edema that is unchanged.  Due to a history of iron deficiency anemia due to AVMs, so he is not on aspirin.  He is intolerant of statins.  He leads a sedentary lifestyle.  He is a retired anesthesiologist.    Past Medical History:   Diagnosis Date   • Anemia    • Anemia     2 UNITS OF BLOOD SAT  11/10. [facility]GERY   • Aortic stenosis    • Arrhythmia    • Atrial flutter (CMS/HCC)    • Congestive heart failure (CMS/HCC)    • Coronary atherosclerosis    • Diabetes mellitus (CMS/HCC)     TYPE 2   • GI (gastrointestinal bleed)    • Gout    • H/O seasonal allergies    • Hard to intubate     SEVERAL TIMES IN PAST. NO ISSUES RECENT HISTORY   • Herpes     HANDS IN THE PAST   • Heyd syndrome (CMS/HCC)    • History of diverticulitis    • History of transfusion    • Hyperlipidemia    • Pacemaker     LEFT.   • Post-nasal drip    • Sleep apnea     Bi-Pap   • Von Willebrand disease (CMS/HCC)        Past Surgical History:   Procedure Laterality Date   • ABLATION OF DYSRHYTHMIC FOCUS     • AORTIC VALVE REPAIR/REPLACEMENT N/A 11/13/2018    Procedure: INTRAOPERATIVE LANG, PERCUTANEOUS TRANSFEMORAL TRANSCATHETER AORTIC VALVE REPLACEMENT PERCUTANEOUS APPROACH;  Surgeon: Elizabeth Meade MD;  Location: [facility] OR 18/19;  Service: Cardiothoracic   • CARDIAC CATHETERIZATION     • CARDIAC CATHETERIZATION N/A 11/1/2018    Procedure: Right and Left Heart Cath;  Surgeon: Dionicio Stoner MD;  Location: [facility] CATH INVASIVE LOCATION;  Service: Cardiology   • CARDIAC CATHETERIZATION  11/1/2018    Procedure: Saphenous Vein Graft;  Surgeon: Dionicio Stoner MD;  Location: [facility] CATH INVASIVE LOCATION;  Service: Cardiology   • CARDIAC ELECTROPHYSIOLOGY PROCEDURE Left 11/5/2018    Procedure: Pacemaker DC new   [place];  Surgeon: Mina Castillo MD;  Location: [facility] CATH INVASIVE LOCATION;  Service: Cardiology   • CATARACT EXTRACTION WITH INTRAOCULAR LENS IMPLANT      LEFT AND RIGHT   • CHOLECYSTECTOMY     • COLONOSCOPY     • CORONARY ANGIOPLASTY      WITH PTCA   • CORONARY ARTERY BYPASS GRAFT      5 VESSEL   • HAND SURGERY      TRIGGER FINGER RELEASE, TENDON RELEASE   • HEMORRHOIDECTOMY     • HERNIA REPAIR      VENTRAL HERNIA REPEATEDLY   • INCISION AND DRAINAGE LEG N/A 7/13/2020    Procedure:  INCISION AND DRAINAGE  ABDOMINAL WALL ABSCESS;  Surgeon: David Silva Jr., MD;  Location: [facility] OR;  Service: General;  Laterality: N/A;   • PACEMAKER IMPLANTATION  11/05/2018   • PORTACATH PLACEMENT Right     POWER PORT   • SINUS SURGERY     • TOTAL HIP ARTHROPLASTY Right    • TOTAL KNEE ARTHROPLASTY Right 1/8/2019    Procedure: RIGHT DISTAL FEMUR REPLACEMENT;  Surgeon: Margot Ray MD;  Location: [facility] OR;  Service: Orthopedics       Social History     Socioeconomic History   • Marital status:      Spouse name: Not on file   • Number of children: Not on file   • Years of education: Not on file   • Highest education level: Not on file   Tobacco Use   • Smoking status: Former Smoker   • Smokeless tobacco: Never Used   • Tobacco comment: QUIT 35YRS AGO   Substance and Sexual Activity   • Alcohol use: No   • Drug use: No   • Sexual activity: Defer       Family History   Problem Relation Age of Onset   • Diabetes Mother    • Cancer Mother    • No Known Problems Father        Review of Systems   Constitutional: Negative.   Cardiovascular: Positive for chest pain and leg swelling. Negative for dyspnea on exertion, orthopnea, paroxysmal nocturnal dyspnea and syncope.   Respiratory: Negative.    Hematologic/Lymphatic: Negative for bleeding problem.   Musculoskeletal: Negative for falls.   Gastrointestinal: Negative for melena.   Neurological: Negative for dizziness and light-headedness.       Allergies   Allergen Reactions   • Statins Myalgia         Current Outpatient Medications:   •  cholecalciferol (VITAMIN D3) 1000 units tablet, Take 1,000 Units by mouth Daily., Disp: , Rfl:   •  diphenhydrAMINE (BENADRYL) 25 mg capsule, Take 25 mg by mouth Every 6 (Six) Hours As Needed for Itching., Disp: , Rfl:   •  fexofenadine (ALLEGRA) 180 MG tablet, Take 180 mg by mouth Daily., Disp: , Rfl:   •  furosemide (LASIX) 40 MG tablet, Take 40 mg by mouth 2 (Two) Times a Day., Disp: , Rfl:   •   "gemfibrozil (LOPID) 600 MG tablet, Take 600 mg by mouth 2 (Two) Times a Day Before Meals., Disp: , Rfl:   •  insulin aspart (novoLOG) 100 UNIT/ML injection, Inject 20 Units under the skin into the appropriate area as directed 3 (Three) Times a Day Before Meals., Disp: , Rfl:   •  insulin detemir (LEVEMIR) 100 UNIT/ML injection, Inject 45 Units under the skin into the appropriate area as directed 2 (two) times a day., Disp: , Rfl:   •  isosorbide mononitrate (IMDUR) 60 MG 24 hr tablet, Take 1 tablet by mouth Every Morning., Disp: 90 tablet, Rfl: 3  •  omeprazole (priLOSEC) 40 MG capsule, Take 40 mg by mouth 2 (Two) Times a Day., Disp: , Rfl:   •  PEG-KCl-NaCl-NaSulf-Na Asc-C (MOVIPREP) 100 g reconstituted solution powder, Take as directed with instructions given to you from your physician., Disp: 1 each, Rfl: 0  •  polyethylene glycol (MIRALAX) packet, Take 17 g by mouth Daily., Disp: , Rfl:   •  Probiotic Product (PROBIOTIC DAILY PO), Take  by mouth., Disp: , Rfl:   •  tamsulosin (FLOMAX) 0.4 MG capsule 24 hr capsule, Take 1 capsule by mouth Daily., Disp: 30 capsule, Rfl:       Objective:     Vitals:    06/30/21 1049   BP: 130/64   Pulse: 60   Weight: 131 kg (288 lb)   Height: 177.8 cm (70\")     Body mass index is 41.32 kg/m².    PHYSICAL EXAM:    Neck:      Vascular: No JVD.   Pulmonary:      Effort: Pulmonary effort is normal.      Breath sounds: Normal breath sounds.   Cardiovascular:      Normal rate. Regular rhythm.      Murmurs: There is a systolic murmur.      No gallop. No click. No rub.   Pulses:     Intact distal pulses.           ECG 12 Lead    Date/Time: 6/30/2021 11:05 AM  Performed by: Valarie Caballero APRN  Authorized by: Valarie Caballero APRN   Comparison: compared with previous ECG from 8/19/2020  Similar to previous ECG  Rhythm: paced  Conduction: left bundle branch block    Clinical impression: abnormal EKG  Comments: Indication: CAD              Assessment:       Diagnosis Plan   1. " Atherosclerosis of coronary artery bypass graft of native heart without angina pectoris  ECG 12 Lead    Stress Test With Myocardial Perfusion One Day   2. S/P CABG (coronary artery bypass graft)  Stress Test With Myocardial Perfusion One Day   3. Nonrheumatic aortic valve stenosis     4. S/P TAVR (transcatheter aortic valve replacement)     5. Pacemaker     6. Chest pain, unspecified type  Stress Test With Myocardial Perfusion One Day     Orders Placed This Encounter   Procedures   • Stress Test With Myocardial Perfusion One Day     Standing Status:   Future     Standing Expiration Date:   6/30/2022     Order Specific Question:   What stress agent will be used?     Answer:   Regadenoson (Lexiscan)     Order Specific Question:   Difficulty walking criteria?     Answer:   Musculoskeletal (hips, knees, feet, back, amputee)     Order Specific Question:   Difficulty walking criteria?     Answer:   LBBB     Order Specific Question:   Reason for exam?     Answer:   Angina     Order Specific Question:   Reason for exam?     Answer:   Known Coronary Artery Disease   • ECG 12 Lead     This order was created via procedure documentation     Order Specific Question:   Release to patient     Answer:   Immediate          Plan:       1.  Coronary artery disease.  History of CABG.  Cardiac catheterization to his TAVR in 2018 revealed a significant lesion in his vein graft to the OM1.  Due to active GI bleeding and a small size of the lesion, intervention was deferred.  He has a documented allergy to statins.  Unfortunately, he is having symptoms of angina.  I am going to increase the isosorbide and order a nuclear stress test.      2.  Aortic stenosis.  Status post TAVR in 2018.  His last echocardiogram in January 2020 revealed normal LV systolic function, a well-functioning TAVR valve with peak and mean gradients within defined limits, and mild mitral stenosis.  He denies any symptoms of heart failure.      3.  Sick sinus syndrome  status post permanent pacemaker.  His pacemaker is functioning well.      Further recommendations will be made pending the results of his stress test.      As always, it has been a pleasure to participate in your patient's care.      Sincerely,         SHAD Meeks

## 2021-07-15 ENCOUNTER — HOSPITAL ENCOUNTER (OUTPATIENT)
Dept: CARDIOLOGY | Facility: HOSPITAL | Age: 81
Discharge: HOME | End: 2021-07-15
Admitting: NURSE PRACTITIONER

## 2021-07-15 VITALS — HEIGHT: 71 IN | BODY MASS INDEX: 40.32 KG/M2 | WEIGHT: 288 LBS

## 2021-07-15 DIAGNOSIS — I25.810 ATHEROSCLEROSIS OF CORONARY ARTERY BYPASS GRAFT OF NATIVE HEART WITHOUT ANGINA PECTORIS: ICD-10-CM

## 2021-07-15 DIAGNOSIS — R07.9 CHEST PAIN, UNSPECIFIED TYPE: ICD-10-CM

## 2021-07-15 DIAGNOSIS — Z95.1 S/P CABG (CORONARY ARTERY BYPASS GRAFT): ICD-10-CM

## 2021-07-15 LAB
BH CV NUCLEAR PRIOR STUDY: 2
BH CV REST NUCLEAR ISOTOPE DOSE: 60 MCI
BH CV STRESS BP STAGE 1: NORMAL
BH CV STRESS COMMENTS STAGE 1: NORMAL
BH CV STRESS DOSE REGADENOSON STAGE 1: 0.4
BH CV STRESS DURATION MIN STAGE 1: 0
BH CV STRESS DURATION SEC STAGE 1: 10
BH CV STRESS HR STAGE 1: 62
BH CV STRESS NUCLEAR ISOTOPE DOSE: 60 MCI
BH CV STRESS PROTOCOL 1: NORMAL
BH CV STRESS RECOVERY BP: NORMAL MMHG
BH CV STRESS RECOVERY HR: 60 BPM
BH CV STRESS STAGE 1: 1
LV EF NUC BP: 49 %
MAXIMAL PREDICTED HEART RATE: 139 BPM
PERCENT MAX PREDICTED HR: 44.6 %
STRESS BASELINE BP: NORMAL MMHG
STRESS BASELINE HR: 60 BPM
STRESS PERCENT HR: 52 %
STRESS POST EXERCISE DUR SEC: 10 SEC
STRESS POST PEAK BP: NORMAL MMHG
STRESS POST PEAK HR: 62 BPM
STRESS TARGET HR: 118 BPM

## 2021-07-15 PROCEDURE — 78492 MYOCRD IMG PET MLT RST&STRS: CPT

## 2021-07-15 PROCEDURE — 0 RUBIDIUM CHLORIDE: Performed by: NURSE PRACTITIONER

## 2021-07-15 PROCEDURE — 78492 MYOCRD IMG PET MLT RST&STRS: CPT | Performed by: INTERNAL MEDICINE

## 2021-07-15 PROCEDURE — 93016 CV STRESS TEST SUPVJ ONLY: CPT | Performed by: INTERNAL MEDICINE

## 2021-07-15 PROCEDURE — 25010000002 REGADENOSON 0.4 MG/5ML SOLUTION: Performed by: NURSE PRACTITIONER

## 2021-07-15 PROCEDURE — A9555 RB82 RUBIDIUM: HCPCS | Performed by: NURSE PRACTITIONER

## 2021-07-15 PROCEDURE — 93017 CV STRESS TEST TRACING ONLY: CPT

## 2021-07-15 PROCEDURE — 93018 CV STRESS TEST I&R ONLY: CPT | Performed by: INTERNAL MEDICINE

## 2021-07-15 RX ADMIN — REGADENOSON 0.4 MG: 0.08 INJECTION, SOLUTION INTRAVENOUS at 08:36

## 2021-07-16 ENCOUNTER — TELEPHONE (OUTPATIENT)
Dept: CARDIOLOGY | Facility: CLINIC | Age: 81
End: 2021-07-16

## 2021-07-21 PROCEDURE — 93296 REM INTERROG EVL PM/IDS: CPT | Performed by: INTERNAL MEDICINE

## 2021-07-21 PROCEDURE — 93294 REM INTERROG EVL PM/LDLS PM: CPT | Performed by: INTERNAL MEDICINE

## 2021-08-09 RX ORDER — ISOSORBIDE MONONITRATE 60 MG/1
60 TABLET, EXTENDED RELEASE ORAL EVERY MORNING
Qty: 90 TABLET | Refills: 3 | Status: SHIPPED | OUTPATIENT
Start: 2021-08-09

## 2021-08-19 ENCOUNTER — OFFICE VISIT (OUTPATIENT)
Dept: CARDIOLOGY | Facility: CLINIC | Age: 81
End: 2021-08-19

## 2021-08-19 ENCOUNTER — TELEPHONE (OUTPATIENT)
Dept: CARDIOLOGY | Facility: CLINIC | Age: 81
End: 2021-08-19

## 2021-08-19 ENCOUNTER — CLINICAL SUPPORT NO REQUIREMENTS (OUTPATIENT)
Dept: CARDIOLOGY | Facility: CLINIC | Age: 81
End: 2021-08-19

## 2021-08-19 VITALS
SYSTOLIC BLOOD PRESSURE: 128 MMHG | DIASTOLIC BLOOD PRESSURE: 64 MMHG | BODY MASS INDEX: 40.17 KG/M2 | HEART RATE: 60 BPM | HEIGHT: 71 IN

## 2021-08-19 DIAGNOSIS — I25.810 ATHEROSCLEROSIS OF CORONARY ARTERY BYPASS GRAFT OF NATIVE HEART WITHOUT ANGINA PECTORIS: Primary | ICD-10-CM

## 2021-08-19 DIAGNOSIS — Z95.1 S/P CABG (CORONARY ARTERY BYPASS GRAFT): ICD-10-CM

## 2021-08-19 DIAGNOSIS — I44.7 LEFT BUNDLE BRANCH BLOCK (LBBB): Primary | ICD-10-CM

## 2021-08-19 PROCEDURE — 93280 PM DEVICE PROGR EVAL DUAL: CPT | Performed by: INTERNAL MEDICINE

## 2021-08-19 PROCEDURE — 93000 ELECTROCARDIOGRAM COMPLETE: CPT | Performed by: NURSE PRACTITIONER

## 2021-08-19 PROCEDURE — 99213 OFFICE O/P EST LOW 20 MIN: CPT | Performed by: NURSE PRACTITIONER

## 2021-08-19 RX ORDER — ISOSORBIDE MONONITRATE 30 MG/1
30 TABLET, EXTENDED RELEASE ORAL DAILY
Qty: 90 TABLET | Refills: 3 | Status: SHIPPED | OUTPATIENT
Start: 2021-08-19 | End: 2021-12-27 | Stop reason: HOSPADM

## 2021-08-19 NOTE — TELEPHONE ENCOUNTER
Dorinda,    This patient saw Valarie today and she put that he needed a 3 month fu with Dr. Stoner. Would you please take a look and see where we can put him in.    Thanks   Britni MEDRANO

## 2021-08-19 NOTE — PROGRESS NOTES
"  Date of Office Visit: 2021  Encounter Provider: SHAD Hernandez  Place of Service: [facility] CARDIOLOGY  Patient Name: Ariel Elliott  :1940    Chief Complaint   Patient presents with   • Coronary Artery Disease   :     HPI: Ariel Elliott is a 81 y.o. male who presents today for follow-up.  Old records have been obtained and reviewed by me.  He is a patient of Dr. Stoner's with a past cardiac history significant for aortic stenosis (status post TAVR in 2018), coronary artery disease (history of CABG), atrial fibrillation/flutter, and sick sinus syndrome status post permanent pacemaker.  In , he underwent an atrial flutter ablation.    He cannot be anticoagulated due to a history of GI bleeding.  Cardiac catheterization prior to his TAVR in 2018 revealed a significant lesion in his vein graft to the OM1.  Due to active GI bleeding and a small size of the lesion, intervention was deferred.   On 2021, I saw him in the office with reports of a gradual onset of nocturnal chest pain/angina usually occurring after 7 PM.  I increased the dose of his Imdur and ordered a stress test.  The stress test revealed no evidence of ischemia.  He is here today for follow-up.   After the dose adjustment to the isosorbide, he noticed an almost immediate improvement.  He tells me he is walked more than he has in a year.  He has been experiencing some \"breakthrough\" chest pain which he reports is mostly postprandial.  It never occurs during the daytime and always seems to be in the evenings.  There was one day he forgot to take his isosorbide at all and he did experience some chest pain that day.  He denies any shortness of breath, palpitations, edema, dizziness, or syncope.    Past Medical History:   Diagnosis Date   • Anemia    • Anemia     2 UNITS OF BLOOD SAT 11/10. [facility]   • Aortic stenosis    • Arrhythmia    • Atrial flutter " (CMS/HCC)    • Congestive heart failure (CMS/HCC)    • Coronary atherosclerosis    • Diabetes mellitus (CMS/HCC)     TYPE 2   • GI (gastrointestinal bleed)    • Gout    • H/O seasonal allergies    • Hard to intubate     SEVERAL TIMES IN PAST. NO ISSUES RECENT HISTORY   • Herpes     HANDS IN THE PAST   • Heyd syndrome (CMS/HCC)    • History of diverticulitis    • History of transfusion    • Hyperlipidemia    • Pacemaker     LEFT.   • Post-nasal drip    • Sleep apnea     Bi-Pap   • Von Willebrand disease (CMS/HCC)        Past Surgical History:   Procedure Laterality Date   • ABLATION OF DYSRHYTHMIC FOCUS     • AORTIC VALVE REPAIR/REPLACEMENT N/A 11/13/2018    Procedure: INTRAOPERATIVE LANG, PERCUTANEOUS TRANSFEMORAL TRANSCATHETER AORTIC VALVE REPLACEMENT PERCUTANEOUS APPROACH;  Surgeon: Elizabeth Meade MD;  Location: [facility] HYBRID OR 18/19;  Service: Cardiothoracic   • CARDIAC CATHETERIZATION     • CARDIAC CATHETERIZATION N/A 11/1/2018    Procedure: Right and Left Heart Cath;  Surgeon: Dionicio Stoner MD;  Location: [facility] CATH INVASIVE LOCATION;  Service: Cardiology   • CARDIAC CATHETERIZATION  11/1/2018    Procedure: Saphenous Vein Graft;  Surgeon: Dionicio Stoner MD;  Location: [facility] CATH INVASIVE LOCATION;  Service: Cardiology   • CARDIAC ELECTROPHYSIOLOGY PROCEDURE Left 11/5/2018    Procedure: Pacemaker DC new   [place];  Surgeon: Mina Castillo MD;  Location: [facility] CATH INVASIVE LOCATION;  Service: Cardiology   • CATARACT EXTRACTION WITH INTRAOCULAR LENS IMPLANT      LEFT AND RIGHT   • CHOLECYSTECTOMY     • COLONOSCOPY     • CORONARY ANGIOPLASTY      WITH PTCA   • CORONARY ARTERY BYPASS GRAFT      5 VESSEL   • HAND SURGERY      TRIGGER FINGER RELEASE, TENDON RELEASE   • HEMORRHOIDECTOMY     • HERNIA REPAIR      VENTRAL HERNIA REPEATEDLY   • INCISION AND DRAINAGE LEG N/A 7/13/2020    Procedure: INCISION AND DRAINAGE  ABDOMINAL WALL ABSCESS;  Surgeon: David Silva Jr., MD;  Location: [facility] OR;   Service: General;  Laterality: N/A;   • PACEMAKER IMPLANTATION  11/05/2018   • PORTACATH PLACEMENT Right     POWER PORT   • SINUS SURGERY     • TOTAL HIP ARTHROPLASTY Right    • TOTAL KNEE ARTHROPLASTY Right 1/8/2019    Procedure: RIGHT DISTAL FEMUR REPLACEMENT;  Surgeon: Margot Ray MD;  Location: [facility] OR;  Service: Orthopedics       Social History     Socioeconomic History   • Marital status:      Spouse name: Not on file   • Number of children: Not on file   • Years of education: Not on file   • Highest education level: Not on file   Tobacco Use   • Smoking status: Former Smoker   • Smokeless tobacco: Never Used   • Tobacco comment: QUIT 35YRS AGO   Substance and Sexual Activity   • Alcohol use: No   • Drug use: No   • Sexual activity: Defer       Family History   Problem Relation Age of Onset   • Diabetes Mother    • Cancer Mother    • No Known Problems Father        Review of Systems   Constitutional: Negative.   Cardiovascular: Positive for chest pain. Negative for dyspnea on exertion, leg swelling, orthopnea, paroxysmal nocturnal dyspnea and syncope.   Respiratory: Negative.    Hematologic/Lymphatic: Negative for bleeding problem.   Musculoskeletal: Negative for falls.   Gastrointestinal: Negative for melena.   Neurological: Negative for dizziness and light-headedness.       Allergies   Allergen Reactions   • Statins Myalgia         Current Outpatient Medications:   •  cholecalciferol (VITAMIN D3) 1000 units tablet, Take 1,000 Units by mouth Daily., Disp: , Rfl:   •  fexofenadine (ALLEGRA) 180 MG tablet, Take 180 mg by mouth Daily., Disp: , Rfl:   •  furosemide (LASIX) 40 MG tablet, Take 40 mg by mouth 2 (Two) Times a Day., Disp: , Rfl:   •  gemfibrozil (LOPID) 600 MG tablet, Take 600 mg by mouth 2 (Two) Times a Day Before Meals., Disp: , Rfl:   •  insulin aspart (novoLOG) 100 UNIT/ML injection, Inject 20 Units under the skin into the appropriate area as directed 3 (Three) Times a  "Day Before Meals., Disp: , Rfl:   •  insulin detemir (LEVEMIR) 100 UNIT/ML injection, Inject 45 Units under the skin into the appropriate area as directed 2 (two) times a day., Disp: , Rfl:   •  isosorbide mononitrate (IMDUR) 60 MG 24 hr tablet, Take 1 tablet by mouth Every Morning., Disp: 90 tablet, Rfl: 3  •  omeprazole (priLOSEC) 40 MG capsule, Take 40 mg by mouth 2 (Two) Times a Day., Disp: , Rfl:   •  PEG-KCl-NaCl-NaSulf-Na Asc-C (MOVIPREP) 100 g reconstituted solution powder, Take as directed with instructions given to you from your physician., Disp: 1 each, Rfl: 0  •  polyethylene glycol (MIRALAX) packet, Take 17 g by mouth Daily., Disp: , Rfl:   •  Probiotic Product (PROBIOTIC DAILY PO), Take  by mouth., Disp: , Rfl:   •  tamsulosin (FLOMAX) 0.4 MG capsule 24 hr capsule, Take 1 capsule by mouth Daily., Disp: 30 capsule, Rfl:   •  isosorbide mononitrate (IMDUR) 30 MG 24 hr tablet, Take 1 tablet by mouth Daily., Disp: 90 tablet, Rfl: 3      Objective:     Vitals:    08/19/21 1342   BP: 128/64   Pulse: 60   Height: 180.3 cm (71\")     Body mass index is 40.17 kg/m².    PHYSICAL EXAM:    Neck:      Vascular: No JVD.   Pulmonary:      Effort: Pulmonary effort is normal.      Breath sounds: Normal breath sounds.   Cardiovascular:      Normal rate. Regular rhythm.      Murmurs: There is a systolic murmur.      No gallop. No click. No rub.   Pulses:     Intact distal pulses.           ECG 12 Lead    Date/Time: 8/19/2021 1:51 PM  Performed by: Valarie Caballero APRN  Authorized by: Valarie Caballero APRN   Comparison: compared with previous ECG from 6/30/2021  Similar to previous ECG  Rhythm: paced  Rate: normal  BPM: 60  Pacing: atrial sensed rhythm  Clinical impression: abnormal EKG  Comments: Indication: CAD              Assessment:       Diagnosis Plan   1. Atherosclerosis of coronary artery bypass graft of native heart without angina pectoris  ECG 12 Lead   2. S/P CABG (coronary artery bypass graft)   "     Orders Placed This Encounter   Procedures   • ECG 12 Lead     This order was created via procedure documentation     Order Specific Question:   Release to patient     Answer:   Immediate          Plan:       I think he is doing better.  The pain he reports has both anginal and nonanginal characteristics.  The recent stress test revealed no ischemia.  I think we should increase his Imdur again to 90 mg daily.  He will follow-up with Dr. Stoner in 3 months.  At that time, if he is still reporting breakthrough chest pain, I think we will need to consider cardiac catheterization.        As always, it has been a pleasure to participate in your patient's care.      Sincerely,         SHAD Meeks

## 2021-11-23 ENCOUNTER — OFFICE VISIT (OUTPATIENT)
Dept: CARDIOLOGY | Facility: CLINIC | Age: 81
End: 2021-11-23

## 2021-11-23 VITALS
DIASTOLIC BLOOD PRESSURE: 70 MMHG | SYSTOLIC BLOOD PRESSURE: 130 MMHG | BODY MASS INDEX: 42.92 KG/M2 | WEIGHT: 299.8 LBS | HEART RATE: 60 BPM | HEIGHT: 70 IN

## 2021-11-23 DIAGNOSIS — E11.8 TYPE 2 DIABETES MELLITUS WITH COMPLICATION, WITH LONG-TERM CURRENT USE OF INSULIN (HCC): ICD-10-CM

## 2021-11-23 DIAGNOSIS — Z95.1 S/P CABG (CORONARY ARTERY BYPASS GRAFT): ICD-10-CM

## 2021-11-23 DIAGNOSIS — Z79.4 TYPE 2 DIABETES MELLITUS WITH COMPLICATION, WITH LONG-TERM CURRENT USE OF INSULIN (HCC): ICD-10-CM

## 2021-11-23 DIAGNOSIS — Z95.2 S/P TAVR (TRANSCATHETER AORTIC VALVE REPLACEMENT): ICD-10-CM

## 2021-11-23 DIAGNOSIS — I35.0 NONRHEUMATIC AORTIC VALVE STENOSIS: Primary | ICD-10-CM

## 2021-11-23 DIAGNOSIS — I44.7 LEFT BUNDLE BRANCH BLOCK (LBBB): ICD-10-CM

## 2021-11-23 PROCEDURE — 99214 OFFICE O/P EST MOD 30 MIN: CPT | Performed by: INTERNAL MEDICINE

## 2021-11-23 PROCEDURE — 93000 ELECTROCARDIOGRAM COMPLETE: CPT | Performed by: INTERNAL MEDICINE

## 2021-11-23 RX ORDER — NITROGLYCERIN 0.4 MG/1
0.4 TABLET SUBLINGUAL
COMMUNITY

## 2021-11-23 NOTE — PROGRESS NOTES
Date of Office Visit: 21  Encounter Provider: Dionicio Stoner MD  Place of Service: [facility] CARDIOLOGY  Patient Name: Ariel Elliott  :1940  [id number]    Chief Complaint   Patient presents with   • Coronary Artery Disease   :     HPI: Ariel Elliott is a 81 y.o. male patient of mine. He had severe aortic stenosis, prior CABG, morbidly obese, severe sick sinus syndrome. He underwent a permanent pacemaker placement and then he underwent a #26 Riley TAVR valve in 2018.      He presents today for follow-up. He is currently short of breath. He states he was having chest pain and took 2 sublingual nitroglycerin tablets after walking from his car to the exam room.  He states that this relieved the chest pain.     He also says his hemoglobin dropped to 6.8 causing a few weeks ago, and he had to get 2 units of blood in the past 2 weeks.  Is supposed to have a CBC redrawn in a week.  He denies any overt bleeding, but does say he occasionally has very dark stools. He denies PND or orthopnea.     He comes in today is pretty short of breath had to take 2 and a nitro walking into the office because he was short of breath he is not having PND orthopnea his edema is unchanged his morbid obesity is unchanged.  He thinks he needs a cath but he still has bleeding    Past Medical History:   Diagnosis Date   • Anemia    • Anemia     2 UNITS OF BLOOD SAT 11/10. [facility]   • Aortic stenosis    • Arrhythmia    • Atrial flutter (HCC)    • Congestive heart failure (HCC)    • Coronary atherosclerosis    • Diabetes mellitus (HCC)     TYPE 2   • GI (gastrointestinal bleed)    • Gout    • H/O seasonal allergies    • Hard to intubate     SEVERAL TIMES IN PAST. NO ISSUES RECENT HISTORY   • Herpes     HANDS IN THE PAST   • Heyd syndrome (HCC)    • History of diverticulitis    • History of transfusion    • Hyperlipidemia    • Pacemaker     LEFT.   •  Post-nasal drip    • Sleep apnea     Bi-Pap   • Von Willebrand disease (HCC)        Past Surgical History:   Procedure Laterality Date   • ABLATION OF DYSRHYTHMIC FOCUS     • AORTIC VALVE REPAIR/REPLACEMENT N/A 11/13/2018    Procedure: INTRAOPERATIVE LANG, PERCUTANEOUS TRANSFEMORAL TRANSCATHETER AORTIC VALVE REPLACEMENT PERCUTANEOUS APPROACH;  Surgeon: Elizabeth Meade MD;  Location: [facility] OR 18/19;  Service: Cardiothoracic   • CARDIAC CATHETERIZATION     • CARDIAC CATHETERIZATION N/A 11/1/2018    Procedure: Right and Left Heart Cath;  Surgeon: Dionicio Stoner MD;  Location: [facility] CATH INVASIVE LOCATION;  Service: Cardiology   • CARDIAC CATHETERIZATION  11/1/2018    Procedure: Saphenous Vein Graft;  Surgeon: Dionicio Stoner MD;  Location: [facility] CATH INVASIVE LOCATION;  Service: Cardiology   • CARDIAC ELECTROPHYSIOLOGY PROCEDURE Left 11/5/2018    Procedure: Pacemaker DC new   [place];  Surgeon: Mina Castillo MD;  Location: [facility] CATH INVASIVE LOCATION;  Service: Cardiology   • CATARACT EXTRACTION WITH INTRAOCULAR LENS IMPLANT      LEFT AND RIGHT   • CHOLECYSTECTOMY     • COLONOSCOPY     • CORONARY ANGIOPLASTY      WITH PTCA   • CORONARY ARTERY BYPASS GRAFT      5 VESSEL   • HAND SURGERY      TRIGGER FINGER RELEASE, TENDON RELEASE   • HEMORRHOIDECTOMY     • HERNIA REPAIR      VENTRAL HERNIA REPEATEDLY   • INCISION AND DRAINAGE LEG N/A 7/13/2020    Procedure: INCISION AND DRAINAGE  ABDOMINAL WALL ABSCESS;  Surgeon: David Silva Jr., MD;  Location: [facility] OR;  Service: General;  Laterality: N/A;   • PACEMAKER IMPLANTATION  11/05/2018   • PORTACATH PLACEMENT Right     POWER PORT   • SINUS SURGERY     • TOTAL HIP ARTHROPLASTY Right    • TOTAL KNEE ARTHROPLASTY Right 1/8/2019    Procedure: RIGHT DISTAL FEMUR REPLACEMENT;  Surgeon: Margot Ray MD;  Location: [facility] OR;  Service: Orthopedics       Social History     Socioeconomic History   • Marital status:    Tobacco Use    • Smoking status: Former Smoker   • Smokeless tobacco: Never Used   • Tobacco comment: QUIT 35YRS AGO   Substance and Sexual Activity   • Alcohol use: No   • Drug use: No   • Sexual activity: Defer       Family History   Problem Relation Age of Onset   • Diabetes Mother    • Cancer Mother    • No Known Problems Father        Review of Systems   Constitutional: Negative for decreased appetite, fever, malaise/fatigue and weight loss.   HENT: Negative for nosebleeds.    Eyes: Negative for double vision.   Cardiovascular: Negative for chest pain, claudication, cyanosis, dyspnea on exertion, irregular heartbeat, leg swelling, near-syncope, orthopnea, palpitations, paroxysmal nocturnal dyspnea and syncope.   Respiratory: Negative for cough, hemoptysis and shortness of breath.    Hematologic/Lymphatic: Negative for bleeding problem.   Skin: Negative for rash.   Musculoskeletal: Negative for falls and myalgias.   Gastrointestinal: Negative for hematochezia, jaundice, melena, nausea and vomiting.   Genitourinary: Negative for hematuria.   Neurological: Negative for dizziness and seizures.   Psychiatric/Behavioral: Negative for altered mental status and memory loss.       Allergies   Allergen Reactions   • Statins Myalgia         Current Outpatient Medications:   •  cholecalciferol (VITAMIN D3) 1000 units tablet, Take 1,000 Units by mouth Daily., Disp: , Rfl:   •  fexofenadine (ALLEGRA) 180 MG tablet, Take 180 mg by mouth Daily., Disp: , Rfl:   •  furosemide (LASIX) 40 MG tablet, Take 40 mg by mouth 2 (Two) Times a Day., Disp: , Rfl:   •  gemfibrozil (LOPID) 600 MG tablet, Take 600 mg by mouth 2 (Two) Times a Day Before Meals., Disp: , Rfl:   •  insulin aspart (novoLOG) 100 UNIT/ML injection, Inject 20 Units under the skin into the appropriate area as directed 3 (Three) Times a Day Before Meals., Disp: , Rfl:   •  insulin detemir (LEVEMIR) 100 UNIT/ML injection, Inject 45 Units under the skin into the appropriate area as  "directed 2 (two) times a day., Disp: , Rfl:   •  isosorbide mononitrate (IMDUR) 30 MG 24 hr tablet, Take 1 tablet by mouth Daily., Disp: 90 tablet, Rfl: 3  •  isosorbide mononitrate (IMDUR) 60 MG 24 hr tablet, Take 1 tablet by mouth Every Morning., Disp: 90 tablet, Rfl: 3  •  nitroglycerin (NITROSTAT) 0.4 MG SL tablet, Place 0.4 mg under the tongue Every 5 (Five) Minutes As Needed for Chest Pain. Take no more than 3 doses in 15 minutes., Disp: , Rfl:   •  omeprazole (priLOSEC) 40 MG capsule, Take 40 mg by mouth 2 (Two) Times a Day., Disp: , Rfl:   •  polyethylene glycol (MIRALAX) packet, Take 17 g by mouth Daily., Disp: , Rfl:   •  tamsulosin (FLOMAX) 0.4 MG capsule 24 hr capsule, Take 1 capsule by mouth Daily., Disp: 30 capsule, Rfl:   •  PEG-KCl-NaCl-NaSulf-Na Asc-C (MOVIPREP) 100 g reconstituted solution powder, Take as directed with instructions given to you from your physician., Disp: 1 each, Rfl: 0  •  Probiotic Product (PROBIOTIC DAILY PO), Take  by mouth., Disp: , Rfl:       Objective:     Vitals:    11/23/21 1244   BP: 130/70   Pulse: 60   Weight: 136 kg (299 lb 12.8 oz)   Height: 177.8 cm (70\")     Body mass index is 43.02 kg/m².    Physical Exam  Constitutional:       Appearance: He is well-developed.   HENT:      Head: Normocephalic.   Eyes:      General: No scleral icterus.  Neck:      Thyroid: No thyromegaly.      Vascular: No JVD.   Cardiovascular:      Rate and Rhythm: Normal rate and regular rhythm.      Heart sounds: Murmur heard.    Low-pitched harsh crescendo-decrescendo early systolic murmur is present with a grade of 1/6.  No friction rub. No gallop.    Pulmonary:      Effort: Pulmonary effort is normal.      Breath sounds: Normal breath sounds. No wheezing or rales.   Abdominal:      Palpations: Abdomen is soft.      Tenderness: There is no abdominal tenderness.   Musculoskeletal:         General: Normal range of motion.   Lymphadenopathy:      Cervical: No cervical adenopathy.   Skin:     " General: Skin is warm and dry.      Findings: No rash.   Neurological:      Mental Status: He is alert and oriented to person, place, and time.           ECG 12 Lead    Date/Time: 11/23/2021 1:43 PM  Performed by: Dionicio Stoner MD  Authorized by: Dionicio Stoner MD   Rhythm: paced  Conduction: non-specific intraventricular conduction delay    Clinical impression: abnormal EKG             Assessment:       Diagnosis Plan   1. Nonrheumatic aortic valve stenosis     2. S/P CABG (coronary artery bypass graft)     3. Left bundle branch block (LBBB)     4. S/P TAVR (transcatheter aortic valve replacement)     5. Type 2 diabetes mellitus with complication, with long-term current use of insulin (HCC)            Plan:     This is a really tough situation I still is GI bleeding is not as much as before the TAVR but it still is a lot he has significant coronary disease that we did we did not treat before now it seems like it is worse it is hard to say if this is related just to his low hemoglobin which certainly is not making things better I have explained this to him and his family at length he understood we are going to hold off doing anything right now he is going to come back and see me in a month.  I have a feeling at that time is going to say is miserable and ready to cath him and if we do find something we can only probably treat him with bare-metal stenting and his further access for the cath was difficult to do from the left arm but is he is got a big pannus and going in his leg is not great so probably would go back from his left arm and maybe use a guide liner to get better pictures no great options here    As always, it has been a pleasure to participate in your patient's care.      Sincerely,       Dionicio Stoner MD

## 2021-12-19 ENCOUNTER — HOSPITAL ENCOUNTER (INPATIENT)
Facility: HOSPITAL | Age: 81
LOS: 8 days | Discharge: HOME | End: 2021-12-27
Attending: EMERGENCY MEDICINE | Admitting: HOSPITALIST

## 2021-12-19 ENCOUNTER — APPOINTMENT (OUTPATIENT)
Dept: GENERAL RADIOLOGY | Facility: HOSPITAL | Age: 81
End: 2021-12-19

## 2021-12-19 DIAGNOSIS — U07.1 PNEUMONIA DUE TO COVID-19 VIRUS: ICD-10-CM

## 2021-12-19 DIAGNOSIS — N17.9 AKI (ACUTE KIDNEY INJURY): ICD-10-CM

## 2021-12-19 DIAGNOSIS — I50.9 CONGESTIVE HEART FAILURE, UNSPECIFIED HF CHRONICITY, UNSPECIFIED HEART FAILURE TYPE: ICD-10-CM

## 2021-12-19 DIAGNOSIS — J96.02 ACUTE RESPIRATORY FAILURE WITH HYPOXIA AND HYPERCAPNIA: Primary | ICD-10-CM

## 2021-12-19 DIAGNOSIS — J12.82 PNEUMONIA DUE TO COVID-19 VIRUS: ICD-10-CM

## 2021-12-19 DIAGNOSIS — J96.01 ACUTE RESPIRATORY FAILURE WITH HYPOXIA AND HYPERCAPNIA: Primary | ICD-10-CM

## 2021-12-19 LAB
ALBUMIN SERPL-MCNC: 3.4 G/DL (ref 3.5–5.2)
ALBUMIN/GLOB SERPL: 0.9 G/DL
ALP SERPL-CCNC: 78 U/L (ref 39–117)
ALT SERPL W P-5'-P-CCNC: 10 U/L (ref 1–41)
ANION GAP SERPL CALCULATED.3IONS-SCNC: 15.8 MMOL/L (ref 5–15)
ARTERIAL PATENCY WRIST A: POSITIVE
ARTERIAL PATENCY WRIST A: POSITIVE
AST SERPL-CCNC: 15 U/L (ref 1–40)
ATMOSPHERIC PRESS: 757.3 MMHG
ATMOSPHERIC PRESS: 757.8 MMHG
B PARAPERT DNA SPEC QL NAA+PROBE: NOT DETECTED
B PERT DNA SPEC QL NAA+PROBE: NOT DETECTED
BASE EXCESS BLDA CALC-SCNC: -4.1 MMOL/L (ref 0–2)
BASE EXCESS BLDA CALC-SCNC: -4.2 MMOL/L (ref 0–2)
BASOPHILS # BLD AUTO: 0.02 10*3/MM3 (ref 0–0.2)
BASOPHILS NFR BLD AUTO: 0.2 % (ref 0–1.5)
BDY SITE: ABNORMAL
BDY SITE: ABNORMAL
BILIRUB SERPL-MCNC: 0.2 MG/DL (ref 0–1.2)
BUN SERPL-MCNC: 79 MG/DL (ref 8–23)
BUN/CREAT SERPL: 21.5 (ref 7–25)
C PNEUM DNA NPH QL NAA+NON-PROBE: NOT DETECTED
CALCIUM SPEC-SCNC: 8.3 MG/DL (ref 8.6–10.5)
CHLORIDE SERPL-SCNC: 98 MMOL/L (ref 98–107)
CO2 SERPL-SCNC: 23.2 MMOL/L (ref 22–29)
CREAT SERPL-MCNC: 3.68 MG/DL (ref 0.76–1.27)
DEPRECATED RDW RBC AUTO: 46.8 FL (ref 37–54)
EOSINOPHIL # BLD AUTO: 0.15 10*3/MM3 (ref 0–0.4)
EOSINOPHIL NFR BLD AUTO: 1.7 % (ref 0.3–6.2)
ERYTHROCYTE [DISTWIDTH] IN BLOOD BY AUTOMATED COUNT: 14.8 % (ref 12.3–15.4)
FLUAV SUBTYP SPEC NAA+PROBE: NOT DETECTED
FLUBV RNA ISLT QL NAA+PROBE: NOT DETECTED
GAS FLOW AIRWAY: 3 LPM
GFR SERPL CREATININE-BSD FRML MDRD: 16 ML/MIN/1.73
GLOBULIN UR ELPH-MCNC: 3.6 GM/DL
GLUCOSE SERPL-MCNC: 228 MG/DL (ref 65–99)
HADV DNA SPEC NAA+PROBE: NOT DETECTED
HCO3 BLDA-SCNC: 22.4 MMOL/L (ref 22–28)
HCO3 BLDA-SCNC: 24.3 MMOL/L (ref 22–28)
HCOV 229E RNA SPEC QL NAA+PROBE: NOT DETECTED
HCOV HKU1 RNA SPEC QL NAA+PROBE: NOT DETECTED
HCOV NL63 RNA SPEC QL NAA+PROBE: NOT DETECTED
HCOV OC43 RNA SPEC QL NAA+PROBE: NOT DETECTED
HCT VFR BLD AUTO: 27.4 % (ref 37.5–51)
HGB BLD-MCNC: 9 G/DL (ref 13–17.7)
HMPV RNA NPH QL NAA+NON-PROBE: NOT DETECTED
HPIV1 RNA ISLT QL NAA+PROBE: NOT DETECTED
HPIV2 RNA SPEC QL NAA+PROBE: NOT DETECTED
HPIV3 RNA NPH QL NAA+PROBE: NOT DETECTED
HPIV4 P GENE NPH QL NAA+PROBE: NOT DETECTED
IMM GRANULOCYTES # BLD AUTO: 0.1 10*3/MM3 (ref 0–0.05)
IMM GRANULOCYTES NFR BLD AUTO: 1.1 % (ref 0–0.5)
INHALED O2 CONCENTRATION: 35 %
LYMPHOCYTES # BLD AUTO: 0.5 10*3/MM3 (ref 0.7–3.1)
LYMPHOCYTES NFR BLD AUTO: 5.7 % (ref 19.6–45.3)
M PNEUMO IGG SER IA-ACNC: NOT DETECTED
MCH RBC QN AUTO: 28.4 PG (ref 26.6–33)
MCHC RBC AUTO-ENTMCNC: 32.8 G/DL (ref 31.5–35.7)
MCV RBC AUTO: 86.4 FL (ref 79–97)
MODALITY: ABNORMAL
MODALITY: ABNORMAL
MONOCYTES # BLD AUTO: 0.51 10*3/MM3 (ref 0.1–0.9)
MONOCYTES NFR BLD AUTO: 5.8 % (ref 5–12)
MRSA DNA SPEC QL NAA+PROBE: NORMAL
NEUTROPHILS NFR BLD AUTO: 7.54 10*3/MM3 (ref 1.7–7)
NEUTROPHILS NFR BLD AUTO: 85.5 % (ref 42.7–76)
NRBC BLD AUTO-RTO: 0.2 /100 WBC (ref 0–0.2)
NT-PROBNP SERPL-MCNC: ABNORMAL PG/ML (ref 0–1800)
O2 A-A PPRESDIFF RESPIRATORY: 0.6 MMHG
PCO2 BLDA: 47.8 MM HG (ref 35–45)
PCO2 BLDA: 62.8 MM HG (ref 35–45)
PH BLDA: 7.2 PH UNITS (ref 7.35–7.45)
PH BLDA: 7.28 PH UNITS (ref 7.35–7.45)
PLATELET # BLD AUTO: 253 10*3/MM3 (ref 140–450)
PMV BLD AUTO: 10.8 FL (ref 6–12)
PO2 BLDA: 113.7 MM HG (ref 80–100)
PO2 BLDA: 75.4 MM HG (ref 80–100)
POTASSIUM SERPL-SCNC: 3.7 MMOL/L (ref 3.5–5.2)
PROCALCITONIN SERPL-MCNC: 0.42 NG/ML (ref 0–0.25)
PROT SERPL-MCNC: 7 G/DL (ref 6–8.5)
RBC # BLD AUTO: 3.17 10*6/MM3 (ref 4.14–5.8)
RHINOVIRUS RNA SPEC NAA+PROBE: NOT DETECTED
RSV RNA NPH QL NAA+NON-PROBE: NOT DETECTED
SAO2 % BLDCOA: 90.7 % (ref 92–99)
SAO2 % BLDCOA: 97.8 % (ref 92–99)
SARS-COV-2 RNA NPH QL NAA+NON-PROBE: DETECTED
SET MECH RESP RATE: 18
SODIUM SERPL-SCNC: 137 MMOL/L (ref 136–145)
TOTAL RATE: 23 BREATHS/MINUTE
TOTAL RATE: 26 BREATHS/MINUTE
TROPONIN T SERPL-MCNC: 0.14 NG/ML (ref 0–0.03)
VT ON VENT VENT: 606 ML
WBC NRBC COR # BLD: 8.82 10*3/MM3 (ref 3.4–10.8)

## 2021-12-19 PROCEDURE — 85025 COMPLETE CBC W/AUTO DIFF WBC: CPT | Performed by: EMERGENCY MEDICINE

## 2021-12-19 PROCEDURE — 25010000002 DEXAMETHASONE PER 1 MG: Performed by: EMERGENCY MEDICINE

## 2021-12-19 PROCEDURE — 84145 PROCALCITONIN (PCT): CPT | Performed by: EMERGENCY MEDICINE

## 2021-12-19 PROCEDURE — 84484 ASSAY OF TROPONIN QUANT: CPT | Performed by: EMERGENCY MEDICINE

## 2021-12-19 PROCEDURE — 82803 BLOOD GASES ANY COMBINATION: CPT

## 2021-12-19 PROCEDURE — 94799 UNLISTED PULMONARY SVC/PX: CPT

## 2021-12-19 PROCEDURE — 36600 WITHDRAWAL OF ARTERIAL BLOOD: CPT

## 2021-12-19 PROCEDURE — 99284 EMERGENCY DEPT VISIT MOD MDM: CPT

## 2021-12-19 PROCEDURE — 93010 ELECTROCARDIOGRAM REPORT: CPT | Performed by: INTERNAL MEDICINE

## 2021-12-19 PROCEDURE — 94660 CPAP INITIATION&MGMT: CPT

## 2021-12-19 PROCEDURE — 71045 X-RAY EXAM CHEST 1 VIEW: CPT

## 2021-12-19 PROCEDURE — 25010000002 CEFTRIAXONE PER 250 MG: Performed by: INTERNAL MEDICINE

## 2021-12-19 PROCEDURE — 87641 MR-STAPH DNA AMP PROBE: CPT | Performed by: INTERNAL MEDICINE

## 2021-12-19 PROCEDURE — 80053 COMPREHEN METABOLIC PANEL: CPT | Performed by: EMERGENCY MEDICINE

## 2021-12-19 PROCEDURE — 93005 ELECTROCARDIOGRAM TRACING: CPT | Performed by: EMERGENCY MEDICINE

## 2021-12-19 PROCEDURE — 83880 ASSAY OF NATRIURETIC PEPTIDE: CPT | Performed by: EMERGENCY MEDICINE

## 2021-12-19 PROCEDURE — 0202U NFCT DS 22 TRGT SARS-COV-2: CPT | Performed by: EMERGENCY MEDICINE

## 2021-12-19 RX ORDER — DEXAMETHASONE SODIUM PHOSPHATE 10 MG/ML
6 INJECTION INTRAMUSCULAR; INTRAVENOUS ONCE
Status: COMPLETED | OUTPATIENT
Start: 2021-12-19 | End: 2021-12-19

## 2021-12-19 RX ORDER — BUMETANIDE 0.25 MG/ML
2 INJECTION INTRAMUSCULAR; INTRAVENOUS ONCE
Status: COMPLETED | OUTPATIENT
Start: 2021-12-19 | End: 2021-12-19

## 2021-12-19 RX ORDER — SODIUM CHLORIDE 0.9 % (FLUSH) 0.9 %
10 SYRINGE (ML) INJECTION AS NEEDED
Status: DISCONTINUED | OUTPATIENT
Start: 2021-12-19 | End: 2021-12-27 | Stop reason: HOSPADM

## 2021-12-19 RX ORDER — NITROGLYCERIN 0.4 MG/1
0.4 TABLET SUBLINGUAL
Status: DISCONTINUED | OUTPATIENT
Start: 2021-12-19 | End: 2021-12-20

## 2021-12-19 RX ADMIN — DEXAMETHASONE SODIUM PHOSPHATE 6 MG: 10 INJECTION, SOLUTION INTRAMUSCULAR; INTRAVENOUS at 18:48

## 2021-12-19 RX ADMIN — BUMETANIDE 2 MG: 0.25 INJECTION INTRAMUSCULAR; INTRAVENOUS at 17:38

## 2021-12-19 RX ADMIN — CEFTRIAXONE SODIUM 1 G: 1 INJECTION, POWDER, FOR SOLUTION INTRAMUSCULAR; INTRAVENOUS at 21:41

## 2021-12-20 ENCOUNTER — APPOINTMENT (OUTPATIENT)
Dept: GENERAL RADIOLOGY | Facility: HOSPITAL | Age: 81
End: 2021-12-20

## 2021-12-20 LAB
ALBUMIN SERPL-MCNC: 2.6 G/DL (ref 3.5–5.2)
ALBUMIN SERPL-MCNC: 2.8 G/DL (ref 3.5–5.2)
ALBUMIN/GLOB SERPL: 0.7 G/DL
ALP SERPL-CCNC: 65 U/L (ref 39–117)
ALP SERPL-CCNC: 66 U/L (ref 39–117)
ALT SERPL W P-5'-P-CCNC: 6 U/L (ref 1–41)
ALT SERPL W P-5'-P-CCNC: 7 U/L (ref 1–41)
ANION GAP SERPL CALCULATED.3IONS-SCNC: 15.9 MMOL/L (ref 5–15)
ARTERIAL PATENCY WRIST A: ABNORMAL
AST SERPL-CCNC: 15 U/L (ref 1–40)
AST SERPL-CCNC: 17 U/L (ref 1–40)
ATMOSPHERIC PRESS: 757 MMHG
BASE EXCESS BLDA CALC-SCNC: -4.3 MMOL/L (ref 0–2)
BASOPHILS # BLD AUTO: 0.01 10*3/MM3 (ref 0–0.2)
BASOPHILS NFR BLD AUTO: 0.2 % (ref 0–1.5)
BDY SITE: ABNORMAL
BILIRUB CONJ SERPL-MCNC: <0.2 MG/DL (ref 0–0.3)
BILIRUB INDIRECT SERPL-MCNC: ABNORMAL MG/DL
BILIRUB SERPL-MCNC: <0.2 MG/DL (ref 0–1.2)
BILIRUB SERPL-MCNC: <0.2 MG/DL (ref 0–1.2)
BUN SERPL-MCNC: 82 MG/DL (ref 8–23)
BUN/CREAT SERPL: 22.9 (ref 7–25)
CALCIUM SPEC-SCNC: 8 MG/DL (ref 8.6–10.5)
CHLORIDE SERPL-SCNC: 102 MMOL/L (ref 98–107)
CO2 SERPL-SCNC: 18.1 MMOL/L (ref 22–29)
CREAT SERPL-MCNC: 3.04 MG/DL (ref 0.76–1.27)
CREAT SERPL-MCNC: 3.58 MG/DL (ref 0.76–1.27)
D DIMER PPP FEU-MCNC: 11.26 MCGFEU/ML (ref 0–0.49)
DEPRECATED RDW RBC AUTO: 44.8 FL (ref 37–54)
EOSINOPHIL # BLD AUTO: 0.01 10*3/MM3 (ref 0–0.4)
EOSINOPHIL NFR BLD AUTO: 0.2 % (ref 0.3–6.2)
ERYTHROCYTE [DISTWIDTH] IN BLOOD BY AUTOMATED COUNT: 14.8 % (ref 12.3–15.4)
GAS FLOW AIRWAY: 2 LPM
GFR SERPL CREATININE-BSD FRML MDRD: 16 ML/MIN/1.73
GFR SERPL CREATININE-BSD FRML MDRD: 20 ML/MIN/1.73
GLOBULIN UR ELPH-MCNC: 3.6 GM/DL
GLUCOSE BLDC GLUCOMTR-MCNC: 185 MG/DL (ref 70–130)
GLUCOSE BLDC GLUCOMTR-MCNC: 185 MG/DL (ref 70–130)
GLUCOSE BLDC GLUCOMTR-MCNC: 246 MG/DL (ref 70–130)
GLUCOSE BLDC GLUCOMTR-MCNC: 249 MG/DL (ref 70–130)
GLUCOSE SERPL-MCNC: 232 MG/DL (ref 65–99)
HCO3 BLDA-SCNC: 23.1 MMOL/L (ref 22–28)
HCT VFR BLD AUTO: 25 % (ref 37.5–51)
HGB BLD-MCNC: 8.3 G/DL (ref 13–17.7)
INR PPP: 1.16 (ref 0.9–1.1)
LYMPHOCYTES # BLD AUTO: 0.25 10*3/MM3 (ref 0.7–3.1)
LYMPHOCYTES NFR BLD AUTO: 4.1 % (ref 19.6–45.3)
MCH RBC QN AUTO: 27.9 PG (ref 26.6–33)
MCHC RBC AUTO-ENTMCNC: 33.2 G/DL (ref 31.5–35.7)
MCV RBC AUTO: 84.2 FL (ref 79–97)
MODALITY: ABNORMAL
MONOCYTES # BLD AUTO: 0.34 10*3/MM3 (ref 0.1–0.9)
MONOCYTES NFR BLD AUTO: 5.6 % (ref 5–12)
NEUTROPHILS NFR BLD AUTO: 5.37 10*3/MM3 (ref 1.7–7)
NEUTROPHILS NFR BLD AUTO: 88.7 % (ref 42.7–76)
PCO2 BLDA: 53.2 MM HG (ref 35–45)
PH BLDA: 7.25 PH UNITS (ref 7.35–7.45)
PLATELET # BLD AUTO: 255 10*3/MM3 (ref 140–450)
PMV BLD AUTO: 11 FL (ref 6–12)
PO2 BLDA: 70.7 MM HG (ref 80–100)
POTASSIUM SERPL-SCNC: 4.4 MMOL/L (ref 3.5–5.2)
PROT SERPL-MCNC: 6.2 G/DL (ref 6–8.5)
PROT SERPL-MCNC: 6.6 G/DL (ref 6–8.5)
PROTHROMBIN TIME: 14.6 SECONDS (ref 11.7–14.2)
QT INTERVAL: 436 MS
RBC # BLD AUTO: 2.97 10*6/MM3 (ref 4.14–5.8)
SAO2 % BLDCOA: 90.5 % (ref 92–99)
SET MECH RESP RATE: 18
SODIUM SERPL-SCNC: 136 MMOL/L (ref 136–145)
TROPONIN T SERPL-MCNC: 0.09 NG/ML (ref 0–0.03)
WBC NRBC COR # BLD: 6.05 10*3/MM3 (ref 3.4–10.8)

## 2021-12-20 PROCEDURE — 82565 ASSAY OF CREATININE: CPT | Performed by: HOSPITALIST

## 2021-12-20 PROCEDURE — 36600 WITHDRAWAL OF ARTERIAL BLOOD: CPT

## 2021-12-20 PROCEDURE — 25010000002 CEFTRIAXONE PER 250 MG: Performed by: HOSPITALIST

## 2021-12-20 PROCEDURE — 94760 N-INVAS EAR/PLS OXIMETRY 1: CPT

## 2021-12-20 PROCEDURE — 87040 BLOOD CULTURE FOR BACTERIA: CPT | Performed by: INTERNAL MEDICINE

## 2021-12-20 PROCEDURE — 85610 PROTHROMBIN TIME: CPT | Performed by: HOSPITALIST

## 2021-12-20 PROCEDURE — 63710000001 INSULIN LISPRO (HUMAN) PER 5 UNITS: Performed by: HOSPITALIST

## 2021-12-20 PROCEDURE — 71045 X-RAY EXAM CHEST 1 VIEW: CPT

## 2021-12-20 PROCEDURE — 85379 FIBRIN DEGRADATION QUANT: CPT | Performed by: HOSPITALIST

## 2021-12-20 PROCEDURE — 84484 ASSAY OF TROPONIN QUANT: CPT | Performed by: INTERNAL MEDICINE

## 2021-12-20 PROCEDURE — 63710000001 INSULIN GLARGINE PER 5 UNITS: Performed by: HOSPITALIST

## 2021-12-20 PROCEDURE — 82962 GLUCOSE BLOOD TEST: CPT

## 2021-12-20 PROCEDURE — XW033E5 INTRODUCTION OF REMDESIVIR ANTI-INFECTIVE INTO PERIPHERAL VEIN, PERCUTANEOUS APPROACH, NEW TECHNOLOGY GROUP 5: ICD-10-PCS | Performed by: HOSPITALIST

## 2021-12-20 PROCEDURE — 99222 1ST HOSP IP/OBS MODERATE 55: CPT | Performed by: INTERNAL MEDICINE

## 2021-12-20 PROCEDURE — 80076 HEPATIC FUNCTION PANEL: CPT | Performed by: HOSPITALIST

## 2021-12-20 PROCEDURE — 94660 CPAP INITIATION&MGMT: CPT

## 2021-12-20 PROCEDURE — 36415 COLL VENOUS BLD VENIPUNCTURE: CPT | Performed by: INTERNAL MEDICINE

## 2021-12-20 PROCEDURE — 94799 UNLISTED PULMONARY SVC/PX: CPT

## 2021-12-20 PROCEDURE — 25010000002 ENOXAPARIN PER 10 MG: Performed by: HOSPITALIST

## 2021-12-20 PROCEDURE — 80053 COMPREHEN METABOLIC PANEL: CPT | Performed by: HOSPITALIST

## 2021-12-20 PROCEDURE — 82803 BLOOD GASES ANY COMBINATION: CPT

## 2021-12-20 PROCEDURE — 85025 COMPLETE CBC W/AUTO DIFF WBC: CPT | Performed by: HOSPITALIST

## 2021-12-20 RX ORDER — ASCORBIC ACID 500 MG
500 TABLET ORAL DAILY
Status: DISCONTINUED | OUTPATIENT
Start: 2021-12-20 | End: 2021-12-23

## 2021-12-20 RX ORDER — ZINC SULFATE 50(220)MG
220 CAPSULE ORAL DAILY
Status: DISCONTINUED | OUTPATIENT
Start: 2021-12-20 | End: 2021-12-23

## 2021-12-20 RX ORDER — FAMOTIDINE 20 MG/1
20 TABLET, FILM COATED ORAL 2 TIMES DAILY
Status: DISCONTINUED | OUTPATIENT
Start: 2021-12-20 | End: 2021-12-21

## 2021-12-20 RX ORDER — INSULIN LISPRO 100 [IU]/ML
0-7 INJECTION, SOLUTION INTRAVENOUS; SUBCUTANEOUS
Status: DISCONTINUED | OUTPATIENT
Start: 2021-12-20 | End: 2021-12-27 | Stop reason: HOSPADM

## 2021-12-20 RX ORDER — DEXAMETHASONE SODIUM PHOSPHATE 10 MG/ML
6 INJECTION INTRAMUSCULAR; INTRAVENOUS DAILY
Status: DISCONTINUED | OUTPATIENT
Start: 2021-12-21 | End: 2021-12-23

## 2021-12-20 RX ORDER — BUMETANIDE 0.25 MG/ML
2 INJECTION INTRAMUSCULAR; INTRAVENOUS EVERY 12 HOURS
Status: DISCONTINUED | OUTPATIENT
Start: 2021-12-20 | End: 2021-12-22

## 2021-12-20 RX ORDER — ALPRAZOLAM 0.5 MG/1
0.5 TABLET ORAL 3 TIMES DAILY PRN
COMMUNITY

## 2021-12-20 RX ORDER — MELATONIN
1000 DAILY
Status: DISCONTINUED | OUTPATIENT
Start: 2021-12-20 | End: 2021-12-27 | Stop reason: HOSPADM

## 2021-12-20 RX ORDER — DEXAMETHASONE SODIUM PHOSPHATE 10 MG/ML
6 INJECTION INTRAMUSCULAR; INTRAVENOUS ONCE
Status: DISCONTINUED | OUTPATIENT
Start: 2021-12-20 | End: 2021-12-20

## 2021-12-20 RX ORDER — BUMETANIDE 0.25 MG/ML
2 INJECTION INTRAMUSCULAR; INTRAVENOUS ONCE
Status: DISCONTINUED | OUTPATIENT
Start: 2021-12-20 | End: 2021-12-20

## 2021-12-20 RX ORDER — NICOTINE POLACRILEX 4 MG
15 LOZENGE BUCCAL
Status: DISCONTINUED | OUTPATIENT
Start: 2021-12-19 | End: 2021-12-20

## 2021-12-20 RX ORDER — ALPRAZOLAM 0.5 MG/1
0.5 TABLET ORAL 3 TIMES DAILY PRN
Status: DISCONTINUED | OUTPATIENT
Start: 2021-12-20 | End: 2021-12-27

## 2021-12-20 RX ORDER — CETIRIZINE HYDROCHLORIDE 10 MG/1
5 TABLET ORAL DAILY
Status: DISCONTINUED | OUTPATIENT
Start: 2021-12-20 | End: 2021-12-23

## 2021-12-20 RX ORDER — TAMSULOSIN HYDROCHLORIDE 0.4 MG/1
0.4 CAPSULE ORAL DAILY
Status: DISCONTINUED | OUTPATIENT
Start: 2021-12-20 | End: 2021-12-27 | Stop reason: HOSPADM

## 2021-12-20 RX ORDER — INSULIN LISPRO 100 [IU]/ML
0-24 INJECTION, SOLUTION INTRAVENOUS; SUBCUTANEOUS
Status: DISCONTINUED | OUTPATIENT
Start: 2021-12-20 | End: 2021-12-20

## 2021-12-20 RX ORDER — POLYETHYLENE GLYCOL 3350 17 G/17G
17 POWDER, FOR SOLUTION ORAL DAILY
Status: DISCONTINUED | OUTPATIENT
Start: 2021-12-20 | End: 2021-12-27 | Stop reason: HOSPADM

## 2021-12-20 RX ORDER — DEXTROSE MONOHYDRATE 25 G/50ML
25 INJECTION, SOLUTION INTRAVENOUS
Status: DISCONTINUED | OUTPATIENT
Start: 2021-12-19 | End: 2021-12-20

## 2021-12-20 RX ORDER — INSULIN LISPRO 100 [IU]/ML
5 INJECTION, SOLUTION INTRAVENOUS; SUBCUTANEOUS
Status: DISCONTINUED | OUTPATIENT
Start: 2021-12-20 | End: 2021-12-23

## 2021-12-20 RX ORDER — L.ACID,PARA/B.BIFIDUM/S.THERM 8B CELL
1 CAPSULE ORAL DAILY
Status: DISCONTINUED | OUTPATIENT
Start: 2021-12-20 | End: 2021-12-27 | Stop reason: HOSPADM

## 2021-12-20 RX ORDER — INSULIN LISPRO 100 [IU]/ML
0-7 INJECTION, SOLUTION INTRAVENOUS; SUBCUTANEOUS
Status: DISCONTINUED | OUTPATIENT
Start: 2021-12-20 | End: 2021-12-20

## 2021-12-20 RX ORDER — ASPIRIN 81 MG/1
81 TABLET ORAL DAILY
Status: DISCONTINUED | OUTPATIENT
Start: 2021-12-20 | End: 2021-12-27 | Stop reason: HOSPADM

## 2021-12-20 RX ADMIN — FAMOTIDINE 20 MG: 20 TABLET, FILM COATED ORAL at 20:39

## 2021-12-20 RX ADMIN — Medication 220 MG: at 14:09

## 2021-12-20 RX ADMIN — FAMOTIDINE 20 MG: 20 TABLET, FILM COATED ORAL at 14:08

## 2021-12-20 RX ADMIN — ALPRAZOLAM 0.5 MG: 0.5 TABLET ORAL at 17:46

## 2021-12-20 RX ADMIN — BUMETANIDE 2 MG: 0.25 INJECTION, SOLUTION INTRAMUSCULAR; INTRAVENOUS at 11:46

## 2021-12-20 RX ADMIN — BUMETANIDE 2 MG: 0.25 INJECTION, SOLUTION INTRAMUSCULAR; INTRAVENOUS at 20:39

## 2021-12-20 RX ADMIN — Medication 1 CAPSULE: at 14:08

## 2021-12-20 RX ADMIN — INSULIN LISPRO 8 UNITS: 100 INJECTION, SOLUTION INTRAVENOUS; SUBCUTANEOUS at 09:50

## 2021-12-20 RX ADMIN — SODIUM CHLORIDE 2 G: 9 INJECTION, SOLUTION INTRAVENOUS at 20:39

## 2021-12-20 RX ADMIN — Medication 1000 UNITS: at 14:08

## 2021-12-20 RX ADMIN — ENOXAPARIN SODIUM 30 MG: 30 INJECTION SUBCUTANEOUS at 09:50

## 2021-12-20 RX ADMIN — REMDESIVIR 200 MG: 100 INJECTION, POWDER, LYOPHILIZED, FOR SOLUTION INTRAVENOUS at 17:46

## 2021-12-20 RX ADMIN — OXYCODONE HYDROCHLORIDE AND ACETAMINOPHEN 500 MG: 500 TABLET ORAL at 14:08

## 2021-12-20 RX ADMIN — POLYETHYLENE GLYCOL 3350 17 G: 17 POWDER, FOR SOLUTION ORAL at 14:07

## 2021-12-20 RX ADMIN — CETIRIZINE HYDROCHLORIDE 5 MG: 10 TABLET ORAL at 14:07

## 2021-12-20 RX ADMIN — INSULIN LISPRO 3 UNITS: 100 INJECTION, SOLUTION INTRAVENOUS; SUBCUTANEOUS at 14:07

## 2021-12-20 RX ADMIN — INSULIN GLARGINE 15 UNITS: 100 INJECTION, SOLUTION SUBCUTANEOUS at 20:40

## 2021-12-20 RX ADMIN — INSULIN LISPRO 2 UNITS: 100 INJECTION, SOLUTION INTRAVENOUS; SUBCUTANEOUS at 17:46

## 2021-12-20 RX ADMIN — TAMSULOSIN HYDROCHLORIDE 0.4 MG: 0.4 CAPSULE ORAL at 14:08

## 2021-12-20 RX ADMIN — INSULIN LISPRO 5 UNITS: 100 INJECTION, SOLUTION INTRAVENOUS; SUBCUTANEOUS at 17:46

## 2021-12-20 RX ADMIN — ASPIRIN 81 MG: 81 TABLET, COATED ORAL at 14:08

## 2021-12-20 RX ADMIN — INSULIN LISPRO 2 UNITS: 100 INJECTION, SOLUTION INTRAVENOUS; SUBCUTANEOUS at 20:39

## 2021-12-21 ENCOUNTER — APPOINTMENT (OUTPATIENT)
Dept: CARDIOLOGY | Facility: HOSPITAL | Age: 81
End: 2021-12-21

## 2021-12-21 LAB
ALBUMIN SERPL-MCNC: 3.1 G/DL (ref 3.5–5.2)
ALBUMIN/GLOB SERPL: 1 G/DL
ALP SERPL-CCNC: 65 U/L (ref 39–117)
ALT SERPL W P-5'-P-CCNC: 6 U/L (ref 1–41)
ANION GAP SERPL CALCULATED.3IONS-SCNC: 11 MMOL/L (ref 5–15)
APTT PPP: 40.4 SECONDS (ref 22.7–35.4)
AST SERPL-CCNC: 10 U/L (ref 1–40)
BASOPHILS # BLD AUTO: 0 10*3/MM3 (ref 0–0.2)
BASOPHILS # BLD AUTO: 0 10*3/MM3 (ref 0–0.2)
BASOPHILS NFR BLD AUTO: 0 % (ref 0–1.5)
BASOPHILS NFR BLD AUTO: 0 % (ref 0–1.5)
BILIRUB CONJ SERPL-MCNC: <0.2 MG/DL (ref 0–0.3)
BILIRUB SERPL-MCNC: <0.2 MG/DL (ref 0–1.2)
BUN SERPL-MCNC: 76 MG/DL (ref 8–23)
BUN/CREAT SERPL: 28.6 (ref 7–25)
CALCIUM SPEC-SCNC: 8.3 MG/DL (ref 8.6–10.5)
CHLORIDE SERPL-SCNC: 106 MMOL/L (ref 98–107)
CHOLEST SERPL-MCNC: 134 MG/DL (ref 0–200)
CO2 SERPL-SCNC: 25 MMOL/L (ref 22–29)
CREAT SERPL-MCNC: 2.66 MG/DL (ref 0.76–1.27)
CRP SERPL-MCNC: 9.18 MG/DL (ref 0–0.5)
DEPRECATED RDW RBC AUTO: 44.6 FL (ref 37–54)
DEPRECATED RDW RBC AUTO: 46.5 FL (ref 37–54)
EOSINOPHIL # BLD AUTO: 0 10*3/MM3 (ref 0–0.4)
EOSINOPHIL # BLD AUTO: 0.05 10*3/MM3 (ref 0–0.4)
EOSINOPHIL NFR BLD AUTO: 0 % (ref 0.3–6.2)
EOSINOPHIL NFR BLD AUTO: 0.9 % (ref 0.3–6.2)
ERYTHROCYTE [DISTWIDTH] IN BLOOD BY AUTOMATED COUNT: 14.5 % (ref 12.3–15.4)
ERYTHROCYTE [DISTWIDTH] IN BLOOD BY AUTOMATED COUNT: 14.8 % (ref 12.3–15.4)
FERRITIN SERPL-MCNC: 196 NG/ML (ref 30–400)
GFR SERPL CREATININE-BSD FRML MDRD: 23 ML/MIN/1.73
GLOBULIN UR ELPH-MCNC: 3.2 GM/DL
GLUCOSE BLDC GLUCOMTR-MCNC: 144 MG/DL (ref 70–130)
GLUCOSE BLDC GLUCOMTR-MCNC: 164 MG/DL (ref 70–130)
GLUCOSE BLDC GLUCOMTR-MCNC: 201 MG/DL (ref 70–130)
GLUCOSE BLDC GLUCOMTR-MCNC: 229 MG/DL (ref 70–130)
GLUCOSE BLDC GLUCOMTR-MCNC: 260 MG/DL (ref 70–130)
GLUCOSE SERPL-MCNC: 153 MG/DL (ref 65–99)
HBA1C MFR BLD: 7.69 % (ref 4.8–5.6)
HCT VFR BLD AUTO: 25.2 % (ref 37.5–51)
HCT VFR BLD AUTO: 25.8 % (ref 37.5–51)
HDLC SERPL-MCNC: 29 MG/DL (ref 40–60)
HGB BLD-MCNC: 8.3 G/DL (ref 13–17.7)
HGB BLD-MCNC: 8.4 G/DL (ref 13–17.7)
IMM GRANULOCYTES # BLD AUTO: 0.04 10*3/MM3 (ref 0–0.05)
IMM GRANULOCYTES # BLD AUTO: 0.05 10*3/MM3 (ref 0–0.05)
IMM GRANULOCYTES NFR BLD AUTO: 0.7 % (ref 0–0.5)
IMM GRANULOCYTES NFR BLD AUTO: 0.9 % (ref 0–0.5)
INR PPP: 1.19 (ref 0.9–1.1)
LDLC SERPL CALC-MCNC: 73 MG/DL (ref 0–100)
LDLC/HDLC SERPL: 2.34 {RATIO}
LYMPHOCYTES # BLD AUTO: 0.15 10*3/MM3 (ref 0.7–3.1)
LYMPHOCYTES # BLD AUTO: 0.27 10*3/MM3 (ref 0.7–3.1)
LYMPHOCYTES NFR BLD AUTO: 2.7 % (ref 19.6–45.3)
LYMPHOCYTES NFR BLD AUTO: 4.8 % (ref 19.6–45.3)
MCH RBC QN AUTO: 27.9 PG (ref 26.6–33)
MCH RBC QN AUTO: 28.3 PG (ref 26.6–33)
MCHC RBC AUTO-ENTMCNC: 32.6 G/DL (ref 31.5–35.7)
MCHC RBC AUTO-ENTMCNC: 32.9 G/DL (ref 31.5–35.7)
MCV RBC AUTO: 84.6 FL (ref 79–97)
MCV RBC AUTO: 86.9 FL (ref 79–97)
MONOCYTES # BLD AUTO: 0.19 10*3/MM3 (ref 0.1–0.9)
MONOCYTES # BLD AUTO: 0.35 10*3/MM3 (ref 0.1–0.9)
MONOCYTES NFR BLD AUTO: 3.4 % (ref 5–12)
MONOCYTES NFR BLD AUTO: 6.3 % (ref 5–12)
NEUTROPHILS NFR BLD AUTO: 4.89 10*3/MM3 (ref 1.7–7)
NEUTROPHILS NFR BLD AUTO: 5.12 10*3/MM3 (ref 1.7–7)
NEUTROPHILS NFR BLD AUTO: 87.3 % (ref 42.7–76)
NEUTROPHILS NFR BLD AUTO: 93 % (ref 42.7–76)
NRBC BLD AUTO-RTO: 0 /100 WBC (ref 0–0.2)
NRBC BLD AUTO-RTO: 0.2 /100 WBC (ref 0–0.2)
PLATELET # BLD AUTO: 226 10*3/MM3 (ref 140–450)
PLATELET # BLD AUTO: 243 10*3/MM3 (ref 140–450)
PMV BLD AUTO: 10 FL (ref 6–12)
PMV BLD AUTO: 10.2 FL (ref 6–12)
POTASSIUM SERPL-SCNC: 3.7 MMOL/L (ref 3.5–5.2)
PROT SERPL-MCNC: 6.3 G/DL (ref 6–8.5)
PROTHROMBIN TIME: 14.9 SECONDS (ref 11.7–14.2)
RBC # BLD AUTO: 2.97 10*6/MM3 (ref 4.14–5.8)
RBC # BLD AUTO: 2.98 10*6/MM3 (ref 4.14–5.8)
SODIUM SERPL-SCNC: 142 MMOL/L (ref 136–145)
TRIGL SERPL-MCNC: 185 MG/DL (ref 0–150)
TROPONIN T SERPL-MCNC: 0.09 NG/ML (ref 0–0.03)
TSH SERPL DL<=0.05 MIU/L-ACNC: 0.89 UIU/ML (ref 0.27–4.2)
VLDLC SERPL-MCNC: 32 MG/DL (ref 5–40)
WBC NRBC COR # BLD: 5.51 10*3/MM3 (ref 3.4–10.8)
WBC NRBC COR # BLD: 5.6 10*3/MM3 (ref 3.4–10.8)

## 2021-12-21 PROCEDURE — 94799 UNLISTED PULMONARY SVC/PX: CPT

## 2021-12-21 PROCEDURE — 97166 OT EVAL MOD COMPLEX 45 MIN: CPT

## 2021-12-21 PROCEDURE — 82728 ASSAY OF FERRITIN: CPT | Performed by: HOSPITALIST

## 2021-12-21 PROCEDURE — 25010000002 DEXAMETHASONE PER 1 MG: Performed by: HOSPITALIST

## 2021-12-21 PROCEDURE — 97530 THERAPEUTIC ACTIVITIES: CPT

## 2021-12-21 PROCEDURE — 85025 COMPLETE CBC W/AUTO DIFF WBC: CPT | Performed by: HOSPITALIST

## 2021-12-21 PROCEDURE — 97162 PT EVAL MOD COMPLEX 30 MIN: CPT

## 2021-12-21 PROCEDURE — 80053 COMPREHEN METABOLIC PANEL: CPT | Performed by: HOSPITALIST

## 2021-12-21 PROCEDURE — 97110 THERAPEUTIC EXERCISES: CPT

## 2021-12-21 PROCEDURE — 25010000002 HEPARIN (PORCINE) PER 1000 UNITS

## 2021-12-21 PROCEDURE — 85730 THROMBOPLASTIN TIME PARTIAL: CPT | Performed by: HOSPITALIST

## 2021-12-21 PROCEDURE — 83036 HEMOGLOBIN GLYCOSYLATED A1C: CPT | Performed by: HOSPITALIST

## 2021-12-21 PROCEDURE — 86140 C-REACTIVE PROTEIN: CPT | Performed by: HOSPITALIST

## 2021-12-21 PROCEDURE — 82962 GLUCOSE BLOOD TEST: CPT

## 2021-12-21 PROCEDURE — 85610 PROTHROMBIN TIME: CPT | Performed by: HOSPITALIST

## 2021-12-21 PROCEDURE — 63710000001 INSULIN GLARGINE PER 5 UNITS: Performed by: HOSPITALIST

## 2021-12-21 PROCEDURE — 25010000002 ENOXAPARIN PER 10 MG: Performed by: HOSPITALIST

## 2021-12-21 PROCEDURE — 82248 BILIRUBIN DIRECT: CPT | Performed by: HOSPITALIST

## 2021-12-21 PROCEDURE — 63710000001 INSULIN LISPRO (HUMAN) PER 5 UNITS: Performed by: HOSPITALIST

## 2021-12-21 PROCEDURE — 99232 SBSQ HOSP IP/OBS MODERATE 35: CPT | Performed by: INTERNAL MEDICINE

## 2021-12-21 PROCEDURE — 94761 N-INVAS EAR/PLS OXIMETRY MLT: CPT

## 2021-12-21 PROCEDURE — 84484 ASSAY OF TROPONIN QUANT: CPT | Performed by: HOSPITALIST

## 2021-12-21 PROCEDURE — 93970 EXTREMITY STUDY: CPT

## 2021-12-21 PROCEDURE — 84443 ASSAY THYROID STIM HORMONE: CPT | Performed by: HOSPITALIST

## 2021-12-21 PROCEDURE — 80061 LIPID PANEL: CPT | Performed by: HOSPITALIST

## 2021-12-21 RX ORDER — HEPARIN SOD,PORCINE/0.9 % NACL 25000/250
11.1 INTRAVENOUS SOLUTION INTRAVENOUS
Status: DISCONTINUED | OUTPATIENT
Start: 2021-12-21 | End: 2021-12-21

## 2021-12-21 RX ORDER — HEPARIN SOD,PORCINE/0.9 % NACL 25000/250
7.41 INTRAVENOUS SOLUTION INTRAVENOUS
Status: DISCONTINUED | OUTPATIENT
Start: 2021-12-21 | End: 2021-12-27

## 2021-12-21 RX ORDER — FAMOTIDINE 20 MG/1
20 TABLET, FILM COATED ORAL DAILY
Status: DISCONTINUED | OUTPATIENT
Start: 2021-12-21 | End: 2021-12-27 | Stop reason: HOSPADM

## 2021-12-21 RX ADMIN — REMDESIVIR 100 MG: 100 INJECTION, POWDER, LYOPHILIZED, FOR SOLUTION INTRAVENOUS at 14:54

## 2021-12-21 RX ADMIN — INSULIN GLARGINE 15 UNITS: 100 INJECTION, SOLUTION SUBCUTANEOUS at 21:50

## 2021-12-21 RX ADMIN — INSULIN LISPRO 5 UNITS: 100 INJECTION, SOLUTION INTRAVENOUS; SUBCUTANEOUS at 12:52

## 2021-12-21 RX ADMIN — DEXAMETHASONE SODIUM PHOSPHATE 6 MG: 10 INJECTION INTRAMUSCULAR; INTRAVENOUS at 10:20

## 2021-12-21 RX ADMIN — ASPIRIN 81 MG: 81 TABLET, COATED ORAL at 10:19

## 2021-12-21 RX ADMIN — BUMETANIDE 2 MG: 0.25 INJECTION, SOLUTION INTRAMUSCULAR; INTRAVENOUS at 10:20

## 2021-12-21 RX ADMIN — HEPARIN SODIUM 7.41 UNITS/KG/HR: 5000 INJECTION INTRAVENOUS; SUBCUTANEOUS at 21:50

## 2021-12-21 RX ADMIN — INSULIN LISPRO 5 UNITS: 100 INJECTION, SOLUTION INTRAVENOUS; SUBCUTANEOUS at 10:21

## 2021-12-21 RX ADMIN — INSULIN LISPRO 2 UNITS: 100 INJECTION, SOLUTION INTRAVENOUS; SUBCUTANEOUS at 12:52

## 2021-12-21 RX ADMIN — ALPRAZOLAM 0.5 MG: 0.5 TABLET ORAL at 17:22

## 2021-12-21 RX ADMIN — FAMOTIDINE 20 MG: 20 TABLET, FILM COATED ORAL at 10:19

## 2021-12-21 RX ADMIN — Medication 1 CAPSULE: at 10:19

## 2021-12-21 RX ADMIN — BUMETANIDE 2 MG: 0.25 INJECTION, SOLUTION INTRAMUSCULAR; INTRAVENOUS at 20:28

## 2021-12-21 RX ADMIN — INSULIN LISPRO 4 UNITS: 100 INJECTION, SOLUTION INTRAVENOUS; SUBCUTANEOUS at 17:22

## 2021-12-21 RX ADMIN — INSULIN LISPRO 5 UNITS: 100 INJECTION, SOLUTION INTRAVENOUS; SUBCUTANEOUS at 17:22

## 2021-12-21 RX ADMIN — Medication 220 MG: at 10:19

## 2021-12-21 RX ADMIN — Medication 1000 UNITS: at 10:19

## 2021-12-21 RX ADMIN — OXYCODONE HYDROCHLORIDE AND ACETAMINOPHEN 500 MG: 500 TABLET ORAL at 10:19

## 2021-12-21 RX ADMIN — TAMSULOSIN HYDROCHLORIDE 0.4 MG: 0.4 CAPSULE ORAL at 10:19

## 2021-12-21 RX ADMIN — CETIRIZINE HYDROCHLORIDE 5 MG: 10 TABLET ORAL at 10:19

## 2021-12-21 RX ADMIN — ALPRAZOLAM 0.5 MG: 0.5 TABLET ORAL at 03:23

## 2021-12-22 LAB
ALBUMIN SERPL-MCNC: 3.1 G/DL (ref 3.5–5.2)
ALBUMIN/GLOB SERPL: 0.9 G/DL
ALP SERPL-CCNC: 66 U/L (ref 39–117)
ALT SERPL W P-5'-P-CCNC: 6 U/L (ref 1–41)
ANION GAP SERPL CALCULATED.3IONS-SCNC: 9.4 MMOL/L (ref 5–15)
APTT PPP: 25.1 SECONDS (ref 22.7–35.4)
APTT PPP: 31.1 SECONDS (ref 22.7–35.4)
AST SERPL-CCNC: 11 U/L (ref 1–40)
BASOPHILS # BLD AUTO: 0 10*3/MM3 (ref 0–0.2)
BASOPHILS # BLD AUTO: 0 10*3/MM3 (ref 0–0.2)
BASOPHILS NFR BLD AUTO: 0 % (ref 0–1.5)
BASOPHILS NFR BLD AUTO: 0 % (ref 0–1.5)
BH CV LOW VAS LEFT SOLEAL VESSEL: 1
BH CV LOWER VASCULAR LEFT COMMON FEMORAL AUGMENT: NORMAL
BH CV LOWER VASCULAR LEFT COMMON FEMORAL COMPETENT: NORMAL
BH CV LOWER VASCULAR LEFT COMMON FEMORAL COMPRESS: NORMAL
BH CV LOWER VASCULAR LEFT COMMON FEMORAL PHASIC: NORMAL
BH CV LOWER VASCULAR LEFT COMMON FEMORAL SPONT: NORMAL
BH CV LOWER VASCULAR LEFT DISTAL FEMORAL COMPRESS: NORMAL
BH CV LOWER VASCULAR LEFT GASTRONEMIUS COMPRESS: NORMAL
BH CV LOWER VASCULAR LEFT GREATER SAPH AK COMPRESS: NORMAL
BH CV LOWER VASCULAR LEFT GREATER SAPH BK COMPRESS: NORMAL
BH CV LOWER VASCULAR LEFT LESSER SAPH COMPRESS: NORMAL
BH CV LOWER VASCULAR LEFT MID FEMORAL AUGMENT: NORMAL
BH CV LOWER VASCULAR LEFT MID FEMORAL COMPETENT: NORMAL
BH CV LOWER VASCULAR LEFT MID FEMORAL COMPRESS: NORMAL
BH CV LOWER VASCULAR LEFT MID FEMORAL PHASIC: NORMAL
BH CV LOWER VASCULAR LEFT MID FEMORAL SPONT: NORMAL
BH CV LOWER VASCULAR LEFT PERONEAL COMPRESS: NORMAL
BH CV LOWER VASCULAR LEFT POPLITEAL AUGMENT: NORMAL
BH CV LOWER VASCULAR LEFT POPLITEAL COMPETENT: NORMAL
BH CV LOWER VASCULAR LEFT POPLITEAL COMPRESS: NORMAL
BH CV LOWER VASCULAR LEFT POPLITEAL PHASIC: NORMAL
BH CV LOWER VASCULAR LEFT POPLITEAL SPONT: NORMAL
BH CV LOWER VASCULAR LEFT POSTERIOR TIBIAL COMPRESS: NORMAL
BH CV LOWER VASCULAR LEFT PROFUNDA FEMORAL COMPRESS: NORMAL
BH CV LOWER VASCULAR LEFT PROXIMAL FEMORAL COMPRESS: NORMAL
BH CV LOWER VASCULAR LEFT SAPHENOFEMORAL JUNCTION COMPRESS: NORMAL
BH CV LOWER VASCULAR LEFT SOLEAL COMPRESS: NORMAL
BH CV LOWER VASCULAR LEFT SOLEAL THROMBUS: NORMAL
BH CV LOWER VASCULAR RIGHT COMMON FEMORAL AUGMENT: NORMAL
BH CV LOWER VASCULAR RIGHT COMMON FEMORAL COMPETENT: NORMAL
BH CV LOWER VASCULAR RIGHT COMMON FEMORAL COMPRESS: NORMAL
BH CV LOWER VASCULAR RIGHT COMMON FEMORAL PHASIC: NORMAL
BH CV LOWER VASCULAR RIGHT COMMON FEMORAL SPONT: NORMAL
BH CV LOWER VASCULAR RIGHT DISTAL FEMORAL COMPRESS: NORMAL
BH CV LOWER VASCULAR RIGHT GASTRONEMIUS COMPRESS: NORMAL
BH CV LOWER VASCULAR RIGHT GREATER SAPH AK COMPRESS: NORMAL
BH CV LOWER VASCULAR RIGHT GREATER SAPH BK COMPRESS: NORMAL
BH CV LOWER VASCULAR RIGHT LESSER SAPH COMPRESS: NORMAL
BH CV LOWER VASCULAR RIGHT MID FEMORAL AUGMENT: NORMAL
BH CV LOWER VASCULAR RIGHT MID FEMORAL COMPETENT: NORMAL
BH CV LOWER VASCULAR RIGHT MID FEMORAL COMPRESS: NORMAL
BH CV LOWER VASCULAR RIGHT MID FEMORAL PHASIC: NORMAL
BH CV LOWER VASCULAR RIGHT MID FEMORAL SPONT: NORMAL
BH CV LOWER VASCULAR RIGHT PERONEAL COMPRESS: NORMAL
BH CV LOWER VASCULAR RIGHT POPLITEAL AUGMENT: NORMAL
BH CV LOWER VASCULAR RIGHT POPLITEAL COMPETENT: NORMAL
BH CV LOWER VASCULAR RIGHT POPLITEAL COMPRESS: NORMAL
BH CV LOWER VASCULAR RIGHT POPLITEAL PHASIC: NORMAL
BH CV LOWER VASCULAR RIGHT POPLITEAL SPONT: NORMAL
BH CV LOWER VASCULAR RIGHT POSTERIOR TIBIAL COMPRESS: NORMAL
BH CV LOWER VASCULAR RIGHT PROFUNDA FEMORAL COMPRESS: NORMAL
BH CV LOWER VASCULAR RIGHT PROXIMAL FEMORAL COMPRESS: NORMAL
BH CV LOWER VASCULAR RIGHT SAPHENOFEMORAL JUNCTION COMPRESS: NORMAL
BH CV POP FLUID COLLECT LEFT: 1
BILIRUB CONJ SERPL-MCNC: <0.2 MG/DL (ref 0–0.3)
BILIRUB SERPL-MCNC: <0.2 MG/DL (ref 0–1.2)
BUN SERPL-MCNC: 69 MG/DL (ref 8–23)
BUN/CREAT SERPL: 37.5 (ref 7–25)
CALCIUM SPEC-SCNC: 8.7 MG/DL (ref 8.6–10.5)
CHLORIDE SERPL-SCNC: 108 MMOL/L (ref 98–107)
CO2 SERPL-SCNC: 28.6 MMOL/L (ref 22–29)
CREAT SERPL-MCNC: 1.84 MG/DL (ref 0.76–1.27)
CRP SERPL-MCNC: 7.17 MG/DL (ref 0–0.5)
DEPRECATED RDW RBC AUTO: 46 FL (ref 37–54)
DEPRECATED RDW RBC AUTO: 46.5 FL (ref 37–54)
EOSINOPHIL # BLD AUTO: 0.02 10*3/MM3 (ref 0–0.4)
EOSINOPHIL # BLD AUTO: 0.05 10*3/MM3 (ref 0–0.4)
EOSINOPHIL NFR BLD AUTO: 0.5 % (ref 0.3–6.2)
EOSINOPHIL NFR BLD AUTO: 1 % (ref 0.3–6.2)
ERYTHROCYTE [DISTWIDTH] IN BLOOD BY AUTOMATED COUNT: 14.8 % (ref 12.3–15.4)
ERYTHROCYTE [DISTWIDTH] IN BLOOD BY AUTOMATED COUNT: 14.9 % (ref 12.3–15.4)
GFR SERPL CREATININE-BSD FRML MDRD: 35 ML/MIN/1.73
GLOBULIN UR ELPH-MCNC: 3.4 GM/DL
GLUCOSE BLDC GLUCOMTR-MCNC: 132 MG/DL (ref 70–130)
GLUCOSE BLDC GLUCOMTR-MCNC: 218 MG/DL (ref 70–130)
GLUCOSE BLDC GLUCOMTR-MCNC: 300 MG/DL (ref 70–130)
GLUCOSE BLDC GLUCOMTR-MCNC: 305 MG/DL (ref 70–130)
GLUCOSE SERPL-MCNC: 159 MG/DL (ref 65–99)
HCT VFR BLD AUTO: 25.5 % (ref 37.5–51)
HCT VFR BLD AUTO: 30.1 % (ref 37.5–51)
HGB BLD-MCNC: 8.4 G/DL (ref 13–17.7)
HGB BLD-MCNC: 9.7 G/DL (ref 13–17.7)
IMM GRANULOCYTES # BLD AUTO: 0.04 10*3/MM3 (ref 0–0.05)
IMM GRANULOCYTES # BLD AUTO: 0.04 10*3/MM3 (ref 0–0.05)
IMM GRANULOCYTES NFR BLD AUTO: 0.8 % (ref 0–0.5)
IMM GRANULOCYTES NFR BLD AUTO: 1.1 % (ref 0–0.5)
LYMPHOCYTES # BLD AUTO: 0.21 10*3/MM3 (ref 0.7–3.1)
LYMPHOCYTES # BLD AUTO: 0.31 10*3/MM3 (ref 0.7–3.1)
LYMPHOCYTES NFR BLD AUTO: 5.6 % (ref 19.6–45.3)
LYMPHOCYTES NFR BLD AUTO: 6 % (ref 19.6–45.3)
MAXIMAL PREDICTED HEART RATE: 139 BPM
MCH RBC QN AUTO: 27.9 PG (ref 26.6–33)
MCH RBC QN AUTO: 28.2 PG (ref 26.6–33)
MCHC RBC AUTO-ENTMCNC: 32.2 G/DL (ref 31.5–35.7)
MCHC RBC AUTO-ENTMCNC: 32.9 G/DL (ref 31.5–35.7)
MCV RBC AUTO: 85.6 FL (ref 79–97)
MCV RBC AUTO: 86.5 FL (ref 79–97)
MONOCYTES # BLD AUTO: 0.31 10*3/MM3 (ref 0.1–0.9)
MONOCYTES # BLD AUTO: 0.37 10*3/MM3 (ref 0.1–0.9)
MONOCYTES NFR BLD AUTO: 7.2 % (ref 5–12)
MONOCYTES NFR BLD AUTO: 8.3 % (ref 5–12)
NEUTROPHILS NFR BLD AUTO: 3.17 10*3/MM3 (ref 1.7–7)
NEUTROPHILS NFR BLD AUTO: 4.4 10*3/MM3 (ref 1.7–7)
NEUTROPHILS NFR BLD AUTO: 84.5 % (ref 42.7–76)
NEUTROPHILS NFR BLD AUTO: 85 % (ref 42.7–76)
NRBC BLD AUTO-RTO: 0 /100 WBC (ref 0–0.2)
NRBC BLD AUTO-RTO: 0 /100 WBC (ref 0–0.2)
PLATELET # BLD AUTO: 227 10*3/MM3 (ref 140–450)
PLATELET # BLD AUTO: 256 10*3/MM3 (ref 140–450)
PMV BLD AUTO: 10.5 FL (ref 6–12)
PMV BLD AUTO: 10.6 FL (ref 6–12)
POTASSIUM SERPL-SCNC: 3.7 MMOL/L (ref 3.5–5.2)
PROT SERPL-MCNC: 6.5 G/DL (ref 6–8.5)
RBC # BLD AUTO: 2.98 10*6/MM3 (ref 4.14–5.8)
RBC # BLD AUTO: 3.48 10*6/MM3 (ref 4.14–5.8)
SODIUM SERPL-SCNC: 146 MMOL/L (ref 136–145)
STRESS TARGET HR: 118 BPM
WBC NRBC COR # BLD: 3.75 10*3/MM3 (ref 3.4–10.8)
WBC NRBC COR # BLD: 5.17 10*3/MM3 (ref 3.4–10.8)

## 2021-12-22 PROCEDURE — 82962 GLUCOSE BLOOD TEST: CPT

## 2021-12-22 PROCEDURE — 94799 UNLISTED PULMONARY SVC/PX: CPT

## 2021-12-22 PROCEDURE — 25010000002 DEXAMETHASONE PER 1 MG: Performed by: HOSPITALIST

## 2021-12-22 PROCEDURE — 85025 COMPLETE CBC W/AUTO DIFF WBC: CPT | Performed by: HOSPITALIST

## 2021-12-22 PROCEDURE — 80053 COMPREHEN METABOLIC PANEL: CPT | Performed by: HOSPITALIST

## 2021-12-22 PROCEDURE — 86148 ANTI-PHOSPHOLIPID ANTIBODY: CPT | Performed by: INTERNAL MEDICINE

## 2021-12-22 PROCEDURE — 85303 CLOT INHIBIT PROT C ACTIVITY: CPT | Performed by: INTERNAL MEDICINE

## 2021-12-22 PROCEDURE — 85305 CLOT INHIBIT PROT S TOTAL: CPT | Performed by: INTERNAL MEDICINE

## 2021-12-22 PROCEDURE — 85705 THROMBOPLASTIN INHIBITION: CPT | Performed by: INTERNAL MEDICINE

## 2021-12-22 PROCEDURE — 85670 THROMBIN TIME PLASMA: CPT | Performed by: INTERNAL MEDICINE

## 2021-12-22 PROCEDURE — 63710000001 INSULIN GLARGINE PER 5 UNITS: Performed by: HOSPITALIST

## 2021-12-22 PROCEDURE — 86147 CARDIOLIPIN ANTIBODY EA IG: CPT | Performed by: INTERNAL MEDICINE

## 2021-12-22 PROCEDURE — 81241 F5 GENE: CPT | Performed by: INTERNAL MEDICINE

## 2021-12-22 PROCEDURE — 81240 F2 GENE: CPT | Performed by: INTERNAL MEDICINE

## 2021-12-22 PROCEDURE — 85730 THROMBOPLASTIN TIME PARTIAL: CPT | Performed by: HOSPITALIST

## 2021-12-22 PROCEDURE — 99232 SBSQ HOSP IP/OBS MODERATE 35: CPT | Performed by: INTERNAL MEDICINE

## 2021-12-22 PROCEDURE — 99233 SBSQ HOSP IP/OBS HIGH 50: CPT | Performed by: INTERNAL MEDICINE

## 2021-12-22 PROCEDURE — 85306 CLOT INHIBIT PROT S FREE: CPT | Performed by: INTERNAL MEDICINE

## 2021-12-22 PROCEDURE — 86146 BETA-2 GLYCOPROTEIN ANTIBODY: CPT | Performed by: INTERNAL MEDICINE

## 2021-12-22 PROCEDURE — 63710000001 INSULIN LISPRO (HUMAN) PER 5 UNITS: Performed by: HOSPITALIST

## 2021-12-22 PROCEDURE — 82248 BILIRUBIN DIRECT: CPT | Performed by: HOSPITALIST

## 2021-12-22 PROCEDURE — 85302 CLOT INHIBIT PROT C ANTIGEN: CPT | Performed by: INTERNAL MEDICINE

## 2021-12-22 PROCEDURE — 85300 ANTITHROMBIN III ACTIVITY: CPT | Performed by: INTERNAL MEDICINE

## 2021-12-22 PROCEDURE — 85613 RUSSELL VIPER VENOM DILUTED: CPT | Performed by: INTERNAL MEDICINE

## 2021-12-22 PROCEDURE — 86140 C-REACTIVE PROTEIN: CPT | Performed by: HOSPITALIST

## 2021-12-22 PROCEDURE — 85732 THROMBOPLASTIN TIME PARTIAL: CPT | Performed by: INTERNAL MEDICINE

## 2021-12-22 PROCEDURE — 25010000002 HEPARIN (PORCINE) PER 1000 UNITS

## 2021-12-22 RX ORDER — SODIUM CHLORIDE 0.9 % (FLUSH) 0.9 %
10 SYRINGE (ML) INJECTION EVERY 12 HOURS SCHEDULED
Status: DISCONTINUED | OUTPATIENT
Start: 2021-12-22 | End: 2021-12-27 | Stop reason: HOSPADM

## 2021-12-22 RX ORDER — BUMETANIDE 0.25 MG/ML
2 INJECTION INTRAMUSCULAR; INTRAVENOUS EVERY 12 HOURS
Status: COMPLETED | OUTPATIENT
Start: 2021-12-22 | End: 2021-12-22

## 2021-12-22 RX ORDER — SODIUM CHLORIDE 0.9 % (FLUSH) 0.9 %
20 SYRINGE (ML) INJECTION AS NEEDED
Status: DISCONTINUED | OUTPATIENT
Start: 2021-12-22 | End: 2021-12-27

## 2021-12-22 RX ORDER — SODIUM CHLORIDE 0.9 % (FLUSH) 0.9 %
10 SYRINGE (ML) INJECTION AS NEEDED
Status: DISCONTINUED | OUTPATIENT
Start: 2021-12-22 | End: 2021-12-27

## 2021-12-22 RX ORDER — HEPARIN SODIUM (PORCINE) LOCK FLUSH IV SOLN 100 UNIT/ML 100 UNIT/ML
5 SOLUTION INTRAVENOUS AS NEEDED
Status: DISCONTINUED | OUTPATIENT
Start: 2021-12-22 | End: 2021-12-27

## 2021-12-22 RX ADMIN — Medication 220 MG: at 08:38

## 2021-12-22 RX ADMIN — SODIUM CHLORIDE, PRESERVATIVE FREE 10 ML: 5 INJECTION INTRAVENOUS at 21:27

## 2021-12-22 RX ADMIN — Medication 1000 UNITS: at 08:38

## 2021-12-22 RX ADMIN — HEPARIN SODIUM 10.4 UNITS/KG/HR: 5000 INJECTION INTRAVENOUS; SUBCUTANEOUS at 21:34

## 2021-12-22 RX ADMIN — CETIRIZINE HYDROCHLORIDE 5 MG: 10 TABLET ORAL at 08:37

## 2021-12-22 RX ADMIN — INSULIN LISPRO 3 UNITS: 100 INJECTION, SOLUTION INTRAVENOUS; SUBCUTANEOUS at 00:08

## 2021-12-22 RX ADMIN — BUMETANIDE 2 MG: 0.25 INJECTION, SOLUTION INTRAMUSCULAR; INTRAVENOUS at 21:28

## 2021-12-22 RX ADMIN — REMDESIVIR 100 MG: 100 INJECTION, POWDER, LYOPHILIZED, FOR SOLUTION INTRAVENOUS at 15:30

## 2021-12-22 RX ADMIN — DEXAMETHASONE SODIUM PHOSPHATE 6 MG: 10 INJECTION INTRAMUSCULAR; INTRAVENOUS at 08:38

## 2021-12-22 RX ADMIN — ALPRAZOLAM 0.5 MG: 0.5 TABLET ORAL at 23:29

## 2021-12-22 RX ADMIN — INSULIN LISPRO 5 UNITS: 100 INJECTION, SOLUTION INTRAVENOUS; SUBCUTANEOUS at 21:27

## 2021-12-22 RX ADMIN — INSULIN LISPRO 3 UNITS: 100 INJECTION, SOLUTION INTRAVENOUS; SUBCUTANEOUS at 12:23

## 2021-12-22 RX ADMIN — ASPIRIN 81 MG: 81 TABLET, COATED ORAL at 08:38

## 2021-12-22 RX ADMIN — INSULIN LISPRO 5 UNITS: 100 INJECTION, SOLUTION INTRAVENOUS; SUBCUTANEOUS at 17:21

## 2021-12-22 RX ADMIN — INSULIN GLARGINE 20 UNITS: 100 INJECTION, SOLUTION SUBCUTANEOUS at 21:35

## 2021-12-22 RX ADMIN — FAMOTIDINE 20 MG: 20 TABLET, FILM COATED ORAL at 08:38

## 2021-12-22 RX ADMIN — BUMETANIDE 2 MG: 0.25 INJECTION, SOLUTION INTRAMUSCULAR; INTRAVENOUS at 10:31

## 2021-12-22 RX ADMIN — SODIUM CHLORIDE, PRESERVATIVE FREE 10 ML: 5 INJECTION INTRAVENOUS at 08:38

## 2021-12-22 RX ADMIN — INSULIN LISPRO 5 UNITS: 100 INJECTION, SOLUTION INTRAVENOUS; SUBCUTANEOUS at 08:36

## 2021-12-22 RX ADMIN — INSULIN LISPRO 5 UNITS: 100 INJECTION, SOLUTION INTRAVENOUS; SUBCUTANEOUS at 12:23

## 2021-12-22 RX ADMIN — OXYCODONE HYDROCHLORIDE AND ACETAMINOPHEN 500 MG: 500 TABLET ORAL at 08:38

## 2021-12-22 RX ADMIN — Medication 1 CAPSULE: at 08:38

## 2021-12-22 RX ADMIN — INSULIN LISPRO 5 UNITS: 100 INJECTION, SOLUTION INTRAVENOUS; SUBCUTANEOUS at 17:22

## 2021-12-22 RX ADMIN — TAMSULOSIN HYDROCHLORIDE 0.4 MG: 0.4 CAPSULE ORAL at 08:38

## 2021-12-23 LAB
ALBUMIN SERPL-MCNC: 3.5 G/DL (ref 3.5–5.2)
ALBUMIN/GLOB SERPL: 1.1 G/DL
ALP SERPL-CCNC: 76 U/L (ref 39–117)
ALT SERPL W P-5'-P-CCNC: 6 U/L (ref 1–41)
ANION GAP SERPL CALCULATED.3IONS-SCNC: 15.3 MMOL/L (ref 5–15)
APTT PPP: 53.9 SECONDS (ref 22.7–35.4)
APTT PPP: 70.7 SECONDS (ref 22.7–35.4)
AST SERPL-CCNC: 11 U/L (ref 1–40)
BASOPHILS # BLD AUTO: 0 10*3/MM3 (ref 0–0.2)
BASOPHILS NFR BLD AUTO: 0 % (ref 0–1.5)
BILIRUB CONJ SERPL-MCNC: <0.2 MG/DL (ref 0–0.3)
BILIRUB SERPL-MCNC: <0.2 MG/DL (ref 0–1.2)
BUN SERPL-MCNC: 67 MG/DL (ref 8–23)
BUN/CREAT SERPL: 40.4 (ref 7–25)
CALCIUM SPEC-SCNC: 9.2 MG/DL (ref 8.6–10.5)
CARDIOLIPIN IGG SER IA-ACNC: <9 GPL U/ML (ref 0–14)
CARDIOLIPIN IGM SER IA-ACNC: 14 MPL U/ML (ref 0–12)
CHLORIDE SERPL-SCNC: 103 MMOL/L (ref 98–107)
CO2 SERPL-SCNC: 29.7 MMOL/L (ref 22–29)
CREAT SERPL-MCNC: 1.66 MG/DL (ref 0.76–1.27)
CRP SERPL-MCNC: 5.39 MG/DL (ref 0–0.5)
DEPRECATED RDW RBC AUTO: 44.9 FL (ref 37–54)
EOSINOPHIL # BLD AUTO: 0.08 10*3/MM3 (ref 0–0.4)
EOSINOPHIL NFR BLD AUTO: 1.4 % (ref 0.3–6.2)
ERYTHROCYTE [DISTWIDTH] IN BLOOD BY AUTOMATED COUNT: 14.6 % (ref 12.3–15.4)
GFR SERPL CREATININE-BSD FRML MDRD: 40 ML/MIN/1.73
GLOBULIN UR ELPH-MCNC: 3.3 GM/DL
GLUCOSE BLDC GLUCOMTR-MCNC: 183 MG/DL (ref 70–130)
GLUCOSE BLDC GLUCOMTR-MCNC: 261 MG/DL (ref 70–130)
GLUCOSE BLDC GLUCOMTR-MCNC: 367 MG/DL (ref 70–130)
GLUCOSE BLDC GLUCOMTR-MCNC: 412 MG/DL (ref 70–130)
GLUCOSE SERPL-MCNC: 197 MG/DL (ref 65–99)
HCT VFR BLD AUTO: 28.4 % (ref 37.5–51)
HGB BLD-MCNC: 9.5 G/DL (ref 13–17.7)
IMM GRANULOCYTES # BLD AUTO: 0.11 10*3/MM3 (ref 0–0.05)
IMM GRANULOCYTES NFR BLD AUTO: 2 % (ref 0–0.5)
INR PPP: 1.22 (ref 0.9–1.1)
LYMPHOCYTES # BLD AUTO: 0.46 10*3/MM3 (ref 0.7–3.1)
LYMPHOCYTES NFR BLD AUTO: 8.2 % (ref 19.6–45.3)
MCH RBC QN AUTO: 28.7 PG (ref 26.6–33)
MCHC RBC AUTO-ENTMCNC: 33.5 G/DL (ref 31.5–35.7)
MCV RBC AUTO: 85.8 FL (ref 79–97)
MONOCYTES # BLD AUTO: 0.43 10*3/MM3 (ref 0.1–0.9)
MONOCYTES NFR BLD AUTO: 7.7 % (ref 5–12)
NEUTROPHILS NFR BLD AUTO: 4.51 10*3/MM3 (ref 1.7–7)
NEUTROPHILS NFR BLD AUTO: 80.7 % (ref 42.7–76)
NRBC BLD AUTO-RTO: 0.2 /100 WBC (ref 0–0.2)
PLATELET # BLD AUTO: 262 10*3/MM3 (ref 140–450)
PMV BLD AUTO: 10.4 FL (ref 6–12)
POTASSIUM SERPL-SCNC: 4 MMOL/L (ref 3.5–5.2)
PROT SERPL-MCNC: 6.8 G/DL (ref 6–8.5)
PROTHROMBIN TIME: 15.2 SECONDS (ref 11.7–14.2)
RBC # BLD AUTO: 3.31 10*6/MM3 (ref 4.14–5.8)
SODIUM SERPL-SCNC: 148 MMOL/L (ref 136–145)
WBC NRBC COR # BLD: 5.59 10*3/MM3 (ref 3.4–10.8)

## 2021-12-23 PROCEDURE — 82248 BILIRUBIN DIRECT: CPT | Performed by: HOSPITALIST

## 2021-12-23 PROCEDURE — 25010000002 DEXAMETHASONE PER 1 MG: Performed by: HOSPITALIST

## 2021-12-23 PROCEDURE — 82962 GLUCOSE BLOOD TEST: CPT

## 2021-12-23 PROCEDURE — 80053 COMPREHEN METABOLIC PANEL: CPT | Performed by: INTERNAL MEDICINE

## 2021-12-23 PROCEDURE — 25010000002 HEPARIN (PORCINE) PER 1000 UNITS: Performed by: HOSPITALIST

## 2021-12-23 PROCEDURE — 63710000001 INSULIN GLARGINE PER 5 UNITS: Performed by: HOSPITALIST

## 2021-12-23 PROCEDURE — 85730 THROMBOPLASTIN TIME PARTIAL: CPT | Performed by: HOSPITALIST

## 2021-12-23 PROCEDURE — 85025 COMPLETE CBC W/AUTO DIFF WBC: CPT | Performed by: HOSPITALIST

## 2021-12-23 PROCEDURE — 86140 C-REACTIVE PROTEIN: CPT | Performed by: HOSPITALIST

## 2021-12-23 PROCEDURE — 63710000001 INSULIN LISPRO (HUMAN) PER 5 UNITS: Performed by: HOSPITALIST

## 2021-12-23 PROCEDURE — 99232 SBSQ HOSP IP/OBS MODERATE 35: CPT | Performed by: INTERNAL MEDICINE

## 2021-12-23 PROCEDURE — 97110 THERAPEUTIC EXERCISES: CPT

## 2021-12-23 PROCEDURE — 85610 PROTHROMBIN TIME: CPT | Performed by: HOSPITALIST

## 2021-12-23 RX ORDER — AMLODIPINE BESYLATE 5 MG/1
5 TABLET ORAL
Status: DISCONTINUED | OUTPATIENT
Start: 2021-12-23 | End: 2021-12-24

## 2021-12-23 RX ORDER — BUMETANIDE 1 MG/1
1 TABLET ORAL
Status: DISCONTINUED | OUTPATIENT
Start: 2021-12-24 | End: 2021-12-25

## 2021-12-23 RX ORDER — INSULIN LISPRO 100 [IU]/ML
8 INJECTION, SOLUTION INTRAVENOUS; SUBCUTANEOUS
Status: DISCONTINUED | OUTPATIENT
Start: 2021-12-23 | End: 2021-12-23

## 2021-12-23 RX ORDER — INSULIN LISPRO 100 [IU]/ML
20 INJECTION, SOLUTION INTRAVENOUS; SUBCUTANEOUS
Status: DISCONTINUED | OUTPATIENT
Start: 2021-12-24 | End: 2021-12-24

## 2021-12-23 RX ORDER — WARFARIN SODIUM 5 MG/1
5 TABLET ORAL
Status: DISCONTINUED | OUTPATIENT
Start: 2021-12-23 | End: 2021-12-25 | Stop reason: DRUGHIGH

## 2021-12-23 RX ADMIN — DEXAMETHASONE SODIUM PHOSPHATE 6 MG: 10 INJECTION INTRAMUSCULAR; INTRAVENOUS at 10:06

## 2021-12-23 RX ADMIN — INSULIN LISPRO 5 UNITS: 100 INJECTION, SOLUTION INTRAVENOUS; SUBCUTANEOUS at 10:09

## 2021-12-23 RX ADMIN — Medication 1 CAPSULE: at 10:06

## 2021-12-23 RX ADMIN — INSULIN GLARGINE 25 UNITS: 100 INJECTION, SOLUTION SUBCUTANEOUS at 20:08

## 2021-12-23 RX ADMIN — HEPARIN SODIUM 13.4 UNITS/KG/HR: 5000 INJECTION INTRAVENOUS; SUBCUTANEOUS at 15:30

## 2021-12-23 RX ADMIN — INSULIN LISPRO 8 UNITS: 100 INJECTION, SOLUTION INTRAVENOUS; SUBCUTANEOUS at 18:09

## 2021-12-23 RX ADMIN — CETIRIZINE HYDROCHLORIDE 5 MG: 10 TABLET ORAL at 10:06

## 2021-12-23 RX ADMIN — AMLODIPINE BESYLATE 5 MG: 5 TABLET ORAL at 13:01

## 2021-12-23 RX ADMIN — TAMSULOSIN HYDROCHLORIDE 0.4 MG: 0.4 CAPSULE ORAL at 10:06

## 2021-12-23 RX ADMIN — REMDESIVIR 100 MG: 100 INJECTION, POWDER, LYOPHILIZED, FOR SOLUTION INTRAVENOUS at 18:09

## 2021-12-23 RX ADMIN — Medication 220 MG: at 10:06

## 2021-12-23 RX ADMIN — INSULIN LISPRO 5 UNITS: 100 INJECTION, SOLUTION INTRAVENOUS; SUBCUTANEOUS at 13:02

## 2021-12-23 RX ADMIN — FAMOTIDINE 20 MG: 20 TABLET, FILM COATED ORAL at 10:06

## 2021-12-23 RX ADMIN — INSULIN LISPRO 6 UNITS: 100 INJECTION, SOLUTION INTRAVENOUS; SUBCUTANEOUS at 18:09

## 2021-12-23 RX ADMIN — Medication 1000 UNITS: at 10:06

## 2021-12-23 RX ADMIN — INSULIN LISPRO 4 UNITS: 100 INJECTION, SOLUTION INTRAVENOUS; SUBCUTANEOUS at 13:01

## 2021-12-23 RX ADMIN — INSULIN GLARGINE 20 UNITS: 100 INJECTION, SOLUTION SUBCUTANEOUS at 21:08

## 2021-12-23 RX ADMIN — OXYCODONE HYDROCHLORIDE AND ACETAMINOPHEN 500 MG: 500 TABLET ORAL at 10:06

## 2021-12-23 RX ADMIN — ASPIRIN 81 MG: 81 TABLET, COATED ORAL at 10:06

## 2021-12-23 RX ADMIN — SODIUM CHLORIDE, PRESERVATIVE FREE 10 ML: 5 INJECTION INTRAVENOUS at 10:09

## 2021-12-23 RX ADMIN — SODIUM CHLORIDE, PRESERVATIVE FREE 10 ML: 5 INJECTION INTRAVENOUS at 20:08

## 2021-12-23 RX ADMIN — INSULIN LISPRO 2 UNITS: 100 INJECTION, SOLUTION INTRAVENOUS; SUBCUTANEOUS at 10:06

## 2021-12-23 RX ADMIN — INSULIN LISPRO 7 UNITS: 100 INJECTION, SOLUTION INTRAVENOUS; SUBCUTANEOUS at 20:08

## 2021-12-23 RX ADMIN — WARFARIN 5 MG: 5 TABLET ORAL at 18:10

## 2021-12-24 LAB
ABO GROUP BLD: NORMAL
ALBUMIN SERPL-MCNC: 3.1 G/DL (ref 3.5–5.2)
ALBUMIN/GLOB SERPL: 1 G/DL
ALP SERPL-CCNC: 68 U/L (ref 39–117)
ALT SERPL W P-5'-P-CCNC: 8 U/L (ref 1–41)
ANION GAP SERPL CALCULATED.3IONS-SCNC: 10.2 MMOL/L (ref 5–15)
APTT PPP: 120.3 SECONDS (ref 22.7–35.4)
APTT PPP: 51.1 SECONDS (ref 22.7–35.4)
APTT PPP: 51.1 SECONDS (ref 22.7–35.4)
APTT SCREEN TO CONFIRM RATIO: 0.93 RATIO (ref 0–1.34)
AST SERPL-CCNC: 12 U/L (ref 1–40)
AT III PPP CHRO-ACNC: 107 % (ref 90–134)
B2 GLYCOPROT1 IGA SER-ACNC: <9 GPI IGA UNITS (ref 0–25)
B2 GLYCOPROT1 IGG SER-ACNC: <9 GPI IGG UNITS (ref 0–20)
B2 GLYCOPROT1 IGM SER-ACNC: 9 GPI IGM UNITS (ref 0–32)
BASOPHILS # BLD AUTO: 0 10*3/MM3 (ref 0–0.2)
BASOPHILS NFR BLD AUTO: 0 % (ref 0–1.5)
BILIRUB SERPL-MCNC: <0.2 MG/DL (ref 0–1.2)
BLD GP AB SCN SERPL QL: NEGATIVE
BUN SERPL-MCNC: 61 MG/DL (ref 8–23)
BUN/CREAT SERPL: 49.6 (ref 7–25)
CALCIUM SPEC-SCNC: 8.7 MG/DL (ref 8.6–10.5)
CHLORIDE SERPL-SCNC: 107 MMOL/L (ref 98–107)
CO2 SERPL-SCNC: 27.8 MMOL/L (ref 22–29)
CONFIRM APTT/NORMAL: 38.7 SEC (ref 0–47.6)
CREAT SERPL-MCNC: 1.23 MG/DL (ref 0.76–1.27)
DEPRECATED RDW RBC AUTO: 46.5 FL (ref 37–54)
EOSINOPHIL # BLD AUTO: 0.04 10*3/MM3 (ref 0–0.4)
EOSINOPHIL NFR BLD AUTO: 0.8 % (ref 0.3–6.2)
ERYTHROCYTE [DISTWIDTH] IN BLOOD BY AUTOMATED COUNT: 14.8 % (ref 12.3–15.4)
F5 GENE MUT ANL BLD/T: NORMAL
FACTOR II, DNA ANALYSIS: NORMAL
GFR SERPL CREATININE-BSD FRML MDRD: 56 ML/MIN/1.73
GLOBULIN UR ELPH-MCNC: 3.1 GM/DL
GLUCOSE BLDC GLUCOMTR-MCNC: 137 MG/DL (ref 70–130)
GLUCOSE BLDC GLUCOMTR-MCNC: 218 MG/DL (ref 70–130)
GLUCOSE BLDC GLUCOMTR-MCNC: 240 MG/DL (ref 70–130)
GLUCOSE BLDC GLUCOMTR-MCNC: 251 MG/DL (ref 70–130)
GLUCOSE SERPL-MCNC: 174 MG/DL (ref 65–99)
HCT VFR BLD AUTO: 26.8 % (ref 37.5–51)
HGB BLD-MCNC: 8.9 G/DL (ref 13–17.7)
IMM GRANULOCYTES # BLD AUTO: 0.08 10*3/MM3 (ref 0–0.05)
IMM GRANULOCYTES NFR BLD AUTO: 1.6 % (ref 0–0.5)
INR PPP: 1.26 (ref 0.9–1.1)
IRON 24H UR-MRATE: 45 MCG/DL (ref 59–158)
IRON SATN MFR SERPL: 14 % (ref 20–50)
LA 2 SCREEN W REFLEX-IMP: NORMAL
LDH SERPL-CCNC: 294 U/L (ref 135–225)
LYMPHOCYTES # BLD AUTO: 0.4 10*3/MM3 (ref 0.7–3.1)
LYMPHOCYTES NFR BLD AUTO: 7.8 % (ref 19.6–45.3)
MCH RBC QN AUTO: 28.7 PG (ref 26.6–33)
MCHC RBC AUTO-ENTMCNC: 33.2 G/DL (ref 31.5–35.7)
MCV RBC AUTO: 86.5 FL (ref 79–97)
MONOCYTES # BLD AUTO: 0.43 10*3/MM3 (ref 0.1–0.9)
MONOCYTES NFR BLD AUTO: 8.4 % (ref 5–12)
NEUTROPHILS NFR BLD AUTO: 4.18 10*3/MM3 (ref 1.7–7)
NEUTROPHILS NFR BLD AUTO: 81.4 % (ref 42.7–76)
NRBC BLD AUTO-RTO: 0 /100 WBC (ref 0–0.2)
PLATELET # BLD AUTO: 229 10*3/MM3 (ref 140–450)
PMV BLD AUTO: 10.5 FL (ref 6–12)
POTASSIUM SERPL-SCNC: 4.2 MMOL/L (ref 3.5–5.2)
PROT C ACT/NOR PPP: 118 % (ref 86–163)
PROT C AG ACT/NOR PPP IA: 106 % (ref 60–150)
PROT S ACT/NOR PPP: 60 % (ref 70–127)
PROT S AG ACT/NOR PPP IA: 78 % (ref 60–150)
PROT S FREE PPP-ACNC: 86 % (ref 49–138)
PROT SERPL-MCNC: 6.2 G/DL (ref 6–8.5)
PROTHROMBIN TIME: 15.6 SECONDS (ref 11.7–14.2)
RBC # BLD AUTO: 3.1 10*6/MM3 (ref 4.14–5.8)
RETICS # AUTO: 0.08 10*6/MM3 (ref 0.02–0.13)
RETICS/RBC NFR AUTO: 2.35 % (ref 0.7–1.9)
RH BLD: POSITIVE
SCREEN APTT: 27.6 SEC (ref 0–51.9)
SCREEN DRVVT: 33.5 SEC (ref 0–47)
SODIUM SERPL-SCNC: 145 MMOL/L (ref 136–145)
T&S EXPIRATION DATE: NORMAL
THROMBIN TIME: 16.8 SEC (ref 0–23)
TIBC SERPL-MCNC: 328 MCG/DL (ref 298–536)
TRANSFERRIN SERPL-MCNC: 220 MG/DL (ref 200–360)
VIT B12 BLD-MCNC: 1181 PG/ML (ref 211–946)
WBC NRBC COR # BLD: 5.13 10*3/MM3 (ref 3.4–10.8)

## 2021-12-24 PROCEDURE — 82962 GLUCOSE BLOOD TEST: CPT

## 2021-12-24 PROCEDURE — 86900 BLOOD TYPING SEROLOGIC ABO: CPT | Performed by: INTERNAL MEDICINE

## 2021-12-24 PROCEDURE — 83615 LACTATE (LD) (LDH) ENZYME: CPT | Performed by: INTERNAL MEDICINE

## 2021-12-24 PROCEDURE — 83921 ORGANIC ACID SINGLE QUANT: CPT | Performed by: INTERNAL MEDICINE

## 2021-12-24 PROCEDURE — 63710000001 DEXAMETHASONE PER 0.25 MG: Performed by: HOSPITALIST

## 2021-12-24 PROCEDURE — 85025 COMPLETE CBC W/AUTO DIFF WBC: CPT | Performed by: HOSPITALIST

## 2021-12-24 PROCEDURE — 86901 BLOOD TYPING SEROLOGIC RH(D): CPT | Performed by: INTERNAL MEDICINE

## 2021-12-24 PROCEDURE — 83540 ASSAY OF IRON: CPT | Performed by: INTERNAL MEDICINE

## 2021-12-24 PROCEDURE — 25010000002 HEPARIN (PORCINE) PER 1000 UNITS: Performed by: HOSPITALIST

## 2021-12-24 PROCEDURE — 82607 VITAMIN B-12: CPT | Performed by: INTERNAL MEDICINE

## 2021-12-24 PROCEDURE — 80053 COMPREHEN METABOLIC PANEL: CPT | Performed by: INTERNAL MEDICINE

## 2021-12-24 PROCEDURE — 63710000001 INSULIN LISPRO (HUMAN) PER 5 UNITS: Performed by: HOSPITALIST

## 2021-12-24 PROCEDURE — 84466 ASSAY OF TRANSFERRIN: CPT | Performed by: INTERNAL MEDICINE

## 2021-12-24 PROCEDURE — 82747 ASSAY OF FOLIC ACID RBC: CPT | Performed by: INTERNAL MEDICINE

## 2021-12-24 PROCEDURE — 85014 HEMATOCRIT: CPT | Performed by: INTERNAL MEDICINE

## 2021-12-24 PROCEDURE — 85730 THROMBOPLASTIN TIME PARTIAL: CPT | Performed by: HOSPITALIST

## 2021-12-24 PROCEDURE — 63710000001 INSULIN GLARGINE PER 5 UNITS: Performed by: HOSPITALIST

## 2021-12-24 PROCEDURE — 99232 SBSQ HOSP IP/OBS MODERATE 35: CPT | Performed by: INTERNAL MEDICINE

## 2021-12-24 PROCEDURE — 85610 PROTHROMBIN TIME: CPT | Performed by: HOSPITALIST

## 2021-12-24 PROCEDURE — 85045 AUTOMATED RETICULOCYTE COUNT: CPT | Performed by: INTERNAL MEDICINE

## 2021-12-24 PROCEDURE — 99221 1ST HOSP IP/OBS SF/LOW 40: CPT | Performed by: INTERNAL MEDICINE

## 2021-12-24 PROCEDURE — 86850 RBC ANTIBODY SCREEN: CPT | Performed by: INTERNAL MEDICINE

## 2021-12-24 RX ORDER — INSULIN LISPRO 100 [IU]/ML
23 INJECTION, SOLUTION INTRAVENOUS; SUBCUTANEOUS
Status: DISCONTINUED | OUTPATIENT
Start: 2021-12-24 | End: 2021-12-26

## 2021-12-24 RX ORDER — AMLODIPINE BESYLATE 10 MG/1
10 TABLET ORAL
Status: DISCONTINUED | OUTPATIENT
Start: 2021-12-25 | End: 2021-12-27 | Stop reason: HOSPADM

## 2021-12-24 RX ORDER — HYDRALAZINE HYDROCHLORIDE 25 MG/1
25 TABLET, FILM COATED ORAL EVERY 8 HOURS SCHEDULED
Status: DISCONTINUED | OUTPATIENT
Start: 2021-12-24 | End: 2021-12-25

## 2021-12-24 RX ADMIN — SODIUM CHLORIDE, PRESERVATIVE FREE 10 ML: 5 INJECTION INTRAVENOUS at 09:41

## 2021-12-24 RX ADMIN — ASPIRIN 81 MG: 81 TABLET, COATED ORAL at 09:40

## 2021-12-24 RX ADMIN — INSULIN LISPRO 3 UNITS: 100 INJECTION, SOLUTION INTRAVENOUS; SUBCUTANEOUS at 14:18

## 2021-12-24 RX ADMIN — INSULIN LISPRO 4 UNITS: 100 INJECTION, SOLUTION INTRAVENOUS; SUBCUTANEOUS at 17:46

## 2021-12-24 RX ADMIN — INSULIN GLARGINE-YFGN 45 UNITS: 100 INJECTION, SOLUTION SUBCUTANEOUS at 20:44

## 2021-12-24 RX ADMIN — TAMSULOSIN HYDROCHLORIDE 0.4 MG: 0.4 CAPSULE ORAL at 09:40

## 2021-12-24 RX ADMIN — SODIUM CHLORIDE, PRESERVATIVE FREE 10 ML: 5 INJECTION INTRAVENOUS at 20:42

## 2021-12-24 RX ADMIN — BUMETANIDE 1 MG: 1 TABLET ORAL at 18:02

## 2021-12-24 RX ADMIN — HEPARIN SODIUM 10.4 UNITS/KG/HR: 5000 INJECTION INTRAVENOUS; SUBCUTANEOUS at 05:53

## 2021-12-24 RX ADMIN — FAMOTIDINE 20 MG: 20 TABLET, FILM COATED ORAL at 09:40

## 2021-12-24 RX ADMIN — INSULIN LISPRO 23 UNITS: 100 INJECTION, SOLUTION INTRAVENOUS; SUBCUTANEOUS at 17:46

## 2021-12-24 RX ADMIN — WARFARIN 5 MG: 5 TABLET ORAL at 17:45

## 2021-12-24 RX ADMIN — INSULIN LISPRO 20 UNITS: 100 INJECTION, SOLUTION INTRAVENOUS; SUBCUTANEOUS at 14:19

## 2021-12-24 RX ADMIN — Medication 1 CAPSULE: at 09:39

## 2021-12-24 RX ADMIN — DEXAMETHASONE 6 MG: 4 TABLET ORAL at 09:38

## 2021-12-24 RX ADMIN — BUMETANIDE 1 MG: 1 TABLET ORAL at 09:38

## 2021-12-24 RX ADMIN — HEPARIN SODIUM 13.4 UNITS/KG/HR: 5000 INJECTION INTRAVENOUS; SUBCUTANEOUS at 22:05

## 2021-12-24 RX ADMIN — INSULIN LISPRO 20 UNITS: 100 INJECTION, SOLUTION INTRAVENOUS; SUBCUTANEOUS at 09:40

## 2021-12-24 RX ADMIN — REMDESIVIR 100 MG: 100 INJECTION, POWDER, LYOPHILIZED, FOR SOLUTION INTRAVENOUS at 16:12

## 2021-12-24 RX ADMIN — INSULIN LISPRO 3 UNITS: 100 INJECTION, SOLUTION INTRAVENOUS; SUBCUTANEOUS at 20:42

## 2021-12-24 RX ADMIN — HYDRALAZINE HYDROCHLORIDE 25 MG: 25 TABLET, FILM COATED ORAL at 16:11

## 2021-12-24 RX ADMIN — Medication 1000 UNITS: at 09:38

## 2021-12-24 RX ADMIN — HEPARIN SODIUM 13.4 UNITS/KG/HR: 5000 INJECTION INTRAVENOUS; SUBCUTANEOUS at 04:41

## 2021-12-24 RX ADMIN — AMLODIPINE BESYLATE 5 MG: 5 TABLET ORAL at 09:39

## 2021-12-24 RX ADMIN — INSULIN GLARGINE-YFGN 45 UNITS: 100 INJECTION, SOLUTION SUBCUTANEOUS at 09:41

## 2021-12-25 LAB
ALBUMIN SERPL-MCNC: 3.2 G/DL (ref 3.5–5.2)
ALBUMIN/GLOB SERPL: 1.1 G/DL
ALP SERPL-CCNC: 67 U/L (ref 39–117)
ALT SERPL W P-5'-P-CCNC: 11 U/L (ref 1–41)
ANION GAP SERPL CALCULATED.3IONS-SCNC: 9.5 MMOL/L (ref 5–15)
APTT PPP: 79 SECONDS (ref 22.7–35.4)
APTT PPP: 93 SECONDS (ref 22.7–35.4)
AST SERPL-CCNC: 14 U/L (ref 1–40)
BACTERIA SPEC AEROBE CULT: NORMAL
BACTERIA SPEC AEROBE CULT: NORMAL
BASOPHILS # BLD AUTO: 0.01 10*3/MM3 (ref 0–0.2)
BASOPHILS NFR BLD AUTO: 0.2 % (ref 0–1.5)
BILIRUB SERPL-MCNC: 0.2 MG/DL (ref 0–1.2)
BUN SERPL-MCNC: 59 MG/DL (ref 8–23)
BUN/CREAT SERPL: 41.8 (ref 7–25)
CALCIUM SPEC-SCNC: 8.9 MG/DL (ref 8.6–10.5)
CHLORIDE SERPL-SCNC: 108 MMOL/L (ref 98–107)
CO2 SERPL-SCNC: 29.5 MMOL/L (ref 22–29)
CREAT SERPL-MCNC: 1.41 MG/DL (ref 0.76–1.27)
DEPRECATED RDW RBC AUTO: 48.3 FL (ref 37–54)
EOSINOPHIL # BLD AUTO: 0.08 10*3/MM3 (ref 0–0.4)
EOSINOPHIL NFR BLD AUTO: 1.2 % (ref 0.3–6.2)
ERYTHROCYTE [DISTWIDTH] IN BLOOD BY AUTOMATED COUNT: 14.8 % (ref 12.3–15.4)
GFR SERPL CREATININE-BSD FRML MDRD: 48 ML/MIN/1.73
GLOBULIN UR ELPH-MCNC: 3 GM/DL
GLUCOSE BLDC GLUCOMTR-MCNC: 143 MG/DL (ref 70–130)
GLUCOSE BLDC GLUCOMTR-MCNC: 157 MG/DL (ref 70–130)
GLUCOSE BLDC GLUCOMTR-MCNC: 227 MG/DL (ref 70–130)
GLUCOSE BLDC GLUCOMTR-MCNC: 233 MG/DL (ref 70–130)
GLUCOSE SERPL-MCNC: 103 MG/DL (ref 65–99)
HCT VFR BLD AUTO: 30 % (ref 37.5–51)
HGB BLD-MCNC: 9.5 G/DL (ref 13–17.7)
IMM GRANULOCYTES # BLD AUTO: 0.12 10*3/MM3 (ref 0–0.05)
IMM GRANULOCYTES NFR BLD AUTO: 1.8 % (ref 0–0.5)
INR PPP: 1.31 (ref 0.9–1.1)
LYMPHOCYTES # BLD AUTO: 0.57 10*3/MM3 (ref 0.7–3.1)
LYMPHOCYTES NFR BLD AUTO: 8.6 % (ref 19.6–45.3)
MCH RBC QN AUTO: 28.4 PG (ref 26.6–33)
MCHC RBC AUTO-ENTMCNC: 31.7 G/DL (ref 31.5–35.7)
MCV RBC AUTO: 89.6 FL (ref 79–97)
MONOCYTES # BLD AUTO: 0.63 10*3/MM3 (ref 0.1–0.9)
MONOCYTES NFR BLD AUTO: 9.5 % (ref 5–12)
NEUTROPHILS NFR BLD AUTO: 5.23 10*3/MM3 (ref 1.7–7)
NEUTROPHILS NFR BLD AUTO: 78.7 % (ref 42.7–76)
NRBC BLD AUTO-RTO: 0.2 /100 WBC (ref 0–0.2)
PLATELET # BLD AUTO: 222 10*3/MM3 (ref 140–450)
PMV BLD AUTO: 10.8 FL (ref 6–12)
POTASSIUM SERPL-SCNC: 4.4 MMOL/L (ref 3.5–5.2)
PROT SERPL-MCNC: 6.2 G/DL (ref 6–8.5)
PROTHROMBIN TIME: 16.1 SECONDS (ref 11.7–14.2)
RBC # BLD AUTO: 3.35 10*6/MM3 (ref 4.14–5.8)
SODIUM SERPL-SCNC: 147 MMOL/L (ref 136–145)
WBC NRBC COR # BLD: 6.64 10*3/MM3 (ref 3.4–10.8)

## 2021-12-25 PROCEDURE — 82962 GLUCOSE BLOOD TEST: CPT

## 2021-12-25 PROCEDURE — 99231 SBSQ HOSP IP/OBS SF/LOW 25: CPT | Performed by: INTERNAL MEDICINE

## 2021-12-25 PROCEDURE — 85610 PROTHROMBIN TIME: CPT | Performed by: HOSPITALIST

## 2021-12-25 PROCEDURE — 63710000001 DEXAMETHASONE PER 0.25 MG: Performed by: HOSPITALIST

## 2021-12-25 PROCEDURE — 63710000001 INSULIN LISPRO (HUMAN) PER 5 UNITS: Performed by: HOSPITALIST

## 2021-12-25 PROCEDURE — 85730 THROMBOPLASTIN TIME PARTIAL: CPT | Performed by: HOSPITALIST

## 2021-12-25 PROCEDURE — 25010000002 HEPARIN (PORCINE) PER 1000 UNITS

## 2021-12-25 PROCEDURE — 85025 COMPLETE CBC W/AUTO DIFF WBC: CPT | Performed by: HOSPITALIST

## 2021-12-25 PROCEDURE — 99232 SBSQ HOSP IP/OBS MODERATE 35: CPT | Performed by: INTERNAL MEDICINE

## 2021-12-25 PROCEDURE — 63710000001 INSULIN GLARGINE PER 5 UNITS: Performed by: HOSPITALIST

## 2021-12-25 PROCEDURE — 80053 COMPREHEN METABOLIC PANEL: CPT | Performed by: INTERNAL MEDICINE

## 2021-12-25 RX ORDER — ISOSORBIDE MONONITRATE 60 MG/1
60 TABLET, EXTENDED RELEASE ORAL
Status: DISCONTINUED | OUTPATIENT
Start: 2021-12-25 | End: 2021-12-27 | Stop reason: HOSPADM

## 2021-12-25 RX ORDER — BUMETANIDE 1 MG/1
1 TABLET ORAL
Status: DISCONTINUED | OUTPATIENT
Start: 2021-12-26 | End: 2021-12-27 | Stop reason: HOSPADM

## 2021-12-25 RX ORDER — HYDRALAZINE HYDROCHLORIDE 50 MG/1
50 TABLET, FILM COATED ORAL EVERY 8 HOURS SCHEDULED
Status: DISCONTINUED | OUTPATIENT
Start: 2021-12-25 | End: 2021-12-27 | Stop reason: HOSPADM

## 2021-12-25 RX ORDER — WARFARIN SODIUM 10 MG/1
10 TABLET ORAL
Status: COMPLETED | OUTPATIENT
Start: 2021-12-25 | End: 2021-12-25

## 2021-12-25 RX ADMIN — HYDRALAZINE HYDROCHLORIDE 25 MG: 25 TABLET, FILM COATED ORAL at 08:22

## 2021-12-25 RX ADMIN — DEXAMETHASONE 6 MG: 4 TABLET ORAL at 08:23

## 2021-12-25 RX ADMIN — INSULIN GLARGINE-YFGN 45 UNITS: 100 INJECTION, SOLUTION SUBCUTANEOUS at 20:52

## 2021-12-25 RX ADMIN — INSULIN LISPRO 3 UNITS: 100 INJECTION, SOLUTION INTRAVENOUS; SUBCUTANEOUS at 20:52

## 2021-12-25 RX ADMIN — SODIUM CHLORIDE, PRESERVATIVE FREE 10 ML: 5 INJECTION INTRAVENOUS at 08:23

## 2021-12-25 RX ADMIN — INSULIN LISPRO 23 UNITS: 100 INJECTION, SOLUTION INTRAVENOUS; SUBCUTANEOUS at 16:46

## 2021-12-25 RX ADMIN — FAMOTIDINE 20 MG: 20 TABLET, FILM COATED ORAL at 08:23

## 2021-12-25 RX ADMIN — ASPIRIN 81 MG: 81 TABLET, COATED ORAL at 08:23

## 2021-12-25 RX ADMIN — INSULIN GLARGINE-YFGN 45 UNITS: 100 INJECTION, SOLUTION SUBCUTANEOUS at 10:25

## 2021-12-25 RX ADMIN — AMLODIPINE BESYLATE 10 MG: 10 TABLET ORAL at 08:22

## 2021-12-25 RX ADMIN — HYDRALAZINE HYDROCHLORIDE 50 MG: 50 TABLET, FILM COATED ORAL at 16:45

## 2021-12-25 RX ADMIN — INSULIN LISPRO 2 UNITS: 100 INJECTION, SOLUTION INTRAVENOUS; SUBCUTANEOUS at 08:22

## 2021-12-25 RX ADMIN — POLYETHYLENE GLYCOL 3350 17 G: 17 POWDER, FOR SOLUTION ORAL at 08:23

## 2021-12-25 RX ADMIN — SODIUM CHLORIDE, PRESERVATIVE FREE 10 ML: 5 INJECTION INTRAVENOUS at 20:44

## 2021-12-25 RX ADMIN — Medication 1 CAPSULE: at 08:23

## 2021-12-25 RX ADMIN — HYDRALAZINE HYDROCHLORIDE 50 MG: 50 TABLET, FILM COATED ORAL at 23:35

## 2021-12-25 RX ADMIN — ISOSORBIDE MONONITRATE 60 MG: 60 TABLET ORAL at 16:45

## 2021-12-25 RX ADMIN — INSULIN LISPRO 3 UNITS: 100 INJECTION, SOLUTION INTRAVENOUS; SUBCUTANEOUS at 16:47

## 2021-12-25 RX ADMIN — INSULIN LISPRO 23 UNITS: 100 INJECTION, SOLUTION INTRAVENOUS; SUBCUTANEOUS at 13:06

## 2021-12-25 RX ADMIN — BUMETANIDE 1 MG: 1 TABLET ORAL at 08:22

## 2021-12-25 RX ADMIN — HEPARIN SODIUM 13.4 UNITS/KG/HR: 5000 INJECTION INTRAVENOUS; SUBCUTANEOUS at 13:07

## 2021-12-25 RX ADMIN — Medication 1000 UNITS: at 08:22

## 2021-12-25 RX ADMIN — TAMSULOSIN HYDROCHLORIDE 0.4 MG: 0.4 CAPSULE ORAL at 08:22

## 2021-12-25 RX ADMIN — INSULIN LISPRO 23 UNITS: 100 INJECTION, SOLUTION INTRAVENOUS; SUBCUTANEOUS at 08:23

## 2021-12-25 RX ADMIN — HYDRALAZINE HYDROCHLORIDE 25 MG: 25 TABLET, FILM COATED ORAL at 00:20

## 2021-12-25 RX ADMIN — WARFARIN 10 MG: 10 TABLET ORAL at 16:46

## 2021-12-26 LAB
ALBUMIN SERPL-MCNC: 3.1 G/DL (ref 3.5–5.2)
ALBUMIN/GLOB SERPL: 1.1 G/DL
ALP SERPL-CCNC: 62 U/L (ref 39–117)
ALT SERPL W P-5'-P-CCNC: 10 U/L (ref 1–41)
ANION GAP SERPL CALCULATED.3IONS-SCNC: 11.3 MMOL/L (ref 5–15)
APTT PPP: 175.5 SECONDS (ref 22.7–35.4)
APTT PPP: 50.6 SECONDS (ref 22.7–35.4)
AST SERPL-CCNC: 16 U/L (ref 1–40)
BASOPHILS # BLD AUTO: 0.01 10*3/MM3 (ref 0–0.2)
BASOPHILS NFR BLD AUTO: 0.2 % (ref 0–1.5)
BILIRUB SERPL-MCNC: 0.2 MG/DL (ref 0–1.2)
BUN SERPL-MCNC: 62 MG/DL (ref 8–23)
BUN/CREAT SERPL: 48.4 (ref 7–25)
CALCIUM SPEC-SCNC: 8.7 MG/DL (ref 8.6–10.5)
CHLORIDE SERPL-SCNC: 106 MMOL/L (ref 98–107)
CO2 SERPL-SCNC: 28.7 MMOL/L (ref 22–29)
CREAT SERPL-MCNC: 1.28 MG/DL (ref 0.76–1.27)
DEPRECATED RDW RBC AUTO: 47.4 FL (ref 37–54)
EOSINOPHIL # BLD AUTO: 0.07 10*3/MM3 (ref 0–0.4)
EOSINOPHIL NFR BLD AUTO: 1.1 % (ref 0.3–6.2)
ERYTHROCYTE [DISTWIDTH] IN BLOOD BY AUTOMATED COUNT: 15 % (ref 12.3–15.4)
GFR SERPL CREATININE-BSD FRML MDRD: 54 ML/MIN/1.73
GLOBULIN UR ELPH-MCNC: 2.8 GM/DL
GLUCOSE BLDC GLUCOMTR-MCNC: 161 MG/DL (ref 70–130)
GLUCOSE BLDC GLUCOMTR-MCNC: 195 MG/DL (ref 70–130)
GLUCOSE BLDC GLUCOMTR-MCNC: 23 MG/DL (ref 70–130)
GLUCOSE BLDC GLUCOMTR-MCNC: 301 MG/DL (ref 70–130)
GLUCOSE BLDC GLUCOMTR-MCNC: 59 MG/DL (ref 70–130)
GLUCOSE BLDC GLUCOMTR-MCNC: 73 MG/DL (ref 70–130)
GLUCOSE SERPL-MCNC: 111 MG/DL (ref 65–99)
HCT VFR BLD AUTO: 27.2 % (ref 37.5–51)
HEMOCCULT STL QL: NEGATIVE
HGB BLD-MCNC: 8.6 G/DL (ref 13–17.7)
IMM GRANULOCYTES # BLD AUTO: 0.17 10*3/MM3 (ref 0–0.05)
IMM GRANULOCYTES NFR BLD AUTO: 2.7 % (ref 0–0.5)
INR PPP: 1.82 (ref 0.9–1.1)
LYMPHOCYTES # BLD AUTO: 0.53 10*3/MM3 (ref 0.7–3.1)
LYMPHOCYTES NFR BLD AUTO: 8.4 % (ref 19.6–45.3)
MCH RBC QN AUTO: 27.5 PG (ref 26.6–33)
MCHC RBC AUTO-ENTMCNC: 31.6 G/DL (ref 31.5–35.7)
MCV RBC AUTO: 86.9 FL (ref 79–97)
MONOCYTES # BLD AUTO: 0.62 10*3/MM3 (ref 0.1–0.9)
MONOCYTES NFR BLD AUTO: 9.8 % (ref 5–12)
NEUTROPHILS NFR BLD AUTO: 4.92 10*3/MM3 (ref 1.7–7)
NEUTROPHILS NFR BLD AUTO: 77.8 % (ref 42.7–76)
NRBC BLD AUTO-RTO: 0 /100 WBC (ref 0–0.2)
PLATELET # BLD AUTO: 221 10*3/MM3 (ref 140–450)
PMV BLD AUTO: 10.6 FL (ref 6–12)
POTASSIUM SERPL-SCNC: 4.5 MMOL/L (ref 3.5–5.2)
PROT SERPL-MCNC: 5.9 G/DL (ref 6–8.5)
PROTHROMBIN TIME: 20.8 SECONDS (ref 11.7–14.2)
RBC # BLD AUTO: 3.13 10*6/MM3 (ref 4.14–5.8)
SODIUM SERPL-SCNC: 146 MMOL/L (ref 136–145)
WBC NRBC COR # BLD: 6.32 10*3/MM3 (ref 3.4–10.8)

## 2021-12-26 PROCEDURE — 82272 OCCULT BLD FECES 1-3 TESTS: CPT | Performed by: INTERNAL MEDICINE

## 2021-12-26 PROCEDURE — 85025 COMPLETE CBC W/AUTO DIFF WBC: CPT | Performed by: HOSPITALIST

## 2021-12-26 PROCEDURE — 97530 THERAPEUTIC ACTIVITIES: CPT

## 2021-12-26 PROCEDURE — 85730 THROMBOPLASTIN TIME PARTIAL: CPT | Performed by: HOSPITALIST

## 2021-12-26 PROCEDURE — 80053 COMPREHEN METABOLIC PANEL: CPT | Performed by: INTERNAL MEDICINE

## 2021-12-26 PROCEDURE — 63710000001 DEXAMETHASONE PER 0.25 MG: Performed by: HOSPITALIST

## 2021-12-26 PROCEDURE — 99232 SBSQ HOSP IP/OBS MODERATE 35: CPT | Performed by: INTERNAL MEDICINE

## 2021-12-26 PROCEDURE — 97110 THERAPEUTIC EXERCISES: CPT

## 2021-12-26 PROCEDURE — 63710000001 INSULIN LISPRO (HUMAN) PER 5 UNITS: Performed by: HOSPITALIST

## 2021-12-26 PROCEDURE — 85610 PROTHROMBIN TIME: CPT | Performed by: HOSPITALIST

## 2021-12-26 PROCEDURE — 94799 UNLISTED PULMONARY SVC/PX: CPT

## 2021-12-26 PROCEDURE — 82962 GLUCOSE BLOOD TEST: CPT

## 2021-12-26 RX ORDER — NICOTINE POLACRILEX 4 MG
15 LOZENGE BUCCAL
Status: DISCONTINUED | OUTPATIENT
Start: 2021-12-26 | End: 2021-12-27

## 2021-12-26 RX ORDER — INSULIN LISPRO 100 [IU]/ML
20 INJECTION, SOLUTION INTRAVENOUS; SUBCUTANEOUS
Status: DISCONTINUED | OUTPATIENT
Start: 2021-12-26 | End: 2021-12-27 | Stop reason: HOSPADM

## 2021-12-26 RX ORDER — WARFARIN SODIUM 5 MG/1
5 TABLET ORAL
Status: COMPLETED | OUTPATIENT
Start: 2021-12-26 | End: 2021-12-26

## 2021-12-26 RX ORDER — DEXTROSE MONOHYDRATE 25 G/50ML
25 INJECTION, SOLUTION INTRAVENOUS
Status: DISCONTINUED | OUTPATIENT
Start: 2021-12-26 | End: 2021-12-27

## 2021-12-26 RX ADMIN — AMLODIPINE BESYLATE 10 MG: 10 TABLET ORAL at 10:33

## 2021-12-26 RX ADMIN — BUMETANIDE 1 MG: 1 TABLET ORAL at 18:22

## 2021-12-26 RX ADMIN — TAMSULOSIN HYDROCHLORIDE 0.4 MG: 0.4 CAPSULE ORAL at 10:35

## 2021-12-26 RX ADMIN — HYDRALAZINE HYDROCHLORIDE 50 MG: 50 TABLET, FILM COATED ORAL at 16:50

## 2021-12-26 RX ADMIN — INSULIN LISPRO 23 UNITS: 100 INJECTION, SOLUTION INTRAVENOUS; SUBCUTANEOUS at 12:39

## 2021-12-26 RX ADMIN — Medication 1000 UNITS: at 10:32

## 2021-12-26 RX ADMIN — POLYETHYLENE GLYCOL 3350 17 G: 17 POWDER, FOR SOLUTION ORAL at 10:36

## 2021-12-26 RX ADMIN — ISOSORBIDE MONONITRATE 60 MG: 60 TABLET ORAL at 10:30

## 2021-12-26 RX ADMIN — DEXAMETHASONE 6 MG: 4 TABLET ORAL at 10:30

## 2021-12-26 RX ADMIN — BUMETANIDE 1 MG: 1 TABLET ORAL at 10:31

## 2021-12-26 RX ADMIN — SODIUM CHLORIDE, PRESERVATIVE FREE 10 ML: 5 INJECTION INTRAVENOUS at 10:44

## 2021-12-26 RX ADMIN — INSULIN LISPRO 23 UNITS: 100 INJECTION, SOLUTION INTRAVENOUS; SUBCUTANEOUS at 10:36

## 2021-12-26 RX ADMIN — INSULIN GLARGINE-YFGN 45 UNITS: 100 INJECTION, SOLUTION SUBCUTANEOUS at 21:04

## 2021-12-26 RX ADMIN — FAMOTIDINE 20 MG: 20 TABLET, FILM COATED ORAL at 10:30

## 2021-12-26 RX ADMIN — INSULIN LISPRO 2 UNITS: 100 INJECTION, SOLUTION INTRAVENOUS; SUBCUTANEOUS at 12:40

## 2021-12-26 RX ADMIN — INSULIN LISPRO 2 UNITS: 100 INJECTION, SOLUTION INTRAVENOUS; SUBCUTANEOUS at 18:24

## 2021-12-26 RX ADMIN — ALPRAZOLAM 0.5 MG: 0.5 TABLET ORAL at 21:03

## 2021-12-26 RX ADMIN — INSULIN LISPRO 5 UNITS: 100 INJECTION, SOLUTION INTRAVENOUS; SUBCUTANEOUS at 21:03

## 2021-12-26 RX ADMIN — Medication 1 CAPSULE: at 10:33

## 2021-12-26 RX ADMIN — WARFARIN 5 MG: 5 TABLET ORAL at 18:22

## 2021-12-26 RX ADMIN — INSULIN GLARGINE-YFGN 45 UNITS: 100 INJECTION, SOLUTION SUBCUTANEOUS at 10:53

## 2021-12-26 RX ADMIN — ASPIRIN 81 MG: 81 TABLET, COATED ORAL at 10:44

## 2021-12-26 RX ADMIN — HYDRALAZINE HYDROCHLORIDE 50 MG: 50 TABLET, FILM COATED ORAL at 10:34

## 2021-12-26 RX ADMIN — INSULIN LISPRO 20 UNITS: 100 INJECTION, SOLUTION INTRAVENOUS; SUBCUTANEOUS at 18:23

## 2021-12-27 ENCOUNTER — READMISSION MANAGEMENT (OUTPATIENT)
Dept: CALL CENTER | Facility: HOSPITAL | Age: 81
End: 2021-12-27

## 2021-12-27 VITALS
SYSTOLIC BLOOD PRESSURE: 117 MMHG | RESPIRATION RATE: 18 BRPM | BODY MASS INDEX: 42.64 KG/M2 | TEMPERATURE: 98.4 F | WEIGHT: 287.92 LBS | DIASTOLIC BLOOD PRESSURE: 63 MMHG | HEIGHT: 69 IN | OXYGEN SATURATION: 93 % | HEART RATE: 60 BPM

## 2021-12-27 LAB
ALBUMIN SERPL-MCNC: 3.1 G/DL (ref 3.5–5.2)
ALBUMIN/GLOB SERPL: 1.2 G/DL
ALP SERPL-CCNC: 57 U/L (ref 39–117)
ALT SERPL W P-5'-P-CCNC: 12 U/L (ref 1–41)
ANION GAP SERPL CALCULATED.3IONS-SCNC: 9.3 MMOL/L (ref 5–15)
APTT PPP: 137 SECONDS (ref 22.7–35.4)
APTT PPP: 82.9 SECONDS (ref 22.7–35.4)
AST SERPL-CCNC: 18 U/L (ref 1–40)
BASOPHILS # BLD AUTO: 0.01 10*3/MM3 (ref 0–0.2)
BASOPHILS NFR BLD AUTO: 0.2 % (ref 0–1.5)
BILIRUB SERPL-MCNC: <0.2 MG/DL (ref 0–1.2)
BUN SERPL-MCNC: 68 MG/DL (ref 8–23)
BUN/CREAT SERPL: 45.3 (ref 7–25)
CALCIUM SPEC-SCNC: 8.6 MG/DL (ref 8.6–10.5)
CHLORIDE SERPL-SCNC: 107 MMOL/L (ref 98–107)
CO2 SERPL-SCNC: 26.7 MMOL/L (ref 22–29)
CREAT SERPL-MCNC: 1.5 MG/DL (ref 0.76–1.27)
DEPRECATED RDW RBC AUTO: 44.1 FL (ref 37–54)
EOSINOPHIL # BLD AUTO: 0.02 10*3/MM3 (ref 0–0.4)
EOSINOPHIL NFR BLD AUTO: 0.4 % (ref 0.3–6.2)
ERYTHROCYTE [DISTWIDTH] IN BLOOD BY AUTOMATED COUNT: 14.5 % (ref 12.3–15.4)
GFR SERPL CREATININE-BSD FRML MDRD: 45 ML/MIN/1.73
GLOBULIN UR ELPH-MCNC: 2.6 GM/DL
GLUCOSE BLDC GLUCOMTR-MCNC: 118 MG/DL (ref 70–130)
GLUCOSE BLDC GLUCOMTR-MCNC: 73 MG/DL (ref 70–130)
GLUCOSE SERPL-MCNC: 75 MG/DL (ref 65–99)
HCT VFR BLD AUTO: 25.5 % (ref 37.5–51)
HGB BLD-MCNC: 8.5 G/DL (ref 13–17.7)
IMM GRANULOCYTES # BLD AUTO: 0.09 10*3/MM3 (ref 0–0.05)
IMM GRANULOCYTES NFR BLD AUTO: 1.9 % (ref 0–0.5)
INR PPP: 2.44 (ref 0.9–1.1)
LYMPHOCYTES # BLD AUTO: 0.44 10*3/MM3 (ref 0.7–3.1)
LYMPHOCYTES NFR BLD AUTO: 9.4 % (ref 19.6–45.3)
MCH RBC QN AUTO: 28 PG (ref 26.6–33)
MCHC RBC AUTO-ENTMCNC: 33.3 G/DL (ref 31.5–35.7)
MCV RBC AUTO: 83.9 FL (ref 79–97)
MONOCYTES # BLD AUTO: 0.39 10*3/MM3 (ref 0.1–0.9)
MONOCYTES NFR BLD AUTO: 8.3 % (ref 5–12)
NEUTROPHILS NFR BLD AUTO: 3.73 10*3/MM3 (ref 1.7–7)
NEUTROPHILS NFR BLD AUTO: 79.8 % (ref 42.7–76)
NRBC BLD AUTO-RTO: 0.2 /100 WBC (ref 0–0.2)
PLATELET # BLD AUTO: 191 10*3/MM3 (ref 140–450)
PMV BLD AUTO: 10.9 FL (ref 6–12)
POTASSIUM SERPL-SCNC: 4.5 MMOL/L (ref 3.5–5.2)
PROT SERPL-MCNC: 5.7 G/DL (ref 6–8.5)
PROTHROMBIN TIME: 26.2 SECONDS (ref 11.7–14.2)
RBC # BLD AUTO: 3.04 10*6/MM3 (ref 4.14–5.8)
SODIUM SERPL-SCNC: 143 MMOL/L (ref 136–145)
WBC NRBC COR # BLD: 4.68 10*3/MM3 (ref 3.4–10.8)

## 2021-12-27 PROCEDURE — 85610 PROTHROMBIN TIME: CPT | Performed by: HOSPITALIST

## 2021-12-27 PROCEDURE — 25010000002 HEPARIN (PORCINE) PER 1000 UNITS: Performed by: HOSPITALIST

## 2021-12-27 PROCEDURE — 80053 COMPREHEN METABOLIC PANEL: CPT | Performed by: INTERNAL MEDICINE

## 2021-12-27 PROCEDURE — 63710000001 DEXAMETHASONE PER 0.25 MG: Performed by: HOSPITALIST

## 2021-12-27 PROCEDURE — 85730 THROMBOPLASTIN TIME PARTIAL: CPT | Performed by: HOSPITALIST

## 2021-12-27 PROCEDURE — 85025 COMPLETE CBC W/AUTO DIFF WBC: CPT | Performed by: HOSPITALIST

## 2021-12-27 PROCEDURE — 82962 GLUCOSE BLOOD TEST: CPT

## 2021-12-27 PROCEDURE — 99231 SBSQ HOSP IP/OBS SF/LOW 25: CPT | Performed by: INTERNAL MEDICINE

## 2021-12-27 RX ORDER — WARFARIN SODIUM 2.5 MG/1
2.5 TABLET ORAL
Status: DISCONTINUED | OUTPATIENT
Start: 2021-12-27 | End: 2021-12-27 | Stop reason: HOSPADM

## 2021-12-27 RX ORDER — HYDRALAZINE HYDROCHLORIDE 50 MG/1
50 TABLET, FILM COATED ORAL EVERY 8 HOURS SCHEDULED
Qty: 90 TABLET | Refills: 0 | Status: SHIPPED | OUTPATIENT
Start: 2021-12-27 | End: 2022-01-26

## 2021-12-27 RX ORDER — AMLODIPINE BESYLATE 10 MG/1
10 TABLET ORAL
Qty: 30 TABLET | Refills: 0 | Status: SHIPPED | OUTPATIENT
Start: 2021-12-28 | End: 2022-01-27

## 2021-12-27 RX ORDER — ASPIRIN 81 MG/1
81 TABLET ORAL DAILY
Qty: 30 TABLET | Refills: 0 | Status: SHIPPED | OUTPATIENT
Start: 2021-12-28 | End: 2022-01-27

## 2021-12-27 RX ORDER — BUMETANIDE 1 MG/1
1 TABLET ORAL 2 TIMES DAILY
Qty: 60 TABLET | Refills: 0 | Status: SHIPPED | OUTPATIENT
Start: 2021-12-27 | End: 2022-01-26

## 2021-12-27 RX ORDER — WARFARIN SODIUM 2.5 MG/1
TABLET ORAL
Qty: 30 TABLET | Refills: 0 | Status: SHIPPED | OUTPATIENT
Start: 2021-12-27

## 2021-12-27 RX ORDER — DEXAMETHASONE 6 MG/1
6 TABLET ORAL
Qty: 1 TABLET | Refills: 0 | Status: SHIPPED | OUTPATIENT
Start: 2021-12-28 | End: 2021-12-29

## 2021-12-27 RX ADMIN — HYDRALAZINE HYDROCHLORIDE 50 MG: 50 TABLET, FILM COATED ORAL at 01:07

## 2021-12-27 RX ADMIN — TAMSULOSIN HYDROCHLORIDE 0.4 MG: 0.4 CAPSULE ORAL at 09:20

## 2021-12-27 RX ADMIN — ASPIRIN 81 MG: 81 TABLET, COATED ORAL at 09:20

## 2021-12-27 RX ADMIN — HYDRALAZINE HYDROCHLORIDE 50 MG: 50 TABLET, FILM COATED ORAL at 09:20

## 2021-12-27 RX ADMIN — Medication 1 CAPSULE: at 09:20

## 2021-12-27 RX ADMIN — AMLODIPINE BESYLATE 10 MG: 10 TABLET ORAL at 09:20

## 2021-12-27 RX ADMIN — INSULIN GLARGINE-YFGN 45 UNITS: 100 INJECTION, SOLUTION SUBCUTANEOUS at 09:21

## 2021-12-27 RX ADMIN — ISOSORBIDE MONONITRATE 60 MG: 60 TABLET ORAL at 09:20

## 2021-12-27 RX ADMIN — DEXAMETHASONE 6 MG: 4 TABLET ORAL at 09:20

## 2021-12-27 RX ADMIN — FAMOTIDINE 20 MG: 20 TABLET, FILM COATED ORAL at 09:20

## 2021-12-27 RX ADMIN — HEPARIN SODIUM 8.4 UNITS/KG/HR: 5000 INJECTION INTRAVENOUS; SUBCUTANEOUS at 02:09

## 2021-12-27 RX ADMIN — BUMETANIDE 1 MG: 1 TABLET ORAL at 09:20

## 2021-12-28 ENCOUNTER — READMISSION MANAGEMENT (OUTPATIENT)
Dept: CALL CENTER | Facility: HOSPITAL | Age: 81
End: 2021-12-28

## 2021-12-28 LAB
FOLATE BLD-MCNC: 385 NG/ML
FOLATE RBC-MCNC: 1167 NG/ML
HCT VFR BLD AUTO: 33 % (ref 37.5–51)
PS IGA SER-ACNC: 1 APS UNITS (ref 0–19)
PS IGG SER-ACNC: 9 UNITS (ref 0–30)
PS IGM SER-ACNC: 17 UNITS (ref 0–30)

## 2021-12-28 NOTE — OUTREACH NOTE
COVID-19 Week 1 Survey      Responses   [facility] patient discharged from? [place]   Does the patient have one of the following disease processes/diagnoses(primary or secondary)? COVID-19   COVID-19 underlying condition? None   Call Number Call 1   Week 1 Call successful? No   Discharge diagnosis COVID-19 pneumonia          Lissa Armstrong RN

## 2021-12-28 NOTE — OUTREACH NOTE
Prep Survey      Responses   [facility] patient discharged from? [place]   Is LACE score < 7 ? No   Emergency Room discharge w/ pulse ox? No   Eligibility Readm Mgmt   Discharge diagnosis COVID-19 pneumonia   Does the patient have one of the following disease processes/diagnoses(primary or secondary)? COVID-19   Does the patient have Home health ordered? No   Is there a DME ordered? No   Prep survey completed? Yes          Angeles Dinero RN

## 2021-12-29 ENCOUNTER — READMISSION MANAGEMENT (OUTPATIENT)
Dept: CALL CENTER | Facility: HOSPITAL | Age: 81
End: 2021-12-29

## 2021-12-29 NOTE — OUTREACH NOTE
COVID-19 Week 1 Survey      Responses   [facility] patient discharged from? [place]   Does the patient have one of the following disease processes/diagnoses(primary or secondary)? COVID-19   COVID-19 underlying condition? None   Call Number Call 2   Week 1 Call successful? No   Discharge diagnosis COVID-19 pneumonia          Rosemary Hernandez RN

## 2021-12-30 ENCOUNTER — TELEPHONE (OUTPATIENT)
Dept: CARDIOLOGY | Facility: CLINIC | Age: 81
End: 2021-12-30

## 2021-12-30 ENCOUNTER — READMISSION MANAGEMENT (OUTPATIENT)
Dept: CALL CENTER | Facility: HOSPITAL | Age: 81
End: 2021-12-30

## 2021-12-30 DIAGNOSIS — I26.99 PULMONARY EMBOLISM, UNSPECIFIED CHRONICITY, UNSPECIFIED PULMONARY EMBOLISM TYPE, UNSPECIFIED WHETHER ACUTE COR PULMONALE PRESENT (HCC): Primary | ICD-10-CM

## 2021-12-30 NOTE — TELEPHONE ENCOUNTER
Lexi olguin stating she has Ariel an appt with Medication management at the \[facility]\"" on 1/6/21  The Warfarin clinic needs an order from you so they can do his INR on th 6th.    [phone]

## 2021-12-30 NOTE — OUTREACH NOTE
COVID-19 Week 1 Survey      Responses   [facility] [place] patient discharged from? [place]   Does the patient have one of the following disease processes/diagnoses(primary or secondary)? COVID-19   COVID-19 underlying condition? None   Call Number Call 3   Week 1 Call successful? No   Discharge diagnosis COVID-19 pneumonia          Saida De Jesus RN

## 2021-12-31 LAB — METHYLMALONATE SERPL-SCNC: 608 NMOL/L (ref 0–378)

## 2022-01-07 ENCOUNTER — TELEPHONE (OUTPATIENT)
Dept: PHARMACY | Facility: HOSPITAL | Age: 82
End: 2022-01-07

## 2022-01-07 NOTE — TELEPHONE ENCOUNTER
"Mr. Elliott was a no-show for his Anticoagulation encounter yesterday. Contacted Lexi (daughter), who [facility] She reports he visited the ED yesterday with complaints of recurrent GI bleed (see other hospital admission 12/19/21 - 12/27/21). She reports his INR on admission was \"6.7,\" and they administered vitamin k in the hospital. She reports she has heard a plan including the holding of anticoagulation in preparation for EGD/colonoscopy. She is agreeable to alerting our clinic when he is discharged from the hospital and he has been cleared to restart warfarin.   "

## 2022-01-19 PROCEDURE — 93294 REM INTERROG EVL PM/LDLS PM: CPT | Performed by: INTERNAL MEDICINE

## 2022-01-19 PROCEDURE — 93296 REM INTERROG EVL PM/IDS: CPT | Performed by: INTERNAL MEDICINE
